# Patient Record
Sex: FEMALE | Race: BLACK OR AFRICAN AMERICAN | Employment: OTHER | URBAN - METROPOLITAN AREA
[De-identification: names, ages, dates, MRNs, and addresses within clinical notes are randomized per-mention and may not be internally consistent; named-entity substitution may affect disease eponyms.]

---

## 2017-01-25 RX ORDER — FLUTICASONE PROPIONATE 50 MCG
SPRAY, SUSPENSION (ML) NASAL
Qty: 1 BOTTLE | Refills: 3 | Status: CANCELLED | OUTPATIENT
Start: 2017-01-25

## 2017-01-25 RX ORDER — FLUTICASONE PROPIONATE 50 MCG
SPRAY, SUSPENSION (ML) NASAL
Qty: 1 BOTTLE | Refills: 3 | Status: SHIPPED | OUTPATIENT
Start: 2017-01-25 | End: 2017-06-20

## 2017-02-27 RX ORDER — SOLIFENACIN SUCCINATE 10 MG/1
TABLET, FILM COATED ORAL
Qty: 30 TABLET | Refills: 0 | Status: SHIPPED | OUTPATIENT
Start: 2017-02-27 | End: 2017-04-04 | Stop reason: SDUPTHER

## 2017-03-23 ENCOUNTER — OFFICE VISIT (OUTPATIENT)
Dept: PRIMARY CARE CLINIC | Age: 62
End: 2017-03-23

## 2017-03-23 VITALS
HEART RATE: 58 BPM | WEIGHT: 233.6 LBS | OXYGEN SATURATION: 98 % | BODY MASS INDEX: 36.59 KG/M2 | SYSTOLIC BLOOD PRESSURE: 122 MMHG | TEMPERATURE: 97.8 F | DIASTOLIC BLOOD PRESSURE: 76 MMHG

## 2017-03-23 DIAGNOSIS — E04.9 GOITER: Primary | ICD-10-CM

## 2017-03-23 DIAGNOSIS — L85.3 DRY SKIN: ICD-10-CM

## 2017-03-23 DIAGNOSIS — E04.9 GOITER: ICD-10-CM

## 2017-03-23 DIAGNOSIS — R10.13 EPIGASTRIC PAIN: ICD-10-CM

## 2017-03-23 LAB
ALBUMIN SERPL-MCNC: 4.1 G/DL (ref 3.4–5)
ALP BLD-CCNC: 86 U/L (ref 40–129)
ALT SERPL-CCNC: 8 U/L (ref 10–40)
AST SERPL-CCNC: 14 U/L (ref 15–37)
BASOPHILS ABSOLUTE: 0 K/UL (ref 0–0.2)
BASOPHILS RELATIVE PERCENT: 1 %
BILIRUB SERPL-MCNC: <0.2 MG/DL (ref 0–1)
BILIRUBIN DIRECT: <0.2 MG/DL (ref 0–0.3)
BILIRUBIN, INDIRECT: ABNORMAL MG/DL (ref 0–1)
EOSINOPHILS ABSOLUTE: 0.3 K/UL (ref 0–0.6)
EOSINOPHILS RELATIVE PERCENT: 6.6 %
HCT VFR BLD CALC: 40.1 % (ref 36–48)
HEMOGLOBIN: 12.7 G/DL (ref 12–16)
LIPASE: 11 U/L (ref 13–60)
LYMPHOCYTES ABSOLUTE: 1.5 K/UL (ref 1–5.1)
LYMPHOCYTES RELATIVE PERCENT: 36.3 %
MCH RBC QN AUTO: 28.2 PG (ref 26–34)
MCHC RBC AUTO-ENTMCNC: 31.7 G/DL (ref 31–36)
MCV RBC AUTO: 89 FL (ref 80–100)
MONOCYTES ABSOLUTE: 0.4 K/UL (ref 0–1.3)
MONOCYTES RELATIVE PERCENT: 10 %
NEUTROPHILS ABSOLUTE: 1.9 K/UL (ref 1.7–7.7)
NEUTROPHILS RELATIVE PERCENT: 46.1 %
PDW BLD-RTO: 14.5 % (ref 12.4–15.4)
PLATELET # BLD: 264 K/UL (ref 135–450)
PMV BLD AUTO: 9.2 FL (ref 5–10.5)
RBC # BLD: 4.51 M/UL (ref 4–5.2)
TOTAL PROTEIN: 7.7 G/DL (ref 6.4–8.2)
TROPONIN: <0.01 NG/ML
TSH REFLEX: 1.56 UIU/ML (ref 0.27–4.2)
WBC # BLD: 4.2 K/UL (ref 4–11)

## 2017-03-23 PROCEDURE — 1036F TOBACCO NON-USER: CPT | Performed by: INTERNAL MEDICINE

## 2017-03-23 PROCEDURE — 99214 OFFICE O/P EST MOD 30 MIN: CPT | Performed by: INTERNAL MEDICINE

## 2017-03-23 PROCEDURE — 3017F COLORECTAL CA SCREEN DOC REV: CPT | Performed by: INTERNAL MEDICINE

## 2017-03-23 PROCEDURE — G8417 CALC BMI ABV UP PARAM F/U: HCPCS | Performed by: INTERNAL MEDICINE

## 2017-03-23 PROCEDURE — G8427 DOCREV CUR MEDS BY ELIG CLIN: HCPCS | Performed by: INTERNAL MEDICINE

## 2017-03-23 PROCEDURE — G8484 FLU IMMUNIZE NO ADMIN: HCPCS | Performed by: INTERNAL MEDICINE

## 2017-03-23 PROCEDURE — 3014F SCREEN MAMMO DOC REV: CPT | Performed by: INTERNAL MEDICINE

## 2017-03-23 ASSESSMENT — ENCOUNTER SYMPTOMS
ABDOMINAL DISTENTION: 0
SORE THROAT: 0
NAUSEA: 1
BLOOD IN STOOL: 0
RECTAL PAIN: 0
CONSTIPATION: 0
ANAL BLEEDING: 0
ABDOMINAL PAIN: 1
DIARRHEA: 1
BACK PAIN: 0
VOMITING: 1
SHORTNESS OF BREATH: 0
COUGH: 0

## 2017-03-27 ENCOUNTER — TELEPHONE (OUTPATIENT)
Dept: PRIMARY CARE CLINIC | Age: 62
End: 2017-03-27

## 2017-03-27 ENCOUNTER — HOSPITAL ENCOUNTER (OUTPATIENT)
Dept: ULTRASOUND IMAGING | Age: 62
Discharge: HOME | End: 2017-03-27

## 2017-03-27 DIAGNOSIS — E04.2 MULTINODULAR GOITER (NONTOXIC): Primary | ICD-10-CM

## 2017-03-27 DIAGNOSIS — R10.13 EPIGASTRIC PAIN: ICD-10-CM

## 2017-03-27 DIAGNOSIS — E04.9 NONTOXIC GOITER: ICD-10-CM

## 2017-03-27 DIAGNOSIS — E04.9 GOITER: ICD-10-CM

## 2017-03-30 ENCOUNTER — OFFICE VISIT (OUTPATIENT)
Dept: ENT CLINIC | Age: 62
End: 2017-03-30

## 2017-03-30 VITALS
HEART RATE: 71 BPM | DIASTOLIC BLOOD PRESSURE: 81 MMHG | RESPIRATION RATE: 24 BRPM | HEIGHT: 67 IN | WEIGHT: 230 LBS | BODY MASS INDEX: 36.1 KG/M2 | SYSTOLIC BLOOD PRESSURE: 131 MMHG

## 2017-03-30 DIAGNOSIS — E04.1 THYROID NODULE: Primary | ICD-10-CM

## 2017-03-30 DIAGNOSIS — J30.9 ALLERGIC SINUSITIS: ICD-10-CM

## 2017-03-30 PROCEDURE — 3014F SCREEN MAMMO DOC REV: CPT | Performed by: OTOLARYNGOLOGY

## 2017-03-30 PROCEDURE — 99203 OFFICE O/P NEW LOW 30 MIN: CPT | Performed by: OTOLARYNGOLOGY

## 2017-03-30 PROCEDURE — G8417 CALC BMI ABV UP PARAM F/U: HCPCS | Performed by: OTOLARYNGOLOGY

## 2017-03-30 PROCEDURE — 1036F TOBACCO NON-USER: CPT | Performed by: OTOLARYNGOLOGY

## 2017-03-30 PROCEDURE — G8427 DOCREV CUR MEDS BY ELIG CLIN: HCPCS | Performed by: OTOLARYNGOLOGY

## 2017-03-30 PROCEDURE — G8484 FLU IMMUNIZE NO ADMIN: HCPCS | Performed by: OTOLARYNGOLOGY

## 2017-03-30 PROCEDURE — 3017F COLORECTAL CA SCREEN DOC REV: CPT | Performed by: OTOLARYNGOLOGY

## 2017-03-30 RX ORDER — LEVOTHYROXINE SODIUM 0.1 MG/1
100 TABLET ORAL DAILY
Qty: 30 TABLET | Refills: 3 | Status: SHIPPED | OUTPATIENT
Start: 2017-03-30 | End: 2017-06-20 | Stop reason: SDUPTHER

## 2017-03-30 RX ORDER — METHYLPREDNISOLONE 4 MG/1
4 TABLET ORAL SEE ADMIN INSTRUCTIONS
Qty: 1 KIT | Refills: 0 | Status: SHIPPED | OUTPATIENT
Start: 2017-03-30 | End: 2017-06-20

## 2017-03-30 ASSESSMENT — ENCOUNTER SYMPTOMS
FACIAL SWELLING: 0
ALLERGIC/IMMUNOLOGIC NEGATIVE: 1
RESPIRATORY NEGATIVE: 1
EYES NEGATIVE: 1
STRIDOR: 0
VOICE CHANGE: 0
SORE THROAT: 0
TROUBLE SWALLOWING: 1

## 2017-04-17 ENCOUNTER — TELEPHONE (OUTPATIENT)
Dept: PRIMARY CARE CLINIC | Age: 62
End: 2017-04-17

## 2017-04-17 RX ORDER — FLUCONAZOLE 150 MG/1
150 TABLET ORAL ONCE
Qty: 1 TABLET | Refills: 0 | Status: SHIPPED | OUTPATIENT
Start: 2017-04-17 | End: 2017-04-17

## 2017-04-18 ENCOUNTER — TELEPHONE (OUTPATIENT)
Dept: ENT CLINIC | Age: 62
End: 2017-04-18

## 2017-04-18 DIAGNOSIS — J02.9 PHARYNGITIS, UNSPECIFIED ETIOLOGY: Primary | ICD-10-CM

## 2017-04-18 RX ORDER — AZITHROMYCIN 250 MG/1
250 TABLET, FILM COATED ORAL DAILY
Qty: 1 PACKET | Refills: 0 | Status: SHIPPED | OUTPATIENT
Start: 2017-04-18 | End: 2017-06-20

## 2017-05-02 ENCOUNTER — OFFICE VISIT (OUTPATIENT)
Dept: ENT CLINIC | Age: 62
End: 2017-05-02

## 2017-05-02 VITALS
WEIGHT: 220 LBS | HEIGHT: 67 IN | DIASTOLIC BLOOD PRESSURE: 67 MMHG | HEART RATE: 63 BPM | SYSTOLIC BLOOD PRESSURE: 124 MMHG | BODY MASS INDEX: 34.53 KG/M2

## 2017-05-02 DIAGNOSIS — E04.1 THYROID NODULE: Primary | ICD-10-CM

## 2017-05-02 DIAGNOSIS — J34.2 NOSE SEPTUM DEVIATION: ICD-10-CM

## 2017-05-02 DIAGNOSIS — H61.23 BILATERAL IMPACTED CERUMEN: ICD-10-CM

## 2017-05-02 PROCEDURE — 3017F COLORECTAL CA SCREEN DOC REV: CPT | Performed by: OTOLARYNGOLOGY

## 2017-05-02 PROCEDURE — 1036F TOBACCO NON-USER: CPT | Performed by: OTOLARYNGOLOGY

## 2017-05-02 PROCEDURE — G8417 CALC BMI ABV UP PARAM F/U: HCPCS | Performed by: OTOLARYNGOLOGY

## 2017-05-02 PROCEDURE — 99213 OFFICE O/P EST LOW 20 MIN: CPT | Performed by: OTOLARYNGOLOGY

## 2017-05-02 PROCEDURE — 3014F SCREEN MAMMO DOC REV: CPT | Performed by: OTOLARYNGOLOGY

## 2017-05-02 PROCEDURE — 69210 REMOVE IMPACTED EAR WAX UNI: CPT | Performed by: OTOLARYNGOLOGY

## 2017-05-02 PROCEDURE — G8427 DOCREV CUR MEDS BY ELIG CLIN: HCPCS | Performed by: OTOLARYNGOLOGY

## 2017-05-08 ENCOUNTER — OFFICE VISIT (OUTPATIENT)
Dept: PRIMARY CARE CLINIC | Age: 62
End: 2017-05-08

## 2017-05-08 DIAGNOSIS — R07.89 MIDSTERNAL CHEST PAIN: Primary | ICD-10-CM

## 2017-05-08 DIAGNOSIS — Z11.4 SCREENING FOR HIV WITHOUT PRESENCE OF RISK FACTORS: ICD-10-CM

## 2017-05-08 DIAGNOSIS — Z23 NEED FOR PNEUMOCOCCAL VACCINATION: ICD-10-CM

## 2017-05-08 DIAGNOSIS — Z23 NEED FOR PROPHYLACTIC VACCINATION AGAINST DIPHTHERIA-TETANUS-PERTUSSIS (DTP): ICD-10-CM

## 2017-05-08 DIAGNOSIS — E78.01 FAMILIAL HYPERCHOLESTEROLEMIA: ICD-10-CM

## 2017-05-08 DIAGNOSIS — Z23 NEED FOR TDAP VACCINATION: ICD-10-CM

## 2017-05-08 DIAGNOSIS — Z11.59 NEED FOR HEPATITIS C SCREENING TEST: ICD-10-CM

## 2017-05-08 DIAGNOSIS — Z13.220 SCREENING FOR HYPERLIPIDEMIA: ICD-10-CM

## 2017-05-08 DIAGNOSIS — Z11.4 SCREENING FOR HIV (HUMAN IMMUNODEFICIENCY VIRUS): ICD-10-CM

## 2017-05-08 DIAGNOSIS — Z23 NEED FOR PROPHYLACTIC VACCINATION AGAINST STREPTOCOCCUS PNEUMONIAE (PNEUMOCOCCUS): ICD-10-CM

## 2017-05-08 PROBLEM — K44.9 BERGMANN'S SYNDROME: Status: ACTIVE | Noted: 2017-05-08

## 2017-05-08 PROBLEM — K85.90 PANCREATITIS: Status: ACTIVE | Noted: 2017-05-08

## 2017-05-08 LAB
A/G RATIO: 1.1 (ref 1.1–2.2)
ALBUMIN SERPL-MCNC: 4.1 G/DL (ref 3.4–5)
ALP BLD-CCNC: 87 U/L (ref 40–129)
ALT SERPL-CCNC: 8 U/L (ref 10–40)
ANION GAP SERPL CALCULATED.3IONS-SCNC: 14 MMOL/L (ref 3–16)
AST SERPL-CCNC: 13 U/L (ref 15–37)
BILIRUB SERPL-MCNC: 0.4 MG/DL (ref 0–1)
BUN BLDV-MCNC: 12 MG/DL (ref 7–20)
CALCIUM SERPL-MCNC: 9.7 MG/DL (ref 8.3–10.6)
CHLORIDE BLD-SCNC: 106 MMOL/L (ref 99–110)
CHOLESTEROL, TOTAL: 215 MG/DL (ref 0–199)
CO2: 22 MMOL/L (ref 21–32)
CREAT SERPL-MCNC: 0.6 MG/DL (ref 0.6–1.1)
GFR AFRICAN AMERICAN: >60
GFR NON-AFRICAN AMERICAN: >60
GLOBULIN: 3.6 G/DL
GLUCOSE BLD-MCNC: 73 MG/DL (ref 70–99)
HDLC SERPL-MCNC: 67 MG/DL (ref 40–60)
LDL CHOLESTEROL CALCULATED: 133 MG/DL
POTASSIUM SERPL-SCNC: 4.3 MMOL/L (ref 3.5–5.1)
SODIUM BLD-SCNC: 142 MMOL/L (ref 136–145)
TOTAL PROTEIN: 7.7 G/DL (ref 6.4–8.2)
TRIGL SERPL-MCNC: 74 MG/DL (ref 0–150)
VLDLC SERPL CALC-MCNC: 15 MG/DL

## 2017-05-08 PROCEDURE — 1036F TOBACCO NON-USER: CPT | Performed by: NURSE PRACTITIONER

## 2017-05-08 PROCEDURE — G8427 DOCREV CUR MEDS BY ELIG CLIN: HCPCS | Performed by: NURSE PRACTITIONER

## 2017-05-08 PROCEDURE — 93000 ELECTROCARDIOGRAM COMPLETE: CPT | Performed by: NURSE PRACTITIONER

## 2017-05-08 PROCEDURE — G0009 ADMIN PNEUMOCOCCAL VACCINE: HCPCS | Performed by: NURSE PRACTITIONER

## 2017-05-08 PROCEDURE — 99214 OFFICE O/P EST MOD 30 MIN: CPT | Performed by: NURSE PRACTITIONER

## 2017-05-08 PROCEDURE — 3017F COLORECTAL CA SCREEN DOC REV: CPT | Performed by: NURSE PRACTITIONER

## 2017-05-08 PROCEDURE — 90715 TDAP VACCINE 7 YRS/> IM: CPT | Performed by: NURSE PRACTITIONER

## 2017-05-08 PROCEDURE — G8417 CALC BMI ABV UP PARAM F/U: HCPCS | Performed by: NURSE PRACTITIONER

## 2017-05-08 PROCEDURE — 90670 PCV13 VACCINE IM: CPT | Performed by: NURSE PRACTITIONER

## 2017-05-08 PROCEDURE — 90471 IMMUNIZATION ADMIN: CPT | Performed by: NURSE PRACTITIONER

## 2017-05-08 PROCEDURE — 3014F SCREEN MAMMO DOC REV: CPT | Performed by: NURSE PRACTITIONER

## 2017-05-08 RX ORDER — AMOXICILLIN 250 MG
1 CAPSULE ORAL DAILY PRN
Qty: 30 TABLET | Refills: 0 | Status: SHIPPED | OUTPATIENT
Start: 2017-05-08 | End: 2018-02-01

## 2017-05-08 RX ORDER — SUCRALFATE 1 G/1
1 TABLET ORAL 4 TIMES DAILY
Qty: 120 TABLET | Refills: 3 | Status: SHIPPED | OUTPATIENT
Start: 2017-05-08 | End: 2017-07-18 | Stop reason: SDUPTHER

## 2017-05-08 ASSESSMENT — ENCOUNTER SYMPTOMS
CHEST TIGHTNESS: 0
ABDOMINAL PAIN: 1
ABDOMINAL DISTENTION: 0
BLOOD IN STOOL: 0
DIARRHEA: 0
CHOKING: 0
CONSTIPATION: 1
WHEEZING: 0
SHORTNESS OF BREATH: 0
PHOTOPHOBIA: 0
VOICE CHANGE: 0
NAUSEA: 0
FACIAL SWELLING: 0
COUGH: 0
VOMITING: 0

## 2017-05-08 ASSESSMENT — PATIENT HEALTH QUESTIONNAIRE - PHQ9
SUM OF ALL RESPONSES TO PHQ QUESTIONS 1-9: 0
2. FEELING DOWN, DEPRESSED OR HOPELESS: 0
1. LITTLE INTEREST OR PLEASURE IN DOING THINGS: 0
SUM OF ALL RESPONSES TO PHQ9 QUESTIONS 1 & 2: 0

## 2017-05-09 LAB — HEPATITIS C ANTIBODY INTERPRETATION: NORMAL

## 2017-05-10 ENCOUNTER — PRE-PROCEDURE TELEPHONE (OUTPATIENT)
Dept: INTERVENTIONAL RADIOLOGY/VASCULAR | Age: 62
End: 2017-05-10

## 2017-05-10 LAB — HIV-1 AND HIV-2 ANTIBODIES: NEGATIVE

## 2017-05-17 ENCOUNTER — HOSPITAL ENCOUNTER (OUTPATIENT)
Dept: INTERVENTIONAL RADIOLOGY/VASCULAR | Age: 62
Discharge: HOME | End: 2017-05-17
Attending: OTOLARYNGOLOGY | Admitting: OTOLARYNGOLOGY

## 2017-05-17 VITALS
RESPIRATION RATE: 16 BRPM | SYSTOLIC BLOOD PRESSURE: 137 MMHG | DIASTOLIC BLOOD PRESSURE: 76 MMHG | OXYGEN SATURATION: 99 % | TEMPERATURE: 97.4 F | HEART RATE: 62 BPM

## 2017-05-17 DIAGNOSIS — E04.1 NONTOXIC UNINODULAR GOITER: ICD-10-CM

## 2017-05-17 RX ORDER — LIDOCAINE HYDROCHLORIDE 10 MG/ML
5 INJECTION, SOLUTION EPIDURAL; INFILTRATION; INTRACAUDAL; PERINEURAL ONCE
Status: COMPLETED | OUTPATIENT
Start: 2017-05-17 | End: 2017-05-17

## 2017-05-17 RX ORDER — BACITRACIN ZINC AND POLYMYXIN B SULFATE 500; 1000 [USP'U]/G; [USP'U]/G
OINTMENT TOPICAL DAILY
Status: COMPLETED | OUTPATIENT
Start: 2017-05-17 | End: 2017-05-17

## 2017-05-17 RX ADMIN — BACITRACIN ZINC AND POLYMYXIN B SULFATE: 500; 1000 OINTMENT TOPICAL at 09:30

## 2017-05-17 RX ADMIN — LIDOCAINE HYDROCHLORIDE 5 ML: 10 INJECTION, SOLUTION EPIDURAL; INFILTRATION; INTRACAUDAL; PERINEURAL at 09:25

## 2017-05-17 ASSESSMENT — PAIN - FUNCTIONAL ASSESSMENT: PAIN_FUNCTIONAL_ASSESSMENT: 0-10

## 2017-05-18 ENCOUNTER — TELEPHONE (OUTPATIENT)
Dept: ENT CLINIC | Age: 62
End: 2017-05-18

## 2017-05-25 ENCOUNTER — TELEPHONE (OUTPATIENT)
Dept: PRIMARY CARE CLINIC | Age: 62
End: 2017-05-25

## 2017-06-01 DIAGNOSIS — R92.8 ABNORMAL MAMMOGRAM OF RIGHT BREAST: Primary | ICD-10-CM

## 2017-06-20 ENCOUNTER — OFFICE VISIT (OUTPATIENT)
Dept: PRIMARY CARE CLINIC | Age: 62
End: 2017-06-20

## 2017-06-20 VITALS
HEIGHT: 67 IN | WEIGHT: 224 LBS | BODY MASS INDEX: 35.16 KG/M2 | RESPIRATION RATE: 16 BRPM | HEART RATE: 61 BPM | SYSTOLIC BLOOD PRESSURE: 130 MMHG | DIASTOLIC BLOOD PRESSURE: 80 MMHG | OXYGEN SATURATION: 99 % | TEMPERATURE: 97.7 F

## 2017-06-20 DIAGNOSIS — R32 INCONTINENCE: ICD-10-CM

## 2017-06-20 DIAGNOSIS — I10 ESSENTIAL HYPERTENSION: ICD-10-CM

## 2017-06-20 DIAGNOSIS — F32.A ANXIETY AND DEPRESSION: Primary | ICD-10-CM

## 2017-06-20 DIAGNOSIS — E03.9 ACQUIRED HYPOTHYROIDISM: ICD-10-CM

## 2017-06-20 DIAGNOSIS — K21.00 GASTROESOPHAGEAL REFLUX DISEASE WITH ESOPHAGITIS: ICD-10-CM

## 2017-06-20 DIAGNOSIS — D64.9 ANEMIA, UNSPECIFIED TYPE: ICD-10-CM

## 2017-06-20 DIAGNOSIS — F41.9 ANXIETY AND DEPRESSION: Primary | ICD-10-CM

## 2017-06-20 DIAGNOSIS — N95.1 MENOPAUSAL SYNDROME (HOT FLASHES): ICD-10-CM

## 2017-06-20 PROCEDURE — 3014F SCREEN MAMMO DOC REV: CPT | Performed by: INTERNAL MEDICINE

## 2017-06-20 PROCEDURE — G8427 DOCREV CUR MEDS BY ELIG CLIN: HCPCS | Performed by: INTERNAL MEDICINE

## 2017-06-20 PROCEDURE — 1036F TOBACCO NON-USER: CPT | Performed by: INTERNAL MEDICINE

## 2017-06-20 PROCEDURE — G8417 CALC BMI ABV UP PARAM F/U: HCPCS | Performed by: INTERNAL MEDICINE

## 2017-06-20 PROCEDURE — 99214 OFFICE O/P EST MOD 30 MIN: CPT | Performed by: INTERNAL MEDICINE

## 2017-06-20 PROCEDURE — 3017F COLORECTAL CA SCREEN DOC REV: CPT | Performed by: INTERNAL MEDICINE

## 2017-06-20 RX ORDER — SOLIFENACIN SUCCINATE 10 MG/1
10 TABLET, FILM COATED ORAL DAILY
Qty: 30 TABLET | Refills: 5 | Status: SHIPPED | OUTPATIENT
Start: 2017-06-20 | End: 2017-07-17 | Stop reason: SDUPTHER

## 2017-06-20 RX ORDER — ATENOLOL 50 MG/1
TABLET ORAL
Qty: 90 TABLET | Refills: 1 | Status: SHIPPED | OUTPATIENT
Start: 2017-06-20 | End: 2017-11-28 | Stop reason: SDUPTHER

## 2017-06-20 RX ORDER — LEVOTHYROXINE SODIUM 0.1 MG/1
100 TABLET ORAL DAILY
Qty: 30 TABLET | Refills: 3 | Status: SHIPPED | OUTPATIENT
Start: 2017-06-20 | End: 2017-11-28

## 2017-06-20 RX ORDER — LANOLIN ALCOHOL/MO/W.PET/CERES
325 CREAM (GRAM) TOPICAL
Qty: 90 TABLET | Refills: 3 | Status: SHIPPED | OUTPATIENT
Start: 2017-06-20 | End: 2017-11-28

## 2017-06-20 RX ORDER — AMLODIPINE BESYLATE 10 MG/1
TABLET ORAL
Qty: 90 TABLET | Refills: 1 | Status: SHIPPED | OUTPATIENT
Start: 2017-06-20 | End: 2017-11-28 | Stop reason: SDUPTHER

## 2017-06-20 ASSESSMENT — ENCOUNTER SYMPTOMS
CONSTIPATION: 1
CHOKING: 0
ABDOMINAL DISTENTION: 0
NAUSEA: 0
COUGH: 0
SHORTNESS OF BREATH: 0
VOICE CHANGE: 0
CHEST TIGHTNESS: 0
BLOOD IN STOOL: 0
PHOTOPHOBIA: 0
VOMITING: 0
DIARRHEA: 0
WHEEZING: 0
FACIAL SWELLING: 0

## 2017-07-10 RX ORDER — FLUTICASONE PROPIONATE 50 MCG
SPRAY, SUSPENSION (ML) NASAL
Qty: 1 BOTTLE | Refills: 3 | Status: SHIPPED | OUTPATIENT
Start: 2017-07-10 | End: 2017-09-26

## 2017-07-11 ENCOUNTER — OFFICE VISIT (OUTPATIENT)
Dept: ENT CLINIC | Age: 62
End: 2017-07-11

## 2017-07-11 VITALS — BODY MASS INDEX: 34.53 KG/M2 | WEIGHT: 220 LBS | HEIGHT: 67 IN

## 2017-07-11 DIAGNOSIS — J32.9 RECURRENT SINUSITIS: Primary | ICD-10-CM

## 2017-07-11 DIAGNOSIS — J30.9 ALLERGIC SINUSITIS: ICD-10-CM

## 2017-07-11 PROCEDURE — 3014F SCREEN MAMMO DOC REV: CPT | Performed by: OTOLARYNGOLOGY

## 2017-07-11 PROCEDURE — 99213 OFFICE O/P EST LOW 20 MIN: CPT | Performed by: OTOLARYNGOLOGY

## 2017-07-11 PROCEDURE — G8428 CUR MEDS NOT DOCUMENT: HCPCS | Performed by: OTOLARYNGOLOGY

## 2017-07-11 PROCEDURE — 3017F COLORECTAL CA SCREEN DOC REV: CPT | Performed by: OTOLARYNGOLOGY

## 2017-07-11 PROCEDURE — G8417 CALC BMI ABV UP PARAM F/U: HCPCS | Performed by: OTOLARYNGOLOGY

## 2017-07-11 PROCEDURE — 1036F TOBACCO NON-USER: CPT | Performed by: OTOLARYNGOLOGY

## 2017-07-11 RX ORDER — BUTALBITAL, ACETAMINOPHEN AND CAFFEINE 50; 325; 40 MG/1; MG/1; MG/1
1 TABLET ORAL EVERY 4 HOURS PRN
Qty: 30 TABLET | Refills: 1 | Status: SHIPPED | OUTPATIENT
Start: 2017-07-11 | End: 2017-09-26

## 2017-07-11 RX ORDER — AZITHROMYCIN 250 MG/1
TABLET, FILM COATED ORAL
Qty: 1 PACKET | Refills: 0 | Status: SHIPPED | OUTPATIENT
Start: 2017-07-11 | End: 2017-09-26

## 2017-07-11 RX ORDER — PREDNISONE 10 MG/1
TABLET ORAL
Qty: 25 TABLET | Refills: 0 | Status: SHIPPED | OUTPATIENT
Start: 2017-07-11 | End: 2017-09-26

## 2017-07-17 DIAGNOSIS — R32 INCONTINENCE: ICD-10-CM

## 2017-07-17 RX ORDER — SOLIFENACIN SUCCINATE 10 MG/1
10 TABLET, FILM COATED ORAL DAILY
Qty: 30 TABLET | Refills: 2 | Status: SHIPPED | OUTPATIENT
Start: 2017-07-17 | End: 2017-11-28 | Stop reason: SDUPTHER

## 2017-07-18 ENCOUNTER — OFFICE VISIT (OUTPATIENT)
Dept: PRIMARY CARE CLINIC | Age: 62
End: 2017-07-18

## 2017-07-18 VITALS
HEART RATE: 63 BPM | BODY MASS INDEX: 35.16 KG/M2 | OXYGEN SATURATION: 98 % | SYSTOLIC BLOOD PRESSURE: 110 MMHG | DIASTOLIC BLOOD PRESSURE: 69 MMHG | TEMPERATURE: 97.9 F | HEIGHT: 67 IN | RESPIRATION RATE: 17 BRPM | WEIGHT: 224 LBS

## 2017-07-18 DIAGNOSIS — R10.13 EPIGASTRIC PAIN: ICD-10-CM

## 2017-07-18 DIAGNOSIS — R10.13 EPIGASTRIC PAIN: Primary | ICD-10-CM

## 2017-07-18 DIAGNOSIS — R07.89 MIDSTERNAL CHEST PAIN: ICD-10-CM

## 2017-07-18 DIAGNOSIS — K21.00 GASTROESOPHAGEAL REFLUX DISEASE WITH ESOPHAGITIS: ICD-10-CM

## 2017-07-18 DIAGNOSIS — F32.A ANXIETY AND DEPRESSION: ICD-10-CM

## 2017-07-18 DIAGNOSIS — F41.9 ANXIETY AND DEPRESSION: ICD-10-CM

## 2017-07-18 LAB
A/G RATIO: 1 (ref 1.1–2.2)
ALBUMIN SERPL-MCNC: 3.9 G/DL (ref 3.4–5)
ALP BLD-CCNC: 91 U/L (ref 40–129)
ALT SERPL-CCNC: 12 U/L (ref 10–40)
AMYLASE: 74 U/L (ref 25–115)
ANION GAP SERPL CALCULATED.3IONS-SCNC: 15 MMOL/L (ref 3–16)
AST SERPL-CCNC: 15 U/L (ref 15–37)
BILIRUB SERPL-MCNC: 0.3 MG/DL (ref 0–1)
BUN BLDV-MCNC: 11 MG/DL (ref 7–20)
CALCIUM SERPL-MCNC: 9.6 MG/DL (ref 8.3–10.6)
CHLORIDE BLD-SCNC: 102 MMOL/L (ref 99–110)
CO2: 22 MMOL/L (ref 21–32)
CREAT SERPL-MCNC: 0.6 MG/DL (ref 0.6–1.2)
GFR AFRICAN AMERICAN: >60
GFR NON-AFRICAN AMERICAN: >60
GLOBULIN: 4 G/DL
GLUCOSE BLD-MCNC: 89 MG/DL (ref 70–99)
LIPASE: 9 U/L (ref 13–60)
POTASSIUM SERPL-SCNC: 4.2 MMOL/L (ref 3.5–5.1)
REASON FOR REJECTION: NORMAL
REJECTED TEST: NORMAL
SODIUM BLD-SCNC: 139 MMOL/L (ref 136–145)
TOTAL PROTEIN: 7.9 G/DL (ref 6.4–8.2)

## 2017-07-18 PROCEDURE — 3017F COLORECTAL CA SCREEN DOC REV: CPT | Performed by: INTERNAL MEDICINE

## 2017-07-18 PROCEDURE — 3014F SCREEN MAMMO DOC REV: CPT | Performed by: INTERNAL MEDICINE

## 2017-07-18 PROCEDURE — G8417 CALC BMI ABV UP PARAM F/U: HCPCS | Performed by: INTERNAL MEDICINE

## 2017-07-18 PROCEDURE — 99214 OFFICE O/P EST MOD 30 MIN: CPT | Performed by: INTERNAL MEDICINE

## 2017-07-18 PROCEDURE — G8427 DOCREV CUR MEDS BY ELIG CLIN: HCPCS | Performed by: INTERNAL MEDICINE

## 2017-07-18 PROCEDURE — 1036F TOBACCO NON-USER: CPT | Performed by: INTERNAL MEDICINE

## 2017-07-18 RX ORDER — SUCRALFATE 1 G/1
1 TABLET ORAL 4 TIMES DAILY
Qty: 120 TABLET | Refills: 3 | Status: SHIPPED | OUTPATIENT
Start: 2017-07-18 | End: 2017-11-28

## 2017-07-18 ASSESSMENT — ENCOUNTER SYMPTOMS
ABDOMINAL PAIN: 1
COUGH: 0
PHOTOPHOBIA: 0
FACIAL SWELLING: 0
BLOOD IN STOOL: 0
VOICE CHANGE: 0
CHEST TIGHTNESS: 0
SHORTNESS OF BREATH: 0
DIARRHEA: 0
CONSTIPATION: 1
CHOKING: 0
NAUSEA: 0
ABDOMINAL DISTENTION: 0
VOMITING: 0
WHEEZING: 0

## 2017-07-21 ENCOUNTER — TELEPHONE (OUTPATIENT)
Dept: ENT CLINIC | Age: 62
End: 2017-07-21

## 2017-07-21 ENCOUNTER — HOSPITAL ENCOUNTER (OUTPATIENT)
Dept: CT IMAGING | Age: 62
Discharge: HOME | End: 2017-07-21
Attending: OTOLARYNGOLOGY | Admitting: OTOLARYNGOLOGY

## 2017-07-21 DIAGNOSIS — J32.9 CHRONIC SINUSITIS: ICD-10-CM

## 2017-07-21 DIAGNOSIS — J32.9 RECURRENT SINUSITIS: ICD-10-CM

## 2017-07-25 ENCOUNTER — TELEPHONE (OUTPATIENT)
Dept: ENT CLINIC | Age: 62
End: 2017-07-25

## 2017-08-07 DIAGNOSIS — J45.909 UNCOMPLICATED ASTHMA, UNSPECIFIED ASTHMA SEVERITY: Primary | ICD-10-CM

## 2017-08-17 ENCOUNTER — TELEPHONE (OUTPATIENT)
Dept: PRIMARY CARE CLINIC | Age: 62
End: 2017-08-17

## 2017-08-18 RX ORDER — FLUCONAZOLE 100 MG/1
200 TABLET ORAL ONCE
Qty: 2 TABLET | Refills: 0 | Status: SHIPPED | OUTPATIENT
Start: 2017-08-18 | End: 2017-08-18

## 2017-09-14 ENCOUNTER — TELEPHONE (OUTPATIENT)
Dept: ENT CLINIC | Age: 62
End: 2017-09-14

## 2017-09-14 DIAGNOSIS — J30.9 ALLERGIC SINUSITIS: Primary | ICD-10-CM

## 2017-09-14 DIAGNOSIS — G44.86 HEADACHE, CERVICOGENIC: ICD-10-CM

## 2017-09-14 RX ORDER — METHYLPREDNISOLONE 4 MG/1
4 TABLET ORAL SEE ADMIN INSTRUCTIONS
Qty: 1 KIT | Refills: 0 | Status: SHIPPED | OUTPATIENT
Start: 2017-09-14 | End: 2017-09-26

## 2017-09-14 RX ORDER — BUTALBITAL, ACETAMINOPHEN AND CAFFEINE 50; 325; 40 MG/1; MG/1; MG/1
1 TABLET ORAL EVERY 4 HOURS PRN
Qty: 30 TABLET | Refills: 0 | Status: SHIPPED | OUTPATIENT
Start: 2017-09-14 | End: 2017-11-28

## 2017-10-08 DIAGNOSIS — E04.1 THYROID NODULE: ICD-10-CM

## 2017-10-09 RX ORDER — LEVOTHYROXINE SODIUM 0.1 MG/1
100 TABLET ORAL DAILY
Qty: 30 TABLET | Refills: 0 | Status: SHIPPED | OUTPATIENT
Start: 2017-10-09 | End: 2017-11-28

## 2017-10-11 ENCOUNTER — HOSPITAL ENCOUNTER (OUTPATIENT)
Dept: PHYSICAL THERAPY | Age: 62
Discharge: HOME | End: 2017-10-31
Admitting: ORTHOPAEDIC SURGERY

## 2017-10-11 ASSESSMENT — PAIN SCALES - GENERAL: PAINLEVEL_OUTOF10: 8

## 2017-10-11 ASSESSMENT — PAIN DESCRIPTION - PROGRESSION: CLINICAL_PROGRESSION: NOT CHANGED

## 2017-10-11 ASSESSMENT — PAIN DESCRIPTION - DESCRIPTORS: DESCRIPTORS: SHARP;STABBING;NUMBNESS

## 2017-10-11 ASSESSMENT — PAIN DESCRIPTION - ORIENTATION: ORIENTATION: RIGHT

## 2017-10-11 ASSESSMENT — PAIN DESCRIPTION - FREQUENCY: FREQUENCY: INTERMITTENT

## 2017-10-11 ASSESSMENT — PAIN DESCRIPTION - LOCATION: LOCATION: KNEE

## 2017-10-11 ASSESSMENT — PAIN DESCRIPTION - PAIN TYPE: TYPE: ACUTE PAIN

## 2017-10-11 NOTE — PROGRESS NOTES
Intervention(s): Medication (see eMar); Cold applied  Vital Signs  Patient Currently in Pain: Yes    Vision/Hearing  Vision  Vision: Within Functional Limits  Hearing  Hearing: Within functional limits    Orientation  Orientation  Overall Orientation Status: Within Normal Limits    Social/Functional History  Social/Functional History  Lives With: Alone  Type of Home: Apartment  Home Layout:  (Apartment is on the first level, no stairs to enter however a small flight to get to the laundry. Pt does not use that laundry as pt with difficulty with step, pt's son takes it to the laundry mat)  Bathroom Shower/Tub: Tub/Shower unit (Pt reports that she often has difficulty getting into the shower )  Home Equipment: Cane  ADL Assistance: [address] [place] [address] Avenue: Independent  Homemaking Responsibilities: Yes (Pt with difficulty with heavy cleaning  )  Ambulation Assistance: Independent (Pt now uses a cane to ambulation however was independent without an AD prior to the injury)  Transfer Assistance: Independent  Active : No (Pt unable to drive d/t pain medication however was driving prior to the injury)  Occupation: Retired  Leisure & Hobbies: Pt enjoys going to [place], used to love [employer]. Traveling. Pt reports that it is difficult for her to enjoy her leisure time d/t pain. Objective     Observation/Palpation  Palpation: Pt TTP in medial and lateral joint line. Edema: Pt with a R lateral knee swelling  consistent with an enlarged bursa.       AROM RLE (degrees)  R Knee Flexion 0-145: 66 degrees in supine, 95 degrees in sitting  R Knee Extension 0: lacking 3 degrees  AROM LLE (degrees)  L Knee Flexion 0-145: 110 degrees  L Knee Extension 0: full extension    Strength RLE  R Hip Flexion: 4-/5  R Hip ABduction: 4-/5  R Hip ADduction: 4-/5  R Knee Flexion: 4/5  R Knee Extension: 4/5  Strength LLE  L Hip Flexion: 4-/5  L Hip ABduction: 4-/5  L Hip ADduction: 4-/5  L Knee Flexion: 4/5  L Knee Extension:

## 2017-10-11 NOTE — PLAN OF CARE
Outpatient Physical Therapy     Phone: [phone] Fax: [phone]     To: Referring Practitioner: Anne-Marie Crouch      Patient: Palmira Valdiiva   : 1955   MRN: [id number]  Evaluation Date: 10/11/2017      Diagnosis Information:  · Diagnosis: R knee sprain   · Treatment Diagnosis: decreased strength in the B LE, decreased AROM and PROM in the R knee, decreased balance, gait abnormalities, and decreased functional mobility     Physical Therapy Certification/Re-Certification Form  Dear Dr. Sosa April,  The following patient has been evaluated for physical therapy services. Please review the attached evaluation and/or summary of the patient's plan of care, and verify that you agree therapy should continue by signing the attached document and sending it back to our office. Plan of Care/Treatment to date:  [x] Therapeutic Exercise      [x] Modalities:  [x] Therapeutic Activity        [] Ultrasound    [x] Gait Training        [] Cervical Traction   [x] Neuromuscular Re-education      [x] Cold/hotpack    [x] Instruction in HEP        [] Lumbar Traction  [x] Manual Therapy        [x] Electrical Stimulation            [] Aquatic Therapy        [] Iontophoresis        ? [] Lymphedema management  [] Women's Health     Other:  [] Vestibular Rehab        []    []  Needed     Frequency/Duration:  # Days per week: [] 1 day # Weeks: [] 1 week [] 5 weeks     [x] 2 days? [] 2 weeks [x] 6 weeks     [] 3 days   [] 3 weeks [] 7 weeks     [] 4 days   [] 4 weeks [] 8 weeks    Rehab Potential: [] Excellent [x] Good [] Fair  [] Poor     Electronically signed by:  Daren Narvaez PT, DPT    If you have any questions or concerns, please don't hesitate to call.   Thank you for your referral.      Physician Signature:________________________________Date:__________________  By signing above, therapists plan is approved by physician

## 2017-10-11 NOTE — FLOWSHEET NOTE
Physical Therapy Daily Treatment Note  Date:  10/11/2017    Patient Name:  Lawrence Newman    :  1955  MRN: [id number]  Restrictions/Precautions:    Medical/Treatment Diagnosis Information:   · Diagnosis: R knee sprain  · Treatment Diagnosis: decreased strength in the B LE, decreased AROM and PROM in the R knee, decreased balance, gait abnormalities, and decreased functional mobility    Tracking Information:  Physician Information Referring Practitioner: Nitish Ernst     Plan of Care Sent Date: 10/11 Signed Received:    Visit Count / Total Visits      Insurance Approved Visits   Approved Dates:     Insurance Information PT Insurance Information: Medicare primary, Verneda Hacker secondary- Eval + 30 visits/year     Progress Note/G-codes   []  Yes  []  No Next Due:      Pain level: 8/10     Subjective:  SEE EVAL    Objective: SEE EVAL  Observation:   Test measurements:      Exercises:  Exercise/Equipment Resistance/Repetitions Other comments                                                                            Other Therapeutic Activities:  10/11: Patient educated on the focus of skilled physical therapy services and plan of care, including: diagnosis, prognosis, treatment goals & options. Pt also educated on BPPV and that should her vertigo symptoms becoming bothersome to her that she should discuss them with her MD as from her subjective report they are consistent with BPPV which is able to be treated.      Home Exercise Program:      Manual Treatments:      Modalities:      Timed Code Treatment Minutes:  38    Total Treatment Minutes:  60    Treatment/Activity Tolerance:  [x] Patient tolerated treatment well [] Patient limited by fatigue  [] Patient limited by pain  [] Patient limited by other medical complications  [] Other:     Prognosis: [x] Good [] Fair  [] Poor    Patient Requires Follow-up: [x] Yes  [] No    Plan:   [] Continue per plan of care [] Alter current plan (see comments)  [x]

## 2017-11-01 ENCOUNTER — HOSPITAL ENCOUNTER (OUTPATIENT)
Dept: OTHER | Age: 62
Discharge: HOME | End: 2017-11-30
Attending: ORTHOPAEDIC SURGERY | Admitting: ORTHOPAEDIC SURGERY

## 2017-11-06 ENCOUNTER — OFFICE VISIT (OUTPATIENT)
Dept: ENT CLINIC | Age: 62
End: 2017-11-06

## 2017-11-06 VITALS
HEART RATE: 67 BPM | SYSTOLIC BLOOD PRESSURE: 114 MMHG | HEIGHT: 67 IN | DIASTOLIC BLOOD PRESSURE: 63 MMHG | WEIGHT: 228 LBS | BODY MASS INDEX: 35.79 KG/M2

## 2017-11-06 DIAGNOSIS — J32.9 RECURRENT SINUSITIS: Primary | ICD-10-CM

## 2017-11-06 DIAGNOSIS — H61.21 IMPACTED CERUMEN OF RIGHT EAR: ICD-10-CM

## 2017-11-06 PROCEDURE — 99213 OFFICE O/P EST LOW 20 MIN: CPT | Performed by: OTOLARYNGOLOGY

## 2017-11-06 PROCEDURE — G8484 FLU IMMUNIZE NO ADMIN: HCPCS | Performed by: OTOLARYNGOLOGY

## 2017-11-06 PROCEDURE — G8417 CALC BMI ABV UP PARAM F/U: HCPCS | Performed by: OTOLARYNGOLOGY

## 2017-11-06 PROCEDURE — 69210 REMOVE IMPACTED EAR WAX UNI: CPT | Performed by: OTOLARYNGOLOGY

## 2017-11-06 PROCEDURE — G8427 DOCREV CUR MEDS BY ELIG CLIN: HCPCS | Performed by: OTOLARYNGOLOGY

## 2017-11-06 PROCEDURE — 3017F COLORECTAL CA SCREEN DOC REV: CPT | Performed by: OTOLARYNGOLOGY

## 2017-11-06 PROCEDURE — 3014F SCREEN MAMMO DOC REV: CPT | Performed by: OTOLARYNGOLOGY

## 2017-11-06 PROCEDURE — 1036F TOBACCO NON-USER: CPT | Performed by: OTOLARYNGOLOGY

## 2017-11-06 RX ORDER — PREDNISONE 10 MG/1
TABLET ORAL
Qty: 25 TABLET | Refills: 0 | Status: SHIPPED | OUTPATIENT
Start: 2017-11-06 | End: 2017-11-28

## 2017-11-08 ENCOUNTER — HOSPITAL ENCOUNTER (OUTPATIENT)
Dept: NUCLEAR MEDICINE | Age: 62
Discharge: HOME | End: 2017-11-08
Admitting: ORTHOPAEDIC SURGERY

## 2017-11-08 DIAGNOSIS — Z96.653 STATUS POST BILATERAL KNEE REPLACEMENTS: ICD-10-CM

## 2017-11-08 DIAGNOSIS — Z96.659 PRESENCE OF ARTIFICIAL KNEE JOINT: ICD-10-CM

## 2017-11-08 RX ORDER — TC 99M MEDRONATE 20 MG/10ML
23.37 INJECTION, POWDER, LYOPHILIZED, FOR SOLUTION INTRAVENOUS
Status: COMPLETED | OUTPATIENT
Start: 2017-11-08 | End: 2017-11-08

## 2017-11-08 RX ADMIN — TC 99M MEDRONATE 23.37 MILLICURIE: 20 INJECTION, POWDER, LYOPHILIZED, FOR SOLUTION INTRAVENOUS at 11:33

## 2017-11-10 ENCOUNTER — TELEPHONE (OUTPATIENT)
Dept: PRIMARY CARE CLINIC | Age: 62
End: 2017-11-10

## 2017-11-10 RX ORDER — FLUCONAZOLE 100 MG/1
200 TABLET ORAL ONCE
Qty: 2 TABLET | Refills: 0 | Status: SHIPPED | OUTPATIENT
Start: 2017-11-10 | End: 2017-11-10

## 2017-11-28 ENCOUNTER — OFFICE VISIT (OUTPATIENT)
Dept: PRIMARY CARE CLINIC | Age: 62
End: 2017-11-28

## 2017-11-28 VITALS
OXYGEN SATURATION: 98 % | DIASTOLIC BLOOD PRESSURE: 79 MMHG | BODY MASS INDEX: 35.85 KG/M2 | SYSTOLIC BLOOD PRESSURE: 123 MMHG | HEART RATE: 72 BPM | TEMPERATURE: 98.4 F | HEIGHT: 67 IN | WEIGHT: 228.4 LBS

## 2017-11-28 DIAGNOSIS — I10 ESSENTIAL HYPERTENSION: ICD-10-CM

## 2017-11-28 DIAGNOSIS — Z23 FLU VACCINE NEED: ICD-10-CM

## 2017-11-28 DIAGNOSIS — I47.9 TACHYCARDIA, PAROXYSMAL (HCC): ICD-10-CM

## 2017-11-28 DIAGNOSIS — Z23 NEED FOR PNEUMOCOCCAL VACCINATION: ICD-10-CM

## 2017-11-28 DIAGNOSIS — F32.A ANXIETY AND DEPRESSION: ICD-10-CM

## 2017-11-28 DIAGNOSIS — F41.9 ANXIETY AND DEPRESSION: ICD-10-CM

## 2017-11-28 DIAGNOSIS — R32 URINARY INCONTINENCE, UNSPECIFIED TYPE: ICD-10-CM

## 2017-11-28 DIAGNOSIS — Z12.4 CERVICAL CANCER SCREENING: ICD-10-CM

## 2017-11-28 DIAGNOSIS — I47.9 TACHYCARDIA, PAROXYSMAL (HCC): Primary | ICD-10-CM

## 2017-11-28 LAB
BASOPHILS ABSOLUTE: 0 K/UL (ref 0–0.2)
BASOPHILS RELATIVE PERCENT: 0.3 %
EOSINOPHILS ABSOLUTE: 0.1 K/UL (ref 0–0.6)
EOSINOPHILS RELATIVE PERCENT: 3.6 %
HCT VFR BLD CALC: 38.9 % (ref 36–48)
HEMOGLOBIN: 12.3 G/DL (ref 12–16)
LYMPHOCYTES ABSOLUTE: 1.5 K/UL (ref 1–5.1)
LYMPHOCYTES RELATIVE PERCENT: 41.8 %
MCH RBC QN AUTO: 28.1 PG (ref 26–34)
MCHC RBC AUTO-ENTMCNC: 31.5 G/DL (ref 31–36)
MCV RBC AUTO: 89 FL (ref 80–100)
MONOCYTES ABSOLUTE: 0.3 K/UL (ref 0–1.3)
MONOCYTES RELATIVE PERCENT: 8.1 %
NEUTROPHILS ABSOLUTE: 1.7 K/UL (ref 1.7–7.7)
NEUTROPHILS RELATIVE PERCENT: 46.2 %
PDW BLD-RTO: 14.1 % (ref 12.4–15.4)
PLATELET # BLD: 283 K/UL (ref 135–450)
PMV BLD AUTO: 8.9 FL (ref 5–10.5)
RBC # BLD: 4.37 M/UL (ref 4–5.2)
TSH SERPL DL<=0.05 MIU/L-ACNC: 0.03 UIU/ML (ref 0.27–4.2)
WBC # BLD: 3.7 K/UL (ref 4–11)

## 2017-11-28 PROCEDURE — G8484 FLU IMMUNIZE NO ADMIN: HCPCS | Performed by: INTERNAL MEDICINE

## 2017-11-28 PROCEDURE — 3017F COLORECTAL CA SCREEN DOC REV: CPT | Performed by: INTERNAL MEDICINE

## 2017-11-28 PROCEDURE — 1036F TOBACCO NON-USER: CPT | Performed by: INTERNAL MEDICINE

## 2017-11-28 PROCEDURE — G8417 CALC BMI ABV UP PARAM F/U: HCPCS | Performed by: INTERNAL MEDICINE

## 2017-11-28 PROCEDURE — G8427 DOCREV CUR MEDS BY ELIG CLIN: HCPCS | Performed by: INTERNAL MEDICINE

## 2017-11-28 PROCEDURE — 99214 OFFICE O/P EST MOD 30 MIN: CPT | Performed by: INTERNAL MEDICINE

## 2017-11-28 PROCEDURE — 3014F SCREEN MAMMO DOC REV: CPT | Performed by: INTERNAL MEDICINE

## 2017-11-28 RX ORDER — AMLODIPINE BESYLATE 10 MG/1
TABLET ORAL
Qty: 90 TABLET | Refills: 3 | Status: SHIPPED | OUTPATIENT
Start: 2017-11-28 | End: 2018-07-11 | Stop reason: SDUPTHER

## 2017-11-28 RX ORDER — SOLIFENACIN SUCCINATE 10 MG/1
10 TABLET, FILM COATED ORAL DAILY
Qty: 30 TABLET | Refills: 2 | Status: SHIPPED | OUTPATIENT
Start: 2017-11-28 | End: 2018-07-27 | Stop reason: SDUPTHER

## 2017-11-28 RX ORDER — ATENOLOL 50 MG/1
TABLET ORAL
Qty: 90 TABLET | Refills: 3 | Status: SHIPPED | OUTPATIENT
Start: 2017-11-28 | End: 2018-07-11 | Stop reason: SDUPTHER

## 2017-11-28 RX ORDER — ALBUTEROL SULFATE 90 UG/1
2 AEROSOL, METERED RESPIRATORY (INHALATION) EVERY 6 HOURS PRN
Qty: 8.5 G | Refills: 5 | Status: SHIPPED | OUTPATIENT
Start: 2017-11-28 | End: 2018-09-12 | Stop reason: SDUPTHER

## 2017-11-28 ASSESSMENT — ENCOUNTER SYMPTOMS
NAUSEA: 1
CHEST TIGHTNESS: 0
BLOOD IN STOOL: 0
CHOKING: 0
SHORTNESS OF BREATH: 0
VOICE CHANGE: 0
COUGH: 0
ABDOMINAL DISTENTION: 0
CONSTIPATION: 0
ABDOMINAL PAIN: 0
WHEEZING: 0
DIARRHEA: 0
VOMITING: 0
PHOTOPHOBIA: 0
FACIAL SWELLING: 0

## 2017-11-28 NOTE — PROGRESS NOTES
nervous/anxious. Anxiety: followed by counselor       Objective:   Physical Exam   Constitutional: She is oriented to person, place, and time. She appears well-nourished. No distress. HENT:   Mouth/Throat: Oropharynx is clear and moist.   Eyes: Conjunctivae are normal.   Neck: Neck supple. Thyromegaly present. No bruits     Cardiovascular: Normal rate, regular rhythm and normal heart sounds. Pulmonary/Chest: Breath sounds normal.   Abdominal: Soft. She exhibits distension. There is no tenderness. There is no rebound and no guarding. Musculoskeletal: Normal range of motion. She exhibits no edema, tenderness or deformity. Neurological: She is alert and oriented to person, place, and time. Skin: Skin is warm and dry. Psychiatric: She has a normal mood and affect. Her behavior is normal.   Nursing note and vitals reviewed. Assessment:       [place] was seen today for nausea and check-up. Diagnoses and all orders for this visit:    Tachycardia, paroxysmal (HCC)  -     CBC Auto Differential; Future  -     TSH without Reflex; Future    Essential hypertension  -     amLODIPine (NORVASC) 10 MG tablet; TAKE 1 TABLET BY MOUTH EVERY DAY  -     atenolol (TENORMIN) 50 MG tablet; TAKE ONE TABLET BY MOUTH DAILY    Anxiety and depression  -     sertraline (ZOLOFT) 50 MG tablet; Take 1 tablet by mouth every evening    Urinary incontinence, unspecified type  -     solifenacin (VESICARE) 10 MG tablet; Take 1 tablet by mouth daily    Cervical cancer screening  -     [facility] - Elpidio Bradford MD    Need for pneumococcal vaccination    Flu vaccine need  -     INFLUENZA, QUADV, 18-64 YRS, ID, PF, MICRO INJ, 0.1ML (FLUZONE QUADV, PF)    Other orders  -     albuterol sulfate HFA (PROAIR HFA) 108 (90 Base) MCG/ACT inhaler; Inhale 2 puffs into the lungs every 6 hours as needed for Wheezing              Strong FH of CAD   Wean off estrace ; keep close check on Lipids . Aspirin ?     Acquired hypothyroidism Controlled   -     levothyroxine (LEVOTHROID) 100 MCG tablet; Take 1 tablet by mouth daily      Menopausal syndrome (hot flashes) advised gradual weaning of Estrace     Anemia, unspecified type  Take daily Iron       Incontinence   Continues despite  vesicare             I    Plan:        Self Management Goals    Know which medication is for what condition:   Know side effects of medications, and discuss with doctor   Discuss side effects and instructions on new medications. Barriers to medication compliance addressed. All patient questions answered. Pt voiced understanding. Know correct dose/frequency of medications  Take medications at the same time each day  Stay current on medication refills  If taking OTC's check with MD/pharmacy first about interactions    LDL goal 377 or less  Systolic BP < or equal to 258  Diastolic BP < or equal to 85    Current Flu and Pneumonia Vax  Set targets for weight loss 4 lbs per month  Exercise 3-5 times per week  Keep check of weight  Weighting machine    On a scale of 1 to 5 how confident are you in these goals?   4/5  Education materials given

## 2017-11-28 NOTE — PROGRESS NOTES
Vaccine Information Sheet, \"Influenza - Inactivated\"  given to Hilda Osler, or parent/legal guardian of  Cayla Valdez and verbalized understanding. Patient responses:    Have you ever had a reaction to a flu vaccine? No  Are you able to eat eggs without adverse effects? Yes  Do you have any current illness? No  Have you ever had Guillian Loveland Syndrome? No    Flu vaccine given per order. Please see immunization tab.

## 2017-12-01 ENCOUNTER — HOSPITAL ENCOUNTER (OUTPATIENT)
Dept: OTHER | Age: 62
Discharge: HOME | End: 2017-12-31
Attending: ORTHOPAEDIC SURGERY | Admitting: ORTHOPAEDIC SURGERY

## 2017-12-04 PROCEDURE — 90630 INFLUENZA, QUADV, 18-64 YRS, ID, PF, MICRO INJ, 0.1ML (FLUZONE QUADV, PF): CPT | Performed by: INTERNAL MEDICINE

## 2017-12-04 PROCEDURE — G0008 ADMIN INFLUENZA VIRUS VAC: HCPCS | Performed by: INTERNAL MEDICINE

## 2017-12-11 ENCOUNTER — TELEPHONE (OUTPATIENT)
Dept: PRIMARY CARE CLINIC | Age: 62
End: 2017-12-11

## 2017-12-11 DIAGNOSIS — E05.90 HYPERTHYROIDISM: Primary | ICD-10-CM

## 2017-12-11 DIAGNOSIS — E04.1 THYROID NODULE: Primary | ICD-10-CM

## 2017-12-11 NOTE — TELEPHONE ENCOUNTER
Patient needs medication for a yeast infection. SYMPTOMS:vaginal irritation and discharge. PHARMACY:  [facility] on [address].

## 2017-12-11 NOTE — TELEPHONE ENCOUNTER
Patient was given her blood test results. She wants to know if Dr. Bobby Candelario wants her to have the T3 and T4 blood tests done said it was mentioned in Dr. Solange Rodriguez result notes. Also pt states she saw an endocrinolgogist in late November and he referred pt to a surgeon for her thyroid. Also pt has an appt with a surgeon for thyroid on 12/13 in [facility].       PATIENT:  (V)[phone]     Cell [phone]

## 2017-12-13 DIAGNOSIS — E05.90 HYPERTHYROIDISM: ICD-10-CM

## 2017-12-13 LAB
T3 TOTAL: 1.28 NG/ML (ref 0.8–2)
T4 FREE: 1 NG/DL (ref 0.9–1.8)
TSH SERPL DL<=0.05 MIU/L-ACNC: 0.04 UIU/ML (ref 0.27–4.2)

## 2017-12-14 DIAGNOSIS — E05.90 HYPERTHYROIDISM: Primary | ICD-10-CM

## 2017-12-26 RX ORDER — FLUTICASONE PROPIONATE 50 MCG
SPRAY, SUSPENSION (ML) NASAL
Qty: 16 G | Refills: 0 | Status: SHIPPED | OUTPATIENT
Start: 2017-12-26 | End: 2018-01-22

## 2017-12-26 NOTE — TELEPHONE ENCOUNTER
Dr Jarad Bradford patient     Requested Prescriptions     Pending Prescriptions Disp Refills    fluticasone (FLONASE) 50 MCG/ACT nasal spray [Pharmacy Med Name: FLUTICASONE 50MCG NASAL SP (120) RX]  0     Sig: SHAKE LIQUID AND USE 2 SPRAYS IN EACH NOSTRIL EVERY DAY.      Last fill: 1/25/17 R: 5  Last OV: 11/28/17

## 2018-01-09 ENCOUNTER — OFFICE VISIT (OUTPATIENT)
Dept: PRIMARY CARE CLINIC | Age: 63
End: 2018-01-09

## 2018-01-09 VITALS
HEART RATE: 64 BPM | TEMPERATURE: 97.8 F | OXYGEN SATURATION: 97 % | SYSTOLIC BLOOD PRESSURE: 144 MMHG | DIASTOLIC BLOOD PRESSURE: 80 MMHG | WEIGHT: 230 LBS | BODY MASS INDEX: 36.02 KG/M2

## 2018-01-09 DIAGNOSIS — R10.84 GENERALIZED ABDOMINAL PAIN: Primary | ICD-10-CM

## 2018-01-09 DIAGNOSIS — E05.90 SUBCLINICAL HYPERTHYROIDISM: ICD-10-CM

## 2018-01-09 PROCEDURE — 3014F SCREEN MAMMO DOC REV: CPT | Performed by: INTERNAL MEDICINE

## 2018-01-09 PROCEDURE — 3017F COLORECTAL CA SCREEN DOC REV: CPT | Performed by: INTERNAL MEDICINE

## 2018-01-09 PROCEDURE — 99214 OFFICE O/P EST MOD 30 MIN: CPT | Performed by: INTERNAL MEDICINE

## 2018-01-09 PROCEDURE — G8484 FLU IMMUNIZE NO ADMIN: HCPCS | Performed by: INTERNAL MEDICINE

## 2018-01-09 PROCEDURE — G8417 CALC BMI ABV UP PARAM F/U: HCPCS | Performed by: INTERNAL MEDICINE

## 2018-01-09 PROCEDURE — 1036F TOBACCO NON-USER: CPT | Performed by: INTERNAL MEDICINE

## 2018-01-09 PROCEDURE — G8427 DOCREV CUR MEDS BY ELIG CLIN: HCPCS | Performed by: INTERNAL MEDICINE

## 2018-01-09 RX ORDER — FAMOTIDINE 20 MG/1
20 TABLET, FILM COATED ORAL 2 TIMES DAILY
Qty: 60 TABLET | Refills: 3 | Status: SHIPPED | OUTPATIENT
Start: 2018-01-09 | End: 2018-02-01

## 2018-01-09 ASSESSMENT — ENCOUNTER SYMPTOMS
HEMATOCHEZIA: 0
ABDOMINAL PAIN: 1
VOMITING: 0
BELCHING: 0
RESPIRATORY NEGATIVE: 1
EYES NEGATIVE: 1
NAUSEA: 0
FLATUS: 0

## 2018-01-09 NOTE — PROGRESS NOTES
[Povidone Iodine] Other (See Comments)     welts    Chlorhexidine Gluconate Other (See Comments)     Welts from ChloraPrep    Morphine Itching    Other      chlorahexidine - hives    Toradol [Ketorolac Tromethamine]     Iodides Rash    Tape [Adhesive Tape] Rash     teradon tape also     Outpatient Prescriptions Marked as Taking for the 1/9/18 encounter (Office Visit) with Kamran Quevedo MD   Medication Sig Dispense Refill    famotidine (PEPCID) 20 MG tablet Take 1 tablet by mouth 2 times daily 60 tablet 3    fluticasone (FLONASE) 50 MCG/ACT nasal spray SHAKE LIQUID AND USE 2 SPRAYS IN EACH NOSTRIL EVERY DAY.  16 g 0    albuterol sulfate HFA (PROAIR HFA) 108 (90 Base) MCG/ACT inhaler Inhale 2 puffs into the lungs every 6 hours as needed for Wheezing 8.5 g 5    amLODIPine (NORVASC) 10 MG tablet TAKE 1 TABLET BY MOUTH EVERY DAY 90 tablet 3    atenolol (TENORMIN) 50 MG tablet TAKE ONE TABLET BY MOUTH DAILY 90 tablet 3    sertraline (ZOLOFT) 50 MG tablet Take 1 tablet by mouth every evening 90 tablet 3    solifenacin (VESICARE) 10 MG tablet Take 1 tablet by mouth daily 30 tablet 2    senna-docusate (PERICOLACE) 8.6-50 MG per tablet Take 1 tablet by mouth daily as needed for Constipation (use daily for 3 days and then as needed.) 30 tablet 0    fluticasone (FLONASE) 50 MCG/ACT nasal spray SHAKE WELL AND USE 2 SPRAYS IN EACH NOSTRIL EVERY DAY 1 Bottle 5    estradiol (ESTRACE) 1 MG tablet TAKE 1 TABLET BY MOUTH EVERY DAY 30 tablet 3       Immunization History   Administered Date(s) Administered    Influenza Virus Vaccine 09/24/2014    Influenza Whole 11/22/2012    Influenza, Intradermal, Preservative free 10/19/2016    Influenza, Intradermal, Quadrivalent, Preservative Free 12/04/2017    Pneumococcal 13-valent Conjugate ([id number]) 05/08/2017    Pneumococcal Conjugate 7-valent 11/22/2012    Tdap (Boostrix, Adacel) 05/08/2017       Past Medical History:   Diagnosis Date    Anxiety state     Asthma     Back ache     chronic    Environmental allergies     GERD (gastroesophageal reflux disease)     Hypertension     MVP (mitral valve prolapse)     Neck pain, chronic     Reflux      Past Surgical History:   Procedure Laterality Date    BACK SURGERY      BLADDER SUSPENSION      CHOLECYSTECTOMY      FOOT SURGERY  11-9-12    endoscopic plantar fasciotomy right foot    GASTRIC BYPASS SURGERY      HERNIA REPAIR      HIATAL HERNIA REPAIR      HYSTERECTOMY      JOINT REPLACEMENT Left     KNEE    KNEE ARTHROPLASTY  2/28/13    L    KNEE ARTHROSCOPY Right 3/7/2014    LAMINECTOMY  7/1/11    bilateral L5-S1 laminotomy, nerve root exploration, foraminotomy    SPLENECTOMY, PARTIAL      TONSILLECTOMY      UPPER GASTROINTESTINAL ENDOSCOPY      with dilatation     Family History   Problem Relation Age of Onset    High Blood Pressure Mother     Heart Disease Mother     Other Mother      glaucoma    High Blood Pressure Father     Heart Disease Father     Other Father      intestional problems    Cancer Sister      cancer    Kidney Disease Sister     Kidney Disease Brother     High Blood Pressure Brother     Other Sister      lupus,fibromyalgia  thyroid surgery    High Blood Pressure Sister     Kidney Disease Sister     Heart Disease Sister     Kidney Disease Brother     High Blood Pressure Brother     Anesth Problems Neg Hx     Malig Hyperten Neg Hx     Hypotension Neg Hx     Malig Hypertherm Neg Hx     Pseudochol. Deficiency Neg Hx        Review of Systems:  Review of Systems   Constitutional: Positive for fatigue. Negative for fever and weight loss. HENT: Negative. Eyes: Negative. Respiratory: Negative. Cardiovascular: Negative. Gastrointestinal: Positive for abdominal pain. Negative for anorexia, flatus, hematochezia, melena, nausea and vomiting. Genitourinary: Negative. Negative for frequency and hematuria. Musculoskeletal: Negative.   Negative for arthralgias and myalgias. Skin: Negative. Neurological: Negative. Negative for headaches. Psychiatric/Behavioral: Negative. Vitals:    01/09/18 1526   BP: (!) 144/80   Pulse: 64   Temp: 97.8 °F (36.6 °C)   TempSrc: Oral   SpO2: 97%   Weight: 230 lb (104.3 kg)     Body mass index is 36.02 kg/m². Wt Readings from Last 3 Encounters:   01/09/18 230 lb (104.3 kg)   11/28/17 228 lb 6.4 oz (103.6 kg)   11/06/17 228 lb (103.4 kg)     BP Readings from Last 3 Encounters:   01/09/18 (!) 144/80   11/28/17 123/79   11/06/17 114/63         Physical Exam   Constitutional: She is oriented to person, place, and time. She appears well-developed and well-nourished. HENT:   Head: Normocephalic and atraumatic. Eyes: Conjunctivae are normal. Pupils are equal, round, and reactive to light. Neck: Normal range of motion. Neck supple. Cardiovascular: Normal rate, regular rhythm and normal heart sounds. Pulmonary/Chest: Effort normal and breath sounds normal.   Abdominal: She exhibits no distension and no mass. There is no tenderness. There is no rebound and no guarding. Musculoskeletal: Normal range of motion. Neurological: She is alert and oriented to person, place, and time. She has normal reflexes. Skin: Skin is warm and dry.        Lab Review   Orders Only on 12/13/2017   Component Date Value    TSH 12/13/2017 0.04*    T4 Free 12/13/2017 1.0     T3, Total 12/13/2017 1.28    Orders Only on 11/28/2017   Component Date Value    WBC 11/28/2017 3.7*    RBC 11/28/2017 4.37     Hemoglobin 11/28/2017 12.3     Hematocrit 11/28/2017 38.9     MCV 11/28/2017 89.0     MCH 11/28/2017 28.1     MCHC 11/28/2017 31.5     RDW 11/28/2017 14.1     Platelets 77/75/4889 283     MPV 11/28/2017 8.9     Neutrophils % 11/28/2017 46.2     Lymphocytes % 11/28/2017 41.8     Monocytes % 11/28/2017 8.1     Eosinophils % 11/28/2017 3.6     Basophils % 11/28/2017 0.3     Neutrophils # 11/28/2017 1.7     Lymphocytes # 11/28/2017 1. 5     Monocytes # 11/28/2017 0.3     Eosinophils # 11/28/2017 0.1     Basophils # 11/28/2017 0.0     TSH 11/28/2017 0.03*   Orders Only on 07/18/2017   Component Date Value    Rejected Test 07/18/2017 CBCWD     Reason for Rejection 07/18/2017 see below    Orders Only on 07/18/2017   Component Date Value    Amylase 07/18/2017 74     Lipase 07/18/2017 9.0*    Sodium 07/18/2017 139     Potassium 07/18/2017 4.2     Chloride 07/18/2017 102     CO2 07/18/2017 22     Anion Gap 07/18/2017 15     Glucose 07/18/2017 89     BUN 07/18/2017 11     CREATININE 07/18/2017 0.6     GFR Non- 07/18/2017 >60     GFR  07/18/2017 >60     Calcium 07/18/2017 9.6     Total Protein 07/18/2017 7.9     Alb 07/18/2017 3.9     Albumin/Globulin Ratio 07/18/2017 1.0*    Total Bilirubin 07/18/2017 0.3     Alkaline Phosphatase 07/18/2017 91     ALT 07/18/2017 12     AST 07/18/2017 15     Globulin 07/18/2017 4.0          Health Maintenance   Topic Date Due    Zostavax vaccine  06/05/2017    Cervical cancer screen  10/20/2017    Pneumococcal med risk (1 of 1 - PPSV23) 12/19/2018 (Originally 6/5/1974)    Breast cancer screen  05/05/2018    TSH testing  12/13/2018    Lipid screen  05/08/2022    Colon cancer screen colonoscopy  01/06/2024    DTaP/Tdap/Td vaccine (2 - Td) 05/08/2027    Flu vaccine  Completed    Hepatitis C screen  Completed    HIV screen  Completed          Assessment/Plan:    Abdominal pain  Hx of abdominal pain and pancreatitis    Subclinical hyperthyroidism  Started feeling bad after being taken off of Levothyroxine recently. Due for thyroid surgery soon. 1. Generalized abdominal pain    - Comprehensive metabolic panel; Future  - Amylase; Future  - Lipase; Future  - Urinalysis; Future  - CBC WITH AUTO DIFFERENTIAL; Future  - famotidine (PEPCID) 20 MG tablet; Take 1 tablet by mouth 2 times daily  Dispense: 60 tablet;  Refill: 3  - CT Abd and Pelvis WO Contrast; Future    2. Subclinical hyperthyroidism    - TSH WITH REFLEX TO FT4; Future       Return in about 4 weeks (around 2/6/2018).

## 2018-01-22 ENCOUNTER — OFFICE VISIT (OUTPATIENT)
Dept: ENT CLINIC | Age: 63
End: 2018-01-22

## 2018-01-22 VITALS — DIASTOLIC BLOOD PRESSURE: 80 MMHG | SYSTOLIC BLOOD PRESSURE: 124 MMHG

## 2018-01-22 DIAGNOSIS — J34.89 NASAL CAVITY MASS: Primary | ICD-10-CM

## 2018-01-22 PROCEDURE — G8417 CALC BMI ABV UP PARAM F/U: HCPCS | Performed by: OTOLARYNGOLOGY

## 2018-01-22 PROCEDURE — 1036F TOBACCO NON-USER: CPT | Performed by: OTOLARYNGOLOGY

## 2018-01-22 PROCEDURE — G8484 FLU IMMUNIZE NO ADMIN: HCPCS | Performed by: OTOLARYNGOLOGY

## 2018-01-22 PROCEDURE — G8427 DOCREV CUR MEDS BY ELIG CLIN: HCPCS | Performed by: OTOLARYNGOLOGY

## 2018-01-22 PROCEDURE — 3017F COLORECTAL CA SCREEN DOC REV: CPT | Performed by: OTOLARYNGOLOGY

## 2018-01-22 PROCEDURE — 3014F SCREEN MAMMO DOC REV: CPT | Performed by: OTOLARYNGOLOGY

## 2018-01-22 PROCEDURE — 99213 OFFICE O/P EST LOW 20 MIN: CPT | Performed by: OTOLARYNGOLOGY

## 2018-01-23 ENCOUNTER — TELEPHONE (OUTPATIENT)
Dept: ENT CLINIC | Age: 63
End: 2018-01-23

## 2018-01-23 DIAGNOSIS — J34.89 NASAL CAVITY MASS: Primary | ICD-10-CM

## 2018-01-23 RX ORDER — PREDNISONE 10 MG/1
TABLET ORAL
Qty: 25 TABLET | Refills: 0 | Status: SHIPPED | OUTPATIENT
Start: 2018-01-23 | End: 2018-02-05 | Stop reason: HOSPADM

## 2018-01-30 ENCOUNTER — HOSPITAL ENCOUNTER (OUTPATIENT)
Dept: CT IMAGING | Age: 63
Discharge: HOME | End: 2018-01-30
Attending: SURGERY | Admitting: SURGERY

## 2018-01-30 ENCOUNTER — TELEPHONE (OUTPATIENT)
Dept: ENT CLINIC | Age: 63
End: 2018-01-30

## 2018-01-30 DIAGNOSIS — J34.89 NASAL CAVITY MASS: ICD-10-CM

## 2018-01-30 DIAGNOSIS — R22.0 LOCALIZED SWELLING, MASS, AND LUMP OF HEAD: ICD-10-CM

## 2018-01-30 LAB
A/G RATIO: 0.9 (ref 1.1–2.2)
ALBUMIN SERPL-MCNC: 3.9 G/DL (ref 3.4–5)
ALP BLD-CCNC: 79 U/L (ref 40–129)
ALT SERPL-CCNC: 8 U/L (ref 10–40)
ANION GAP SERPL CALCULATED.3IONS-SCNC: 13 MMOL/L (ref 3–16)
AST SERPL-CCNC: 13 U/L (ref 15–37)
BILIRUB SERPL-MCNC: 0.3 MG/DL (ref 0–1)
BUN BLDV-MCNC: 11 MG/DL (ref 7–20)
CALCIUM SERPL-MCNC: 9.3 MG/DL (ref 8.3–10.6)
CHLORIDE BLD-SCNC: 103 MMOL/L (ref 99–110)
CO2: 22 MMOL/L (ref 21–32)
CREAT SERPL-MCNC: 0.7 MG/DL (ref 0.6–1.2)
GFR AFRICAN AMERICAN: >60
GFR NON-AFRICAN AMERICAN: >60
GLOBULIN: 4.3 G/DL
GLUCOSE BLD-MCNC: 103 MG/DL (ref 70–99)
POTASSIUM SERPL-SCNC: 3.9 MMOL/L (ref 3.5–5.1)
SODIUM BLD-SCNC: 138 MMOL/L (ref 136–145)
TOTAL PROTEIN: 8.2 G/DL (ref 6.4–8.2)

## 2018-01-30 NOTE — TELEPHONE ENCOUNTER
I advised pt he said it was ok. .... Will you call her to confirm the surgery?   Please call on [phone]

## 2018-01-30 NOTE — TELEPHONE ENCOUNTER
CT scan confirms a mass in the anterior portion of the nasal passage which is separate from her turbinate. Comparison to previous one done in July shows not any particular change. I discussed the issue with the patient and do feel that it would be appropriate to remove it on an outpatient basis under general anesthesia. Patient is responsive to that and we will get that set up.

## 2018-02-05 ENCOUNTER — HOSPITAL ENCOUNTER (OUTPATIENT)
Dept: SURGERY | Age: 63
Discharge: HOME | End: 2018-02-05
Attending: OTOLARYNGOLOGY | Admitting: OTOLARYNGOLOGY

## 2018-02-05 VITALS
OXYGEN SATURATION: 100 % | TEMPERATURE: 97.9 F | HEART RATE: 60 BPM | RESPIRATION RATE: 15 BRPM | DIASTOLIC BLOOD PRESSURE: 72 MMHG | WEIGHT: 229.4 LBS | HEIGHT: 67 IN | BODY MASS INDEX: 36 KG/M2 | SYSTOLIC BLOOD PRESSURE: 130 MMHG

## 2018-02-05 DIAGNOSIS — J34.89 NASAL MASS: Primary | ICD-10-CM

## 2018-02-05 LAB
ANION GAP SERPL CALCULATED.3IONS-SCNC: 9 MMOL/L (ref 3–16)
BUN BLDV-MCNC: 12 MG/DL (ref 7–20)
CALCIUM SERPL-MCNC: 9 MG/DL (ref 8.3–10.6)
CHLORIDE BLD-SCNC: 103 MMOL/L (ref 99–110)
CO2: 27 MMOL/L (ref 21–32)
CREAT SERPL-MCNC: 0.7 MG/DL (ref 0.6–1.2)
GFR AFRICAN AMERICAN: >60
GFR NON-AFRICAN AMERICAN: >60
GLUCOSE BLD-MCNC: 92 MG/DL (ref 70–99)
HCT VFR BLD CALC: 37.6 % (ref 36–48)
HEMOGLOBIN: 12.6 G/DL (ref 12–16)
MCH RBC QN AUTO: 28.9 PG (ref 26–34)
MCHC RBC AUTO-ENTMCNC: 33.4 G/DL (ref 31–36)
MCV RBC AUTO: 86.4 FL (ref 80–100)
PDW BLD-RTO: 14.4 % (ref 12.4–15.4)
PLATELET # BLD: 316 K/UL (ref 135–450)
PMV BLD AUTO: 7.6 FL (ref 5–10.5)
POTASSIUM SERPL-SCNC: 4 MMOL/L (ref 3.5–5.1)
RBC # BLD: 4.35 M/UL (ref 4–5.2)
SODIUM BLD-SCNC: 139 MMOL/L (ref 136–145)
WBC # BLD: 6.4 K/UL (ref 4–11)

## 2018-02-05 PROCEDURE — 30117 REMOVAL OF INTRANASAL LESION: CPT | Performed by: OTOLARYNGOLOGY

## 2018-02-05 RX ORDER — SODIUM CHLORIDE, SODIUM LACTATE, POTASSIUM CHLORIDE, CALCIUM CHLORIDE 600; 310; 30; 20 MG/100ML; MG/100ML; MG/100ML; MG/100ML
INJECTION, SOLUTION INTRAVENOUS CONTINUOUS
Status: DISCONTINUED | OUTPATIENT
Start: 2018-02-05 | End: 2018-02-06 | Stop reason: HOSPADM

## 2018-02-05 RX ORDER — HYDROCODONE BITARTRATE AND ACETAMINOPHEN 5; 325 MG/1; MG/1
1 TABLET ORAL EVERY 6 HOURS PRN
Qty: 30 TABLET | Refills: 0 | Status: SHIPPED | OUTPATIENT
Start: 2018-02-05 | End: 2018-02-12

## 2018-02-05 RX ORDER — OXYCODONE HYDROCHLORIDE AND ACETAMINOPHEN 5; 325 MG/1; MG/1
1 TABLET ORAL
Status: COMPLETED | OUTPATIENT
Start: 2018-02-05 | End: 2018-02-05

## 2018-02-05 RX ORDER — SODIUM CHLORIDE 0.9 % (FLUSH) 0.9 %
10 SYRINGE (ML) INJECTION EVERY 12 HOURS SCHEDULED
Status: DISCONTINUED | OUTPATIENT
Start: 2018-02-05 | End: 2018-02-05 | Stop reason: SDUPTHER

## 2018-02-05 RX ORDER — HYDRALAZINE HYDROCHLORIDE 20 MG/ML
5 INJECTION INTRAMUSCULAR; INTRAVENOUS EVERY 10 MIN PRN
Status: DISCONTINUED | OUTPATIENT
Start: 2018-02-05 | End: 2018-02-06 | Stop reason: HOSPADM

## 2018-02-05 RX ORDER — SODIUM CHLORIDE 0.9 % (FLUSH) 0.9 %
10 SYRINGE (ML) INJECTION PRN
Status: DISCONTINUED | OUTPATIENT
Start: 2018-02-05 | End: 2018-02-05 | Stop reason: SDUPTHER

## 2018-02-05 RX ORDER — SODIUM CHLORIDE 0.9 % (FLUSH) 0.9 %
10 SYRINGE (ML) INJECTION EVERY 12 HOURS SCHEDULED
Status: DISCONTINUED | OUTPATIENT
Start: 2018-02-05 | End: 2018-02-06 | Stop reason: HOSPADM

## 2018-02-05 RX ORDER — LIDOCAINE HYDROCHLORIDE 10 MG/ML
1 INJECTION, SOLUTION EPIDURAL; INFILTRATION; INTRACAUDAL; PERINEURAL
Status: COMPLETED | OUTPATIENT
Start: 2018-02-05 | End: 2018-02-05

## 2018-02-05 RX ORDER — METHYLPREDNISOLONE 4 MG/1
TABLET ORAL
Qty: 1 KIT | Refills: 0 | Status: SHIPPED | OUTPATIENT
Start: 2018-02-05 | End: 2018-02-11

## 2018-02-05 RX ORDER — SODIUM CHLORIDE 0.9 % (FLUSH) 0.9 %
10 SYRINGE (ML) INJECTION PRN
Status: DISCONTINUED | OUTPATIENT
Start: 2018-02-05 | End: 2018-02-06 | Stop reason: HOSPADM

## 2018-02-05 RX ORDER — CEFAZOLIN SODIUM 2 G/100ML
2 INJECTION, SOLUTION INTRAVENOUS ONCE
Status: COMPLETED | OUTPATIENT
Start: 2018-02-05 | End: 2018-02-05

## 2018-02-05 RX ORDER — CEFDINIR 300 MG/1
300 CAPSULE ORAL 2 TIMES DAILY
Qty: 20 CAPSULE | Refills: 0 | Status: SHIPPED | OUTPATIENT
Start: 2018-02-05 | End: 2018-02-21

## 2018-02-05 RX ORDER — ONDANSETRON 2 MG/ML
4 INJECTION INTRAMUSCULAR; INTRAVENOUS EVERY 6 HOURS PRN
Status: DISCONTINUED | OUTPATIENT
Start: 2018-02-05 | End: 2018-02-06 | Stop reason: HOSPADM

## 2018-02-05 RX ORDER — MEPERIDINE HYDROCHLORIDE 25 MG/ML
12.5 INJECTION INTRAMUSCULAR; INTRAVENOUS; SUBCUTANEOUS EVERY 5 MIN PRN
Status: DISCONTINUED | OUTPATIENT
Start: 2018-02-05 | End: 2018-02-06 | Stop reason: HOSPADM

## 2018-02-05 RX ORDER — HYDROMORPHONE HCL 110MG/55ML
0.5 PATIENT CONTROLLED ANALGESIA SYRINGE INTRAVENOUS EVERY 5 MIN PRN
Status: DISCONTINUED | OUTPATIENT
Start: 2018-02-05 | End: 2018-02-06 | Stop reason: HOSPADM

## 2018-02-05 RX ORDER — LABETALOL HYDROCHLORIDE 5 MG/ML
5 INJECTION, SOLUTION INTRAVENOUS EVERY 10 MIN PRN
Status: DISCONTINUED | OUTPATIENT
Start: 2018-02-05 | End: 2018-02-06 | Stop reason: HOSPADM

## 2018-02-05 RX ORDER — ACETAMINOPHEN 325 MG/1
650 TABLET ORAL EVERY 4 HOURS PRN
Status: DISCONTINUED | OUTPATIENT
Start: 2018-02-05 | End: 2018-02-06 | Stop reason: HOSPADM

## 2018-02-05 RX ORDER — ONDANSETRON 2 MG/ML
4 INJECTION INTRAMUSCULAR; INTRAVENOUS
Status: ACTIVE | OUTPATIENT
Start: 2018-02-05 | End: 2018-02-05

## 2018-02-05 RX ADMIN — Medication 0.5 MG: at 09:38

## 2018-02-05 RX ADMIN — LIDOCAINE HYDROCHLORIDE 0.2 ML: 10 INJECTION, SOLUTION EPIDURAL; INFILTRATION; INTRACAUDAL; PERINEURAL at 07:42

## 2018-02-05 RX ADMIN — Medication 0.5 MG: at 10:08

## 2018-02-05 RX ADMIN — OXYCODONE HYDROCHLORIDE AND ACETAMINOPHEN 1 TABLET: 5; 325 TABLET ORAL at 10:38

## 2018-02-05 RX ADMIN — SODIUM CHLORIDE, SODIUM LACTATE, POTASSIUM CHLORIDE, CALCIUM CHLORIDE: 600; 310; 30; 20 INJECTION, SOLUTION INTRAVENOUS at 07:41

## 2018-02-05 RX ADMIN — Medication 0.5 MG: at 10:24

## 2018-02-05 RX ADMIN — CEFAZOLIN SODIUM 2 G: 2 INJECTION, SOLUTION INTRAVENOUS at 08:27

## 2018-02-05 ASSESSMENT — PAIN SCALES - GENERAL
PAINLEVEL_OUTOF10: 9
PAINLEVEL_OUTOF10: 6
PAINLEVEL_OUTOF10: 8
PAINLEVEL_OUTOF10: 6

## 2018-02-05 ASSESSMENT — PAIN DESCRIPTION - PAIN TYPE
TYPE: SURGICAL PAIN
TYPE: SURGICAL PAIN

## 2018-02-05 ASSESSMENT — PAIN DESCRIPTION - LOCATION
LOCATION: NOSE
LOCATION: NOSE

## 2018-02-05 ASSESSMENT — PAIN - FUNCTIONAL ASSESSMENT: PAIN_FUNCTIONAL_ASSESSMENT: 0-10

## 2018-02-05 NOTE — ANESTHESIA PRE-OP
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    Obstructive apnea G47.33    Pancreatitis K85.90    Recurrent sinusitis J32.9    Headache, cervicogenic R51    Nasal cavity mass R22.0       Past Medical History:        Diagnosis Date    Anxiety state     Asthma     Back ache     chronic    Environmental allergies     GERD (gastroesophageal reflux disease)     Hypertension     MVP (mitral valve prolapse)     Neck pain, chronic     Reflux        Past Surgical History:        Procedure Laterality Date    BACK SURGERY      BLADDER SUSPENSION      CHOLECYSTECTOMY      FOOT SURGERY  11-9-12    endoscopic plantar fasciotomy right foot    GASTRIC BYPASS SURGERY      HERNIA REPAIR      HIATAL HERNIA REPAIR      HYSTERECTOMY      JOINT REPLACEMENT Bilateral     KNEE    KNEE ARTHROPLASTY  2/28/13    L    KNEE ARTHROSCOPY Right 3/7/2014    LAMINECTOMY  7/1/11    bilateral L5-S1 laminotomy, nerve root exploration, foraminotomy    SPLENECTOMY, PARTIAL      TONSILLECTOMY      UPPER GASTROINTESTINAL ENDOSCOPY      with dilatation       Social History:    Social History   Substance Use Topics    Smoking status: Former Smoker     Packs/day: 0.25     Years: 2.00     Quit date: 1/1/2004    Smokeless tobacco: Never Used      Comment: [id number]    Alcohol use No                                Counseling given: Not Answered      Vital Signs (Current):   Vitals:    02/05/18 0709   Weight: 229 lb 6.4 oz discussed with CRNA.                   Tyler Boothe MD   2/5/2018

## 2018-02-05 NOTE — H&P
(7/1/11); hernia repair; Splenectomy, partial; hiatal hernia repair; Hysterectomy; Upper gastrointestinal endoscopy; Foot surgery (11-9-12); Knee Arthroplasty (2/28/13); back surgery; Gastric bypass surgery; bladder suspension; Cholecystectomy; Tonsillectomy; Knee arthroscopy (Right, 3/7/2014); and joint replacement (Bilateral). Medications:  Current Outpatient Prescriptions on File Prior to Encounter   Medication Sig Dispense Refill    esomeprazole Magnesium (NEXIUM) 40 MG PACK Take 40 mg by mouth 2 times daily      predniSONE (DELTASONE) 10 MG tablet Take 2 tablets bid for 3 days, 1 tablet bid for 2 days, 1 tablet daily for 2 days, then one half tablet daily 25 tablet 0    albuterol sulfate HFA (PROAIR HFA) 108 (90 Base) MCG/ACT inhaler Inhale 2 puffs into the lungs every 6 hours as needed for Wheezing 8.5 g 5    amLODIPine (NORVASC) 10 MG tablet TAKE 1 TABLET BY MOUTH EVERY DAY 90 tablet 3    atenolol (TENORMIN) 50 MG tablet TAKE ONE TABLET BY MOUTH DAILY 90 tablet 3    solifenacin (VESICARE) 10 MG tablet Take 1 tablet by mouth daily 30 tablet 2    fluticasone (FLONASE) 50 MCG/ACT nasal spray SHAKE WELL AND USE 2 SPRAYS IN EACH NOSTRIL EVERY DAY 1 Bottle 5    estradiol (ESTRACE) 1 MG tablet TAKE 1 TABLET BY MOUTH EVERY DAY 30 tablet 3     No current facility-administered medications on file prior to encounter. Allergies: Allergies   Allergen Reactions    Gabapentin Other (See Comments)     Memory Loss    Protonix [Pantoprazole Sodium] Shortness Of Breath and Rash    Tramadol Itching    Aspirin     Betadine [Povidone Iodine] Other (See Comments)     welts    Chlorhexidine Gluconate Other (See Comments)     Welts from ChloraPrep    Morphine Itching    Other      chlorahexidine - hives    Toradol [Ketorolac Tromethamine]     Iodides Rash    Tape [Adhesive Tape] Rash     teradon tape also  Paper tape ok         Social History:   reports that she quit smoking about 14 years ago.  She has Assessment & Recommendation:   1. HTN:  BP control is adequate  2. GERD:  On PPI therapy,  No current symptoms  3. WAQAR:   Uses CPAP prn  4. Asthma:  Uses only prn albuterol, last used > 1 month ago  5. Nasal mass: For surgery today       Patient is medically optimized for surgery. Thank you for the opportunity to participate in the care of your patient.   Tenny Nageotte, CNP    2/5/2018 7:12 AM

## 2018-02-05 NOTE — PROGRESS NOTES
Adm to Phase I PACU from OR awakening and responding. VS and respirations adequate.  No nasal drainage noted

## 2018-02-07 ENCOUNTER — OFFICE VISIT (OUTPATIENT)
Dept: ENT CLINIC | Age: 63
End: 2018-02-07

## 2018-02-07 VITALS — SYSTOLIC BLOOD PRESSURE: 118 MMHG | DIASTOLIC BLOOD PRESSURE: 80 MMHG

## 2018-02-07 DIAGNOSIS — D14.0 INVERTED PAPILLOMA OF NASAL CAVITY: Primary | ICD-10-CM

## 2018-02-07 PROCEDURE — 99024 POSTOP FOLLOW-UP VISIT: CPT | Performed by: OTOLARYNGOLOGY

## 2018-02-14 ENCOUNTER — OFFICE VISIT (OUTPATIENT)
Dept: ENT CLINIC | Age: 63
End: 2018-02-14

## 2018-02-14 VITALS — DIASTOLIC BLOOD PRESSURE: 80 MMHG | SYSTOLIC BLOOD PRESSURE: 104 MMHG

## 2018-02-14 DIAGNOSIS — D14.0 INVERTED PAPILLOMA OF NASAL CAVITY: Primary | ICD-10-CM

## 2018-02-14 PROCEDURE — 99024 POSTOP FOLLOW-UP VISIT: CPT | Performed by: OTOLARYNGOLOGY

## 2018-02-14 NOTE — PROGRESS NOTES
Patient is now one week post removal of inverting papilloma right nostril. She seems to be doing much better. Healing is noted. We will start her on some Bactroban ointment to be used 3 times daily. I will see her again in 2 weeks.

## 2018-02-21 ENCOUNTER — OFFICE VISIT (OUTPATIENT)
Dept: PRIMARY CARE CLINIC | Age: 63
End: 2018-02-21

## 2018-02-21 VITALS
HEIGHT: 67 IN | BODY MASS INDEX: 35.94 KG/M2 | WEIGHT: 229 LBS | OXYGEN SATURATION: 98 % | RESPIRATION RATE: 16 BRPM | HEART RATE: 68 BPM | SYSTOLIC BLOOD PRESSURE: 120 MMHG | TEMPERATURE: 97.4 F | DIASTOLIC BLOOD PRESSURE: 80 MMHG

## 2018-02-21 DIAGNOSIS — E05.90 HYPERTHYROIDISM: ICD-10-CM

## 2018-02-21 DIAGNOSIS — E55.9 VITAMIN D DEFICIENCY: ICD-10-CM

## 2018-02-21 DIAGNOSIS — I10 ESSENTIAL HYPERTENSION: ICD-10-CM

## 2018-02-21 DIAGNOSIS — Z01.818 PREOP EXAMINATION: Primary | ICD-10-CM

## 2018-02-21 DIAGNOSIS — Z01.818 PREOP EXAMINATION: ICD-10-CM

## 2018-02-21 LAB
A/G RATIO: 1.2 (ref 1.1–2.2)
ALBUMIN SERPL-MCNC: 4 G/DL (ref 3.4–5)
ALP BLD-CCNC: 86 U/L (ref 40–129)
ALT SERPL-CCNC: 6 U/L (ref 10–40)
ANION GAP SERPL CALCULATED.3IONS-SCNC: 14 MMOL/L (ref 3–16)
AST SERPL-CCNC: 10 U/L (ref 15–37)
BASOPHILS ABSOLUTE: 0 K/UL (ref 0–0.2)
BASOPHILS RELATIVE PERCENT: 0.6 %
BILIRUB SERPL-MCNC: 0.4 MG/DL (ref 0–1)
BUN BLDV-MCNC: 10 MG/DL (ref 7–20)
CALCIUM SERPL-MCNC: 9.7 MG/DL (ref 8.3–10.6)
CHLORIDE BLD-SCNC: 107 MMOL/L (ref 99–110)
CO2: 22 MMOL/L (ref 21–32)
CREAT SERPL-MCNC: 0.8 MG/DL (ref 0.6–1.2)
EOSINOPHILS ABSOLUTE: 0.1 K/UL (ref 0–0.6)
EOSINOPHILS RELATIVE PERCENT: 1.8 %
GFR AFRICAN AMERICAN: >60
GFR NON-AFRICAN AMERICAN: >60
GLOBULIN: 3.4 G/DL
GLUCOSE BLD-MCNC: 88 MG/DL (ref 70–99)
HCT VFR BLD CALC: 40.1 % (ref 36–48)
HEMOGLOBIN: 12.7 G/DL (ref 12–16)
LYMPHOCYTES ABSOLUTE: 1.3 K/UL (ref 1–5.1)
LYMPHOCYTES RELATIVE PERCENT: 21.1 %
MCH RBC QN AUTO: 28.5 PG (ref 26–34)
MCHC RBC AUTO-ENTMCNC: 31.7 G/DL (ref 31–36)
MCV RBC AUTO: 90.1 FL (ref 80–100)
MONOCYTES ABSOLUTE: 0.5 K/UL (ref 0–1.3)
MONOCYTES RELATIVE PERCENT: 8.3 %
NEUTROPHILS ABSOLUTE: 4 K/UL (ref 1.7–7.7)
NEUTROPHILS RELATIVE PERCENT: 68.2 %
PARATHYROID HORMONE INTACT: 105.7 PG/ML (ref 14–72)
PDW BLD-RTO: 14.9 % (ref 12.4–15.4)
PLATELET # BLD: 363 K/UL (ref 135–450)
PMV BLD AUTO: 8.9 FL (ref 5–10.5)
POTASSIUM SERPL-SCNC: 4.1 MMOL/L (ref 3.5–5.1)
RBC # BLD: 4.45 M/UL (ref 4–5.2)
SODIUM BLD-SCNC: 143 MMOL/L (ref 136–145)
T3 TOTAL: 1.41 NG/ML (ref 0.8–2)
T4 FREE: 0.9 NG/DL (ref 0.9–1.8)
TOTAL PROTEIN: 7.4 G/DL (ref 6.4–8.2)
TSH SERPL DL<=0.05 MIU/L-ACNC: 0.72 UIU/ML (ref 0.27–4.2)
VITAMIN D 25-HYDROXY: 9.8 NG/ML
WBC # BLD: 5.9 K/UL (ref 4–11)

## 2018-02-21 PROCEDURE — G8427 DOCREV CUR MEDS BY ELIG CLIN: HCPCS | Performed by: INTERNAL MEDICINE

## 2018-02-21 PROCEDURE — G8417 CALC BMI ABV UP PARAM F/U: HCPCS | Performed by: INTERNAL MEDICINE

## 2018-02-21 PROCEDURE — 3014F SCREEN MAMMO DOC REV: CPT | Performed by: INTERNAL MEDICINE

## 2018-02-21 PROCEDURE — G8484 FLU IMMUNIZE NO ADMIN: HCPCS | Performed by: INTERNAL MEDICINE

## 2018-02-21 PROCEDURE — 3017F COLORECTAL CA SCREEN DOC REV: CPT | Performed by: INTERNAL MEDICINE

## 2018-02-21 PROCEDURE — 1036F TOBACCO NON-USER: CPT | Performed by: INTERNAL MEDICINE

## 2018-02-21 PROCEDURE — 99214 OFFICE O/P EST MOD 30 MIN: CPT | Performed by: INTERNAL MEDICINE

## 2018-02-21 ASSESSMENT — ENCOUNTER SYMPTOMS
CHEST TIGHTNESS: 0
WHEEZING: 0
VOMITING: 0
FACIAL SWELLING: 0
SHORTNESS OF BREATH: 0
PHOTOPHOBIA: 0
BLOOD IN STOOL: 0
COUGH: 0
CHOKING: 0
VOICE CHANGE: 0
CONSTIPATION: 0
ABDOMINAL DISTENTION: 0
ABDOMINAL PAIN: 0
DIARRHEA: 0

## 2018-02-21 NOTE — PROGRESS NOTES
Anxiety: followed by counselor       Objective:   Physical Exam   Constitutional: She is oriented to person, place, and time. She appears well-nourished. No distress. HENT:   Mouth/Throat: Oropharynx is clear and moist.   Eyes: Conjunctivae and EOM are normal. Pupils are equal, round, and reactive to light. Neck: Neck supple. Thyromegaly present. No bruits     Cardiovascular: Normal rate, regular rhythm and normal heart sounds. Pulmonary/Chest: Breath sounds normal. She has no wheezes. Abdominal: Soft. She exhibits distension. There is no tenderness. Musculoskeletal: Normal range of motion. She exhibits no edema. Neurological: She is alert and oriented to person, place, and time. Skin: Skin is warm and dry. Psychiatric: She has a normal mood and affect. Her behavior is normal.   Nursing note and vitals reviewed. Assessment:       Cayla was seen today for pre-op exam.    Diagnoses and all orders for this visit:    Preop examination  Nl exam . Cleared for surgery  -     TSH without Reflex; Future  -     T4, Free; Future  -     T3; Future  -     PTH, Intact; Future  -     CALCIUM; Future  -     Vitamin D 25 Hydroxy; Future  -     CBC Auto Differential; Future  -     Comprehensive Metabolic Panel; Future    Thyroid Goitre   -     TSH without Reflex; Future  -     T4, Free; Future  -     T3; Future  -     PTH, Intact; Future  -     CALCIUM; Future  -     Vitamin D 25 Hydroxy; Future    Vitamin D deficiency  -     Vitamin D 25 Hydroxy; Future      Essential hypertension  -     amLODIPine (NORVASC) 10 MG tablet; TAKE 1 TABLET BY MOUTH EVERY DAY  -     atenolol (TENORMIN) 50 MG tablet; TAKE ONE TABLET BY MOUTH DAILY    Urinary incontinence, unspecified type  -     solifenacin (VESICARE) 10 MG tablet; Take 1 tablet by mouth daily    Cervical cancer screening  -     Laila Velarde MD                Strong FH of CAD   Wean off estrace ; keep close check on Lipids .  Aspirin ?    Menopausal syndrome (hot flashes) advised gradual weaning of Estrace     Anemia, unspecified type  Take daily Iron     Incontinence   Continues despite  vesicare             I    Plan:        Self Management Goals    Know which medication is for what condition:   Know side effects of medications, and discuss with doctor   Discuss side effects and instructions on new medications. Barriers to medication compliance addressed. All patient questions answered. Pt voiced understanding. Know correct dose/frequency of medications  Take medications at the same time each day  Stay current on medication refills  If taking OTC's check with MD/pharmacy first about interactions    LDL goal 647 or less  Systolic BP < or equal to 730  Diastolic BP < or equal to 85    Current Flu and Pneumonia Vax  Set targets for weight loss 4 lbs per month  Exercise 3-5 times per week  Keep check of weight  Weighting machine    On a scale of 1 to 5 how confident are you in these goals?   4/5  Education materials given

## 2018-02-22 NOTE — TELEPHONE ENCOUNTER
Patient call says was seen in the office yesterday says discussed the fact that she has a yeast infection from antibiotics and also told the doctor that also given her loose stools can something be called to the pharmacy please advise [phone]

## 2018-02-26 ENCOUNTER — OFFICE VISIT (OUTPATIENT)
Dept: ENT CLINIC | Age: 63
End: 2018-02-26

## 2018-02-26 VITALS — DIASTOLIC BLOOD PRESSURE: 80 MMHG | SYSTOLIC BLOOD PRESSURE: 124 MMHG

## 2018-02-26 DIAGNOSIS — D14.0 INVERTED PAPILLOMA OF NASAL CAVITY: Primary | ICD-10-CM

## 2018-02-26 PROCEDURE — 99024 POSTOP FOLLOW-UP VISIT: CPT | Performed by: OTOLARYNGOLOGY

## 2018-02-27 RX ORDER — FLUCONAZOLE 100 MG/1
200 TABLET ORAL ONCE
Qty: 2 TABLET | Refills: 0 | Status: SHIPPED | OUTPATIENT
Start: 2018-02-27 | End: 2018-02-27

## 2018-02-27 NOTE — TELEPHONE ENCOUNTER
Patient now calling to ask has her Nexium gotten approval patient says she gets pills not the powder please advise [phone]

## 2018-03-02 NOTE — TELEPHONE ENCOUNTER
Pt said she needs a PA for her Nexium. She has been out and is suffering. Pt said once it gets approved, she gets enough to cover. 6 months. She takes it 2 times a again, 20 mg each pill. Pt said she called in over a week ago about this. PATIENT:  [phone]    Please call patient to let her know this is done.   Thanks

## 2018-03-26 ENCOUNTER — OFFICE VISIT (OUTPATIENT)
Dept: ENT CLINIC | Age: 63
End: 2018-03-26

## 2018-03-26 VITALS — SYSTOLIC BLOOD PRESSURE: 124 MMHG | DIASTOLIC BLOOD PRESSURE: 80 MMHG

## 2018-03-26 DIAGNOSIS — G44.001 INTRACTABLE CLUSTER HEADACHE SYNDROME, UNSPECIFIED CHRONICITY PATTERN: ICD-10-CM

## 2018-03-26 DIAGNOSIS — D14.0 INVERTED PAPILLOMA OF NASAL CAVITY: Primary | ICD-10-CM

## 2018-03-26 PROCEDURE — 1036F TOBACCO NON-USER: CPT | Performed by: OTOLARYNGOLOGY

## 2018-03-26 PROCEDURE — 99213 OFFICE O/P EST LOW 20 MIN: CPT | Performed by: OTOLARYNGOLOGY

## 2018-03-26 PROCEDURE — 3014F SCREEN MAMMO DOC REV: CPT | Performed by: OTOLARYNGOLOGY

## 2018-03-26 PROCEDURE — G8482 FLU IMMUNIZE ORDER/ADMIN: HCPCS | Performed by: OTOLARYNGOLOGY

## 2018-03-26 PROCEDURE — 3017F COLORECTAL CA SCREEN DOC REV: CPT | Performed by: OTOLARYNGOLOGY

## 2018-03-26 PROCEDURE — G8417 CALC BMI ABV UP PARAM F/U: HCPCS | Performed by: OTOLARYNGOLOGY

## 2018-03-26 PROCEDURE — G8427 DOCREV CUR MEDS BY ELIG CLIN: HCPCS | Performed by: OTOLARYNGOLOGY

## 2018-03-26 RX ORDER — AMITRIPTYLINE HYDROCHLORIDE 10 MG/1
10 TABLET, FILM COATED ORAL NIGHTLY
Qty: 15 TABLET | Refills: 3 | Status: SHIPPED | OUTPATIENT
Start: 2018-03-26 | End: 2018-04-10 | Stop reason: SDUPTHER

## 2018-03-26 RX ORDER — BUTALBITAL, ACETAMINOPHEN AND CAFFEINE 50; 325; 40 MG/1; MG/1; MG/1
1 TABLET ORAL EVERY 4 HOURS PRN
Qty: 30 TABLET | Refills: 3 | Status: SHIPPED | OUTPATIENT
Start: 2018-03-26 | End: 2018-05-01

## 2018-03-26 RX ORDER — SODIUM CHLORIDE/ALOE VERA
GEL (GRAM) NASAL 3 TIMES DAILY
Qty: 1 TUBE | Refills: 3 | Status: SHIPPED | OUTPATIENT
Start: 2018-03-26 | End: 2018-09-12

## 2018-04-10 ENCOUNTER — OFFICE VISIT (OUTPATIENT)
Dept: PRIMARY CARE CLINIC | Age: 63
End: 2018-04-10

## 2018-04-10 ENCOUNTER — OFFICE VISIT (OUTPATIENT)
Dept: ENT CLINIC | Age: 63
End: 2018-04-10

## 2018-04-10 VITALS
BODY MASS INDEX: 35.93 KG/M2 | TEMPERATURE: 97.8 F | WEIGHT: 228.9 LBS | OXYGEN SATURATION: 100 % | HEART RATE: 62 BPM | DIASTOLIC BLOOD PRESSURE: 83 MMHG | HEIGHT: 67 IN | SYSTOLIC BLOOD PRESSURE: 121 MMHG

## 2018-04-10 VITALS — SYSTOLIC BLOOD PRESSURE: 126 MMHG | DIASTOLIC BLOOD PRESSURE: 80 MMHG

## 2018-04-10 DIAGNOSIS — D14.0 INVERTED PAPILLOMA OF NASAL CAVITY: Primary | ICD-10-CM

## 2018-04-10 DIAGNOSIS — M54.41 CHRONIC MIDLINE LOW BACK PAIN WITH BILATERAL SCIATICA: Primary | ICD-10-CM

## 2018-04-10 DIAGNOSIS — G89.29 CHRONIC MIDLINE LOW BACK PAIN WITH BILATERAL SCIATICA: Primary | ICD-10-CM

## 2018-04-10 DIAGNOSIS — M54.42 CHRONIC MIDLINE LOW BACK PAIN WITH BILATERAL SCIATICA: Primary | ICD-10-CM

## 2018-04-10 PROCEDURE — 3017F COLORECTAL CA SCREEN DOC REV: CPT | Performed by: INTERNAL MEDICINE

## 2018-04-10 PROCEDURE — 99024 POSTOP FOLLOW-UP VISIT: CPT | Performed by: OTOLARYNGOLOGY

## 2018-04-10 PROCEDURE — 99214 OFFICE O/P EST MOD 30 MIN: CPT | Performed by: INTERNAL MEDICINE

## 2018-04-10 PROCEDURE — 3014F SCREEN MAMMO DOC REV: CPT | Performed by: INTERNAL MEDICINE

## 2018-04-10 PROCEDURE — G8417 CALC BMI ABV UP PARAM F/U: HCPCS | Performed by: INTERNAL MEDICINE

## 2018-04-10 PROCEDURE — 1036F TOBACCO NON-USER: CPT | Performed by: INTERNAL MEDICINE

## 2018-04-10 PROCEDURE — G8427 DOCREV CUR MEDS BY ELIG CLIN: HCPCS | Performed by: INTERNAL MEDICINE

## 2018-04-10 RX ORDER — AMITRIPTYLINE HYDROCHLORIDE 50 MG/1
50 TABLET, FILM COATED ORAL NIGHTLY
Qty: 30 TABLET | Refills: 3 | Status: SHIPPED | OUTPATIENT
Start: 2018-04-10 | End: 2018-07-18

## 2018-04-10 ASSESSMENT — ENCOUNTER SYMPTOMS
CONSTIPATION: 0
VOMITING: 0
NAUSEA: 0
DIARRHEA: 0
SHORTNESS OF BREATH: 0
BACK PAIN: 1
COUGH: 0
ABDOMINAL PAIN: 0

## 2018-04-16 ENCOUNTER — TELEPHONE (OUTPATIENT)
Dept: PRIMARY CARE CLINIC | Age: 63
End: 2018-04-16

## 2018-04-19 ENCOUNTER — TELEPHONE (OUTPATIENT)
Dept: PRIMARY CARE CLINIC | Age: 63
End: 2018-04-19

## 2018-04-19 RX ORDER — FLUCONAZOLE 100 MG/1
200 TABLET ORAL ONCE
Qty: 2 TABLET | Refills: 0 | Status: SHIPPED | OUTPATIENT
Start: 2018-04-19 | End: 2018-04-19

## 2018-04-23 ENCOUNTER — CARE COORDINATION (OUTPATIENT)
Dept: CASE MANAGEMENT | Age: 63
End: 2018-04-23

## 2018-04-26 ENCOUNTER — OFFICE VISIT (OUTPATIENT)
Dept: PRIMARY CARE CLINIC | Age: 63
End: 2018-04-26

## 2018-04-26 VITALS
RESPIRATION RATE: 14 BRPM | DIASTOLIC BLOOD PRESSURE: 74 MMHG | HEART RATE: 68 BPM | TEMPERATURE: 99.1 F | OXYGEN SATURATION: 98 % | BODY MASS INDEX: 37.03 KG/M2 | SYSTOLIC BLOOD PRESSURE: 133 MMHG | WEIGHT: 236.4 LBS

## 2018-04-26 DIAGNOSIS — R10.13 EPIGASTRIC PAIN: Primary | ICD-10-CM

## 2018-04-26 PROCEDURE — 1036F TOBACCO NON-USER: CPT | Performed by: FAMILY MEDICINE

## 2018-04-26 PROCEDURE — 3017F COLORECTAL CA SCREEN DOC REV: CPT | Performed by: FAMILY MEDICINE

## 2018-04-26 PROCEDURE — G8417 CALC BMI ABV UP PARAM F/U: HCPCS | Performed by: FAMILY MEDICINE

## 2018-04-26 PROCEDURE — G8427 DOCREV CUR MEDS BY ELIG CLIN: HCPCS | Performed by: FAMILY MEDICINE

## 2018-04-26 PROCEDURE — 99213 OFFICE O/P EST LOW 20 MIN: CPT | Performed by: FAMILY MEDICINE

## 2018-05-01 ENCOUNTER — OFFICE VISIT (OUTPATIENT)
Dept: ENT CLINIC | Age: 63
End: 2018-05-01

## 2018-05-01 VITALS — SYSTOLIC BLOOD PRESSURE: 126 MMHG | DIASTOLIC BLOOD PRESSURE: 80 MMHG

## 2018-05-01 DIAGNOSIS — D14.0 INVERTED PAPILLOMA OF NASAL CAVITY: Primary | ICD-10-CM

## 2018-05-01 PROCEDURE — 99024 POSTOP FOLLOW-UP VISIT: CPT | Performed by: OTOLARYNGOLOGY

## 2018-05-01 RX ORDER — FLUTICASONE PROPIONATE 50 MCG
1 SPRAY, SUSPENSION (ML) NASAL DAILY
Qty: 1 BOTTLE | Refills: 3 | Status: SHIPPED | OUTPATIENT
Start: 2018-05-01 | End: 2019-07-11

## 2018-05-18 ENCOUNTER — TELEPHONE (OUTPATIENT)
Dept: PRIMARY CARE CLINIC | Age: 63
End: 2018-05-18

## 2018-05-18 RX ORDER — CYCLOBENZAPRINE HCL 10 MG
10 TABLET ORAL 3 TIMES DAILY PRN
Qty: 30 TABLET | Refills: 0 | Status: SHIPPED | OUTPATIENT
Start: 2018-05-18 | End: 2018-05-31 | Stop reason: SDUPTHER

## 2018-05-29 ENCOUNTER — TELEPHONE (OUTPATIENT)
Dept: PRIMARY CARE CLINIC | Age: 63
End: 2018-05-29

## 2018-05-31 DIAGNOSIS — M62.830 MUSCLE SPASM OF BACK: Primary | ICD-10-CM

## 2018-05-31 RX ORDER — CYCLOBENZAPRINE HCL 10 MG
10 TABLET ORAL 3 TIMES DAILY PRN
Qty: 30 TABLET | Refills: 0 | Status: SHIPPED | OUTPATIENT
Start: 2018-05-31 | End: 2018-06-10

## 2018-06-24 DIAGNOSIS — R10.13 EPIGASTRIC PAIN: ICD-10-CM

## 2018-07-11 ENCOUNTER — OFFICE VISIT (OUTPATIENT)
Dept: PRIMARY CARE CLINIC | Age: 63
End: 2018-07-11

## 2018-07-11 VITALS
HEART RATE: 60 BPM | OXYGEN SATURATION: 98 % | HEIGHT: 67 IN | WEIGHT: 234 LBS | TEMPERATURE: 98.2 F | DIASTOLIC BLOOD PRESSURE: 80 MMHG | BODY MASS INDEX: 36.73 KG/M2 | SYSTOLIC BLOOD PRESSURE: 130 MMHG

## 2018-07-11 DIAGNOSIS — K21.00 GERD WITH ESOPHAGITIS: ICD-10-CM

## 2018-07-11 DIAGNOSIS — I10 ESSENTIAL HYPERTENSION: ICD-10-CM

## 2018-07-11 DIAGNOSIS — E89.0 POSTOPERATIVE HYPOTHYROIDISM: Primary | ICD-10-CM

## 2018-07-11 PROCEDURE — G8417 CALC BMI ABV UP PARAM F/U: HCPCS | Performed by: INTERNAL MEDICINE

## 2018-07-11 PROCEDURE — G8427 DOCREV CUR MEDS BY ELIG CLIN: HCPCS | Performed by: INTERNAL MEDICINE

## 2018-07-11 PROCEDURE — 1036F TOBACCO NON-USER: CPT | Performed by: INTERNAL MEDICINE

## 2018-07-11 PROCEDURE — 3017F COLORECTAL CA SCREEN DOC REV: CPT | Performed by: INTERNAL MEDICINE

## 2018-07-11 PROCEDURE — 99214 OFFICE O/P EST MOD 30 MIN: CPT | Performed by: INTERNAL MEDICINE

## 2018-07-11 RX ORDER — AMLODIPINE BESYLATE 10 MG/1
TABLET ORAL
Qty: 90 TABLET | Refills: 3 | Status: SHIPPED | OUTPATIENT
Start: 2018-07-11 | End: 2018-09-12 | Stop reason: SDUPTHER

## 2018-07-11 RX ORDER — ATENOLOL 50 MG/1
TABLET ORAL
Qty: 90 TABLET | Refills: 3 | Status: SHIPPED | OUTPATIENT
Start: 2018-07-11 | End: 2018-09-12 | Stop reason: SDUPTHER

## 2018-07-11 RX ORDER — RANITIDINE 150 MG/1
150 TABLET ORAL 2 TIMES DAILY
Qty: 60 TABLET | Refills: 3 | Status: SHIPPED | OUTPATIENT
Start: 2018-07-11 | End: 2018-08-01

## 2018-07-11 ASSESSMENT — ENCOUNTER SYMPTOMS
ABDOMINAL PAIN: 0
SORE THROAT: 0
SHORTNESS OF BREATH: 0
CHOKING: 0
VOICE CHANGE: 0
CONSTIPATION: 0
PHOTOPHOBIA: 0
BLOOD IN STOOL: 0
FACIAL SWELLING: 0
ABDOMINAL DISTENTION: 0
WHEEZING: 0
CHEST TIGHTNESS: 0
COUGH: 1
DIARRHEA: 0
VOMITING: 0

## 2018-07-11 ASSESSMENT — PATIENT HEALTH QUESTIONNAIRE - PHQ9
1. LITTLE INTEREST OR PLEASURE IN DOING THINGS: 0
2. FEELING DOWN, DEPRESSED OR HOPELESS: 0
SUM OF ALL RESPONSES TO PHQ9 QUESTIONS 1 & 2: 0
SUM OF ALL RESPONSES TO PHQ QUESTIONS 1-9: 0

## 2018-07-11 NOTE — PROGRESS NOTES
Subjective:      Patient ID: Taylor Presley is a 66 y.o. female. 7/11/18 Patient presents with:  Cough: c/o dry cough for several weeks. heart burn ; worse at night  ;  taking nexium bid   Hypertension: taking meds reg   Hypothyroidism: S/O thyroidectomy . C/O Endocrine / [facility] . On Synthroid          Last seen  2/21/18 Patient presents with:  Pre-op Exam: Pre-op exam surgery scheduled 2/27/18 total thyroidectomy  Has had surgery before   No personal or FH of any anaesthesia problems or bleeding disorders         Last seen  11/28/17           Past Medical History:   Diagnosis Date    Anxiety state     Asthma     Back ache     chronic    Environmental allergies     GERD (gastroesophageal reflux disease)     Hypertension     MVP (mitral valve prolapse)     Neck pain, chronic     Reflux        Review of Systems   Constitutional: Negative for activity change, appetite change, chills, diaphoresis, fatigue, fever and unexpected weight change. All vaccines up to date    Flu vac 12/17    Tdap 5/17     Pneumonia vac 2012 ;  5/17    HENT: Negative for congestion, ear pain, facial swelling, postnasal drip, sore throat (sore tongue) and voice change. Dentures fit well ; new   Eyes: Negative for photophobia and visual disturbance. Eye exam 2/17   Respiratory: Positive for cough (Heartburn ). Negative for choking, chest tightness, shortness of breath and wheezing. Does not smoke ; No Etoh   H/o Asthma    Cardiovascular: Negative for chest pain and leg swelling. Palpitations: off and on         HTN > 5 yrs on meds . No known CAD . Father dies of MI in his 46s   Sister with CAD ? MVP  H/o palpitations   Gastrointestinal: Negative for abdominal distention, abdominal pain, blood in stool, constipation, diarrhea and vomiting. Post gastric bypass 2005  followed in [place] for this    GERD    Colonscopy 2015 ; Polyps   Endocrine: Negative for cold intolerance and heat intolerance.

## 2018-07-18 ENCOUNTER — PAT TELEPHONE (OUTPATIENT)
Dept: PREADMISSION TESTING | Age: 63
End: 2018-07-18

## 2018-07-18 VITALS — HEIGHT: 67 IN | BODY MASS INDEX: 35.31 KG/M2 | WEIGHT: 225 LBS

## 2018-07-18 RX ORDER — METHOCARBAMOL 500 MG/1
500 TABLET, FILM COATED ORAL 4 TIMES DAILY
COMMUNITY
End: 2018-07-18

## 2018-07-18 RX ORDER — METHOCARBAMOL 750 MG/1
750 TABLET, FILM COATED ORAL
COMMUNITY
End: 2018-09-12

## 2018-07-25 ENCOUNTER — HOSPITAL ENCOUNTER (OUTPATIENT)
Dept: ENDOSCOPY | Age: 63
Discharge: HOME | End: 2018-07-25
Attending: INTERNAL MEDICINE | Admitting: INTERNAL MEDICINE

## 2018-07-25 VITALS
HEART RATE: 78 BPM | RESPIRATION RATE: 18 BRPM | BODY MASS INDEX: 35 KG/M2 | SYSTOLIC BLOOD PRESSURE: 135 MMHG | WEIGHT: 223 LBS | DIASTOLIC BLOOD PRESSURE: 64 MMHG | HEIGHT: 67 IN | OXYGEN SATURATION: 97 % | TEMPERATURE: 97.1 F

## 2018-07-25 DIAGNOSIS — K21.00 GASTRO-ESOPHAGEAL REFLUX DISEASE WITH ESOPHAGITIS: ICD-10-CM

## 2018-07-25 RX ORDER — SODIUM CHLORIDE 0.9 % (FLUSH) 0.9 %
10 SYRINGE (ML) INJECTION PRN
Status: DISCONTINUED | OUTPATIENT
Start: 2018-07-25 | End: 2018-07-26 | Stop reason: HOSPADM

## 2018-07-25 RX ORDER — SODIUM CHLORIDE 0.9 % (FLUSH) 0.9 %
10 SYRINGE (ML) INJECTION EVERY 12 HOURS SCHEDULED
Status: DISCONTINUED | OUTPATIENT
Start: 2018-07-25 | End: 2018-07-26 | Stop reason: HOSPADM

## 2018-07-25 RX ORDER — LEVOTHYROXINE SODIUM 0.07 MG/1
75 TABLET ORAL DAILY
COMMUNITY
End: 2020-11-10 | Stop reason: SDUPTHER

## 2018-07-25 RX ORDER — SODIUM CHLORIDE 9 MG/ML
INJECTION, SOLUTION INTRAVENOUS CONTINUOUS
Status: DISCONTINUED | OUTPATIENT
Start: 2018-07-25 | End: 2018-07-26 | Stop reason: HOSPADM

## 2018-07-25 RX ORDER — LIDOCAINE HYDROCHLORIDE 10 MG/ML
1 INJECTION, SOLUTION EPIDURAL; INFILTRATION; INTRACAUDAL; PERINEURAL
Status: ACTIVE | OUTPATIENT
Start: 2018-07-25 | End: 2018-07-25

## 2018-07-25 RX ADMIN — SODIUM CHLORIDE: 9 INJECTION, SOLUTION INTRAVENOUS at 08:49

## 2018-07-25 ASSESSMENT — PAIN SCALES - GENERAL
PAINLEVEL_OUTOF10: 0

## 2018-07-25 ASSESSMENT — PAIN - FUNCTIONAL ASSESSMENT: PAIN_FUNCTIONAL_ASSESSMENT: 0-10

## 2018-07-25 NOTE — ANESTHESIA PRE-OP
ANESTHESIA PRE-OP NOTE    NAME: Cayla Farfan  : 1955  AGE: 66 y.o.  MED. REC. #: [id number]    DOS: 18      PROCEDURE: EGD      SURGEON: Yarelis    HPI: 65 yo F with GERD and abdominal pain     ALLERGIES: Gabapentin; Protonix [pantoprazole sodium]; Tramadol; Aspirin; Betadine [povidone iodine]; Chlorhexidine gluconate; Morphine; Other; Toradol [ketorolac tromethamine];  Iodides; and Tape [adhesive tape] Height: 5' 7\" (170.2 cm) Weight: 223 lb (101.2 kg)  Vitals:    18 0835   BP: 127/70   Pulse: 58   Resp: 18   Temp: 97 °F (36.1 °C)   SpO2: 100%      MEDICATIONS:  Patient's Medications   New Prescriptions    No medications on file   Previous Medications    ALBUTEROL SULFATE HFA (PROAIR HFA) 108 (90 BASE) MCG/ACT INHALER    Inhale 2 puffs into the lungs every 6 hours as needed for Wheezing    AMLODIPINE (NORVASC) 10 MG TABLET    TAKE 1 TABLET BY MOUTH EVERY DAY    ATENOLOL (TENORMIN) 50 MG TABLET    TAKE ONE TABLET BY MOUTH DAILY    ESTRADIOL (ESTRACE) 1 MG TABLET    TAKE 1 TABLET BY MOUTH EVERY DAY    FLUTICASONE (FLONASE) 50 MCG/ACT NASAL SPRAY    1 spray by Nasal route daily    METHOCARBAMOL (ROBAXIN) 750 MG TABLET    Take 750 mg by mouth    NEXIUM 40 MG DELAYED RELEASE CAPSULE    Take 1 capsule by mouth 2 times daily    RANITIDINE (ZANTAC) 150 MG TABLET    Take 1 tablet by mouth 2 times daily    SALINE NASAL GEL (AYR) GEL    by Nasal route three times daily    SOLIFENACIN (VESICARE) 10 MG TABLET    Take 1 tablet by mouth daily   Modified Medications    No medications on file   Discontinued Medications    No medications on file      ASA STATUS: 3  NPO since: > 8 hrs   Patient identified/chart reviewed: yes  CV:   + HTN  no HLD    no CAD   PULMONARY: +  Asthma    no COPD   no WAQAR   ENDOCRINE: no DM     no Thyroid Disease   GI: + GERD   : no known renal disease   NEURO/PSYCH: no CVA   no Seizure Disorder   MUSCULOSKELETAL: + chronic LBP   HEME/ONC: no DVT  no PE   no Anemia      OTHER:   EKG: Echocardiogram:   COMMENTS:        PSH:  has a past surgical history that includes laminectomy (7/1/11); hernia repair; Splenectomy, partial; hiatal hernia repair; Hysterectomy; Upper gastrointestinal endoscopy; Foot surgery (11-9-12); Knee Arthroplasty (2/28/13); back surgery; Gastric bypass surgery; bladder suspension; Cholecystectomy; Tonsillectomy; Knee arthroscopy (Right, 3/7/2014); joint replacement (Bilateral); and Nasal sinus surgery (Right, 02/05/2018). Patient Active Problem List   Diagnosis    Bariatric surgery status    Epidural lipomatosis    Adhesive capsulitis    Rotator cuff tendinitis    Plantar fasciitis, right    Mitral valve disease    GERD (gastroesophageal reflux disease)    HTN (hypertension)    Anxiety    Asthma    Osteoarthritis of right knee    Right knee DJD    Chest pain    Hesitancy of micturition    Abdominal pain, other specified site    HTN (hypertension), malignant    Abdominal pain    Abdominal pain, epigastric    Subclinical hyperthyroidism    Thyroid nodule    Allergic sinusitis    Pharyngitis    Bilateral impacted cerumen    Nose septum deviation    Anemia    Mau's syndrome    Obstructive apnea    Pancreatitis    Recurrent sinusitis    Headache, cervicogenic    Nasal mass    Inverted papilloma of nasal cavity    Intractable cluster headache syndrome     PMH:  Past Medical History:   Diagnosis Date    Anxiety state     Asthma     Back ache     chronic    Environmental allergies     GERD (gastroesophageal reflux disease)     Hypertension     MVP (mitral valve prolapse)     Neck pain, chronic     Reflux       PERSONAL/FAMILY ANESTHESIA PROBLEMS: no     AIRWAY ASSESSMENT:  MALLAMPATI: III DENTITION: few remaining teeth, none loose per patient ROM: full     ANESTHETIC PLAN: TIVA    If radames-operative block planned, describe:    CONSENT: Risks/benefits/options/questions discussed.  Patient agrees:  yes

## 2018-07-25 NOTE — BRIEF OP NOTE
Brief Postoperative Note    Cayla Moralez  YOB: 1955  [id number]    Pre-operative Diagnosis: Epigastric pain; GERD    Post-operative Diagnosis: Same    Procedure: EGD    Anesthesia: MAC    Surgeons/Assistants: Yarelis    Estimated Blood Loss: None    Complications: None    Specimens: Was Not Obtained    Findings: See dictated report    Electronically signed by Ofelia Ruvalcaba MD on 7/25/2018 at 9:12 AM

## 2018-07-25 NOTE — PROCEDURES
[address]                                  PROCEDURE NOTE    PATIENT NAME: Lor Snyder                     :        1955  MED REC NO:   [id number]                          ROOM:  ACCOUNT NO:   [de-identified]                          ADMIT DATE: 2018  PROVIDER:     Josiah Linder MD    EGD    DATE OF PROCEDURE:  2018    REFERRING PROVIDER:  Galina Jones MD    INSTRUMENT USED:  Olympus [id number]. ANESTHESIA:  The patient was premedicated with Diprivan intravenously as  administered by the anesthesiology service. INDICATIONS:  The patient has not been seen in many years. She has had  multiple previous abdominal surgeries. She presents now with what she  claims is refractory gastroesophageal reflux despite high-dose acid  suppression. She also is complaining of epigastric abdominal pain. PROCEDURE:  The endoscope was inserted into the esophagus without  difficulty. There was mild luminal narrowing in the distal esophagus due  to benign scarring. The Z-line was located at 40 cm. There was no  evidence of inflammatory or metaplastic change. The patient has a very  small gastric remnant which was normal.  There was no marginal ulceration. The blind pouch was examined and was normal.  The efferent limb of the  gastrojejunostomy was examined to the length of the endoscope and was  completely normal.    IMPRESSION:  1. Mild nonobstructive stricturing in the distal esophagus. 2.  Otherwise normal upper gastrointestinal endoscopy, status post  gastrojejunostomy. PLAN:  The etiology for the patient's symptoms is unknown. I see no  inflammatory change in the esophagus and her exam is otherwise normal.  She  also had a recent normal CT scan of the abdomen and pelvis. Her symptoms  may simply be postoperative.   I will encourage her to follow up with her  surgical team.  Her most

## 2018-07-25 NOTE — H&P
every 6 hours as needed for Wheezing 8.5 g 5    solifenacin (VESICARE) 10 MG tablet Take 1 tablet by mouth daily 30 tablet 2    estradiol (ESTRACE) 1 MG tablet TAKE 1 TABLET BY MOUTH EVERY DAY 30 tablet 3     No current facility-administered medications on file prior to encounter. Allergies:  Gabapentin; Protonix [pantoprazole sodium]; Tramadol; Aspirin; Betadine [povidone iodine]; Chlorhexidine gluconate; Morphine; Other; Toradol [ketorolac tromethamine]; Iodides; and Tape [place] tape]    Social History:      Social History     Social History    Marital status: Legally      Spouse name: N/A    Number of children: N/A    Years of education: N/A     Occupational History    Not on file. Social History Main Topics    Smoking status: Former Smoker     Packs/day: 0.25     Years: 2.00     Quit date: 1/1/2004    Smokeless tobacco: Never Used      Comment: [id number]    Alcohol use No    Drug use: No    Sexual activity: Yes     Partners: Male     Other Topics Concern    Not on file     Social History Narrative    No narrative on file           Family History:   Family History   Problem Relation Age of Onset    High Blood Pressure Mother     Heart Disease Mother     Other Mother         glaucoma    High Blood Pressure Father     Heart Disease Father     Other Father         intestional problems    Cancer Sister         cancer    Kidney Disease Sister     Kidney Disease Brother     High Blood Pressure Brother     Other Sister         lupus,fibromyalgia  thyroid surgery    High Blood Pressure Sister     Kidney Disease Sister     Heart Disease Sister     Kidney Disease Brother     High Blood Pressure Brother     Anesth Problems Neg Hx     Malig Hyperten Neg Hx     Hypotension Neg Hx     Malig Hypertherm Neg Hx     Pseudochol.  Deficiency Neg Hx          PHYSICAL EXAM:      /70   Pulse 58   Temp 97 °F (36.1 °C) (Temporal)   Resp 18   Ht 5' 7\" (1.702 m)   Wt 223

## 2018-07-27 DIAGNOSIS — K21.00 GASTROESOPHAGEAL REFLUX DISEASE WITH ESOPHAGITIS: ICD-10-CM

## 2018-07-27 DIAGNOSIS — R32 URINARY INCONTINENCE, UNSPECIFIED TYPE: ICD-10-CM

## 2018-07-27 DIAGNOSIS — R10.13 EPIGASTRIC PAIN: ICD-10-CM

## 2018-07-27 RX ORDER — SOLIFENACIN SUCCINATE 10 MG/1
10 TABLET, FILM COATED ORAL DAILY
Qty: 30 TABLET | Refills: 0 | OUTPATIENT
Start: 2018-07-27

## 2018-07-27 RX ORDER — SOLIFENACIN SUCCINATE 10 MG/1
10 TABLET, FILM COATED ORAL DAILY
Qty: 30 TABLET | Refills: 2 | Status: SHIPPED | OUTPATIENT
Start: 2018-07-27 | End: 2018-11-08 | Stop reason: SDUPTHER

## 2018-08-01 ENCOUNTER — OFFICE VISIT (OUTPATIENT)
Dept: ENT CLINIC | Age: 63
End: 2018-08-01

## 2018-08-01 VITALS — DIASTOLIC BLOOD PRESSURE: 80 MMHG | SYSTOLIC BLOOD PRESSURE: 124 MMHG

## 2018-08-01 DIAGNOSIS — J30.9 ALLERGIC SINUSITIS: Primary | ICD-10-CM

## 2018-08-01 DIAGNOSIS — G44.86 HEADACHE, CERVICOGENIC: ICD-10-CM

## 2018-08-01 DIAGNOSIS — J32.9 RECURRENT SINUSITIS: ICD-10-CM

## 2018-08-01 PROCEDURE — G8417 CALC BMI ABV UP PARAM F/U: HCPCS | Performed by: OTOLARYNGOLOGY

## 2018-08-01 PROCEDURE — 99213 OFFICE O/P EST LOW 20 MIN: CPT | Performed by: OTOLARYNGOLOGY

## 2018-08-01 PROCEDURE — G8427 DOCREV CUR MEDS BY ELIG CLIN: HCPCS | Performed by: OTOLARYNGOLOGY

## 2018-08-01 PROCEDURE — 96372 THER/PROPH/DIAG INJ SC/IM: CPT | Performed by: OTOLARYNGOLOGY

## 2018-08-01 PROCEDURE — 1036F TOBACCO NON-USER: CPT | Performed by: OTOLARYNGOLOGY

## 2018-08-01 PROCEDURE — 3017F COLORECTAL CA SCREEN DOC REV: CPT | Performed by: OTOLARYNGOLOGY

## 2018-08-01 RX ORDER — AZITHROMYCIN 250 MG/1
TABLET, FILM COATED ORAL
Qty: 1 PACKET | Refills: 0 | Status: SHIPPED | OUTPATIENT
Start: 2018-08-01 | End: 2018-09-12

## 2018-08-01 RX ORDER — FLUCONAZOLE 100 MG/1
100 TABLET ORAL DAILY
Qty: 5 TABLET | Refills: 0 | Status: SHIPPED | OUTPATIENT
Start: 2018-08-01 | End: 2018-09-12

## 2018-08-01 RX ORDER — BUTALBITAL, ACETAMINOPHEN AND CAFFEINE 50; 325; 40 MG/1; MG/1; MG/1
1 TABLET ORAL EVERY 4 HOURS PRN
Qty: 30 TABLET | Refills: 0 | Status: SHIPPED | OUTPATIENT
Start: 2018-08-01 | End: 2018-08-29 | Stop reason: SDUPTHER

## 2018-08-01 RX ORDER — FLUTICASONE PROPIONATE 50 MCG
2 SPRAY, SUSPENSION (ML) NASAL DAILY
Qty: 1 BOTTLE | Refills: 2 | Status: SHIPPED | OUTPATIENT
Start: 2018-08-01 | End: 2018-09-04

## 2018-08-01 RX ORDER — METHYLPREDNISOLONE ACETATE 40 MG/ML
40 INJECTION, SUSPENSION INTRA-ARTICULAR; INTRALESIONAL; INTRAMUSCULAR; SOFT TISSUE ONCE
Status: COMPLETED | OUTPATIENT
Start: 2018-08-01 | End: 2018-08-01

## 2018-08-01 RX ADMIN — METHYLPREDNISOLONE ACETATE 40 MG: 40 INJECTION, SUSPENSION INTRA-ARTICULAR; INTRALESIONAL; INTRAMUSCULAR; SOFT TISSUE at 13:15

## 2018-08-01 NOTE — PROGRESS NOTES
Patient has been doing reasonably well with no problem breathing through her nose but she has been having over the past week or so some increase in in the frontal area as well as the ethmoid region thought to be secondary to her sinuses feeling congested. She has had a little off balance associated with it but no fever. She has had no bleeding from her nose or posteriorly. Currently, she appears in no obvious acute distress. Ear examination reveals normal findings with normal-appearing tympanic membranes and ear canals. The oral examination is totally unremarkable. The neck is free of any adenopathy, mass, thyroid enlargement but she does have a healing anterior scar from previous surgery. Nasal mucosa treated with cotton pledgets  impregnated with Afrin and lidocaine placed in middle meatuses against turbinates. Pledgets left in place for five minutes and removed enabling enhanced visualization. The exam revealed positive edema of mucosa. it was negative for erythema indicative of infection. There was not evidence of deviation of the septum which was not significant. There were not polyps present. .There were not other masses present. The turbinates were not enlarged beyond normal.  There does appear to be some tenderness overlying the frontal sinus areas as well as the maxillary areas all of which suggests that there is some element of sinus infection as well as allergy involvement. I see no evidence of recurrence of her inverting papilloma on the right side. The airway is quite patent. No nystagmus is noted. We will start her on a course of Zithromax as well as Diflucan to prevent yeast infection which she has had in the past.  She will also be on some Fioricet for the headaches and continue her Flonase nasal spray. In addition, Depo-Medrol injection is given.   I have asked that she will let me know if there is no improvement in the course of the next week and I will see her again in 3 months for routine evaluation.

## 2018-08-13 ENCOUNTER — TELEPHONE (OUTPATIENT)
Dept: PRIMARY CARE CLINIC | Age: 63
End: 2018-08-13

## 2018-08-16 ENCOUNTER — TELEPHONE (OUTPATIENT)
Dept: PRIMARY CARE CLINIC | Age: 63
End: 2018-08-16

## 2018-08-16 NOTE — TELEPHONE ENCOUNTER
Form sent from [facility] for back brace. First one had code # [id number]. Send another one with correct code : . New form faxed 8/15/18. Please complete 2nd form and rerturn.

## 2018-08-17 NOTE — TELEPHONE ENCOUNTER
Juan Nunez from [facility] calling back to make sure code  gets puts on form that was faxed back  please call [phone]

## 2018-08-24 DIAGNOSIS — E03.9 ACQUIRED HYPOTHYROIDISM: ICD-10-CM

## 2018-08-24 RX ORDER — LEVOTHYROXINE SODIUM 0.1 MG/1
100 TABLET ORAL DAILY
Qty: 30 TABLET | Refills: 0 | Status: SHIPPED | OUTPATIENT
Start: 2018-08-24 | End: 2018-09-04

## 2018-08-28 ENCOUNTER — TELEPHONE (OUTPATIENT)
Dept: ENT CLINIC | Age: 63
End: 2018-08-28

## 2018-08-28 DIAGNOSIS — D14.0 INVERTED PAPILLOMA OF NASAL CAVITY: Primary | ICD-10-CM

## 2018-08-28 DIAGNOSIS — J30.9 ALLERGIC SINUSITIS: ICD-10-CM

## 2018-08-28 RX ORDER — METHYLPREDNISOLONE 4 MG/1
4 TABLET ORAL SEE ADMIN INSTRUCTIONS
Qty: 1 KIT | Refills: 0 | Status: SHIPPED | OUTPATIENT
Start: 2018-08-28 | End: 2018-09-12

## 2018-08-29 DIAGNOSIS — G44.86 HEADACHE, CERVICOGENIC: ICD-10-CM

## 2018-08-29 RX ORDER — BUTALBITAL, ACETAMINOPHEN AND CAFFEINE 50; 325; 40 MG/1; MG/1; MG/1
1 TABLET ORAL EVERY 4 HOURS PRN
Qty: 30 TABLET | Refills: 0 | Status: SHIPPED | OUTPATIENT
Start: 2018-08-29 | End: 2018-09-12

## 2018-08-29 NOTE — TELEPHONE ENCOUNTER
Patient called , she says that someone tried to reach her yesterday, Fbaian Ferraro ,    She says that she is having headaches, and has been sleeping  A lot, please advise    Please call patient on her cell [phone]

## 2018-08-29 NOTE — TELEPHONE ENCOUNTER
Number listed in message is for a business, if patient calls back please just relay message from     Ysabel De Leon [address] [employer] 22 hours ago (3:21 PM)      I would like her to try the medication which is at the pharmacy and have her scheduled for a sinus CT scan (Routing comment)

## 2018-09-04 ENCOUNTER — PAT TELEPHONE (OUTPATIENT)
Dept: PREADMISSION TESTING | Age: 63
End: 2018-09-04

## 2018-09-04 VITALS — HEIGHT: 67 IN | WEIGHT: 220 LBS | BODY MASS INDEX: 34.53 KG/M2

## 2018-09-07 RX ORDER — SODIUM CHLORIDE 9 MG/ML
INJECTION, SOLUTION INTRAVENOUS CONTINUOUS
Status: CANCELLED | OUTPATIENT
Start: 2018-09-07

## 2018-09-07 RX ORDER — SODIUM CHLORIDE 0.9 % (FLUSH) 0.9 %
10 SYRINGE (ML) INJECTION EVERY 12 HOURS SCHEDULED
Status: CANCELLED | OUTPATIENT
Start: 2018-09-07

## 2018-09-07 RX ORDER — SODIUM CHLORIDE 0.9 % (FLUSH) 0.9 %
10 SYRINGE (ML) INJECTION PRN
Status: CANCELLED | OUTPATIENT
Start: 2018-09-07

## 2018-09-08 ENCOUNTER — HOSPITAL ENCOUNTER (OUTPATIENT)
Dept: CT IMAGING | Age: 63
Discharge: HOME | End: 2018-09-08
Attending: OTOLARYNGOLOGY | Admitting: OTOLARYNGOLOGY

## 2018-09-08 DIAGNOSIS — J30.9 ALLERGIC SINUSITIS: ICD-10-CM

## 2018-09-08 DIAGNOSIS — J30.9 ALLERGIC RHINITIS: ICD-10-CM

## 2018-09-08 DIAGNOSIS — D14.0 INVERTED PAPILLOMA OF NASAL CAVITY: ICD-10-CM

## 2018-09-11 ENCOUNTER — HOSPITAL ENCOUNTER (OUTPATIENT)
Dept: ENDOSCOPY | Age: 63
Discharge: HOME | End: 2018-09-12
Attending: INTERNAL MEDICINE | Admitting: INTERNAL MEDICINE

## 2018-09-11 DIAGNOSIS — K62.5 HEMORRHAGE OF ANUS AND RECTUM: ICD-10-CM

## 2018-09-12 ENCOUNTER — OFFICE VISIT (OUTPATIENT)
Dept: PRIMARY CARE CLINIC | Age: 63
End: 2018-09-12

## 2018-09-12 ENCOUNTER — TELEPHONE (OUTPATIENT)
Dept: PRIMARY CARE CLINIC | Age: 63
End: 2018-09-12

## 2018-09-12 VITALS
BODY MASS INDEX: 36.73 KG/M2 | HEIGHT: 67 IN | TEMPERATURE: 97.5 F | OXYGEN SATURATION: 98 % | DIASTOLIC BLOOD PRESSURE: 80 MMHG | HEART RATE: 59 BPM | SYSTOLIC BLOOD PRESSURE: 120 MMHG | WEIGHT: 234 LBS

## 2018-09-12 DIAGNOSIS — N76.0 ACUTE VAGINITIS: ICD-10-CM

## 2018-09-12 DIAGNOSIS — J20.9 COPD WITH ACUTE BRONCHITIS (HCC): ICD-10-CM

## 2018-09-12 DIAGNOSIS — J44.0 COPD WITH ACUTE BRONCHITIS (HCC): ICD-10-CM

## 2018-09-12 DIAGNOSIS — E89.0 POST-OPERATIVE HYPOTHYROIDISM: ICD-10-CM

## 2018-09-12 DIAGNOSIS — R63.5 WEIGHT GAIN: ICD-10-CM

## 2018-09-12 DIAGNOSIS — I10 ESSENTIAL HYPERTENSION: ICD-10-CM

## 2018-09-12 PROCEDURE — G8427 DOCREV CUR MEDS BY ELIG CLIN: HCPCS | Performed by: INTERNAL MEDICINE

## 2018-09-12 PROCEDURE — G8926 SPIRO NO PERF OR DOC: HCPCS | Performed by: INTERNAL MEDICINE

## 2018-09-12 PROCEDURE — 3023F SPIROM DOC REV: CPT | Performed by: INTERNAL MEDICINE

## 2018-09-12 PROCEDURE — 99214 OFFICE O/P EST MOD 30 MIN: CPT | Performed by: INTERNAL MEDICINE

## 2018-09-12 PROCEDURE — 1036F TOBACCO NON-USER: CPT | Performed by: INTERNAL MEDICINE

## 2018-09-12 PROCEDURE — G8417 CALC BMI ABV UP PARAM F/U: HCPCS | Performed by: INTERNAL MEDICINE

## 2018-09-12 PROCEDURE — 3017F COLORECTAL CA SCREEN DOC REV: CPT | Performed by: INTERNAL MEDICINE

## 2018-09-12 RX ORDER — PREDNISONE 20 MG/1
40 TABLET ORAL DAILY
Qty: 12 TABLET | Refills: 0 | Status: SHIPPED | OUTPATIENT
Start: 2018-09-12 | End: 2018-09-18

## 2018-09-12 RX ORDER — ALBUTEROL SULFATE 90 UG/1
2 AEROSOL, METERED RESPIRATORY (INHALATION) EVERY 6 HOURS PRN
Qty: 8.5 G | Refills: 5 | Status: SHIPPED | OUTPATIENT
Start: 2018-09-12 | End: 2018-09-24 | Stop reason: SDUPTHER

## 2018-09-12 RX ORDER — AMLODIPINE BESYLATE 10 MG/1
TABLET ORAL
Qty: 90 TABLET | Refills: 3 | Status: SHIPPED | OUTPATIENT
Start: 2018-09-12 | End: 2019-03-29

## 2018-09-12 RX ORDER — FLUCONAZOLE 100 MG/1
200 TABLET ORAL ONCE
Qty: 2 TABLET | Refills: 0 | Status: SHIPPED | OUTPATIENT
Start: 2018-09-12 | End: 2018-09-12

## 2018-09-12 RX ORDER — AMOXICILLIN AND CLAVULANATE POTASSIUM 875; 125 MG/1; MG/1
1 TABLET, FILM COATED ORAL 2 TIMES DAILY
Qty: 20 TABLET | Refills: 0 | Status: SHIPPED | OUTPATIENT
Start: 2018-09-12 | End: 2018-09-22

## 2018-09-12 RX ORDER — FLUTICASONE PROPIONATE 220 UG/1
2 AEROSOL, METERED RESPIRATORY (INHALATION) 2 TIMES DAILY
Qty: 1 INHALER | Refills: 3 | Status: SHIPPED | OUTPATIENT
Start: 2018-09-12 | End: 2018-09-24 | Stop reason: SDUPTHER

## 2018-09-12 RX ORDER — ATENOLOL 50 MG/1
TABLET ORAL
Qty: 90 TABLET | Refills: 3 | Status: SHIPPED | OUTPATIENT
Start: 2018-09-12 | End: 2019-03-29

## 2018-09-12 ASSESSMENT — ENCOUNTER SYMPTOMS
VOICE CHANGE: 0
BLOOD IN STOOL: 0
FACIAL SWELLING: 0
SORE THROAT: 1
SHORTNESS OF BREATH: 0
PHOTOPHOBIA: 0
ABDOMINAL DISTENTION: 0
VOMITING: 0
CONSTIPATION: 0
COUGH: 1
ABDOMINAL PAIN: 0
DIARRHEA: 0
CHOKING: 0
WHEEZING: 1
CHEST TIGHTNESS: 0

## 2018-09-13 ENCOUNTER — HOSPITAL ENCOUNTER (OUTPATIENT)
Dept: OTHER | Age: 63
Discharge: HOME | End: 2018-09-13
Attending: INTERNAL MEDICINE | Admitting: INTERNAL MEDICINE

## 2018-09-13 DIAGNOSIS — J44.0 COPD WITH ACUTE BRONCHITIS (HCC): ICD-10-CM

## 2018-09-13 DIAGNOSIS — J20.9 COPD WITH ACUTE BRONCHITIS (HCC): ICD-10-CM

## 2018-09-13 LAB
BASOPHILS ABSOLUTE: 0.1 K/UL (ref 0–0.2)
BASOPHILS RELATIVE PERCENT: 1 %
EOSINOPHILS ABSOLUTE: 0 K/UL (ref 0–0.6)
EOSINOPHILS RELATIVE PERCENT: 0.1 %
HCT VFR BLD CALC: 40.8 % (ref 36–48)
HEMOGLOBIN: 13 G/DL (ref 12–16)
LYMPHOCYTES ABSOLUTE: 0.9 K/UL (ref 1–5.1)
LYMPHOCYTES RELATIVE PERCENT: 17.1 %
MCH RBC QN AUTO: 28.4 PG (ref 26–34)
MCHC RBC AUTO-ENTMCNC: 31.8 G/DL (ref 31–36)
MCV RBC AUTO: 89.2 FL (ref 80–100)
MONOCYTES ABSOLUTE: 0.2 K/UL (ref 0–1.3)
MONOCYTES RELATIVE PERCENT: 3.4 %
NEUTROPHILS ABSOLUTE: 4.4 K/UL (ref 1.7–7.7)
NEUTROPHILS RELATIVE PERCENT: 78.4 %
PDW BLD-RTO: 15 % (ref 12.4–15.4)
PLATELET # BLD: 280 K/UL (ref 135–450)
PMV BLD AUTO: 9.7 FL (ref 5–10.5)
RBC # BLD: 4.57 M/UL (ref 4–5.2)
TSH SERPL DL<=0.05 MIU/L-ACNC: 0.32 UIU/ML (ref 0.27–4.2)
WBC # BLD: 5.5 K/UL (ref 4–11)

## 2018-09-19 ENCOUNTER — TELEPHONE (OUTPATIENT)
Dept: PRIMARY CARE CLINIC | Age: 63
End: 2018-09-19

## 2018-09-19 RX ORDER — FLUCONAZOLE 100 MG/1
100 TABLET ORAL DAILY
Qty: 3 TABLET | Refills: 0 | Status: SHIPPED | OUTPATIENT
Start: 2018-09-19 | End: 2018-09-22

## 2018-09-20 ENCOUNTER — TELEPHONE (OUTPATIENT)
Dept: PRIMARY CARE CLINIC | Age: 63
End: 2018-09-20

## 2018-09-20 NOTE — TELEPHONE ENCOUNTER
Possible yeast infection .  Diflucan was ordered   If generalized swelling occurs ,that is a allergic reaction , then stop the antibiotic

## 2018-09-24 ENCOUNTER — OFFICE VISIT (OUTPATIENT)
Dept: PRIMARY CARE CLINIC | Age: 63
End: 2018-09-24
Payer: MEDICARE

## 2018-09-24 VITALS
HEART RATE: 62 BPM | HEIGHT: 67 IN | DIASTOLIC BLOOD PRESSURE: 74 MMHG | BODY MASS INDEX: 36.88 KG/M2 | TEMPERATURE: 98.2 F | OXYGEN SATURATION: 100 % | WEIGHT: 235 LBS | SYSTOLIC BLOOD PRESSURE: 130 MMHG

## 2018-09-24 DIAGNOSIS — Z23 FLU VACCINE NEED: ICD-10-CM

## 2018-09-24 DIAGNOSIS — I10 ESSENTIAL HYPERTENSION: ICD-10-CM

## 2018-09-24 DIAGNOSIS — J45.40 MODERATE PERSISTENT ASTHMA WITHOUT COMPLICATION: Primary | ICD-10-CM

## 2018-09-24 PROCEDURE — G8427 DOCREV CUR MEDS BY ELIG CLIN: HCPCS | Performed by: INTERNAL MEDICINE

## 2018-09-24 PROCEDURE — 1036F TOBACCO NON-USER: CPT | Performed by: INTERNAL MEDICINE

## 2018-09-24 PROCEDURE — 90686 IIV4 VACC NO PRSV 0.5 ML IM: CPT | Performed by: INTERNAL MEDICINE

## 2018-09-24 PROCEDURE — G0008 ADMIN INFLUENZA VIRUS VAC: HCPCS | Performed by: INTERNAL MEDICINE

## 2018-09-24 PROCEDURE — G8417 CALC BMI ABV UP PARAM F/U: HCPCS | Performed by: INTERNAL MEDICINE

## 2018-09-24 PROCEDURE — 3017F COLORECTAL CA SCREEN DOC REV: CPT | Performed by: INTERNAL MEDICINE

## 2018-09-24 PROCEDURE — 99213 OFFICE O/P EST LOW 20 MIN: CPT | Performed by: INTERNAL MEDICINE

## 2018-09-24 RX ORDER — FEXOFENADINE HCL 180 MG/1
180 TABLET ORAL DAILY
Qty: 30 TABLET | Refills: 3 | Status: SHIPPED | OUTPATIENT
Start: 2018-09-24 | End: 2019-03-29

## 2018-09-24 RX ORDER — FLUTICASONE PROPIONATE 220 UG/1
2 AEROSOL, METERED RESPIRATORY (INHALATION) 2 TIMES DAILY
Qty: 1 INHALER | Refills: 3 | Status: SHIPPED | OUTPATIENT
Start: 2018-09-24 | End: 2019-11-01

## 2018-09-24 RX ORDER — ALBUTEROL SULFATE 90 UG/1
2 AEROSOL, METERED RESPIRATORY (INHALATION) EVERY 6 HOURS PRN
Qty: 8.5 G | Refills: 5 | Status: SHIPPED | OUTPATIENT
Start: 2018-09-24 | End: 2019-11-19 | Stop reason: SDUPTHER

## 2018-09-24 RX ORDER — MONTELUKAST SODIUM 10 MG/1
10 TABLET ORAL DAILY
Qty: 30 TABLET | Refills: 3 | Status: SHIPPED | OUTPATIENT
Start: 2018-09-24 | End: 2019-08-01

## 2018-09-24 ASSESSMENT — ENCOUNTER SYMPTOMS
PHOTOPHOBIA: 0
ABDOMINAL PAIN: 0
VOICE CHANGE: 0
FACIAL SWELLING: 0
DIARRHEA: 0
WHEEZING: 0
SHORTNESS OF BREATH: 0
ABDOMINAL DISTENTION: 0
CHEST TIGHTNESS: 0
COUGH: 0
CHOKING: 0
SORE THROAT: 0
VOMITING: 0
CONSTIPATION: 0
BLOOD IN STOOL: 0

## 2018-09-27 ENCOUNTER — ANESTHESIA EVENT (OUTPATIENT)
Dept: ENDOSCOPY | Age: 63
End: 2018-09-27
Payer: MEDICARE

## 2018-09-28 ENCOUNTER — ANESTHESIA (OUTPATIENT)
Dept: ENDOSCOPY | Age: 63
End: 2018-09-28
Payer: MEDICARE

## 2018-09-28 ENCOUNTER — HOSPITAL ENCOUNTER (OUTPATIENT)
Age: 63
Setting detail: OUTPATIENT SURGERY
Discharge: HOME | End: 2018-09-28
Attending: INTERNAL MEDICINE | Admitting: INTERNAL MEDICINE
Payer: MEDICARE

## 2018-09-28 VITALS
OXYGEN SATURATION: 100 % | RESPIRATION RATE: 16 BRPM | DIASTOLIC BLOOD PRESSURE: 72 MMHG | SYSTOLIC BLOOD PRESSURE: 122 MMHG | TEMPERATURE: 98.3 F | HEART RATE: 56 BPM

## 2018-09-28 VITALS — SYSTOLIC BLOOD PRESSURE: 109 MMHG | OXYGEN SATURATION: 99 % | DIASTOLIC BLOOD PRESSURE: 71 MMHG

## 2018-09-28 PROCEDURE — 2580000003 HC RX 258: Performed by: ANESTHESIOLOGY

## 2018-09-28 PROCEDURE — 6360000002 HC RX W HCPCS: Performed by: NURSE ANESTHETIST, CERTIFIED REGISTERED

## 2018-09-28 PROCEDURE — 7100000011 HC PHASE II RECOVERY - ADDTL 15 MIN: Performed by: INTERNAL MEDICINE

## 2018-09-28 PROCEDURE — 6370000000 HC RX 637 (ALT 250 FOR IP)

## 2018-09-28 PROCEDURE — 3609009500 HC COLONOSCOPY DIAGNOSTIC OR SCREENING: Performed by: INTERNAL MEDICINE

## 2018-09-28 PROCEDURE — 3700000001 HC ADD 15 MINUTES (ANESTHESIA): Performed by: INTERNAL MEDICINE

## 2018-09-28 PROCEDURE — 7100000010 HC PHASE II RECOVERY - FIRST 15 MIN: Performed by: INTERNAL MEDICINE

## 2018-09-28 PROCEDURE — 3700000000 HC ANESTHESIA ATTENDED CARE: Performed by: INTERNAL MEDICINE

## 2018-09-28 PROCEDURE — 2500000003 HC RX 250 WO HCPCS: Performed by: NURSE ANESTHETIST, CERTIFIED REGISTERED

## 2018-09-28 RX ORDER — LIDOCAINE HYDROCHLORIDE 20 MG/ML
INJECTION, SOLUTION INFILTRATION; PERINEURAL PRN
Status: DISCONTINUED | OUTPATIENT
Start: 2018-09-28 | End: 2018-09-28

## 2018-09-28 RX ORDER — LIDOCAINE HYDROCHLORIDE 20 MG/ML
INJECTION, SOLUTION INFILTRATION; PERINEURAL PRN
Status: DISCONTINUED | OUTPATIENT
Start: 2018-09-28 | End: 2018-09-28 | Stop reason: SDUPTHER

## 2018-09-28 RX ORDER — SODIUM CHLORIDE 0.9 % (FLUSH) 0.9 %
10 SYRINGE (ML) INJECTION EVERY 12 HOURS SCHEDULED
Status: DISCONTINUED | OUTPATIENT
Start: 2018-09-28 | End: 2018-09-28 | Stop reason: HOSPADM

## 2018-09-28 RX ORDER — SODIUM CHLORIDE 0.9 % (FLUSH) 0.9 %
10 SYRINGE (ML) INJECTION PRN
Status: DISCONTINUED | OUTPATIENT
Start: 2018-09-28 | End: 2018-09-28 | Stop reason: HOSPADM

## 2018-09-28 RX ORDER — ONDANSETRON 2 MG/ML
4 INJECTION INTRAMUSCULAR; INTRAVENOUS PRN
Status: DISCONTINUED | OUTPATIENT
Start: 2018-09-28 | End: 2018-09-28 | Stop reason: HOSPADM

## 2018-09-28 RX ORDER — LABETALOL HYDROCHLORIDE 5 MG/ML
5 INJECTION, SOLUTION INTRAVENOUS EVERY 10 MIN PRN
Status: DISCONTINUED | OUTPATIENT
Start: 2018-09-28 | End: 2018-09-28 | Stop reason: HOSPADM

## 2018-09-28 RX ORDER — SODIUM CHLORIDE 9 MG/ML
INJECTION, SOLUTION INTRAVENOUS CONTINUOUS
Status: DISCONTINUED | OUTPATIENT
Start: 2018-09-28 | End: 2018-09-28 | Stop reason: HOSPADM

## 2018-09-28 RX ORDER — OXYCODONE HYDROCHLORIDE AND ACETAMINOPHEN 5; 325 MG/1; MG/1
1 TABLET ORAL PRN
Status: DISCONTINUED | OUTPATIENT
Start: 2018-09-28 | End: 2018-09-28 | Stop reason: HOSPADM

## 2018-09-28 RX ORDER — PROPOFOL 10 MG/ML
INJECTION, EMULSION INTRAVENOUS PRN
Status: DISCONTINUED | OUTPATIENT
Start: 2018-09-28 | End: 2018-09-28 | Stop reason: SDUPTHER

## 2018-09-28 RX ORDER — HYDRALAZINE HYDROCHLORIDE 20 MG/ML
5 INJECTION INTRAMUSCULAR; INTRAVENOUS EVERY 10 MIN PRN
Status: DISCONTINUED | OUTPATIENT
Start: 2018-09-28 | End: 2018-09-28 | Stop reason: HOSPADM

## 2018-09-28 RX ORDER — LIDOCAINE HYDROCHLORIDE 10 MG/ML
1 INJECTION, SOLUTION EPIDURAL; INFILTRATION; INTRACAUDAL; PERINEURAL
Status: DISCONTINUED | OUTPATIENT
Start: 2018-09-28 | End: 2018-09-28 | Stop reason: HOSPADM

## 2018-09-28 RX ORDER — OXYCODONE HYDROCHLORIDE AND ACETAMINOPHEN 5; 325 MG/1; MG/1
2 TABLET ORAL PRN
Status: DISCONTINUED | OUTPATIENT
Start: 2018-09-28 | End: 2018-09-28 | Stop reason: HOSPADM

## 2018-09-28 RX ORDER — SODIUM PHOSPHATE, DIBASIC AND SODIUM PHOSPHATE, MONOBASIC 7; 19 G/133ML; G/133ML
ENEMA RECTAL
Status: COMPLETED
Start: 2018-09-28 | End: 2018-09-28

## 2018-09-28 RX ORDER — MEPERIDINE HYDROCHLORIDE 25 MG/ML
12.5 INJECTION INTRAMUSCULAR; INTRAVENOUS; SUBCUTANEOUS EVERY 5 MIN PRN
Status: DISCONTINUED | OUTPATIENT
Start: 2018-09-28 | End: 2018-09-28 | Stop reason: HOSPADM

## 2018-09-28 RX ORDER — SODIUM PHOSPHATE, DIBASIC AND SODIUM PHOSPHATE, MONOBASIC 7; 19 G/133ML; G/133ML
2 ENEMA RECTAL
Status: COMPLETED | OUTPATIENT
Start: 2018-09-28 | End: 2018-09-28

## 2018-09-28 RX ORDER — PROMETHAZINE HYDROCHLORIDE 25 MG/ML
6.25 INJECTION, SOLUTION INTRAMUSCULAR; INTRAVENOUS
Status: DISCONTINUED | OUTPATIENT
Start: 2018-09-28 | End: 2018-09-28 | Stop reason: HOSPADM

## 2018-09-28 RX ORDER — DIPHENHYDRAMINE HYDROCHLORIDE 50 MG/ML
12.5 INJECTION INTRAMUSCULAR; INTRAVENOUS
Status: DISCONTINUED | OUTPATIENT
Start: 2018-09-28 | End: 2018-09-28 | Stop reason: HOSPADM

## 2018-09-28 RX ADMIN — LIDOCAINE HYDROCHLORIDE 25 MG: 20 INJECTION, SOLUTION INFILTRATION; PERINEURAL at 13:47

## 2018-09-28 RX ADMIN — PROPOFOL 50 MG: 10 INJECTION, EMULSION INTRAVENOUS at 13:47

## 2018-09-28 RX ADMIN — SODIUM PHOSPHATE 2 ENEMA: 7; 19 ENEMA RECTAL at 12:25

## 2018-09-28 RX ADMIN — LIDOCAINE HYDROCHLORIDE 25 MG: 20 INJECTION, SOLUTION INFILTRATION; PERINEURAL at 13:52

## 2018-09-28 RX ADMIN — SODIUM PHOSPHATE, DIBASIC AND SODIUM PHOSPHATE, MONOBASIC 2 ENEMA: 7; 19 ENEMA RECTAL at 12:25

## 2018-09-28 RX ADMIN — LIDOCAINE HYDROCHLORIDE 50 MG: 20 INJECTION, SOLUTION INFILTRATION; PERINEURAL at 13:44

## 2018-09-28 RX ADMIN — SODIUM CHLORIDE: 0.9 INJECTION, SOLUTION INTRAVENOUS at 12:52

## 2018-09-28 RX ADMIN — PROPOFOL 50 MG: 10 INJECTION, EMULSION INTRAVENOUS at 13:52

## 2018-09-28 RX ADMIN — PROPOFOL 100 MG: 10 INJECTION, EMULSION INTRAVENOUS at 13:44

## 2018-09-28 ASSESSMENT — PAIN SCALES - GENERAL
PAINLEVEL_OUTOF10: 0

## 2018-09-28 ASSESSMENT — PAIN - FUNCTIONAL ASSESSMENT: PAIN_FUNCTIONAL_ASSESSMENT: 0-10

## 2018-09-28 NOTE — ANESTHESIA PRE PROCEDURE
RBC 4.57 09/13/2018    HGB 13.0 09/13/2018    HCT 40.8 09/13/2018    MCV 89.2 09/13/2018    RDW 15.0 09/13/2018     09/13/2018       CMP:   Lab Results   Component Value Date     07/12/2018    K 4.1 07/12/2018     07/12/2018    CO2 23 07/12/2018    BUN 18 07/12/2018    CREATININE 0.9 07/12/2018    GFRAA >60 07/12/2018    GFRAA >60 06/15/2013    AGRATIO 1.0 07/12/2018    LABGLOM >60 07/12/2018    GLUCOSE 93 07/12/2018    PROT 7.5 07/12/2018    PROT 7.8 02/22/2013    CALCIUM 9.2 07/12/2018    BILITOT 0.3 07/12/2018    ALKPHOS 74 07/12/2018    AST 12 07/12/2018    ALT 9 07/12/2018       POC Tests: No results for input(s): POCGLU, POCNA, POCK, POCCL, POCBUN, POCHEMO, POCHCT in the last 72 hours. Coags:   Lab Results   Component Value Date    PROTIME 21.3 03/04/2013    INR 1.93 03/04/2013    APTT 29.2 02/22/2013       HCG (If Applicable):   Lab Results   Component Value Date    PREGTESTUR negative 10/09/2014        ABGs: No results found for: PHART, PO2ART, QIY3BTQ, ISU7DRV, BEART, Z5GATSYY     Type & Screen (If Applicable):  Lab Results   Component Value Date    LABABO O 02/22/2013    [address] Positive 02/22/2013       Anesthesia Evaluation    Airway: Mallampati: II  TM distance: <3 FB   Neck ROM: full  Mouth opening: > = 3 FB Dental:      Comment: Poor dentition    Pulmonary:normal exam  breath sounds clear to auscultation  (+) sleep apnea: on CPAP,  asthma:                            Cardiovascular:    (+) hypertension: mild, murmur,         Rhythm: regular  Rate: normal                    Neuro/Psych:   (+) neuromuscular disease:, headaches:,             GI/Hepatic/Renal:   (+) hiatal hernia, GERD:,           Endo/Other:    (+) hyperthyroidism::., .                 Abdominal:   (+) obese,         Vascular:                                      Anesthesia Plan      MAC     ASA 3       Induction: intravenous. Anesthetic plan and risks discussed with patient.       Plan discussed with

## 2018-09-28 NOTE — ANESTHESIA POSTPROCEDURE EVALUATION
Department of Anesthesiology  Postprocedure Note    Patient: Reagan Rodas  MRN: [id number]  YOB: 1955  Date of evaluation: 9/28/2018  Time:  2:08 PM     Procedure Summary     Date:  09/28/18 Room / Location:  [facility] ENDO 02 / [facility] ENDOSCOPY    Anesthesia Start:  4840 Anesthesia Stop:  1400    Procedure:  COLONOSCOPY DIAGNOSTIC OR SCREENING (N/A ) Diagnosis:       Rectal bleeding      (RECTAL BLEEDING)    Surgeon:  Justus Phillips MD Responsible Provider:  Tika Valdivia MD    Anesthesia Type:  MAC ASA Status:  3          Anesthesia Type: MAC    Wilfrid Phase I: Wilfrid Score: 10    Wilfrid Phase II: Wilfrid Score: 9    Last vitals: Reviewed and per EMR flowsheets.        Anesthesia Post Evaluation    Patient location during evaluation: PACU  Patient participation: complete - patient participated  Level of consciousness: awake and alert  Pain score: 0  Airway patency: patent  Nausea & Vomiting: no nausea and no vomiting  Complications: no  Cardiovascular status: blood pressure returned to baseline  Respiratory status: acceptable  Hydration status: stable

## 2018-11-01 ENCOUNTER — OFFICE VISIT (OUTPATIENT)
Dept: ENT CLINIC | Age: 63
End: 2018-11-01

## 2018-11-01 VITALS — SYSTOLIC BLOOD PRESSURE: 122 MMHG | HEART RATE: 64 BPM | DIASTOLIC BLOOD PRESSURE: 74 MMHG | OXYGEN SATURATION: 97 %

## 2018-11-01 DIAGNOSIS — J32.9 RECURRENT SINUSITIS: ICD-10-CM

## 2018-11-01 DIAGNOSIS — J34.2 NOSE SEPTUM DEVIATION: Primary | ICD-10-CM

## 2018-11-01 PROCEDURE — 99024 POSTOP FOLLOW-UP VISIT: CPT | Performed by: OTOLARYNGOLOGY

## 2018-11-06 ENCOUNTER — HOSPITAL ENCOUNTER (OUTPATIENT)
Age: 63
Setting detail: OUTPATIENT SURGERY
Discharge: HOME | End: 2018-11-06
Attending: ANESTHESIOLOGY | Admitting: ANESTHESIOLOGY
Payer: MEDICARE

## 2018-11-06 ENCOUNTER — APPOINTMENT (OUTPATIENT)
Dept: GENERAL RADIOLOGY | Age: 63
End: 2018-11-06
Attending: ANESTHESIOLOGY
Payer: MEDICARE

## 2018-11-06 VITALS
OXYGEN SATURATION: 100 % | SYSTOLIC BLOOD PRESSURE: 118 MMHG | WEIGHT: 230 LBS | BODY MASS INDEX: 36.1 KG/M2 | HEIGHT: 67 IN | DIASTOLIC BLOOD PRESSURE: 74 MMHG | RESPIRATION RATE: 16 BRPM | TEMPERATURE: 97 F | HEART RATE: 60 BPM

## 2018-11-06 PROCEDURE — 3610000056 HC PAIN LEVEL 4 BASE (NON-OR): Performed by: ANESTHESIOLOGY

## 2018-11-06 PROCEDURE — 6360000002 HC RX W HCPCS: Performed by: ANESTHESIOLOGY

## 2018-11-06 PROCEDURE — 99152 MOD SED SAME PHYS/QHP 5/>YRS: CPT | Performed by: ANESTHESIOLOGY

## 2018-11-06 PROCEDURE — 2500000003 HC RX 250 WO HCPCS: Performed by: ANESTHESIOLOGY

## 2018-11-06 PROCEDURE — 2709999900 HC NON-CHARGEABLE SUPPLY: Performed by: ANESTHESIOLOGY

## 2018-11-06 PROCEDURE — 77003 FLUOROGUIDE FOR SPINE INJECT: CPT

## 2018-11-06 RX ORDER — BUPIVACAINE HYDROCHLORIDE 2.5 MG/ML
INJECTION, SOLUTION EPIDURAL; INFILTRATION; INTRACAUDAL
Status: COMPLETED | OUTPATIENT
Start: 2018-11-06 | End: 2018-11-06

## 2018-11-06 RX ORDER — FENTANYL CITRATE 50 UG/ML
INJECTION, SOLUTION INTRAMUSCULAR; INTRAVENOUS
Status: COMPLETED | OUTPATIENT
Start: 2018-11-06 | End: 2018-11-06

## 2018-11-06 RX ORDER — LIDOCAINE HYDROCHLORIDE 10 MG/ML
INJECTION, SOLUTION INFILTRATION; PERINEURAL
Status: COMPLETED | OUTPATIENT
Start: 2018-11-06 | End: 2018-11-06

## 2018-11-06 RX ORDER — MIDAZOLAM HYDROCHLORIDE 1 MG/ML
INJECTION INTRAMUSCULAR; INTRAVENOUS
Status: COMPLETED | OUTPATIENT
Start: 2018-11-06 | End: 2018-11-06

## 2018-11-06 RX ORDER — METHYLPREDNISOLONE ACETATE 40 MG/ML
INJECTION, SUSPENSION INTRA-ARTICULAR; INTRALESIONAL; INTRAMUSCULAR; SOFT TISSUE
Status: COMPLETED | OUTPATIENT
Start: 2018-11-06 | End: 2018-11-06

## 2018-11-06 ASSESSMENT — PAIN DESCRIPTION - LOCATION
LOCATION: BACK
LOCATION: BACK

## 2018-11-06 ASSESSMENT — PAIN SCALES - GENERAL
PAINLEVEL_OUTOF10: 4
PAINLEVEL_OUTOF10: 4

## 2018-11-06 ASSESSMENT — PAIN DESCRIPTION - ORIENTATION
ORIENTATION: LOWER
ORIENTATION: LOWER

## 2018-11-06 ASSESSMENT — PAIN DESCRIPTION - PAIN TYPE
TYPE: CHRONIC PAIN
TYPE: CHRONIC PAIN

## 2018-11-06 ASSESSMENT — PAIN DESCRIPTION - DESCRIPTORS: DESCRIPTORS: ACHING;PRESSURE

## 2018-11-06 ASSESSMENT — PAIN - FUNCTIONAL ASSESSMENT: PAIN_FUNCTIONAL_ASSESSMENT: 0-10

## 2018-11-08 DIAGNOSIS — R32 URINARY INCONTINENCE, UNSPECIFIED TYPE: ICD-10-CM

## 2018-11-08 NOTE — OP NOTE
Lidocaine for local anesthesia. A 22-gauge, 3.5-inch needle was inserted into the lumbar medial branch under radiographic guidance. Both AP and lateral views were obtained. Following placement of the needle and negative aspiration, 1 cc of Bupivacaine 0.25% with Depomedrol 20 mg. was injected. The identical procedure was performed on facet joint levels Right L4-L5 and RT L5-Sacral Ala . During the procedure there was no evidence of CSF, paresthesias or heme. After the procedure the needles were flushed with preservative free local anesthetic and removed. Skin was cleaned and a sterile dressing was applied. Following the procedure the patient's vital signs were stable. The patient was discharged home in good condition after being given discharge instructions.

## 2018-11-09 RX ORDER — SOLIFENACIN SUCCINATE 10 MG/1
10 TABLET, FILM COATED ORAL DAILY
Qty: 30 TABLET | Refills: 0 | Status: SHIPPED | OUTPATIENT
Start: 2018-11-09 | End: 2018-12-17 | Stop reason: SDUPTHER

## 2018-12-17 DIAGNOSIS — R32 URINARY INCONTINENCE, UNSPECIFIED TYPE: ICD-10-CM

## 2018-12-18 RX ORDER — SOLIFENACIN SUCCINATE 10 MG/1
10 TABLET, FILM COATED ORAL DAILY
Qty: 30 TABLET | Refills: 0 | Status: SHIPPED | OUTPATIENT
Start: 2018-12-18 | End: 2019-01-23 | Stop reason: SDUPTHER

## 2019-01-10 ENCOUNTER — OFFICE VISIT (OUTPATIENT)
Dept: ENT CLINIC | Age: 64
End: 2019-01-10
Payer: MEDICARE

## 2019-01-10 VITALS — SYSTOLIC BLOOD PRESSURE: 126 MMHG | DIASTOLIC BLOOD PRESSURE: 74 MMHG | HEART RATE: 59 BPM | OXYGEN SATURATION: 98 %

## 2019-01-10 DIAGNOSIS — J32.9 RECURRENT SINUSITIS: ICD-10-CM

## 2019-01-10 DIAGNOSIS — J30.9 ALLERGIC SINUSITIS: Primary | ICD-10-CM

## 2019-01-10 PROCEDURE — 3017F COLORECTAL CA SCREEN DOC REV: CPT | Performed by: OTOLARYNGOLOGY

## 2019-01-10 PROCEDURE — 1036F TOBACCO NON-USER: CPT | Performed by: OTOLARYNGOLOGY

## 2019-01-10 PROCEDURE — G8427 DOCREV CUR MEDS BY ELIG CLIN: HCPCS | Performed by: OTOLARYNGOLOGY

## 2019-01-10 PROCEDURE — 99213 OFFICE O/P EST LOW 20 MIN: CPT | Performed by: OTOLARYNGOLOGY

## 2019-01-10 PROCEDURE — G8482 FLU IMMUNIZE ORDER/ADMIN: HCPCS | Performed by: OTOLARYNGOLOGY

## 2019-01-10 PROCEDURE — G8417 CALC BMI ABV UP PARAM F/U: HCPCS | Performed by: OTOLARYNGOLOGY

## 2019-01-10 RX ORDER — METHYLPREDNISOLONE 4 MG/1
4 TABLET ORAL SEE ADMIN INSTRUCTIONS
Qty: 1 KIT | Refills: 0 | Status: SHIPPED | OUTPATIENT
Start: 2019-01-10 | End: 2019-03-29

## 2019-01-10 RX ORDER — BUTALBITAL, ACETAMINOPHEN AND CAFFEINE 50; 325; 40 MG/1; MG/1; MG/1
1 TABLET ORAL EVERY 4 HOURS PRN
Qty: 30 TABLET | Refills: 0 | Status: SHIPPED | OUTPATIENT
Start: 2019-01-10 | End: 2019-03-29

## 2019-01-10 RX ORDER — AZITHROMYCIN 250 MG/1
TABLET, FILM COATED ORAL
Qty: 1 PACKET | Refills: 0 | Status: SHIPPED | OUTPATIENT
Start: 2019-01-10 | End: 2019-03-29

## 2019-01-11 ENCOUNTER — TELEPHONE (OUTPATIENT)
Dept: ENT CLINIC | Age: 64
End: 2019-01-11

## 2019-01-22 ENCOUNTER — OFFICE VISIT (OUTPATIENT)
Dept: ENT CLINIC | Age: 64
End: 2019-01-22
Payer: MEDICARE

## 2019-01-22 VITALS — SYSTOLIC BLOOD PRESSURE: 128 MMHG | DIASTOLIC BLOOD PRESSURE: 72 MMHG | HEART RATE: 51 BPM | OXYGEN SATURATION: 99 %

## 2019-01-22 DIAGNOSIS — G43.101 MIGRAINE WITH AURA AND WITH STATUS MIGRAINOSUS, NOT INTRACTABLE: Primary | ICD-10-CM

## 2019-01-22 PROCEDURE — 99213 OFFICE O/P EST LOW 20 MIN: CPT | Performed by: OTOLARYNGOLOGY

## 2019-01-22 PROCEDURE — G8417 CALC BMI ABV UP PARAM F/U: HCPCS | Performed by: OTOLARYNGOLOGY

## 2019-01-22 PROCEDURE — G8482 FLU IMMUNIZE ORDER/ADMIN: HCPCS | Performed by: OTOLARYNGOLOGY

## 2019-01-22 PROCEDURE — G8427 DOCREV CUR MEDS BY ELIG CLIN: HCPCS | Performed by: OTOLARYNGOLOGY

## 2019-01-22 PROCEDURE — 3017F COLORECTAL CA SCREEN DOC REV: CPT | Performed by: OTOLARYNGOLOGY

## 2019-01-22 PROCEDURE — 1036F TOBACCO NON-USER: CPT | Performed by: OTOLARYNGOLOGY

## 2019-01-22 RX ORDER — AMITRIPTYLINE HYDROCHLORIDE 10 MG/1
10 TABLET, FILM COATED ORAL NIGHTLY
Qty: 15 TABLET | Refills: 1 | Status: SHIPPED | OUTPATIENT
Start: 2019-01-22 | End: 2019-03-29

## 2019-01-22 RX ORDER — BUTALBITAL, ACETAMINOPHEN AND CAFFEINE 50; 325; 40 MG/1; MG/1; MG/1
1 TABLET ORAL EVERY 4 HOURS PRN
Qty: 30 TABLET | Refills: 1 | Status: SHIPPED | OUTPATIENT
Start: 2019-01-22 | End: 2019-02-19 | Stop reason: SDUPTHER

## 2019-01-23 DIAGNOSIS — R32 URINARY INCONTINENCE, UNSPECIFIED TYPE: ICD-10-CM

## 2019-01-23 RX ORDER — SOLIFENACIN SUCCINATE 10 MG/1
10 TABLET, FILM COATED ORAL DAILY
Qty: 30 TABLET | Refills: 0 | Status: SHIPPED | OUTPATIENT
Start: 2019-01-23 | End: 2019-02-18 | Stop reason: SDUPTHER

## 2019-02-03 DIAGNOSIS — K21.00 GERD WITH ESOPHAGITIS: ICD-10-CM

## 2019-02-15 ENCOUNTER — TELEPHONE (OUTPATIENT)
Dept: ENT CLINIC | Age: 64
End: 2019-02-15

## 2019-02-15 DIAGNOSIS — G43.101 MIGRAINE WITH AURA AND WITH STATUS MIGRAINOSUS, NOT INTRACTABLE: Primary | ICD-10-CM

## 2019-02-15 RX ORDER — METHYLPREDNISOLONE 4 MG/1
4 TABLET ORAL SEE ADMIN INSTRUCTIONS
Qty: 1 KIT | Refills: 0 | Status: SHIPPED | OUTPATIENT
Start: 2019-02-15 | End: 2019-03-29

## 2019-02-15 RX ORDER — SUMATRIPTAN 50 MG/1
50 TABLET, FILM COATED ORAL
Qty: 9 TABLET | Refills: 3 | Status: SHIPPED | OUTPATIENT
Start: 2019-02-15 | End: 2019-03-29

## 2019-02-18 DIAGNOSIS — R32 URINARY INCONTINENCE, UNSPECIFIED TYPE: ICD-10-CM

## 2019-02-19 ENCOUNTER — OFFICE VISIT (OUTPATIENT)
Dept: ENT CLINIC | Age: 64
End: 2019-02-19
Payer: MEDICARE

## 2019-02-19 ENCOUNTER — TELEPHONE (OUTPATIENT)
Dept: ENT CLINIC | Age: 64
End: 2019-02-19

## 2019-02-19 VITALS — DIASTOLIC BLOOD PRESSURE: 72 MMHG | SYSTOLIC BLOOD PRESSURE: 136 MMHG | HEART RATE: 64 BPM | OXYGEN SATURATION: 98 %

## 2019-02-19 DIAGNOSIS — G43.101 MIGRAINE WITH AURA AND WITH STATUS MIGRAINOSUS, NOT INTRACTABLE: Primary | ICD-10-CM

## 2019-02-19 DIAGNOSIS — G43.101 MIGRAINE WITH AURA AND WITH STATUS MIGRAINOSUS, NOT INTRACTABLE: ICD-10-CM

## 2019-02-19 DIAGNOSIS — D14.0 INVERTED PAPILLOMA OF LATERAL NASAL WALL: ICD-10-CM

## 2019-02-19 DIAGNOSIS — N28.9 KIDNEY DISEASE: ICD-10-CM

## 2019-02-19 PROCEDURE — 1036F TOBACCO NON-USER: CPT | Performed by: OTOLARYNGOLOGY

## 2019-02-19 PROCEDURE — G8417 CALC BMI ABV UP PARAM F/U: HCPCS | Performed by: OTOLARYNGOLOGY

## 2019-02-19 PROCEDURE — 99213 OFFICE O/P EST LOW 20 MIN: CPT | Performed by: OTOLARYNGOLOGY

## 2019-02-19 PROCEDURE — G8482 FLU IMMUNIZE ORDER/ADMIN: HCPCS | Performed by: OTOLARYNGOLOGY

## 2019-02-19 PROCEDURE — 3017F COLORECTAL CA SCREEN DOC REV: CPT | Performed by: OTOLARYNGOLOGY

## 2019-02-19 PROCEDURE — G8427 DOCREV CUR MEDS BY ELIG CLIN: HCPCS | Performed by: OTOLARYNGOLOGY

## 2019-02-19 RX ORDER — PREDNISONE 10 MG/1
TABLET ORAL
Qty: 25 TABLET | Refills: 0 | Status: SHIPPED | OUTPATIENT
Start: 2019-02-19 | End: 2019-03-29

## 2019-02-19 RX ORDER — BUTALBITAL, ACETAMINOPHEN AND CAFFEINE 50; 325; 40 MG/1; MG/1; MG/1
1 TABLET ORAL EVERY 4 HOURS PRN
Qty: 30 TABLET | Refills: 1 | Status: SHIPPED | OUTPATIENT
Start: 2019-02-19 | End: 2019-03-29

## 2019-02-19 RX ORDER — SOLIFENACIN SUCCINATE 10 MG/1
10 TABLET, FILM COATED ORAL DAILY
Qty: 30 TABLET | Refills: 0 | Status: SHIPPED | OUTPATIENT
Start: 2019-02-19 | End: 2019-03-28 | Stop reason: SDUPTHER

## 2019-02-20 ENCOUNTER — TELEPHONE (OUTPATIENT)
Dept: ENT CLINIC | Age: 64
End: 2019-02-20

## 2019-02-20 DIAGNOSIS — F41.9 ANXIETY: Primary | ICD-10-CM

## 2019-02-20 RX ORDER — ALPRAZOLAM 0.25 MG/1
TABLET ORAL
Qty: 3 TABLET | Refills: 0 | OUTPATIENT
Start: 2019-02-20 | End: 2019-02-21

## 2019-02-22 ENCOUNTER — HOSPITAL ENCOUNTER (OUTPATIENT)
Dept: CT IMAGING | Age: 64
Discharge: HOME | End: 2019-02-22
Payer: MEDICARE

## 2019-02-22 ENCOUNTER — HOSPITAL ENCOUNTER (OUTPATIENT)
Age: 64
Discharge: HOME | End: 2019-02-22
Payer: MEDICARE

## 2019-02-22 DIAGNOSIS — G43.101 MIGRAINE WITH AURA AND WITH STATUS MIGRAINOSUS, NOT INTRACTABLE: ICD-10-CM

## 2019-02-22 DIAGNOSIS — D14.0 INVERTED PAPILLOMA OF LATERAL NASAL WALL: ICD-10-CM

## 2019-02-22 LAB
ALBUMIN SERPL-MCNC: 4.3 G/DL (ref 3.4–5)
ANION GAP SERPL CALCULATED.3IONS-SCNC: 10 MMOL/L (ref 3–16)
BUN BLDV-MCNC: 12 MG/DL (ref 7–20)
CALCIUM SERPL-MCNC: 9.6 MG/DL (ref 8.3–10.6)
CHLORIDE BLD-SCNC: 106 MMOL/L (ref 99–110)
CO2: 23 MMOL/L (ref 21–32)
CREAT SERPL-MCNC: 0.8 MG/DL (ref 0.6–1.2)
GFR AFRICAN AMERICAN: >60
GFR NON-AFRICAN AMERICAN: >60
GLUCOSE BLD-MCNC: 85 MG/DL (ref 70–99)
PHOSPHORUS: 2.8 MG/DL (ref 2.5–4.9)
POTASSIUM SERPL-SCNC: 3.9 MMOL/L (ref 3.5–5.1)
SODIUM BLD-SCNC: 139 MMOL/L (ref 136–145)

## 2019-02-22 PROCEDURE — 36415 COLL VENOUS BLD VENIPUNCTURE: CPT

## 2019-02-22 PROCEDURE — 80069 RENAL FUNCTION PANEL: CPT

## 2019-02-22 PROCEDURE — 70450 CT HEAD/BRAIN W/O DYE: CPT

## 2019-02-26 ENCOUNTER — TELEPHONE (OUTPATIENT)
Dept: ENT CLINIC | Age: 64
End: 2019-02-26

## 2019-03-06 ENCOUNTER — TELEPHONE (OUTPATIENT)
Dept: PRIMARY CARE CLINIC | Age: 64
End: 2019-03-06

## 2019-03-07 ENCOUNTER — TELEPHONE (OUTPATIENT)
Dept: PRIMARY CARE CLINIC | Age: 64
End: 2019-03-07

## 2019-03-07 DIAGNOSIS — K21.9 GASTROESOPHAGEAL REFLUX DISEASE WITHOUT ESOPHAGITIS: Primary | ICD-10-CM

## 2019-03-07 RX ORDER — ESOMEPRAZOLE MAGNESIUM 40 MG/1
40 CAPSULE, DELAYED RELEASE ORAL DAILY
Qty: 30 CAPSULE | Refills: 3 | Status: SHIPPED | OUTPATIENT
Start: 2019-03-07 | End: 2019-06-26 | Stop reason: SDUPTHER

## 2019-03-28 DIAGNOSIS — R32 URINARY INCONTINENCE, UNSPECIFIED TYPE: ICD-10-CM

## 2019-03-29 ENCOUNTER — OFFICE VISIT (OUTPATIENT)
Dept: PRIMARY CARE CLINIC | Age: 64
End: 2019-03-29
Payer: MEDICARE

## 2019-03-29 VITALS
SYSTOLIC BLOOD PRESSURE: 160 MMHG | WEIGHT: 243 LBS | DIASTOLIC BLOOD PRESSURE: 90 MMHG | HEIGHT: 67 IN | HEART RATE: 76 BPM | BODY MASS INDEX: 38.14 KG/M2

## 2019-03-29 DIAGNOSIS — G44.89 OTHER HEADACHE SYNDROME: Primary | ICD-10-CM

## 2019-03-29 DIAGNOSIS — I10 ESSENTIAL HYPERTENSION: ICD-10-CM

## 2019-03-29 PROCEDURE — 1036F TOBACCO NON-USER: CPT | Performed by: INTERNAL MEDICINE

## 2019-03-29 PROCEDURE — G8417 CALC BMI ABV UP PARAM F/U: HCPCS | Performed by: INTERNAL MEDICINE

## 2019-03-29 PROCEDURE — G8427 DOCREV CUR MEDS BY ELIG CLIN: HCPCS | Performed by: INTERNAL MEDICINE

## 2019-03-29 PROCEDURE — 99213 OFFICE O/P EST LOW 20 MIN: CPT | Performed by: INTERNAL MEDICINE

## 2019-03-29 PROCEDURE — 3017F COLORECTAL CA SCREEN DOC REV: CPT | Performed by: INTERNAL MEDICINE

## 2019-03-29 PROCEDURE — G8482 FLU IMMUNIZE ORDER/ADMIN: HCPCS | Performed by: INTERNAL MEDICINE

## 2019-03-29 RX ORDER — LABETALOL 200 MG/1
200 TABLET, FILM COATED ORAL 2 TIMES DAILY
Qty: 60 TABLET | Refills: 1 | Status: SHIPPED | OUTPATIENT
Start: 2019-03-29 | End: 2019-06-26 | Stop reason: SDUPTHER

## 2019-03-29 RX ORDER — ESTRADIOL 1 MG/1
1 TABLET ORAL DAILY
COMMUNITY
End: 2021-03-11

## 2019-03-29 RX ORDER — METHOCARBAMOL 500 MG/1
TABLET, FILM COATED ORAL
Refills: 0 | COMMUNITY
Start: 2019-02-12 | End: 2020-03-05

## 2019-03-29 RX ORDER — SOLIFENACIN SUCCINATE 10 MG/1
10 TABLET, FILM COATED ORAL DAILY
Qty: 30 TABLET | Refills: 0 | Status: SHIPPED | OUTPATIENT
Start: 2019-03-29 | End: 2019-05-21 | Stop reason: SDUPTHER

## 2019-03-29 RX ORDER — ESTRADIOL 0.5 MG/1
0.5 TABLET ORAL DAILY
COMMUNITY
End: 2021-03-11

## 2019-03-29 ASSESSMENT — ENCOUNTER SYMPTOMS
WHEEZING: 0
BLOOD IN STOOL: 0
VOMITING: 0
FACIAL SWELLING: 0
CONSTIPATION: 0
SHORTNESS OF BREATH: 0
ABDOMINAL PAIN: 0
CHEST TIGHTNESS: 0
ABDOMINAL DISTENTION: 0
DIARRHEA: 0
VOICE CHANGE: 0
PHOTOPHOBIA: 0
CHOKING: 0
SORE THROAT: 0
COUGH: 0

## 2019-04-18 ENCOUNTER — APPOINTMENT (OUTPATIENT)
Dept: CT IMAGING | Age: 64
End: 2019-04-18
Payer: MEDICARE

## 2019-04-18 ENCOUNTER — HOSPITAL ENCOUNTER (EMERGENCY)
Age: 64
Discharge: HOME | End: 2019-04-18
Payer: MEDICARE

## 2019-04-18 VITALS
HEART RATE: 64 BPM | TEMPERATURE: 97.8 F | BODY MASS INDEX: 37.74 KG/M2 | DIASTOLIC BLOOD PRESSURE: 75 MMHG | WEIGHT: 240.96 LBS | RESPIRATION RATE: 16 BRPM | OXYGEN SATURATION: 98 % | SYSTOLIC BLOOD PRESSURE: 150 MMHG

## 2019-04-18 DIAGNOSIS — R10.10 PAIN OF UPPER ABDOMEN: Primary | ICD-10-CM

## 2019-04-18 LAB
A/G RATIO: 1 (ref 1.1–2.2)
ALBUMIN SERPL-MCNC: 4 G/DL (ref 3.4–5)
ALP BLD-CCNC: 87 U/L (ref 40–129)
ALT SERPL-CCNC: 9 U/L (ref 10–40)
ANION GAP SERPL CALCULATED.3IONS-SCNC: 11 MMOL/L (ref 3–16)
AST SERPL-CCNC: 16 U/L (ref 15–37)
BASOPHILS ABSOLUTE: 0 K/UL (ref 0–0.2)
BASOPHILS RELATIVE PERCENT: 0.8 %
BILIRUB SERPL-MCNC: 0.4 MG/DL (ref 0–1)
BILIRUBIN URINE: NEGATIVE
BLOOD, URINE: NEGATIVE
BUN BLDV-MCNC: 8 MG/DL (ref 7–20)
CALCIUM SERPL-MCNC: 9.3 MG/DL (ref 8.3–10.6)
CHLORIDE BLD-SCNC: 108 MMOL/L (ref 99–110)
CLARITY: CLEAR
CO2: 23 MMOL/L (ref 21–32)
COLOR: YELLOW
CREAT SERPL-MCNC: 0.8 MG/DL (ref 0.6–1.2)
EOSINOPHILS ABSOLUTE: 0.2 K/UL (ref 0–0.6)
EOSINOPHILS RELATIVE PERCENT: 5.1 %
GFR AFRICAN AMERICAN: >60
GFR NON-AFRICAN AMERICAN: >60
GLOBULIN: 4 G/DL
GLUCOSE BLD-MCNC: 84 MG/DL (ref 70–99)
GLUCOSE URINE: NEGATIVE MG/DL
HCT VFR BLD CALC: 37.7 % (ref 36–48)
HEMOGLOBIN: 12.2 G/DL (ref 12–16)
KETONES, URINE: NEGATIVE MG/DL
LEUKOCYTE ESTERASE, URINE: NEGATIVE
LIPASE: 6 U/L (ref 13–60)
LYMPHOCYTES ABSOLUTE: 1.3 K/UL (ref 1–5.1)
LYMPHOCYTES RELATIVE PERCENT: 33.7 %
MCH RBC QN AUTO: 28.7 PG (ref 26–34)
MCHC RBC AUTO-ENTMCNC: 32.4 G/DL (ref 31–36)
MCV RBC AUTO: 88.8 FL (ref 80–100)
MICROSCOPIC EXAMINATION: NORMAL
MONOCYTES ABSOLUTE: 0.3 K/UL (ref 0–1.3)
MONOCYTES RELATIVE PERCENT: 8.7 %
NEUTROPHILS ABSOLUTE: 2.1 K/UL (ref 1.7–7.7)
NEUTROPHILS RELATIVE PERCENT: 51.7 %
NITRITE, URINE: NEGATIVE
PDW BLD-RTO: 14.6 % (ref 12.4–15.4)
PH UA: 6 (ref 5–8)
PLATELET # BLD: 280 K/UL (ref 135–450)
PMV BLD AUTO: 8.3 FL (ref 5–10.5)
POTASSIUM SERPL-SCNC: 4.1 MMOL/L (ref 3.5–5.1)
PROTEIN UA: NEGATIVE MG/DL
RBC # BLD: 4.25 M/UL (ref 4–5.2)
SODIUM BLD-SCNC: 142 MMOL/L (ref 136–145)
SPECIFIC GRAVITY UA: 1.01 (ref 1–1.03)
TOTAL PROTEIN: 8 G/DL (ref 6.4–8.2)
URINE REFLEX TO CULTURE: NORMAL
URINE TYPE: NORMAL
UROBILINOGEN, URINE: 1 E.U./DL
WBC # BLD: 4 K/UL (ref 4–11)

## 2019-04-18 PROCEDURE — 74176 CT ABD & PELVIS W/O CONTRAST: CPT

## 2019-04-18 PROCEDURE — 99284 EMERGENCY DEPT VISIT MOD MDM: CPT

## 2019-04-18 PROCEDURE — 6360000002 HC RX W HCPCS: Performed by: PHYSICIAN ASSISTANT

## 2019-04-18 PROCEDURE — 93005 ELECTROCARDIOGRAM TRACING: CPT | Performed by: EMERGENCY MEDICINE

## 2019-04-18 PROCEDURE — 2580000003 HC RX 258: Performed by: PHYSICIAN ASSISTANT

## 2019-04-18 PROCEDURE — 96374 THER/PROPH/DIAG INJ IV PUSH: CPT

## 2019-04-18 PROCEDURE — 81003 URINALYSIS AUTO W/O SCOPE: CPT

## 2019-04-18 PROCEDURE — 36415 COLL VENOUS BLD VENIPUNCTURE: CPT

## 2019-04-18 PROCEDURE — 6370000000 HC RX 637 (ALT 250 FOR IP): Performed by: PHYSICIAN ASSISTANT

## 2019-04-18 PROCEDURE — 83690 ASSAY OF LIPASE: CPT

## 2019-04-18 PROCEDURE — 80053 COMPREHEN METABOLIC PANEL: CPT

## 2019-04-18 PROCEDURE — 85025 COMPLETE CBC W/AUTO DIFF WBC: CPT

## 2019-04-18 PROCEDURE — 96361 HYDRATE IV INFUSION ADD-ON: CPT

## 2019-04-18 RX ORDER — 0.9 % SODIUM CHLORIDE 0.9 %
1000 INTRAVENOUS SOLUTION INTRAVENOUS ONCE
Status: COMPLETED | OUTPATIENT
Start: 2019-04-18 | End: 2019-04-18

## 2019-04-18 RX ORDER — HYDROCODONE BITARTRATE AND ACETAMINOPHEN 5; 325 MG/1; MG/1
1 TABLET ORAL ONCE
Status: COMPLETED | OUTPATIENT
Start: 2019-04-18 | End: 2019-04-18

## 2019-04-18 RX ORDER — ONDANSETRON 2 MG/ML
4 INJECTION INTRAMUSCULAR; INTRAVENOUS ONCE
Status: COMPLETED | OUTPATIENT
Start: 2019-04-18 | End: 2019-04-18

## 2019-04-18 RX ORDER — HYDROCODONE BITARTRATE AND ACETAMINOPHEN 5; 325 MG/1; MG/1
1 TABLET ORAL EVERY 6 HOURS PRN
Qty: 6 TABLET | Refills: 0 | Status: SHIPPED | OUTPATIENT
Start: 2019-04-18 | End: 2019-04-20

## 2019-04-18 RX ADMIN — ONDANSETRON 4 MG: 2 INJECTION INTRAMUSCULAR; INTRAVENOUS at 15:13

## 2019-04-18 RX ADMIN — SODIUM CHLORIDE 1000 ML: 9 INJECTION, SOLUTION INTRAVENOUS at 15:13

## 2019-04-18 RX ADMIN — HYDROCODONE BITARTRATE AND ACETAMINOPHEN 1 TABLET: 5; 325 TABLET ORAL at 19:25

## 2019-04-18 ASSESSMENT — PAIN DESCRIPTION - PAIN TYPE
TYPE: ACUTE PAIN
TYPE: ACUTE PAIN

## 2019-04-18 ASSESSMENT — ENCOUNTER SYMPTOMS
APNEA: 0
SHORTNESS OF BREATH: 0
NAUSEA: 1
EYE DISCHARGE: 0
SORE THROAT: 0
CHOKING: 0
EYE REDNESS: 0
VOMITING: 0
FACIAL SWELLING: 0
BACK PAIN: 0
ABDOMINAL PAIN: 1

## 2019-04-18 ASSESSMENT — PAIN SCALES - GENERAL
PAINLEVEL_OUTOF10: 7
PAINLEVEL_OUTOF10: 6

## 2019-04-18 ASSESSMENT — PAIN DESCRIPTION - DESCRIPTORS
DESCRIPTORS: SHARP;SHOOTING
DESCRIPTORS: SHARP

## 2019-04-18 ASSESSMENT — PAIN DESCRIPTION - ORIENTATION: ORIENTATION: MID;RIGHT

## 2019-04-18 ASSESSMENT — PAIN DESCRIPTION - FREQUENCY: FREQUENCY: INTERMITTENT

## 2019-04-18 ASSESSMENT — PAIN DESCRIPTION - LOCATION
LOCATION: ABDOMEN
LOCATION: ABDOMEN

## 2019-04-18 NOTE — ED PROVIDER NOTES
**EVALUATED BY ADVANCED PRACTICE PROVIDER**        [facility]  eMERGENCY dEPARTMENT eNCOUnter      Pt Name: Yovany Chan  FUD:[id number]  [place] 1955  Date of evaluation: 4/18/2019  Provider: Juan José Keen PA-C      Chief Complaint:    Chief Complaint   Patient presents with    Abdominal Pain     upper abd pain, radiates to RUQ and into back. Worse after eating. + nausea. no vomiting. hx pacreatitis. Nursing Notes, Past Medical Hx, Past Surgical Hx, Social Hx, Allergies, and Family Hx were all reviewed and agreed with or any disagreements were addressed in the HPI.    HPI:  (Location, Duration, Timing, Severity,Quality, Assoc Sx, Context, Modifying factors)  This is a  66 y.o. female complain of upper abdominal pain after eating she gets nauseated. She denies vomiting. History of pancreatitis. She denies fever, no diarrhea or constipation. She tells me she is taken Nexium 40 mg for reflux disease. Pain does not radiate to her back. No chest pain. No other complaints.       PastMedical/Surgical History:      Diagnosis Date    Anxiety state     Asthma     Back ache     chronic    Environmental allergies     GERD (gastroesophageal reflux disease)     Hypertension     MVP (mitral valve prolapse)     Neck pain, chronic     Reflux          Procedure Laterality Date    BACK SURGERY      BLADDER SUSPENSION      CHOLECYSTECTOMY      FOOT SURGERY  11-9-12    endoscopic plantar fasciotomy right foot    GASTRIC BYPASS SURGERY      HERNIA REPAIR      HIATAL HERNIA REPAIR      HYSTERECTOMY      JOINT REPLACEMENT Bilateral     KNEE    KNEE ARTHROPLASTY  2/28/13    L    KNEE ARTHROSCOPY Right 3/7/2014    LAMINECTOMY  7/1/11    bilateral L5-S1 laminotomy, nerve root exploration, foraminotomy    NASAL SINUS SURGERY Right 02/05/2018    Excision of rt nasal mass    [place] COLONOSCOPY FLX DX W/COLLJ SPEC WHEN PFRMD N/A 9/28/2018    COLONOSCOPY DIAGNOSTIC OR Pertinent negatives as per HPI. Except as noted above in the ROS, problem specific ROS was completed and is negative. Physical Exam:  Physical Exam   Constitutional: She is oriented to person, place, and time. She appears well-developed and well-nourished. HENT:   Head: Normocephalic and atraumatic. Nose: Nose normal.   Eyes: Right eye exhibits no discharge. Left eye exhibits no discharge. Neck: Normal range of motion. Neck supple. Cardiovascular: Normal rate, regular rhythm and normal heart sounds. Exam reveals no gallop and no friction rub. No murmur heard. Pulmonary/Chest: Effort normal and breath sounds normal. No respiratory distress. She has no wheezes. She has no rales. She exhibits no tenderness. Abdominal: Soft. Bowel sounds are normal. She exhibits no distension and no mass. There is tenderness. There is no guarding. Musculoskeletal: Normal range of motion. Neurological: She is alert and oriented to person, place, and time. Skin: Skin is warm and dry. She is not diaphoretic. Psychiatric: She has a normal mood and affect. Her behavior is normal.   Nursing note and vitals reviewed.       MEDICAL DECISION MAKING    Vitals:    Vitals:    04/18/19 1345   BP: (!) 168/99   Pulse: 61   Resp: 16   Temp: 97.8 °F (36.6 °C)   TempSrc: Oral   SpO2: 97%   Weight: 240 lb 15.4 oz (109.3 kg)       LABS:  Labs Reviewed   COMPREHENSIVE METABOLIC PANEL - Abnormal; Notable for the following components:       Result Value    Albumin/Globulin Ratio 1.0 (*)     ALT 9 (*)     All other components within normal limits    Narrative:     Performed at:  [facility]  [address] [address]   Phone [phone]   LIPASE - Abnormal; Notable for the following components:    Lipase 6.0 (*)     All other components within normal limits    Narrative:     Performed at:  [facility]  [address] [address] Phone [phone]   CBC WITH AUTO DIFFERENTIAL    Narrative:     Performed at:  [facility]  [address] [place], [place] [address]   Phone [phone]   URINE RT REFLEX TO CULTURE    Narrative:     Performed at:  [facility]  [address] [place], [place] [address]   Phone [phone] of labs reviewed and werenegative at this time or not returned at the time of this note. RADIOLOGY:   Non-plain film images such as CT, Ultrasound and MRI are read by the radiologist. Hossein Thomas PA-C have directly visualized the radiologic plain film image(s) with the below findings:        Interpretation per the Radiologist below, if available at the time of thisnote:    [address] [facility] [address]   Final Result   No evidence of acute intra-abdominal process. Ct Abdomen Pelvis Wo Contrast    Result Date: 4/18/2019  EXAMINATION: CT OF THE ABDOMEN AND PELVIS WITHOUT CONTRAST 4/18/2019 2:51 pm TECHNIQUE: CT of the abdomen and pelvis was performed without the administration of intravenous contrast. Multiplanar reformatted images are provided for review. Dose modulation, iterative reconstruction, and/or weight based adjustment of the mA/kV was utilized to reduce the radiation dose to as low as reasonably achievable. COMPARISON: 07/13/2018 HISTORY: ORDERING SYSTEM PROVIDED HISTORY: Epigastric abdominal pain TECHNOLOGIST PROVIDED HISTORY: Ordering Physician Provided Reason for Exam: epigastric abd pain, hx partial splenectomy, paco, hernia, hysterectomy, gastric bypass, bladder suspension Acuity: Acute Type of Exam: Initial FINDINGS: Lower Chest: Basilar atelectasis. Organs: Lack of IV and oral contrast limits sensitivity for visceral organ pathology. Within the kidneys bilaterally, no renal calculi. Fluid density lesions within each kidney, typically on the basis of cystic change. No ureteral calculus.  Status post cholecystectomy. Noncontrast views of the spleen are grossly unremarkable. No pancreatic stranding. 1.4 cm low-density nodule of the left adrenal gland, Hounsfield units 8, most compatible with adenoma. Low-density thickening of the right adrenal gland medially is also not markedly changed. Postoperative change is seen within the midline upper abdomen along the stomach. Calcification of the abdominal aorta and iliac arteries. GI/Bowel: Diverticulosis, without rajinder diverticulitis. Appendix is within normal limits in caliber. Loops of small and large bowel are nondilated. Postoperative change of bowel within the mid abdomen to the left of midline. Pelvis: Pelvic phleboliths. Bladder is decompressed. No inguinal adenopathy. Peritoneum/Retroperitoneum: No free air. Bones/Soft Tissues: Sclerotic focus within the left jena sacrum is similar to prior. No evidence of acute intra-abdominal process. MEDICAL DECISION MAKING / ED COURSE:      PROCEDURES:   Procedures    None    Patient was given:     Medications   ondansetron (ZOFRAN) injection 4 mg (4 mg Intravenous Given 4/18/19 1513)   0.9 % sodium chloride bolus (0 mLs Intravenous Stopped 4/18/19 1621)       Emergency room course: Patient on exam cardiovascular regular rate and rhythm, lungs clear no wheezes rales or rhonchi. No chest wall tenderness with palpation. Abdomen shows mild upper abdominal tenderness with no rebound or guarding noted. No palpable mass. Normal bowel sounds all 4 quadrants. No CVA or flank tenderness. Patient neurologically is no motor sensory deficit noted. Full range of motion all extremity. Does not appear to be in acute distress. Alert and oriented ×4. Lab results from today urinalysis is negative for leukocyte negative for nitrites negative for blood and ketones. CBC within normal limits with a white count of 4.0. CMP within normal limits.  Lipase normal at 6.0.      CT abdomen and pelvis without contrast shows no

## 2019-04-19 LAB
EKG ATRIAL RATE: 51 BPM
EKG DIAGNOSIS: NORMAL
EKG P-R INTERVAL: 164 MS
EKG Q-T INTERVAL: 450 MS
EKG QRS DURATION: 80 MS
EKG QTC CALCULATION (BAZETT): 414 MS
EKG R AXIS: -26 DEGREES
EKG T AXIS: -28 DEGREES
EKG VENTRICULAR RATE: 51 BPM

## 2019-04-19 PROCEDURE — 93010 ELECTROCARDIOGRAM REPORT: CPT | Performed by: INTERNAL MEDICINE

## 2019-04-19 NOTE — ED PROVIDER NOTES
Interpreted by emergency department physician    Rhythm: sinus bradycardia  Rate: 51  Axis: -26  Ectopy: none  Conduction: normal  ST Segments: normal  T Waves: diffuse flattening but otherwise normal  Q Waves: none    Clinical Impression: sinus bradycardia    Fidel Brandon DO  04/19/19 3895

## 2019-05-02 ENCOUNTER — TELEPHONE (OUTPATIENT)
Dept: PRIMARY CARE CLINIC | Age: 64
End: 2019-05-02

## 2019-05-02 DIAGNOSIS — J30.9 ALLERGIC SINUSITIS: ICD-10-CM

## 2019-05-02 RX ORDER — FLUTICASONE PROPIONATE 50 MCG
SPRAY, SUSPENSION (ML) NASAL
Qty: 1 BOTTLE | Refills: 3 | Status: SHIPPED | OUTPATIENT
Start: 2019-05-02 | End: 2019-10-03 | Stop reason: SDUPTHER

## 2019-05-21 DIAGNOSIS — R32 URINARY INCONTINENCE, UNSPECIFIED TYPE: ICD-10-CM

## 2019-05-21 RX ORDER — SOLIFENACIN SUCCINATE 10 MG/1
10 TABLET, FILM COATED ORAL DAILY
Qty: 30 TABLET | Refills: 0 | Status: SHIPPED | OUTPATIENT
Start: 2019-05-21 | End: 2019-06-26 | Stop reason: SDUPTHER

## 2019-05-30 ENCOUNTER — TELEPHONE (OUTPATIENT)
Dept: PRIMARY CARE CLINIC | Age: 64
End: 2019-05-30

## 2019-06-26 ENCOUNTER — TELEPHONE (OUTPATIENT)
Dept: PRIMARY CARE CLINIC | Age: 64
End: 2019-06-26

## 2019-06-26 ENCOUNTER — OFFICE VISIT (OUTPATIENT)
Dept: ENT CLINIC | Age: 64
End: 2019-06-26
Payer: MEDICARE

## 2019-06-26 ENCOUNTER — OFFICE VISIT (OUTPATIENT)
Dept: PRIMARY CARE CLINIC | Age: 64
End: 2019-06-26
Payer: MEDICARE

## 2019-06-26 VITALS
DIASTOLIC BLOOD PRESSURE: 80 MMHG | HEIGHT: 67 IN | HEART RATE: 55 BPM | SYSTOLIC BLOOD PRESSURE: 140 MMHG | WEIGHT: 238 LBS | BODY MASS INDEX: 37.35 KG/M2

## 2019-06-26 VITALS — DIASTOLIC BLOOD PRESSURE: 64 MMHG | SYSTOLIC BLOOD PRESSURE: 126 MMHG | HEART RATE: 65 BPM | OXYGEN SATURATION: 97 %

## 2019-06-26 DIAGNOSIS — R32 URINARY INCONTINENCE, UNSPECIFIED TYPE: ICD-10-CM

## 2019-06-26 DIAGNOSIS — Z23 NEED FOR PNEUMOCOCCAL VACCINATION: ICD-10-CM

## 2019-06-26 DIAGNOSIS — R63.8 WEIGHT DISORDER: ICD-10-CM

## 2019-06-26 DIAGNOSIS — I10 ESSENTIAL HYPERTENSION: ICD-10-CM

## 2019-06-26 DIAGNOSIS — K21.9 GASTROESOPHAGEAL REFLUX DISEASE WITHOUT ESOPHAGITIS: ICD-10-CM

## 2019-06-26 DIAGNOSIS — Z23 NEED FOR SHINGLES VACCINE: ICD-10-CM

## 2019-06-26 DIAGNOSIS — H81.09 LABYRINTHINE VERTIGO, UNSPECIFIED LATERALITY: Primary | ICD-10-CM

## 2019-06-26 PROCEDURE — G8417 CALC BMI ABV UP PARAM F/U: HCPCS | Performed by: OTOLARYNGOLOGY

## 2019-06-26 PROCEDURE — G8427 DOCREV CUR MEDS BY ELIG CLIN: HCPCS | Performed by: INTERNAL MEDICINE

## 2019-06-26 PROCEDURE — 1036F TOBACCO NON-USER: CPT | Performed by: INTERNAL MEDICINE

## 2019-06-26 PROCEDURE — 3017F COLORECTAL CA SCREEN DOC REV: CPT | Performed by: INTERNAL MEDICINE

## 2019-06-26 PROCEDURE — 90732 PPSV23 VACC 2 YRS+ SUBQ/IM: CPT | Performed by: INTERNAL MEDICINE

## 2019-06-26 PROCEDURE — G8417 CALC BMI ABV UP PARAM F/U: HCPCS | Performed by: INTERNAL MEDICINE

## 2019-06-26 PROCEDURE — 99214 OFFICE O/P EST MOD 30 MIN: CPT | Performed by: INTERNAL MEDICINE

## 2019-06-26 PROCEDURE — 3017F COLORECTAL CA SCREEN DOC REV: CPT | Performed by: OTOLARYNGOLOGY

## 2019-06-26 PROCEDURE — 3014F SCREEN MAMMO DOC REV: CPT | Performed by: OTOLARYNGOLOGY

## 2019-06-26 PROCEDURE — 1036F TOBACCO NON-USER: CPT | Performed by: OTOLARYNGOLOGY

## 2019-06-26 PROCEDURE — 3014F SCREEN MAMMO DOC REV: CPT | Performed by: INTERNAL MEDICINE

## 2019-06-26 PROCEDURE — G0009 ADMIN PNEUMOCOCCAL VACCINE: HCPCS | Performed by: INTERNAL MEDICINE

## 2019-06-26 PROCEDURE — G8427 DOCREV CUR MEDS BY ELIG CLIN: HCPCS | Performed by: OTOLARYNGOLOGY

## 2019-06-26 PROCEDURE — 99214 OFFICE O/P EST MOD 30 MIN: CPT | Performed by: OTOLARYNGOLOGY

## 2019-06-26 RX ORDER — FESOTERODINE FUMARATE 4 MG/1
4 TABLET, EXTENDED RELEASE ORAL DAILY
Qty: 30 TABLET | Refills: 3 | Status: SHIPPED | OUTPATIENT
Start: 2019-06-26 | End: 2019-07-11

## 2019-06-26 RX ORDER — SOLIFENACIN SUCCINATE 10 MG/1
10 TABLET, FILM COATED ORAL DAILY
Qty: 30 TABLET | Refills: 0 | Status: SHIPPED | OUTPATIENT
Start: 2019-06-26 | End: 2019-06-26

## 2019-06-26 RX ORDER — LABETALOL 200 MG/1
200 TABLET, FILM COATED ORAL 2 TIMES DAILY
Qty: 60 TABLET | Refills: 5 | Status: SHIPPED | OUTPATIENT
Start: 2019-06-26 | End: 2019-07-11 | Stop reason: SDUPTHER

## 2019-06-26 RX ORDER — ESOMEPRAZOLE MAGNESIUM 40 MG/1
40 CAPSULE, DELAYED RELEASE ORAL DAILY
Qty: 30 CAPSULE | Refills: 3 | Status: SHIPPED | OUTPATIENT
Start: 2019-06-26 | End: 2019-11-05

## 2019-06-26 ASSESSMENT — ENCOUNTER SYMPTOMS
SORE THROAT: 0
BLOOD IN STOOL: 0
WHEEZING: 0
ABDOMINAL DISTENTION: 0
CHEST TIGHTNESS: 0
FACIAL SWELLING: 0
VOMITING: 0
CONSTIPATION: 0
CHOKING: 0
VOICE CHANGE: 0
DIARRHEA: 0
PHOTOPHOBIA: 0
ABDOMINAL PAIN: 0
COUGH: 0
SHORTNESS OF BREATH: 0

## 2019-06-26 ASSESSMENT — PATIENT HEALTH QUESTIONNAIRE - PHQ9
SUM OF ALL RESPONSES TO PHQ QUESTIONS 1-9: 0
1. LITTLE INTEREST OR PLEASURE IN DOING THINGS: 0
SUM OF ALL RESPONSES TO PHQ9 QUESTIONS 1 & 2: 0
SUM OF ALL RESPONSES TO PHQ QUESTIONS 1-9: 0
2. FEELING DOWN, DEPRESSED OR HOPELESS: 0

## 2019-06-26 NOTE — TELEPHONE ENCOUNTER
Received fax from [employer] stating that the pt [employer] is not covered but Silas Parker is   If change is ok please sign pended order

## 2019-06-26 NOTE — PROGRESS NOTES
swelling, postnasal drip, sore throat and voice change. Dentures fit well ; new   Eyes: Negative for photophobia and visual disturbance. Eye exam 2/17   Respiratory: Negative for cough, choking, chest tightness, shortness of breath and wheezing. Does not smoke ; No Etoh   H/o Asthma    Cardiovascular: Negative for chest pain and leg swelling. Palpitations: off and on         HTN > 5 yrs on meds . No known CAD . Father dies of MI in his 46s   Sister with CAD ? MVP  H/o palpitations   Gastrointestinal: Negative for abdominal distention, abdominal pain, blood in stool, constipation, diarrhea and vomiting. Post gastric bypass 2005    GERD    Colonscopy 2015 ; Polyps   Endocrine: Negative for cold intolerance and heat intolerance. Followed by Dr. Jessica Crook for thyroid   Genitourinary:        [address] [facility]  2018    Scheduled Pap 0n 9/127/18    Musculoskeletal:        DJD   Skin: Negative. Neurological: Positive for headaches. Psychiatric/Behavioral: Negative for self-injury, sleep disturbance and suicidal ideas. Anxiety: followed by counselor       Objective:   Physical Exam   Constitutional: She is oriented to person, place, and time. No distress. Eyes: Conjunctivae are normal.   Neck: Neck supple. Surgical Scar / neck     Cardiovascular: Regular rhythm and normal heart sounds. Bradycardia present. Pulmonary/Chest: Breath sounds normal.   Abdominal: Soft. She exhibits distension. Musculoskeletal: Normal range of motion. She exhibits no edema. Neurological: She is alert and oriented to person, place, and time. Skin: Skin is warm and dry. Psychiatric: She has a normal mood and affect. Her behavior is normal.   Nursing note and vitals reviewed. Assessment:       Dante was seen today for 1 month follow-up, hypertension and gastroesophageal reflux. Diagnoses and all orders for this visit:    Weight disorder  BMI > 37 .  Advised low carb diet = exercise reg     Essential hypertension BP better with Labetalol . Off Norvasc and Atenolol   -     labetalol (NORMODYNE) 200 MG tablet; Take 1 tablet by mouth 2 times daily    Gastroesophageal reflux disease without esophagitis  Wt reduction will help . Avoid caffiene / chees / Rosea Hick / oj  -     esomeprazole (NEXIUM) 40 MG delayed release capsule; Take 1 capsule by mouth daily    Urinary incontinence, unspecified type  -     solifenacin (VESICARE) 10 MG tablet; Take 1 tablet by mouth daily    Need for pneumococcal vaccination    Need for shingles vaccine  -     zoster recombinant adjuvanted vaccine [facility]) 50 MCG/0.5ML SUSR injection; Inject 0.5 mLs into the muscle once for 1 dose        Other headache syndrome   Resolved with change in meds         Moderate persistent asthma without complication  -    Post-operative hypothyroidism On Synthroid 75 mcg / d  C/O Endocrine            I    Plan:        Self Management Goals    Know which medication is for what condition:   Know side effects of medications, and discuss with doctor   Discuss side effects and instructions on new medications. Barriers to medication compliance addressed. All patient questions answered. Pt voiced understanding. Know correct dose/frequency of medications  Take medications at the same time each day  Stay current on medication refills  If taking OTC's check with MD/pharmacy first about interactions    LDL goal 410 or less  Systolic BP < or equal to 778  Diastolic BP < or equal to 85    Current Flu and Pneumonia Vax  Set targets for weight loss 4 lbs per month  Exercise 3-5 times per week  Keep check of weight  Weighting machine    On a scale of 1 to 5 how confident are you in these goals?   4/5  Education materials given

## 2019-06-27 RX ORDER — FESOTERODINE FUMARATE 8 MG/1
1 TABLET, EXTENDED RELEASE ORAL DAILY
Qty: 30 TABLET | Refills: 0 | Status: SHIPPED | OUTPATIENT
Start: 2019-06-27 | End: 2020-11-10

## 2019-07-10 ENCOUNTER — TELEPHONE (OUTPATIENT)
Dept: PRIMARY CARE CLINIC | Age: 64
End: 2019-07-10

## 2019-07-11 ENCOUNTER — OFFICE VISIT (OUTPATIENT)
Dept: PRIMARY CARE CLINIC | Age: 64
End: 2019-07-11
Payer: MEDICARE

## 2019-07-11 VITALS
DIASTOLIC BLOOD PRESSURE: 90 MMHG | WEIGHT: 236 LBS | BODY MASS INDEX: 37.04 KG/M2 | SYSTOLIC BLOOD PRESSURE: 155 MMHG | HEART RATE: 71 BPM | HEIGHT: 67 IN

## 2019-07-11 DIAGNOSIS — N76.0 ACUTE VAGINITIS: ICD-10-CM

## 2019-07-11 DIAGNOSIS — M54.5 ACUTE MIDLINE LOW BACK PAIN, WITH SCIATICA PRESENCE UNSPECIFIED: Primary | ICD-10-CM

## 2019-07-11 DIAGNOSIS — I10 ESSENTIAL HYPERTENSION: ICD-10-CM

## 2019-07-11 PROCEDURE — 1036F TOBACCO NON-USER: CPT | Performed by: INTERNAL MEDICINE

## 2019-07-11 PROCEDURE — 3017F COLORECTAL CA SCREEN DOC REV: CPT | Performed by: INTERNAL MEDICINE

## 2019-07-11 PROCEDURE — 3014F SCREEN MAMMO DOC REV: CPT | Performed by: INTERNAL MEDICINE

## 2019-07-11 PROCEDURE — G8417 CALC BMI ABV UP PARAM F/U: HCPCS | Performed by: INTERNAL MEDICINE

## 2019-07-11 PROCEDURE — 99214 OFFICE O/P EST MOD 30 MIN: CPT | Performed by: INTERNAL MEDICINE

## 2019-07-11 PROCEDURE — G8427 DOCREV CUR MEDS BY ELIG CLIN: HCPCS | Performed by: INTERNAL MEDICINE

## 2019-07-11 RX ORDER — FESOTERODINE FUMARATE 4 MG/1
4 TABLET, EXTENDED RELEASE ORAL DAILY
Qty: 30 TABLET | Refills: 5 | Status: SHIPPED | OUTPATIENT
Start: 2019-07-11 | End: 2019-08-01

## 2019-07-11 RX ORDER — LABETALOL 200 MG/1
200 TABLET, FILM COATED ORAL 2 TIMES DAILY
Qty: 60 TABLET | Refills: 5 | Status: SHIPPED | OUTPATIENT
Start: 2019-07-11 | End: 2019-10-23

## 2019-07-11 RX ORDER — FLUCONAZOLE 100 MG/1
100 TABLET ORAL DAILY
Qty: 2 TABLET | Refills: 0 | Status: SHIPPED | OUTPATIENT
Start: 2019-07-11 | End: 2019-07-13

## 2019-07-11 RX ORDER — PREDNISONE 20 MG/1
40 TABLET ORAL DAILY
Qty: 12 TABLET | Refills: 0 | Status: SHIPPED | OUTPATIENT
Start: 2019-07-11 | End: 2019-07-17

## 2019-07-11 RX ORDER — LOSARTAN POTASSIUM 100 MG/1
100 TABLET ORAL DAILY
Qty: 30 TABLET | Refills: 5 | Status: SHIPPED | OUTPATIENT
Start: 2019-07-11 | End: 2019-10-23 | Stop reason: SDUPTHER

## 2019-07-11 ASSESSMENT — ENCOUNTER SYMPTOMS
BLOOD IN STOOL: 0
VOMITING: 0
DIARRHEA: 0
ABDOMINAL PAIN: 0
CHOKING: 0
SORE THROAT: 0
PHOTOPHOBIA: 0
COUGH: 0
SHORTNESS OF BREATH: 0
VOICE CHANGE: 0
BACK PAIN: 1
ABDOMINAL DISTENTION: 0
WHEEZING: 0
CHEST TIGHTNESS: 0
FACIAL SWELLING: 0
CONSTIPATION: 0

## 2019-08-01 RX ORDER — ACETAMINOPHEN 160 MG
TABLET,DISINTEGRATING ORAL
COMMUNITY
End: 2020-11-10

## 2019-08-01 RX ORDER — ACETAMINOPHEN 325 MG/1
650 TABLET ORAL EVERY 6 HOURS PRN
COMMUNITY
End: 2020-11-10

## 2019-08-04 DIAGNOSIS — K21.9 GASTROESOPHAGEAL REFLUX DISEASE WITHOUT ESOPHAGITIS: ICD-10-CM

## 2019-08-12 ENCOUNTER — ANESTHESIA EVENT (OUTPATIENT)
Dept: ENDOSCOPY | Age: 64
End: 2019-08-12
Payer: MEDICARE

## 2019-08-14 ENCOUNTER — HOSPITAL ENCOUNTER (OUTPATIENT)
Age: 64
Setting detail: OUTPATIENT SURGERY
Discharge: HOME | End: 2019-08-14
Attending: INTERNAL MEDICINE | Admitting: INTERNAL MEDICINE
Payer: MEDICARE

## 2019-08-14 ENCOUNTER — ANESTHESIA (OUTPATIENT)
Dept: ENDOSCOPY | Age: 64
End: 2019-08-14
Payer: MEDICARE

## 2019-08-14 VITALS
OXYGEN SATURATION: 98 % | SYSTOLIC BLOOD PRESSURE: 162 MMHG | HEART RATE: 59 BPM | RESPIRATION RATE: 16 BRPM | TEMPERATURE: 97.8 F | HEIGHT: 67 IN | BODY MASS INDEX: 36.26 KG/M2 | WEIGHT: 231 LBS | DIASTOLIC BLOOD PRESSURE: 68 MMHG

## 2019-08-14 VITALS — OXYGEN SATURATION: 95 % | DIASTOLIC BLOOD PRESSURE: 72 MMHG | SYSTOLIC BLOOD PRESSURE: 161 MMHG

## 2019-08-14 PROCEDURE — 2580000003 HC RX 258: Performed by: ANESTHESIOLOGY

## 2019-08-14 PROCEDURE — 3700000001 HC ADD 15 MINUTES (ANESTHESIA): Performed by: INTERNAL MEDICINE

## 2019-08-14 PROCEDURE — 6360000002 HC RX W HCPCS: Performed by: NURSE ANESTHETIST, CERTIFIED REGISTERED

## 2019-08-14 PROCEDURE — 3609017100 HC EGD: Performed by: INTERNAL MEDICINE

## 2019-08-14 PROCEDURE — 3700000000 HC ANESTHESIA ATTENDED CARE: Performed by: INTERNAL MEDICINE

## 2019-08-14 PROCEDURE — 7100000010 HC PHASE II RECOVERY - FIRST 15 MIN: Performed by: INTERNAL MEDICINE

## 2019-08-14 PROCEDURE — 7100000011 HC PHASE II RECOVERY - ADDTL 15 MIN: Performed by: INTERNAL MEDICINE

## 2019-08-14 PROCEDURE — 2500000003 HC RX 250 WO HCPCS: Performed by: NURSE ANESTHETIST, CERTIFIED REGISTERED

## 2019-08-14 RX ORDER — SODIUM CHLORIDE 9 MG/ML
INJECTION, SOLUTION INTRAVENOUS CONTINUOUS
Status: DISCONTINUED | OUTPATIENT
Start: 2019-08-14 | End: 2019-08-14 | Stop reason: HOSPADM

## 2019-08-14 RX ORDER — SODIUM CHLORIDE 0.9 % (FLUSH) 0.9 %
10 SYRINGE (ML) INJECTION EVERY 12 HOURS SCHEDULED
Status: DISCONTINUED | OUTPATIENT
Start: 2019-08-14 | End: 2019-08-14 | Stop reason: HOSPADM

## 2019-08-14 RX ORDER — LIDOCAINE HYDROCHLORIDE 20 MG/ML
INJECTION, SOLUTION EPIDURAL; INFILTRATION; INTRACAUDAL; PERINEURAL PRN
Status: DISCONTINUED | OUTPATIENT
Start: 2019-08-14 | End: 2019-08-14 | Stop reason: SDUPTHER

## 2019-08-14 RX ORDER — SODIUM CHLORIDE 0.9 % (FLUSH) 0.9 %
10 SYRINGE (ML) INJECTION PRN
Status: DISCONTINUED | OUTPATIENT
Start: 2019-08-14 | End: 2019-08-14 | Stop reason: HOSPADM

## 2019-08-14 RX ORDER — PROPOFOL 10 MG/ML
INJECTION, EMULSION INTRAVENOUS PRN
Status: DISCONTINUED | OUTPATIENT
Start: 2019-08-14 | End: 2019-08-14 | Stop reason: SDUPTHER

## 2019-08-14 RX ORDER — ONDANSETRON 2 MG/ML
4 INJECTION INTRAMUSCULAR; INTRAVENOUS
Status: DISCONTINUED | OUTPATIENT
Start: 2019-08-14 | End: 2019-08-14 | Stop reason: HOSPADM

## 2019-08-14 RX ADMIN — PROPOFOL 25 MG: 10 INJECTION, EMULSION INTRAVENOUS at 10:01

## 2019-08-14 RX ADMIN — PROPOFOL 25 MG: 10 INJECTION, EMULSION INTRAVENOUS at 10:02

## 2019-08-14 RX ADMIN — SODIUM CHLORIDE: 9 INJECTION, SOLUTION INTRAVENOUS at 09:47

## 2019-08-14 RX ADMIN — LIDOCAINE HYDROCHLORIDE 100 MG: 20 INJECTION, SOLUTION EPIDURAL; INFILTRATION; INTRACAUDAL; PERINEURAL at 09:56

## 2019-08-14 RX ADMIN — PROPOFOL 125 MG: 10 INJECTION, EMULSION INTRAVENOUS at 09:56

## 2019-08-14 RX ADMIN — PROPOFOL 25 MG: 10 INJECTION, EMULSION INTRAVENOUS at 09:59

## 2019-08-14 ASSESSMENT — PAIN SCALES - GENERAL
PAINLEVEL_OUTOF10: 0

## 2019-08-14 ASSESSMENT — PAIN - FUNCTIONAL ASSESSMENT: PAIN_FUNCTIONAL_ASSESSMENT: 0-10

## 2019-08-14 NOTE — H&P
Historical Provider, MD   Calcium Carbonate-Vitamin D (CALTRATE 600+D PO) Take by mouth   Yes Historical Provider, MD   labetalol (NORMODYNE) 200 MG tablet Take 1 tablet by mouth 2 times daily 7/11/19  Yes Yoko Cordero MD   losartan (COZAAR) 100 MG tablet Take 1 tablet by mouth daily 7/11/19  Yes Yoko Cordero MD   Fesoterodine Fumarate ER 8 MG TB24 Take 1 tablet by mouth daily 6/27/19  Yes Yoko Cordero MD   esomeprazole (NEXIUM) 40 MG delayed release capsule Take 1 capsule by mouth daily 6/26/19  Yes Yoko Cordero MD   fluticasone (FLONASE) 50 MCG/ACT nasal spray SHAKE LIQUID AND USE 2 SPRAYS NASALLY DAILY 5/2/19  Yes Adis Mcadams MD   methocarbamol (ROBAXIN) 500 MG tablet TK 1 T PO Q 8 H PRF MUSCLE SPASMS 2/12/19  Yes Historical Provider, MD   estradiol (ESTRACE) 1 MG tablet Take 1 mg by mouth daily   Yes Historical Provider, MD   estradiol (ESTRACE) 0.5 MG tablet Take 0.5 mg by mouth daily   Yes Historical Provider, MD   levothyroxine (SYNTHROID) 75 MCG tablet Take 75 mcg by mouth Daily Indications: TAKES 1/2 TABLET    Yes Historical Provider, MD   fluticasone (FLOVENT HFA) 220 MCG/ACT inhaler Inhale 2 puffs into the lungs 2 times daily 9/24/18 9/24/19  Yoko Cordero MD   albuterol sulfate HFA (PROAIR HFA) 108 (90 Base) MCG/ACT inhaler Inhale 2 puffs into the lungs every 6 hours as needed for Wheezing 9/24/18   Yoko Cordero MD     Allergies:    Gabapentin; Protonix [pantoprazole sodium]; Tramadol; Amoxicillin; Aspirin; Augmentin [amoxicillin-pot clavulanate]; Betadine [povidone iodine]; Chlorhexidine gluconate; Morphine; Other; Toradol [ketorolac tromethamine];  Iodides; Ranitidine; and Tape Elray Ana tape]  Social History:   Social History     Socioeconomic History    Marital status: Legally      Spouse name: Not on file    Number of children: Not on file    Years of education: Not on file    Highest education level: Not on file   Occupational History    Not on file Social Needs    Financial resource strain: Not on file    Food insecurity:     Worry: Not on file     Inability: Not on file    Transportation needs:     Medical: Not on file     Non-medical: Not on file   Tobacco Use    Smoking status: Former Smoker     Packs/day: 0.25     Years: 2.00     Pack years: 0.50     Last attempt to quit: 1/1/2004     Years since quitting: 15.6    Smokeless tobacco: Never Used    Tobacco comment: [id number]   Substance and Sexual Activity    Alcohol use: No    Drug use: No    Sexual activity: Yes     Partners: Male   Lifestyle    Physical activity:     Days per week: Not on file     Minutes per session: Not on file    Stress: Not on file   Relationships    Social connections:     Talks on phone: Not on file     Gets together: Not on file     Attends Rastafarian service: Not on file     Active member of club or organization: Not on file     Attends meetings of clubs or organizations: Not on file     Relationship status: Not on file    Intimate partner violence:     Fear of current or ex partner: Not on file     Emotionally abused: Not on file     Physically abused: Not on file     Forced sexual activity: Not on file   Other Topics Concern    Not on file   Social History Narrative    Not on file           Family History:   Family History   Problem Relation Age of Onset    High Blood Pressure Mother     Heart Disease Mother     Other Mother         glaucoma    High Blood Pressure Father     Heart Disease Father     Other Father         intestional problems    Cancer Sister         cancer    Kidney Disease Sister     Kidney Disease Brother     High Blood Pressure Brother     Other Sister         lupus,fibromyalgia  thyroid surgery    High Blood Pressure Sister     Kidney Disease Sister     Heart Disease Sister     Kidney Disease Brother     High Blood Pressure Brother     Anesth Problems Neg Hx     Malig Hyperten Neg Hx     Hypotension Neg Hx     Malig

## 2019-08-14 NOTE — ANESTHESIA PRE PROCEDURE
Years since quitting: 15.6    Smokeless tobacco: Never Used    Tobacco comment: [id number]   Substance Use Topics    Alcohol use: No                                Counseling given: Not Answered  Comment: [id number]      Vital Signs (Current):   Vitals:    08/01/19 1645 08/14/19 0842   BP:  (!) 159/72   Pulse:  62   Resp:  16   Temp:  97 °F (36.1 °C)   TempSrc:  Temporal   SpO2:  98%   Weight: 236 lb (107 kg) 231 lb (104.8 kg)   Height: 5' 7\" (1.702 m) 5' 7\" (1.702 m)                                              BP Readings from Last 3 Encounters:   08/14/19 (!) 159/72   07/11/19 (!) 155/90   06/26/19 (!) 140/80       NPO Status: Time of last liquid consumption: 2300                        Time of last solid consumption: 2100                        Date of last liquid consumption: 08/13/19                        Date of last solid food consumption: 08/13/19    BMI:   Wt Readings from Last 3 Encounters:   08/14/19 231 lb (104.8 kg)   07/11/19 236 lb (107 kg)   06/26/19 238 lb (108 kg)     Body mass index is 36.18 kg/m². CBC:   Lab Results   Component Value Date    WBC 4.0 04/18/2019    RBC 4.25 04/18/2019    HGB 12.2 04/18/2019    HCT 37.7 04/18/2019    MCV 88.8 04/18/2019    RDW 14.6 04/18/2019     04/18/2019       CMP:   Lab Results   Component Value Date     04/18/2019    K 4.1 04/18/2019     04/18/2019    CO2 23 04/18/2019    BUN 8 04/18/2019    CREATININE 0.8 04/18/2019    GFRAA >60 04/18/2019    GFRAA >60 06/15/2013    AGRATIO 1.0 04/18/2019    LABGLOM >60 04/18/2019    GLUCOSE 84 04/18/2019    PROT 8.0 04/18/2019    PROT 7.8 02/22/2013    CALCIUM 9.3 04/18/2019    BILITOT 0.4 04/18/2019    ALKPHOS 87 04/18/2019    AST 16 04/18/2019    ALT 9 04/18/2019       POC Tests: No results for input(s): POCGLU, POCNA, POCK, POCCL, POCBUN, POCHEMO, POCHCT in the last 72 hours.     Coags:   Lab Results   Component Value Date    PROTIME 21.3 03/04/2013    INR 1.93 03/04/2013    APTT 29.2 02/22/2013       HCG (If Applicable):   Lab Results   Component Value Date    PREGTESTUR negative 10/09/2014        ABGs: No results found for: PHART, PO2ART, UMM8JYG, LNY6PGM, BEART, [id number]     Type & Screen (If Applicable):  Lab Results   Component Value Date    LABABO O 02/22/2013    [facility] JOSE Positive 02/22/2013       Anesthesia Evaluation  Patient summary reviewed no history of anesthetic complications:   Airway: Mallampati: II  TM distance: >3 FB   Neck ROM: full  Mouth opening: > = 3 FB Dental:      Comment: Missing teeth    Pulmonary: breath sounds clear to auscultation  (+) sleep apnea:  asthma:     (-) COPD                           Cardiovascular:    (+) hypertension:, valvular problems/murmurs: MVP, dysrhythmias (palpitations per patient):,     (-) past MI, CAD, CABG/stent,  angina,  CHF and murmur      Rhythm: regular  Rate: normal                    Neuro/Psych:   (+) neuromuscular disease:, headaches: migraine headaches, depression/anxiety             GI/Hepatic/Renal:   (+) hiatal hernia, GERD:,      (-) PUD, hepatitis, liver disease and no renal disease       Endo/Other:    (+) hypothyroidism: arthritis:., .    (-) diabetes mellitus, blood dyscrasia               Abdominal:           Vascular:                                        Anesthesia Plan      MAC     ASA 3       Induction: intravenous. Anesthetic plan and risks discussed with patient. Plan discussed with CRNA. This pre-anesthesia assessment may be used as a history and physical.    DOS STAFF ADDENDUM:    Pt seen and examined, chart reviewed (including anesthesia, drug and allergy history). No interval changes to history and physical examination. Anesthetic plan, risks, benefits, alternatives, and personnel involved discussed with patient. Patient verbalized an understanding and agrees to proceed.       Dotty Elaine MD  August 14, 2019  8:56 AM        Dotty Elaine MD   8/14/2019

## 2019-08-20 ENCOUNTER — HOSPITAL ENCOUNTER (OUTPATIENT)
Dept: GENERAL RADIOLOGY | Age: 64
Discharge: HOME | End: 2019-08-20
Payer: MEDICARE

## 2019-08-20 DIAGNOSIS — R10.9 ABDOMINAL PAIN, UNSPECIFIED ABDOMINAL LOCATION: ICD-10-CM

## 2019-08-20 PROCEDURE — 74250 X-RAY XM SM INT 1CNTRST STD: CPT

## 2019-09-04 DIAGNOSIS — K21.9 GASTROESOPHAGEAL REFLUX DISEASE WITHOUT ESOPHAGITIS: ICD-10-CM

## 2019-09-13 ENCOUNTER — PROCEDURE VISIT (OUTPATIENT)
Dept: AUDIOLOGY | Age: 64
End: 2019-09-13
Payer: MEDICARE

## 2019-09-13 DIAGNOSIS — H90.3 SENSORINEURAL HEARING LOSS (SNHL) OF BOTH EARS: Primary | ICD-10-CM

## 2019-09-13 DIAGNOSIS — H81.4 CENTRAL POSITIONAL VERTIGO: Primary | ICD-10-CM

## 2019-09-13 PROCEDURE — 92540 BASIC VESTIBULAR EVALUATION: CPT | Performed by: AUDIOLOGIST

## 2019-09-13 PROCEDURE — 92567 TYMPANOMETRY: CPT | Performed by: AUDIOLOGIST

## 2019-09-13 PROCEDURE — 92557 COMPREHENSIVE HEARING TEST: CPT | Performed by: AUDIOLOGIST

## 2019-09-13 PROCEDURE — 92537 CALORIC VSTBLR TEST W/REC: CPT | Performed by: AUDIOLOGIST

## 2019-09-13 NOTE — PROGRESS NOTES
Subjective:      Patient ID: Bryan More is a 60 y.o. female referred to audiology for vestibular evaluation. Patient reports with concerns of vertigo, accompanied by headaches. Patient reports headaches are frequently. Patient reports a change in blood pressure medication which has stabilized blood pressure. Patient ranks blood pressure and now, normal and controlled. Assessment:       Neither central nor peripheral-vestibular involvement can be ruled out at this time. I am convinced symptoms are central in origin, rather peripheral. Some evidence of positional vertigo observed. Plan:      Recommendations: MRI is warranted. Patient should consult with neurology for ongoing headaches. Physical therapy warranted for ear-brain retraining.          Noah Vazquez, AuD

## 2019-09-18 ENCOUNTER — TELEPHONE (OUTPATIENT)
Dept: OTOLARYNGOLOGY | Age: 64
End: 2019-09-18

## 2019-09-18 DIAGNOSIS — H81.4 VERTIGO OF CENTRAL ORIGIN, UNSPECIFIED LATERALITY: Primary | ICD-10-CM

## 2019-10-01 ENCOUNTER — OFFICE VISIT (OUTPATIENT)
Dept: ENT CLINIC | Age: 64
End: 2019-10-01
Payer: MEDICARE

## 2019-10-01 VITALS — OXYGEN SATURATION: 97 % | DIASTOLIC BLOOD PRESSURE: 80 MMHG | SYSTOLIC BLOOD PRESSURE: 142 MMHG | HEART RATE: 69 BPM

## 2019-10-01 DIAGNOSIS — J30.9 ALLERGIC SINUSITIS: Primary | ICD-10-CM

## 2019-10-01 DIAGNOSIS — G44.86 HEADACHE, CERVICOGENIC: ICD-10-CM

## 2019-10-01 PROCEDURE — 3014F SCREEN MAMMO DOC REV: CPT | Performed by: OTOLARYNGOLOGY

## 2019-10-01 PROCEDURE — 1036F TOBACCO NON-USER: CPT | Performed by: OTOLARYNGOLOGY

## 2019-10-01 PROCEDURE — G8484 FLU IMMUNIZE NO ADMIN: HCPCS | Performed by: OTOLARYNGOLOGY

## 2019-10-01 PROCEDURE — 3017F COLORECTAL CA SCREEN DOC REV: CPT | Performed by: OTOLARYNGOLOGY

## 2019-10-01 PROCEDURE — 99213 OFFICE O/P EST LOW 20 MIN: CPT | Performed by: OTOLARYNGOLOGY

## 2019-10-01 PROCEDURE — G8417 CALC BMI ABV UP PARAM F/U: HCPCS | Performed by: OTOLARYNGOLOGY

## 2019-10-01 PROCEDURE — G8427 DOCREV CUR MEDS BY ELIG CLIN: HCPCS | Performed by: OTOLARYNGOLOGY

## 2019-10-01 RX ORDER — BUTALBITAL, ACETAMINOPHEN AND CAFFEINE 50; 325; 40 MG/1; MG/1; MG/1
1 TABLET ORAL EVERY 4 HOURS PRN
Qty: 20 TABLET | Refills: 1 | Status: SHIPPED | OUTPATIENT
Start: 2019-10-01 | End: 2019-10-23 | Stop reason: ALTCHOICE

## 2019-10-01 RX ORDER — METHYLPREDNISOLONE 4 MG/1
4 TABLET ORAL SEE ADMIN INSTRUCTIONS
Qty: 1 KIT | Refills: 0 | Status: SHIPPED | OUTPATIENT
Start: 2019-10-01 | End: 2019-10-23

## 2019-10-02 DIAGNOSIS — K21.9 GASTROESOPHAGEAL REFLUX DISEASE WITHOUT ESOPHAGITIS: ICD-10-CM

## 2019-10-03 DIAGNOSIS — J30.9 ALLERGIC SINUSITIS: ICD-10-CM

## 2019-10-03 RX ORDER — FLUTICASONE PROPIONATE 50 MCG
SPRAY, SUSPENSION (ML) NASAL
Qty: 1 BOTTLE | Refills: 3 | Status: SHIPPED | OUTPATIENT
Start: 2019-10-03 | End: 2020-04-24

## 2019-10-15 ENCOUNTER — TELEPHONE (OUTPATIENT)
Dept: ENT CLINIC | Age: 64
End: 2019-10-15

## 2019-10-15 DIAGNOSIS — B37.9 MONILIASIS: ICD-10-CM

## 2019-10-15 DIAGNOSIS — G44.86 HEADACHE, CERVICOGENIC: Primary | ICD-10-CM

## 2019-10-15 RX ORDER — FLUCONAZOLE 100 MG/1
100 TABLET ORAL DAILY
Qty: 7 TABLET | Refills: 0 | Status: SHIPPED | OUTPATIENT
Start: 2019-10-15 | End: 2019-11-05 | Stop reason: ALTCHOICE

## 2019-10-15 RX ORDER — AMITRIPTYLINE HYDROCHLORIDE 10 MG/1
10 TABLET, FILM COATED ORAL NIGHTLY
Qty: 15 TABLET | Refills: 1 | Status: SHIPPED | OUTPATIENT
Start: 2019-10-15 | End: 2019-10-23 | Stop reason: ALTCHOICE

## 2019-10-23 ENCOUNTER — OFFICE VISIT (OUTPATIENT)
Dept: PRIMARY CARE CLINIC | Age: 64
End: 2019-10-23
Payer: MEDICARE

## 2019-10-23 VITALS
HEIGHT: 67 IN | DIASTOLIC BLOOD PRESSURE: 100 MMHG | SYSTOLIC BLOOD PRESSURE: 160 MMHG | HEART RATE: 67 BPM | WEIGHT: 233 LBS | BODY MASS INDEX: 36.57 KG/M2

## 2019-10-23 DIAGNOSIS — G44.89 OTHER HEADACHE SYNDROME: ICD-10-CM

## 2019-10-23 DIAGNOSIS — Z23 FLU VACCINE NEED: ICD-10-CM

## 2019-10-23 DIAGNOSIS — I10 ESSENTIAL HYPERTENSION: ICD-10-CM

## 2019-10-23 PROCEDURE — G8484 FLU IMMUNIZE NO ADMIN: HCPCS | Performed by: INTERNAL MEDICINE

## 2019-10-23 PROCEDURE — G9899 SCRN MAM PERF RSLTS DOC: HCPCS | Performed by: INTERNAL MEDICINE

## 2019-10-23 PROCEDURE — 90686 IIV4 VACC NO PRSV 0.5 ML IM: CPT | Performed by: INTERNAL MEDICINE

## 2019-10-23 PROCEDURE — G8417 CALC BMI ABV UP PARAM F/U: HCPCS | Performed by: INTERNAL MEDICINE

## 2019-10-23 PROCEDURE — 1036F TOBACCO NON-USER: CPT | Performed by: INTERNAL MEDICINE

## 2019-10-23 PROCEDURE — G0008 ADMIN INFLUENZA VIRUS VAC: HCPCS | Performed by: INTERNAL MEDICINE

## 2019-10-23 PROCEDURE — 3017F COLORECTAL CA SCREEN DOC REV: CPT | Performed by: INTERNAL MEDICINE

## 2019-10-23 PROCEDURE — 99214 OFFICE O/P EST MOD 30 MIN: CPT | Performed by: INTERNAL MEDICINE

## 2019-10-23 PROCEDURE — G8427 DOCREV CUR MEDS BY ELIG CLIN: HCPCS | Performed by: INTERNAL MEDICINE

## 2019-10-23 RX ORDER — LOSARTAN POTASSIUM 100 MG/1
100 TABLET ORAL DAILY
Qty: 30 TABLET | Refills: 5 | Status: SHIPPED | OUTPATIENT
Start: 2019-10-23 | End: 2019-11-05 | Stop reason: SDUPTHER

## 2019-10-23 RX ORDER — LABETALOL 300 MG/1
300 TABLET, FILM COATED ORAL 2 TIMES DAILY
Qty: 60 TABLET | Refills: 3 | Status: SHIPPED | OUTPATIENT
Start: 2019-10-23 | End: 2019-11-05 | Stop reason: SDUPTHER

## 2019-10-23 ASSESSMENT — ENCOUNTER SYMPTOMS
FACIAL SWELLING: 0
CHEST TIGHTNESS: 0
ABDOMINAL DISTENTION: 0
ABDOMINAL PAIN: 0
DIARRHEA: 0
SORE THROAT: 0
VOMITING: 0
PHOTOPHOBIA: 0
COUGH: 0
CHOKING: 0
CONSTIPATION: 0
VOICE CHANGE: 0
WHEEZING: 0
BLOOD IN STOOL: 0
SHORTNESS OF BREATH: 0

## 2019-11-01 ENCOUNTER — OFFICE VISIT (OUTPATIENT)
Dept: ENT CLINIC | Age: 64
End: 2019-11-01
Payer: MEDICARE

## 2019-11-01 VITALS — HEART RATE: 70 BPM | DIASTOLIC BLOOD PRESSURE: 88 MMHG | OXYGEN SATURATION: 92 % | SYSTOLIC BLOOD PRESSURE: 148 MMHG

## 2019-11-01 DIAGNOSIS — K21.9 GASTROESOPHAGEAL REFLUX DISEASE WITHOUT ESOPHAGITIS: ICD-10-CM

## 2019-11-01 DIAGNOSIS — J30.9 ALLERGIC SINUSITIS: Primary | ICD-10-CM

## 2019-11-01 DIAGNOSIS — J32.9 RECURRENT SINUSITIS: ICD-10-CM

## 2019-11-01 PROCEDURE — G9899 SCRN MAM PERF RSLTS DOC: HCPCS | Performed by: OTOLARYNGOLOGY

## 2019-11-01 PROCEDURE — G8417 CALC BMI ABV UP PARAM F/U: HCPCS | Performed by: OTOLARYNGOLOGY

## 2019-11-01 PROCEDURE — 1036F TOBACCO NON-USER: CPT | Performed by: OTOLARYNGOLOGY

## 2019-11-01 PROCEDURE — 99213 OFFICE O/P EST LOW 20 MIN: CPT | Performed by: OTOLARYNGOLOGY

## 2019-11-01 PROCEDURE — G8427 DOCREV CUR MEDS BY ELIG CLIN: HCPCS | Performed by: OTOLARYNGOLOGY

## 2019-11-01 PROCEDURE — 3017F COLORECTAL CA SCREEN DOC REV: CPT | Performed by: OTOLARYNGOLOGY

## 2019-11-01 PROCEDURE — G8482 FLU IMMUNIZE ORDER/ADMIN: HCPCS | Performed by: OTOLARYNGOLOGY

## 2019-11-01 RX ORDER — BUTALBITAL, ACETAMINOPHEN AND CAFFEINE 50; 325; 40 MG/1; MG/1; MG/1
1 TABLET ORAL EVERY 4 HOURS PRN
Qty: 25 TABLET | Refills: 0 | Status: SHIPPED | OUTPATIENT
Start: 2019-11-01 | End: 2020-03-05

## 2019-11-01 RX ORDER — PREDNISONE 10 MG/1
TABLET ORAL
Qty: 25 TABLET | Refills: 0 | Status: SHIPPED | OUTPATIENT
Start: 2019-11-01 | End: 2019-12-10

## 2019-11-05 ENCOUNTER — OFFICE VISIT (OUTPATIENT)
Dept: PRIMARY CARE CLINIC | Age: 64
End: 2019-11-05
Payer: MEDICARE

## 2019-11-05 ENCOUNTER — TELEPHONE (OUTPATIENT)
Dept: PRIMARY CARE CLINIC | Age: 64
End: 2019-11-05

## 2019-11-05 ENCOUNTER — HOSPITAL ENCOUNTER (OUTPATIENT)
Dept: CT IMAGING | Age: 64
Discharge: HOME | End: 2019-11-05
Payer: MEDICARE

## 2019-11-05 VITALS
HEIGHT: 67 IN | WEIGHT: 236 LBS | BODY MASS INDEX: 37.04 KG/M2 | DIASTOLIC BLOOD PRESSURE: 80 MMHG | HEART RATE: 56 BPM | SYSTOLIC BLOOD PRESSURE: 180 MMHG

## 2019-11-05 DIAGNOSIS — K21.9 GASTROESOPHAGEAL REFLUX DISEASE WITHOUT ESOPHAGITIS: ICD-10-CM

## 2019-11-05 DIAGNOSIS — I10 ESSENTIAL HYPERTENSION: ICD-10-CM

## 2019-11-05 DIAGNOSIS — J32.9 RECURRENT SINUSITIS: ICD-10-CM

## 2019-11-05 PROCEDURE — G9899 SCRN MAM PERF RSLTS DOC: HCPCS | Performed by: INTERNAL MEDICINE

## 2019-11-05 PROCEDURE — G8482 FLU IMMUNIZE ORDER/ADMIN: HCPCS | Performed by: INTERNAL MEDICINE

## 2019-11-05 PROCEDURE — 3017F COLORECTAL CA SCREEN DOC REV: CPT | Performed by: INTERNAL MEDICINE

## 2019-11-05 PROCEDURE — G8427 DOCREV CUR MEDS BY ELIG CLIN: HCPCS | Performed by: INTERNAL MEDICINE

## 2019-11-05 PROCEDURE — 1036F TOBACCO NON-USER: CPT | Performed by: INTERNAL MEDICINE

## 2019-11-05 PROCEDURE — 70486 CT MAXILLOFACIAL W/O DYE: CPT

## 2019-11-05 PROCEDURE — G8417 CALC BMI ABV UP PARAM F/U: HCPCS | Performed by: INTERNAL MEDICINE

## 2019-11-05 PROCEDURE — 99213 OFFICE O/P EST LOW 20 MIN: CPT | Performed by: INTERNAL MEDICINE

## 2019-11-05 RX ORDER — LABETALOL 300 MG/1
300 TABLET, FILM COATED ORAL 2 TIMES DAILY
Qty: 60 TABLET | Refills: 3 | Status: SHIPPED | OUTPATIENT
Start: 2019-11-05 | End: 2019-12-10 | Stop reason: SDUPTHER

## 2019-11-05 RX ORDER — ESOMEPRAZOLE MAGNESIUM 40 MG/1
40 CAPSULE, DELAYED RELEASE ORAL DAILY
Qty: 90 CAPSULE | Refills: 3 | Status: SHIPPED | OUTPATIENT
Start: 2019-11-05 | End: 2019-12-10 | Stop reason: SDUPTHER

## 2019-11-05 RX ORDER — LOSARTAN POTASSIUM 100 MG/1
100 TABLET ORAL DAILY
Qty: 30 TABLET | Refills: 5 | Status: SHIPPED | OUTPATIENT
Start: 2019-11-05 | End: 2019-12-10 | Stop reason: SDUPTHER

## 2019-11-05 RX ORDER — HYDRALAZINE HYDROCHLORIDE 50 MG/1
50 TABLET, FILM COATED ORAL 2 TIMES DAILY
Qty: 60 TABLET | Refills: 5 | Status: SHIPPED | OUTPATIENT
Start: 2019-11-05 | End: 2019-12-10 | Stop reason: SDUPTHER

## 2019-11-05 ASSESSMENT — ENCOUNTER SYMPTOMS
BLOOD IN STOOL: 0
CHOKING: 0
WHEEZING: 0
BACK PAIN: 1
VOMITING: 0
FACIAL SWELLING: 0
ABDOMINAL DISTENTION: 0
ABDOMINAL PAIN: 0
SORE THROAT: 0
SHORTNESS OF BREATH: 0
PHOTOPHOBIA: 0
CONSTIPATION: 0
CHEST TIGHTNESS: 0
COUGH: 0
VOICE CHANGE: 0
DIARRHEA: 0

## 2019-11-13 ENCOUNTER — TELEPHONE (OUTPATIENT)
Dept: ENT CLINIC | Age: 64
End: 2019-11-13

## 2019-11-13 DIAGNOSIS — G44.86 HEADACHE, CERVICOGENIC: Primary | ICD-10-CM

## 2019-11-13 RX ORDER — BUTALBITAL, ACETAMINOPHEN AND CAFFEINE 50; 325; 40 MG/1; MG/1; MG/1
1 TABLET ORAL EVERY 4 HOURS PRN
Qty: 50 TABLET | Refills: 2 | Status: SHIPPED | OUTPATIENT
Start: 2019-11-13 | End: 2020-01-28

## 2019-11-15 ENCOUNTER — TELEPHONE (OUTPATIENT)
Dept: PRIMARY CARE CLINIC | Age: 64
End: 2019-11-15

## 2019-11-15 DIAGNOSIS — G44.89 OTHER HEADACHE SYNDROME: ICD-10-CM

## 2019-11-15 LAB
A/G RATIO: 1.3 (ref 1.1–2.2)
ALBUMIN SERPL-MCNC: 4.1 G/DL (ref 3.4–5)
ALP BLD-CCNC: 90 U/L (ref 40–129)
ALT SERPL-CCNC: 12 U/L (ref 10–40)
AMYLASE: 80 U/L (ref 25–115)
ANION GAP SERPL CALCULATED.3IONS-SCNC: 14 MMOL/L (ref 3–16)
AST SERPL-CCNC: 15 U/L (ref 15–37)
BASOPHILS ABSOLUTE: 0 K/UL (ref 0–0.2)
BASOPHILS RELATIVE PERCENT: 1.3 %
BILIRUB SERPL-MCNC: <0.2 MG/DL (ref 0–1)
BUN BLDV-MCNC: 12 MG/DL (ref 7–20)
CALCIUM SERPL-MCNC: 9.5 MG/DL (ref 8.3–10.6)
CHLORIDE BLD-SCNC: 109 MMOL/L (ref 99–110)
CO2: 18 MMOL/L (ref 21–32)
CREAT SERPL-MCNC: 0.9 MG/DL (ref 0.6–1.2)
EOSINOPHILS ABSOLUTE: 0.3 K/UL (ref 0–0.6)
EOSINOPHILS RELATIVE PERCENT: 8.2 %
GFR AFRICAN AMERICAN: >60
GFR NON-AFRICAN AMERICAN: >60
GLOBULIN: 3.1 G/DL
GLUCOSE BLD-MCNC: 91 MG/DL (ref 70–99)
HCT VFR BLD CALC: 35.1 % (ref 36–48)
HEMOGLOBIN: 11.3 G/DL (ref 12–16)
LIPASE: 8 U/L (ref 13–60)
LYMPHOCYTES ABSOLUTE: 1 K/UL (ref 1–5.1)
LYMPHOCYTES RELATIVE PERCENT: 28.6 %
MCH RBC QN AUTO: 28.6 PG (ref 26–34)
MCHC RBC AUTO-ENTMCNC: 32.2 G/DL (ref 31–36)
MCV RBC AUTO: 88.9 FL (ref 80–100)
MONOCYTES ABSOLUTE: 0.3 K/UL (ref 0–1.3)
MONOCYTES RELATIVE PERCENT: 10 %
NEUTROPHILS ABSOLUTE: 1.8 K/UL (ref 1.7–7.7)
NEUTROPHILS RELATIVE PERCENT: 51.9 %
PDW BLD-RTO: 14 % (ref 12.4–15.4)
PLATELET # BLD: 251 K/UL (ref 135–450)
PMV BLD AUTO: 9.1 FL (ref 5–10.5)
POTASSIUM SERPL-SCNC: 4.3 MMOL/L (ref 3.5–5.1)
RBC # BLD: 3.95 M/UL (ref 4–5.2)
SEDIMENTATION RATE, ERYTHROCYTE: 42 MM/HR (ref 0–30)
SODIUM BLD-SCNC: 141 MMOL/L (ref 136–145)
TOTAL PROTEIN: 7.2 G/DL (ref 6.4–8.2)
WBC # BLD: 3.5 K/UL (ref 4–11)

## 2019-11-18 RX ORDER — FERROUS SULFATE 325(65) MG
325 TABLET ORAL 2 TIMES DAILY
Qty: 180 TABLET | Refills: 5 | Status: SHIPPED | OUTPATIENT
Start: 2019-11-18 | End: 2020-11-10

## 2019-11-19 DIAGNOSIS — J45.40 MODERATE PERSISTENT ASTHMA WITHOUT COMPLICATION: ICD-10-CM

## 2019-11-20 RX ORDER — ALBUTEROL SULFATE 90 UG/1
2 AEROSOL, METERED RESPIRATORY (INHALATION) EVERY 6 HOURS PRN
Qty: 18 G | Refills: 0 | Status: SHIPPED | OUTPATIENT
Start: 2019-11-20 | End: 2019-12-18

## 2019-11-20 RX ORDER — FLUTICASONE PROPIONATE 220 UG/1
AEROSOL, METERED RESPIRATORY (INHALATION)
Qty: 12 G | Refills: 0 | Status: SHIPPED | OUTPATIENT
Start: 2019-11-20 | End: 2020-03-23

## 2019-12-03 DIAGNOSIS — K21.9 GASTROESOPHAGEAL REFLUX DISEASE WITHOUT ESOPHAGITIS: ICD-10-CM

## 2019-12-10 ENCOUNTER — OFFICE VISIT (OUTPATIENT)
Dept: PRIMARY CARE CLINIC | Age: 64
End: 2019-12-10
Payer: MEDICARE

## 2019-12-10 ENCOUNTER — OFFICE VISIT (OUTPATIENT)
Dept: ENT CLINIC | Age: 64
End: 2019-12-10
Payer: MEDICARE

## 2019-12-10 VITALS
WEIGHT: 233 LBS | HEIGHT: 67 IN | DIASTOLIC BLOOD PRESSURE: 80 MMHG | HEART RATE: 69 BPM | BODY MASS INDEX: 36.57 KG/M2 | OXYGEN SATURATION: 99 % | SYSTOLIC BLOOD PRESSURE: 114 MMHG

## 2019-12-10 VITALS — DIASTOLIC BLOOD PRESSURE: 78 MMHG | SYSTOLIC BLOOD PRESSURE: 138 MMHG | OXYGEN SATURATION: 93 % | HEART RATE: 73 BPM

## 2019-12-10 DIAGNOSIS — K21.9 GASTROESOPHAGEAL REFLUX DISEASE WITHOUT ESOPHAGITIS: ICD-10-CM

## 2019-12-10 DIAGNOSIS — G43.101 MIGRAINE WITH AURA AND WITH STATUS MIGRAINOSUS, NOT INTRACTABLE: Primary | ICD-10-CM

## 2019-12-10 DIAGNOSIS — I10 ESSENTIAL HYPERTENSION: ICD-10-CM

## 2019-12-10 DIAGNOSIS — D50.9 IRON DEFICIENCY ANEMIA, UNSPECIFIED IRON DEFICIENCY ANEMIA TYPE: ICD-10-CM

## 2019-12-10 DIAGNOSIS — H81.4 VERTIGO OF CENTRAL ORIGIN, UNSPECIFIED LATERALITY: ICD-10-CM

## 2019-12-10 PROCEDURE — G9899 SCRN MAM PERF RSLTS DOC: HCPCS | Performed by: INTERNAL MEDICINE

## 2019-12-10 PROCEDURE — G8417 CALC BMI ABV UP PARAM F/U: HCPCS | Performed by: INTERNAL MEDICINE

## 2019-12-10 PROCEDURE — G8482 FLU IMMUNIZE ORDER/ADMIN: HCPCS | Performed by: OTOLARYNGOLOGY

## 2019-12-10 PROCEDURE — 1036F TOBACCO NON-USER: CPT | Performed by: OTOLARYNGOLOGY

## 2019-12-10 PROCEDURE — G8427 DOCREV CUR MEDS BY ELIG CLIN: HCPCS | Performed by: OTOLARYNGOLOGY

## 2019-12-10 PROCEDURE — 3017F COLORECTAL CA SCREEN DOC REV: CPT | Performed by: INTERNAL MEDICINE

## 2019-12-10 PROCEDURE — G8482 FLU IMMUNIZE ORDER/ADMIN: HCPCS | Performed by: INTERNAL MEDICINE

## 2019-12-10 PROCEDURE — 99213 OFFICE O/P EST LOW 20 MIN: CPT | Performed by: OTOLARYNGOLOGY

## 2019-12-10 PROCEDURE — G8417 CALC BMI ABV UP PARAM F/U: HCPCS | Performed by: OTOLARYNGOLOGY

## 2019-12-10 PROCEDURE — G9899 SCRN MAM PERF RSLTS DOC: HCPCS | Performed by: OTOLARYNGOLOGY

## 2019-12-10 PROCEDURE — G8427 DOCREV CUR MEDS BY ELIG CLIN: HCPCS | Performed by: INTERNAL MEDICINE

## 2019-12-10 PROCEDURE — 3017F COLORECTAL CA SCREEN DOC REV: CPT | Performed by: OTOLARYNGOLOGY

## 2019-12-10 PROCEDURE — 1036F TOBACCO NON-USER: CPT | Performed by: INTERNAL MEDICINE

## 2019-12-10 PROCEDURE — 99213 OFFICE O/P EST LOW 20 MIN: CPT | Performed by: INTERNAL MEDICINE

## 2019-12-10 RX ORDER — LOSARTAN POTASSIUM 100 MG/1
100 TABLET ORAL DAILY
Qty: 30 TABLET | Refills: 6 | Status: SHIPPED | OUTPATIENT
Start: 2019-12-10 | End: 2020-05-29 | Stop reason: SDUPTHER

## 2019-12-10 RX ORDER — HYDRALAZINE HYDROCHLORIDE 50 MG/1
50 TABLET, FILM COATED ORAL 2 TIMES DAILY
Qty: 60 TABLET | Refills: 6 | Status: SHIPPED | OUTPATIENT
Start: 2019-12-10 | End: 2020-05-29 | Stop reason: SDUPTHER

## 2019-12-10 RX ORDER — ESOMEPRAZOLE MAGNESIUM 40 MG/1
40 CAPSULE, DELAYED RELEASE ORAL DAILY
Qty: 90 CAPSULE | Refills: 3 | Status: SHIPPED | OUTPATIENT
Start: 2019-12-10 | End: 2020-11-10 | Stop reason: SDUPTHER

## 2019-12-10 RX ORDER — AMITRIPTYLINE HYDROCHLORIDE 10 MG/1
10 TABLET, FILM COATED ORAL NIGHTLY
Qty: 30 TABLET | Refills: 0 | Status: SHIPPED | OUTPATIENT
Start: 2019-12-10 | End: 2020-03-05

## 2019-12-10 RX ORDER — LABETALOL 300 MG/1
300 TABLET, FILM COATED ORAL 2 TIMES DAILY
Qty: 60 TABLET | Refills: 6 | Status: SHIPPED | OUTPATIENT
Start: 2019-12-10 | End: 2020-05-29 | Stop reason: SDUPTHER

## 2019-12-10 ASSESSMENT — ENCOUNTER SYMPTOMS
SHORTNESS OF BREATH: 0
ABDOMINAL PAIN: 0
DIARRHEA: 0
CONSTIPATION: 0
VOMITING: 0
ABDOMINAL DISTENTION: 0
CHOKING: 0
WHEEZING: 0
COUGH: 0
PHOTOPHOBIA: 0
BLOOD IN STOOL: 0
FACIAL SWELLING: 0
VOICE CHANGE: 0
SORE THROAT: 0
BACK PAIN: 1
CHEST TIGHTNESS: 0

## 2019-12-18 DIAGNOSIS — J45.40 MODERATE PERSISTENT ASTHMA WITHOUT COMPLICATION: ICD-10-CM

## 2019-12-18 RX ORDER — ALBUTEROL SULFATE 90 UG/1
2 AEROSOL, METERED RESPIRATORY (INHALATION) EVERY 6 HOURS PRN
Qty: 18 G | Refills: 0 | Status: SHIPPED | OUTPATIENT
Start: 2019-12-18 | End: 2020-03-23

## 2020-01-08 ENCOUNTER — TELEPHONE (OUTPATIENT)
Dept: ENT CLINIC | Age: 65
End: 2020-01-08

## 2020-01-08 RX ORDER — TOPIRAMATE 25 MG/1
25 TABLET ORAL NIGHTLY
Qty: 15 TABLET | Refills: 0 | Status: SHIPPED | OUTPATIENT
Start: 2020-01-08 | End: 2020-03-05

## 2020-01-28 ENCOUNTER — OFFICE VISIT (OUTPATIENT)
Dept: ENT CLINIC | Age: 65
End: 2020-01-28
Payer: MEDICARE

## 2020-01-28 VITALS — OXYGEN SATURATION: 98 % | DIASTOLIC BLOOD PRESSURE: 88 MMHG | SYSTOLIC BLOOD PRESSURE: 145 MMHG | HEART RATE: 79 BPM

## 2020-01-28 PROCEDURE — G8482 FLU IMMUNIZE ORDER/ADMIN: HCPCS | Performed by: OTOLARYNGOLOGY

## 2020-01-28 PROCEDURE — 3017F COLORECTAL CA SCREEN DOC REV: CPT | Performed by: OTOLARYNGOLOGY

## 2020-01-28 PROCEDURE — 99213 OFFICE O/P EST LOW 20 MIN: CPT | Performed by: OTOLARYNGOLOGY

## 2020-01-28 PROCEDURE — G8427 DOCREV CUR MEDS BY ELIG CLIN: HCPCS | Performed by: OTOLARYNGOLOGY

## 2020-01-28 PROCEDURE — G8417 CALC BMI ABV UP PARAM F/U: HCPCS | Performed by: OTOLARYNGOLOGY

## 2020-01-28 PROCEDURE — G9899 SCRN MAM PERF RSLTS DOC: HCPCS | Performed by: OTOLARYNGOLOGY

## 2020-01-28 PROCEDURE — 1036F TOBACCO NON-USER: CPT | Performed by: OTOLARYNGOLOGY

## 2020-01-28 RX ORDER — ALPRAZOLAM 0.25 MG/1
0.25 TABLET ORAL 2 TIMES DAILY
Qty: 30 TABLET | Refills: 0 | Status: SHIPPED | OUTPATIENT
Start: 2020-01-28 | End: 2020-02-12

## 2020-01-28 RX ORDER — METHYLPREDNISOLONE 4 MG/1
4 TABLET ORAL SEE ADMIN INSTRUCTIONS
Qty: 1 KIT | Refills: 0 | Status: SHIPPED | OUTPATIENT
Start: 2020-01-28 | End: 2020-03-05 | Stop reason: ALTCHOICE

## 2020-01-28 NOTE — PROGRESS NOTES
Patient primarily is complaining of headaches which are generalized in location and are unassociated with nausea or vomiting but are occurring in a more frequent fashion. She has not noticed any particular sinus drainage of any significance and does not seem to have more of a problem on the right side than the left. No fever is been noted. Currently, she appears in no obvious acute distress. Ear examination reveals normal-appearing tympanic membranes and ear canals. Pupils are equal round regular and reactive to light and accommodation and extraocular movements are totally intact. No neurologic signs are noted. Oral examination is unremarkable. The neck is free of adenopathy, mass, thyroid enlargement. Nasal mucosa treated with cotton pledgets  impregnated with Afrin and lidocaine placed in middle meatuses against turbinates. Pledgets left in place for five minutes and removed enabling enhanced visualization. The exam revealed positive edema of mucosa. it was negative for erythema indicative of infection. There was not evidence of deviation of the septum which was not significant. There were not polyps present. .There were not other masses present. The turbinates were not enlarged beyond normal.  Findings seem to be mostly those of what appears to be a migraine variant. The nasal tissues themselves do not indicate any evidence of disease of any significance. I have reviewed all of her previous studies including CT scans which have been not demonstrated any significant pathology. As a result, we will try further attempts at medical therapy with a Medrol Dosepak as well as Xanax. She will contact the office in 2 weeks to determine her response.

## 2020-02-18 ENCOUNTER — TELEPHONE (OUTPATIENT)
Dept: ENT CLINIC | Age: 65
End: 2020-02-18

## 2020-02-18 RX ORDER — ALPRAZOLAM 0.25 MG/1
0.25 TABLET ORAL 2 TIMES DAILY
Qty: 60 TABLET | Refills: 0 | OUTPATIENT
Start: 2020-02-18 | End: 2020-05-06 | Stop reason: SDUPTHER

## 2020-03-04 ENCOUNTER — TELEPHONE (OUTPATIENT)
Dept: PRIMARY CARE CLINIC | Age: 65
End: 2020-03-04

## 2020-03-05 ENCOUNTER — OFFICE VISIT (OUTPATIENT)
Dept: PRIMARY CARE CLINIC | Age: 65
End: 2020-03-05
Payer: MEDICARE

## 2020-03-05 VITALS
HEIGHT: 67 IN | SYSTOLIC BLOOD PRESSURE: 121 MMHG | WEIGHT: 225 LBS | BODY MASS INDEX: 35.31 KG/M2 | DIASTOLIC BLOOD PRESSURE: 81 MMHG | HEART RATE: 64 BPM

## 2020-03-05 PROBLEM — E66.01 MORBIDLY OBESE (HCC): Status: ACTIVE | Noted: 2020-03-05

## 2020-03-05 PROCEDURE — 3017F COLORECTAL CA SCREEN DOC REV: CPT | Performed by: INTERNAL MEDICINE

## 2020-03-05 PROCEDURE — 99214 OFFICE O/P EST MOD 30 MIN: CPT | Performed by: INTERNAL MEDICINE

## 2020-03-05 PROCEDURE — G8417 CALC BMI ABV UP PARAM F/U: HCPCS | Performed by: INTERNAL MEDICINE

## 2020-03-05 PROCEDURE — 1036F TOBACCO NON-USER: CPT | Performed by: INTERNAL MEDICINE

## 2020-03-05 PROCEDURE — G8427 DOCREV CUR MEDS BY ELIG CLIN: HCPCS | Performed by: INTERNAL MEDICINE

## 2020-03-05 PROCEDURE — G8482 FLU IMMUNIZE ORDER/ADMIN: HCPCS | Performed by: INTERNAL MEDICINE

## 2020-03-05 PROCEDURE — G9899 SCRN MAM PERF RSLTS DOC: HCPCS | Performed by: INTERNAL MEDICINE

## 2020-03-05 RX ORDER — PROMETHAZINE HYDROCHLORIDE 25 MG/1
25 TABLET ORAL EVERY 6 HOURS PRN
Qty: 21 TABLET | Refills: 0 | Status: SHIPPED | OUTPATIENT
Start: 2020-03-05 | End: 2020-03-12

## 2020-03-05 RX ORDER — ACETAMINOPHEN 500 MG
500 TABLET ORAL EVERY 6 HOURS PRN
Qty: 90 TABLET | Refills: 1 | Status: SHIPPED | OUTPATIENT
Start: 2020-03-05 | End: 2020-11-10 | Stop reason: SDUPTHER

## 2020-03-05 RX ORDER — SULFAMETHOXAZOLE AND TRIMETHOPRIM 800; 160 MG/1; MG/1
1 TABLET ORAL 2 TIMES DAILY
Qty: 20 TABLET | Refills: 0 | Status: SHIPPED | OUTPATIENT
Start: 2020-03-05 | End: 2020-03-15

## 2020-03-05 RX ORDER — DOXYCYCLINE HYCLATE 100 MG
100 TABLET ORAL 2 TIMES DAILY
Qty: 20 TABLET | Refills: 0 | Status: SHIPPED | OUTPATIENT
Start: 2020-03-05 | End: 2020-03-15

## 2020-03-05 ASSESSMENT — ENCOUNTER SYMPTOMS
DIARRHEA: 0
WHEEZING: 0
CHEST TIGHTNESS: 0
SORE THROAT: 0
PHOTOPHOBIA: 0
BLOOD IN STOOL: 0
COUGH: 0
FACIAL SWELLING: 0
ABDOMINAL DISTENTION: 0
ABDOMINAL PAIN: 1
CONSTIPATION: 0
CHOKING: 0
VOICE CHANGE: 0
BACK PAIN: 1
VOMITING: 0
SHORTNESS OF BREATH: 0

## 2020-03-05 ASSESSMENT — PATIENT HEALTH QUESTIONNAIRE - PHQ9
SUM OF ALL RESPONSES TO PHQ QUESTIONS 1-9: 0
SUM OF ALL RESPONSES TO PHQ QUESTIONS 1-9: 0
2. FEELING DOWN, DEPRESSED OR HOPELESS: 0
SUM OF ALL RESPONSES TO PHQ9 QUESTIONS 1 & 2: 0
1. LITTLE INTEREST OR PLEASURE IN DOING THINGS: 0

## 2020-03-05 NOTE — PROGRESS NOTES
Subjective:      Patient ID: Connie Fu is a 60 y.o. female. 3/5/2020 Patient presents with:  Abscess: groin area            Last seen  12/10/2019 Patient presents with:  Hypertension: follow up  Anemia  Headaches much improved             Last seen  11/5/2019 Patient presents with:  Hypertension: 8 day follow up               2/27/18 total thyroidectomy      Abscess   Associated symptoms include abdominal pain (lower ; lump) and arthralgias. Pertinent negatives include no chest pain, chills, congestion, coughing, diaphoresis, fatigue, fever, sore throat or vomiting. Past Medical History:   Diagnosis Date    Anxiety state     Asthma     Back ache     chronic    Environmental allergies     GERD (gastroesophageal reflux disease)     Hypertension     MVP (mitral valve prolapse)     Neck pain, chronic     Reflux     Sleep apnea        Review of Systems   Constitutional: Negative for activity change, appetite change, chills, diaphoresis, fatigue, fever and unexpected weight change. All vaccines up to date    Flu vac 10/19    Tdap 5/17     Pneumonia vac 2012 ;  5/17    HENT: Negative for congestion, ear pain, facial swelling, postnasal drip, sore throat and voice change. Dentures fit well ; new   Eyes: Negative for photophobia and visual disturbance. Eye exam 2/17   Respiratory: Negative for cough, choking, chest tightness, shortness of breath and wheezing. Does not smoke ; No Etoh   H/o Asthma    Cardiovascular: Negative for chest pain and leg swelling. Palpitations: off and on         HTN > 5 yrs on meds . No known CAD . Father dies of MI in his 46s   Sister with CAD ? MVP  H/o palpitations   Gastrointestinal: Positive for abdominal pain (lower ; lump). Negative for abdominal distention, blood in stool, constipation, diarrhea and vomiting. H/O Ulers ? Cannot take Nsaids! Post gastric bypass 2005      GERD    Colonscopy 2015 ;  Polyps   Endocrine:

## 2020-03-23 RX ORDER — FLUTICASONE PROPIONATE 220 UG/1
AEROSOL, METERED RESPIRATORY (INHALATION)
Qty: 12 G | Refills: 5 | Status: SHIPPED | OUTPATIENT
Start: 2020-03-23 | End: 2020-05-29 | Stop reason: SDUPTHER

## 2020-03-23 RX ORDER — ALBUTEROL SULFATE 90 UG/1
2 AEROSOL, METERED RESPIRATORY (INHALATION) EVERY 6 HOURS PRN
Qty: 18 G | Refills: 5 | Status: SHIPPED | OUTPATIENT
Start: 2020-03-23 | End: 2020-05-29 | Stop reason: SDUPTHER

## 2020-04-24 RX ORDER — FLUTICASONE PROPIONATE 50 MCG
SPRAY, SUSPENSION (ML) NASAL
Qty: 16 G | Refills: 3 | Status: SHIPPED | OUTPATIENT
Start: 2020-04-24 | End: 2020-11-10

## 2020-05-06 RX ORDER — ALPRAZOLAM 0.25 MG/1
0.25 TABLET ORAL 2 TIMES DAILY
Qty: 60 TABLET | Refills: 0 | Status: CANCELLED | OUTPATIENT
Start: 2020-05-06 | End: 2020-06-05

## 2020-05-06 RX ORDER — ALPRAZOLAM 0.25 MG/1
0.25 TABLET ORAL 2 TIMES DAILY
Qty: 60 TABLET | Refills: 0 | OUTPATIENT
Start: 2020-05-06 | End: 2020-06-05

## 2020-05-07 ENCOUNTER — TELEPHONE (OUTPATIENT)
Dept: ENT CLINIC | Age: 65
End: 2020-05-07

## 2020-05-07 RX ORDER — ALPRAZOLAM 0.25 MG/1
0.25 TABLET ORAL 2 TIMES DAILY
Qty: 60 TABLET | Refills: 0 | OUTPATIENT
Start: 2020-05-07 | End: 2020-06-06

## 2020-05-29 ENCOUNTER — VIRTUAL VISIT (OUTPATIENT)
Dept: PRIMARY CARE CLINIC | Age: 65
End: 2020-05-29
Payer: MEDICARE

## 2020-05-29 VITALS — DIASTOLIC BLOOD PRESSURE: 81 MMHG | SYSTOLIC BLOOD PRESSURE: 121 MMHG

## 2020-05-29 PROCEDURE — G8417 CALC BMI ABV UP PARAM F/U: HCPCS | Performed by: INTERNAL MEDICINE

## 2020-05-29 PROCEDURE — G8427 DOCREV CUR MEDS BY ELIG CLIN: HCPCS | Performed by: INTERNAL MEDICINE

## 2020-05-29 PROCEDURE — 3017F COLORECTAL CA SCREEN DOC REV: CPT | Performed by: INTERNAL MEDICINE

## 2020-05-29 PROCEDURE — 1036F TOBACCO NON-USER: CPT | Performed by: INTERNAL MEDICINE

## 2020-05-29 PROCEDURE — 99213 OFFICE O/P EST LOW 20 MIN: CPT | Performed by: INTERNAL MEDICINE

## 2020-05-29 PROCEDURE — G9899 SCRN MAM PERF RSLTS DOC: HCPCS | Performed by: INTERNAL MEDICINE

## 2020-05-29 RX ORDER — AZITHROMYCIN 250 MG/1
250 TABLET, FILM COATED ORAL SEE ADMIN INSTRUCTIONS
Qty: 6 TABLET | Refills: 0 | Status: SHIPPED | OUTPATIENT
Start: 2020-05-29 | End: 2020-06-03

## 2020-05-29 RX ORDER — GUAIFENESIN 600 MG/1
600 TABLET, EXTENDED RELEASE ORAL 2 TIMES DAILY
Qty: 14 TABLET | Refills: 0 | Status: SHIPPED | OUTPATIENT
Start: 2020-05-29 | End: 2020-06-05

## 2020-05-29 RX ORDER — LOSARTAN POTASSIUM 100 MG/1
100 TABLET ORAL DAILY
Qty: 30 TABLET | Refills: 6 | Status: SHIPPED | OUTPATIENT
Start: 2020-05-29 | End: 2020-11-10 | Stop reason: SDUPTHER

## 2020-05-29 RX ORDER — ALBUTEROL SULFATE 90 UG/1
2 AEROSOL, METERED RESPIRATORY (INHALATION) EVERY 6 HOURS PRN
Qty: 18 G | Refills: 5 | Status: SHIPPED | OUTPATIENT
Start: 2020-05-29 | End: 2021-04-19

## 2020-05-29 RX ORDER — HYDRALAZINE HYDROCHLORIDE 50 MG/1
50 TABLET, FILM COATED ORAL 2 TIMES DAILY
Qty: 60 TABLET | Refills: 6 | Status: SHIPPED | OUTPATIENT
Start: 2020-05-29 | End: 2020-11-10 | Stop reason: SDUPTHER

## 2020-05-29 RX ORDER — FLUTICASONE PROPIONATE 220 UG/1
2 AEROSOL, METERED RESPIRATORY (INHALATION) 2 TIMES DAILY
Qty: 12 G | Refills: 5 | Status: SHIPPED | OUTPATIENT
Start: 2020-05-29 | End: 2021-04-19

## 2020-05-29 RX ORDER — LABETALOL 300 MG/1
300 TABLET, FILM COATED ORAL 2 TIMES DAILY
Qty: 60 TABLET | Refills: 6 | Status: SHIPPED | OUTPATIENT
Start: 2020-05-29 | End: 2020-11-10 | Stop reason: SDUPTHER

## 2020-05-29 ASSESSMENT — ENCOUNTER SYMPTOMS
ABDOMINAL DISTENTION: 0
CHOKING: 0
SHORTNESS OF BREATH: 0
CONSTIPATION: 0
DIARRHEA: 0
WHEEZING: 1
VOICE CHANGE: 0
CHEST TIGHTNESS: 0
BLOOD IN STOOL: 0
SORE THROAT: 0
FACIAL SWELLING: 0
COUGH: 1
RHINORRHEA: 1
PHOTOPHOBIA: 0

## 2020-05-29 NOTE — PROGRESS NOTES
Genitourinary:        Mammogram  [facility]  2018    Scheduled Pap 0n 9/127/18    Musculoskeletal:        DJD . Surgery back 2012 ? C/O Dr Parmjit Ramesh . On robaxin fom him    Skin: Negative. Psychiatric/Behavioral: Negative for self-injury, sleep disturbance and suicidal ideas. Anxiety: followed by counselor       Objective:   Physical Exam   Constitutional: She is oriented to person, place, and time. No distress. Vitals as last noted 3/20   Neck:        Neurological: She is alert and oriented to person, place, and time. Psychiatric: Her behavior is normal.       Assessment:       Cayla Funez is a 60 y.o. female evaluated via telephone on 5/29/2020. Consent:  She and/or health care decision maker is aware that that she may receive a bill for this telephone service, depending on her insurance coverage, and has provided verbal consent to proceed: Yes      Documentation:  I communicated with the patient about this . Details of this discussion including any medical advice provided: as noted       I affirm this is a Patient Initiated Episode with a Patient who has not had a related appointment within my department in the past 7 days or scheduled within the next 24 hours. Patient identification was verified at the start of the visit: Yes    Total Time: minutes: 11-20 minutes    Note: not billable if this call serves to triage the patient into an appointment for the relevant concern      Sudha Yao was seen today for cough and congestion. Diagnoses and all orders for this visit:    Asthma with bronchitis  Advised to keep check of Temp . Call is symptoms worsen . Stay home for 2 wks   -     azithromycin (ZITHROMAX) 250 MG tablet; Take 1 tablet by mouth See Admin Instructions for 5 days 500mg on day 1 followed by 250mg on days 2 - 5  -     guaiFENesin (MUCINEX) 600 MG extended release tablet;  Take 1 tablet by mouth 2 times daily for 7 days  -     fluticasone (FLOVENT HFA) 220 MCG/ACT

## 2020-06-04 ENCOUNTER — TELEPHONE (OUTPATIENT)
Dept: ADMINISTRATIVE | Age: 65
End: 2020-06-04

## 2020-06-04 RX ORDER — TIZANIDINE 2 MG/1
2 TABLET ORAL 3 TIMES DAILY PRN
Qty: 30 TABLET | Refills: 0 | Status: SHIPPED | OUTPATIENT
Start: 2020-06-04 | End: 2020-07-09 | Stop reason: SDUPTHER

## 2020-06-04 RX ORDER — ACETAMINOPHEN 500 MG
500 TABLET ORAL EVERY 6 HOURS PRN
Qty: 90 TABLET | Refills: 1 | Status: SHIPPED | OUTPATIENT
Start: 2020-06-04 | End: 2021-04-05

## 2020-06-05 ENCOUNTER — TELEPHONE (OUTPATIENT)
Dept: PRIMARY CARE CLINIC | Age: 65
End: 2020-06-05

## 2020-06-09 ENCOUNTER — OFFICE VISIT (OUTPATIENT)
Dept: ENT CLINIC | Age: 65
End: 2020-06-09
Payer: MEDICARE

## 2020-06-09 VITALS
TEMPERATURE: 97.8 F | HEIGHT: 67 IN | DIASTOLIC BLOOD PRESSURE: 80 MMHG | SYSTOLIC BLOOD PRESSURE: 142 MMHG | HEART RATE: 66 BPM | BODY MASS INDEX: 35.24 KG/M2

## 2020-06-09 PROCEDURE — 99213 OFFICE O/P EST LOW 20 MIN: CPT | Performed by: OTOLARYNGOLOGY

## 2020-06-09 PROCEDURE — 1036F TOBACCO NON-USER: CPT | Performed by: OTOLARYNGOLOGY

## 2020-06-09 PROCEDURE — G8417 CALC BMI ABV UP PARAM F/U: HCPCS | Performed by: OTOLARYNGOLOGY

## 2020-06-09 PROCEDURE — G8427 DOCREV CUR MEDS BY ELIG CLIN: HCPCS | Performed by: OTOLARYNGOLOGY

## 2020-06-09 PROCEDURE — 3017F COLORECTAL CA SCREEN DOC REV: CPT | Performed by: OTOLARYNGOLOGY

## 2020-06-09 PROCEDURE — G9899 SCRN MAM PERF RSLTS DOC: HCPCS | Performed by: OTOLARYNGOLOGY

## 2020-06-09 RX ORDER — CLINDAMYCIN HYDROCHLORIDE 300 MG/1
300 CAPSULE ORAL 3 TIMES DAILY
Qty: 30 CAPSULE | Refills: 0 | Status: SHIPPED | OUTPATIENT
Start: 2020-06-09 | End: 2020-11-10

## 2020-06-09 RX ORDER — QUETIAPINE FUMARATE 25 MG/1
25 TABLET, FILM COATED ORAL 2 TIMES DAILY
Qty: 60 TABLET | Refills: 3 | Status: SHIPPED | OUTPATIENT
Start: 2020-06-09 | End: 2020-11-10

## 2020-06-09 RX ORDER — TOBRAMYCIN 3 MG/ML
1 SOLUTION/ DROPS OPHTHALMIC 4 TIMES DAILY
Qty: 5 ML | Refills: 1 | Status: SHIPPED | OUTPATIENT
Start: 2020-06-09 | End: 2020-06-19

## 2020-07-09 RX ORDER — TIZANIDINE 2 MG/1
2 TABLET ORAL 3 TIMES DAILY PRN
Qty: 30 TABLET | Refills: 0 | Status: SHIPPED | OUTPATIENT
Start: 2020-07-09 | End: 2020-11-10

## 2020-07-09 NOTE — TELEPHONE ENCOUNTER
[facility] received a call from:    Name of Caller: [place]    Relationship to patient:self    Organization name: n/a    Best contact number: [phone]    Reason for call: Pt is requesting a prescription for her eczema. Pt is also requesting referral for dermatology    Patient requesting a medication refill. Medication: tiZANidine (ZANAFLEX) 2 MG tablet   Pharmacy: [address]  Last office visit: 6/9/2020  Next office visit: Visit date not found  .

## 2020-07-14 ENCOUNTER — OFFICE VISIT (OUTPATIENT)
Dept: ENT CLINIC | Age: 65
End: 2020-07-14
Payer: MEDICARE

## 2020-07-14 VITALS
DIASTOLIC BLOOD PRESSURE: 86 MMHG | WEIGHT: 225 LBS | TEMPERATURE: 97.8 F | SYSTOLIC BLOOD PRESSURE: 130 MMHG | HEIGHT: 67 IN | HEART RATE: 77 BPM | BODY MASS INDEX: 35.31 KG/M2

## 2020-07-14 PROCEDURE — 30901 CONTROL OF NOSEBLEED: CPT | Performed by: OTOLARYNGOLOGY

## 2020-07-14 PROCEDURE — 99212 OFFICE O/P EST SF 10 MIN: CPT | Performed by: OTOLARYNGOLOGY

## 2020-07-14 PROCEDURE — G8427 DOCREV CUR MEDS BY ELIG CLIN: HCPCS | Performed by: OTOLARYNGOLOGY

## 2020-07-14 PROCEDURE — 3017F COLORECTAL CA SCREEN DOC REV: CPT | Performed by: OTOLARYNGOLOGY

## 2020-07-14 PROCEDURE — G8417 CALC BMI ABV UP PARAM F/U: HCPCS | Performed by: OTOLARYNGOLOGY

## 2020-07-14 PROCEDURE — G9899 SCRN MAM PERF RSLTS DOC: HCPCS | Performed by: OTOLARYNGOLOGY

## 2020-07-14 PROCEDURE — 1036F TOBACCO NON-USER: CPT | Performed by: OTOLARYNGOLOGY

## 2020-07-14 RX ORDER — PREDNISOLONE ACETATE 10 MG/ML
1 SUSPENSION/ DROPS OPHTHALMIC 2 TIMES DAILY
Qty: 5 ML | Refills: 1 | Status: SHIPPED | OUTPATIENT
Start: 2020-07-14 | End: 2021-11-16

## 2020-07-14 NOTE — PROGRESS NOTES
Over the course the past couple weeks, the patient has been noticing intermittent bleeding from the left side of her nose. This is not required any packing or visits to an emergency room or urgent care facility. She has been using her nasal spray 2 puffs on each side once a day. She has not been on any medication including aspirin or ibuprofen nor is she on any blood thinning agents. She has had no trauma. There is been no fever. No significant pain has been noted. Currently, she appears in no obvious acute distress. Ear examination reveals normal-appearing tympanic membranes and ear canals. Oral examination is unremarkable. The neck is free of any adenopathy, mass, thyroid enlargement. Nasal mucosa treated with cotton pledgets  impregnated with Afrin and lidocaine placed in middle meatuses against turbinates. Pledgets left in place for five minutes and removed enabling enhanced visualization. The exam revealed positive edema of mucosa. it was negative for erythema indicative of infection. There was not evidence of deviation of the septum which was not significant. There were not polyps present. .There were not other masses present. The turbinates were not enlarged beyond normal.  Site of bleeding appears to be the midportion of the cartilaginous septum on the left side. The areas treated with silver nitrate cautery. We will have her use saline spray 3 times daily and also some Bactroban ointment on a nightly basis for the next 2 weeks. I have asked that she contact me if no significant improvement occurs. She is also instructed to decrease the use of her Flonase to 1 spray on each side once a day. She is also complaining of eye drainage which appears to be related to conjunctivitis on an allergic basis. Therefore, we will add Pred forte drops as well.

## 2020-07-15 ENCOUNTER — TELEPHONE (OUTPATIENT)
Dept: PRIMARY CARE CLINIC | Age: 65
End: 2020-07-15

## 2020-07-15 NOTE — TELEPHONE ENCOUNTER
Pt was exposed to someone who was admitted to the hospital with Covid. .   Pt has underlying heart condition. She will be having spinal surgery in the future, but she does not know the exact date. She would like to be tested now for COVID and wants Dr. Jenny Nieto to order the test.  She said her other family members have already been tested today at [facility] on [facility]. She said she needs an appt to go there. Pt does not currently have any symptoms. Please advise.       PHONE:  [phone]

## 2020-08-09 RX ORDER — FESOTERODINE FUMARATE 4 MG/1
TABLET, FILM COATED, EXTENDED RELEASE ORAL
Qty: 30 TABLET | Refills: 5 | Status: CANCELLED | OUTPATIENT
Start: 2020-08-09

## 2020-08-10 RX ORDER — FESOTERODINE FUMARATE 4 MG/1
TABLET, FILM COATED, EXTENDED RELEASE ORAL
Qty: 30 TABLET | Refills: 5 | Status: SHIPPED | OUTPATIENT
Start: 2020-08-10 | End: 2020-11-10

## 2020-08-27 ENCOUNTER — TELEPHONE (OUTPATIENT)
Dept: PRIMARY CARE CLINIC | Age: 65
End: 2020-08-27

## 2020-08-27 NOTE — TELEPHONE ENCOUNTER
Called patient she wanted to let Dr. Tari Bautista know that she is in the hospital  .  She is very concerned and wanted to know if Tari Bautista will look into what's going on

## 2020-08-27 NOTE — TELEPHONE ENCOUNTER
Pt is in the hospital and is requesting to speak to Eddy. pls return her call. thank you  Jose Rafael: [phone]

## 2020-08-31 ENCOUNTER — TELEPHONE (OUTPATIENT)
Dept: PRIMARY CARE CLINIC | Age: 65
End: 2020-08-31

## 2020-09-01 RX ORDER — FLUCONAZOLE 100 MG/1
200 TABLET ORAL ONCE
Qty: 2 TABLET | Refills: 0 | Status: SHIPPED | OUTPATIENT
Start: 2020-09-01 | End: 2020-10-08

## 2020-09-24 ENCOUNTER — NURSE TRIAGE (OUTPATIENT)
Dept: OTHER | Facility: CLINIC | Age: 65
End: 2020-09-24

## 2020-09-24 NOTE — TELEPHONE ENCOUNTER
Reason for Disposition   Patient wants to be seen    Answer Assessment - Initial Assessment Questions  1. DESCRIPTION: \"Describe your dizziness. \"      Lighted out when walking   2. LIGHTHEADED: \"Do you feel lightheaded? \" (e.g., somewhat faint, woozy, weak upon standing)      *No Answer*  3. VERTIGO: \"Do you feel like either you or the room is spinning or tilting? \" (i.e. vertigo)      *No Answer*  4. SEVERITY: \"How bad is it? \"  \"Do you feel like you are going to faint? \" \"Can you stand and walk? \"    - MILD - walking normally    - MODERATE - interferes with normal activities (e.g., work, school)     - SEVERE - unable to stand, requires support to walk, feels like passing out now. *No Answer*  5. ONSET:  \"When did the dizziness begin? \"      Couple weeks  6. AGGRAVATING FACTORS: \"Does anything make it worse? \" (e.g., standing, change in head position)      *No Answer*  7. HEART RATE: \"Can you tell me your heart rate? \" \"How many beats in 15 seconds? \"  (Note: not all patients can do this)        *No Answer*  8. CAUSE: \"What do you think is causing the dizziness? \"      *No Answer*  9. RECURRENT SYMPTOM: \"Have you had dizziness before? \" If so, ask: \"When was the last time? \" \"What happened that time? \"      *No Answer*  10. OTHER SYMPTOMS: \"Do you have any other symptoms? \" (e.g., fever, chest pain, vomiting, diarrhea, bleeding)        *No Answer*  11. PREGNANCY: \"Is there any chance you are pregnant? \" \"When was your last menstrual period? \"        *No Answer*    Protocols used: DIZZINESS-ADULT-OH    Pt states she has been having dizzy spells for 3 weeks thinks its related to her B/p but has not checked her b/p at home pt states she had on episode where she passed out a while back    Call transferred to Donna Vick

## 2020-10-01 ENCOUNTER — VIRTUAL VISIT (OUTPATIENT)
Dept: PRIMARY CARE CLINIC | Age: 65
End: 2020-10-01
Payer: MEDICARE

## 2020-10-01 PROCEDURE — G8428 CUR MEDS NOT DOCUMENT: HCPCS | Performed by: INTERNAL MEDICINE

## 2020-10-01 PROCEDURE — G8417 CALC BMI ABV UP PARAM F/U: HCPCS | Performed by: INTERNAL MEDICINE

## 2020-10-01 PROCEDURE — 3017F COLORECTAL CA SCREEN DOC REV: CPT | Performed by: INTERNAL MEDICINE

## 2020-10-01 PROCEDURE — G8484 FLU IMMUNIZE NO ADMIN: HCPCS | Performed by: INTERNAL MEDICINE

## 2020-10-01 PROCEDURE — 1036F TOBACCO NON-USER: CPT | Performed by: INTERNAL MEDICINE

## 2020-10-01 PROCEDURE — G9899 SCRN MAM PERF RSLTS DOC: HCPCS | Performed by: INTERNAL MEDICINE

## 2020-10-01 PROCEDURE — 99214 OFFICE O/P EST MOD 30 MIN: CPT | Performed by: INTERNAL MEDICINE

## 2020-10-01 RX ORDER — SULFAMETHOXAZOLE AND TRIMETHOPRIM 400; 80 MG/1; MG/1
1 TABLET ORAL DAILY
Qty: 10 TABLET | Refills: 0 | Status: SHIPPED | OUTPATIENT
Start: 2020-10-01 | End: 2020-10-06

## 2020-10-01 ASSESSMENT — ENCOUNTER SYMPTOMS
SHORTNESS OF BREATH: 0
DIARRHEA: 0
ABDOMINAL DISTENTION: 0
PHOTOPHOBIA: 0
BACK PAIN: 1
SORE THROAT: 0
BLOOD IN STOOL: 0
CHEST TIGHTNESS: 0
CONSTIPATION: 0
CHOKING: 0
FACIAL SWELLING: 0
VOICE CHANGE: 0

## 2020-10-01 NOTE — PROGRESS NOTES
Subjective:      Patient ID: Ned Crowder is a 63 y.o. female. VV 10/1/2020 Patient presents with:  Back Pain 2 wks . S/P Surgery dissectomy 8/26/20 at [facility]   Urinary Frequency 3 days     Was told she has Stage 3 Renal failiure ? Labs last checked here 11/19 show Nl renal function               Last seen  VV 5/29/2020  Patient presents with:  Cough  Congestion: one sister has pneumonia but not been tested for covid 19 yet! Has not checked temperature ? Last seen  3/5/2020 Patient presents with:  Abscess: groin area                   2/27/18 total thyroidectomy           Past Medical History:   Diagnosis Date    Anxiety state     Asthma     Back ache     chronic    Environmental allergies     GERD (gastroesophageal reflux disease)     Hypertension     MVP (mitral valve prolapse)     Neck pain, chronic     Reflux     Sleep apnea        Review of Systems   Constitutional: Negative for activity change, appetite change, chills, diaphoresis, fatigue, fever and unexpected weight change. All vaccines up to date    Flu vac 10/19    Tdap 5/17     Pneumonia vac 2012 ;  5/17    HENT: Negative for ear pain, facial swelling, postnasal drip, sore throat and voice change. Dentures fit well ; new   Eyes: Negative for photophobia and visual disturbance. Eye exam 2/17   Respiratory: Negative for choking, chest tightness and shortness of breath. Does not smoke ; No Etoh   H/o Asthma    Cardiovascular: Negative for chest pain and leg swelling. Palpitations: off and on         HTN > 5 yrs on meds . No known CAD . Father dies of MI in his 46s   Sister with CAD ? MVP  H/o palpitations   Gastrointestinal: Negative for abdominal distention, blood in stool, constipation and diarrhea. H/O Ulers ? Cannot take Nsaids! Post gastric bypass 2005      GERD    Colonscopy 2015 ; Polyps   Endocrine: Negative for cold intolerance and heat intolerance.         Followed by Dr. Gloria Block for thyroid   Genitourinary: Positive for frequency and urgency. Mammogram  St   2018    Scheduled Pap 0n 9/127/18    Musculoskeletal: Positive for back pain. S/P Surgery  DDD 8/26/20      DJD . Surgery back 2012 ? C/O Dr Alvarez Clay . On robaxin fom him    Skin: Negative. Neurological: Positive for dizziness and light-headedness. Psychiatric/Behavioral: Negative for self-injury, sleep disturbance and suicidal ideas. Anxiety: followed by counselor       Objective:   Physical Exam   Constitutional: She is oriented to person, place, and time. Neck:        Neurological: She is alert and oriented to person, place, and time. Assessment:       Cayla Rivera is a 63 y.o. female evaluated via telephone on 10/1/2020. Consent:  She and/or health care decision maker is aware that that she may receive a bill for this telephone service, depending on her insurance coverage, and has provided verbal consent to proceed: Yes      Documentation:  I communicated with the patient about this . Details of this discussion including any medical advice provided: as noted       I affirm this is a Patient Initiated Episode with a Patient who has not had a related appointment within my department in the past 7 days or scheduled within the next 24 hours. Patient identification was verified at the start of the visit: Yes    Total Time: 20 mins     Note: not billable if this call serves to triage the patient into an appointment for the relevant concern      Sloan Vann was seen today for back pain, urinary frequency and dizziness. Diagnoses and all orders for this visit:    Frequency of urination treat for possible UTI   -     sulfamethoxazole-trimethoprim (BACTRIM) 400-80 MG per tablet; Take 1 tablet by mouth daily for 5 days  -     Urinalysis; Future    Dizziness  Check Labs and F/U in office .  Bring all meds with you  -     CBC Auto Differential; Future  -     Comprehensive Metabolic Panel; Future    Hyperglycemia  -     Hemoglobin A1C; Future    Post operative  Hypothyroidism  On synthroid 75   C/o endocrine  -     Lipid Panel; Future  -     TSH without Reflex; Future          Essential hypertension  -     losartan (COZAAR) 100 MG tablet; Take 1 tablet by mouth daily  -     hydrALAZINE (APRESOLINE) 50 MG tablet; Take 1 tablet by mouth 2 times daily  -     labetalol (NORMODYNE) 300 MG tablet;  Take 1 tablet by mouth 2 times daily          Iron deficiency anemia, unspecified iron deficiency anemia s/p gastric bypass       -      I    Plan:

## 2020-10-08 RX ORDER — FLUCONAZOLE 100 MG/1
TABLET ORAL
Qty: 2 TABLET | Refills: 0 | Status: SHIPPED | OUTPATIENT
Start: 2020-10-08 | End: 2020-11-10

## 2020-10-08 NOTE — TELEPHONE ENCOUNTER
Medication:   Requested Prescriptions     Pending Prescriptions Disp Refills    fluconazole (DIFLUCAN) 100 MG tablet [Pharmacy Med Name: FLUCONAZOLE 100MG TABLETS] 2 tablet 0     Sig: TAKE 2 TABLETS BY MOUTH 1 TIME FOR 1 DOSE        Last Filled:      Patient Phone Number: [phone] (home)     Last appt: 10/1/2020   Next appt: 11/3/2020    Last OARRS:   RX Monitoring 1/9/2018   Attestation The Prescription Monitoring Report for this patient was reviewed today.

## 2020-11-10 ENCOUNTER — VIRTUAL VISIT (OUTPATIENT)
Dept: PRIMARY CARE CLINIC | Age: 65
End: 2020-11-10
Payer: MEDICARE

## 2020-11-10 PROCEDURE — G8428 CUR MEDS NOT DOCUMENT: HCPCS | Performed by: INTERNAL MEDICINE

## 2020-11-10 PROCEDURE — 99214 OFFICE O/P EST MOD 30 MIN: CPT | Performed by: INTERNAL MEDICINE

## 2020-11-10 PROCEDURE — 1036F TOBACCO NON-USER: CPT | Performed by: INTERNAL MEDICINE

## 2020-11-10 PROCEDURE — G8417 CALC BMI ABV UP PARAM F/U: HCPCS | Performed by: INTERNAL MEDICINE

## 2020-11-10 PROCEDURE — G9899 SCRN MAM PERF RSLTS DOC: HCPCS | Performed by: INTERNAL MEDICINE

## 2020-11-10 PROCEDURE — G8484 FLU IMMUNIZE NO ADMIN: HCPCS | Performed by: INTERNAL MEDICINE

## 2020-11-10 PROCEDURE — 3017F COLORECTAL CA SCREEN DOC REV: CPT | Performed by: INTERNAL MEDICINE

## 2020-11-10 RX ORDER — HYDRALAZINE HYDROCHLORIDE 50 MG/1
50 TABLET, FILM COATED ORAL 2 TIMES DAILY
Qty: 60 TABLET | Refills: 6 | Status: SHIPPED | OUTPATIENT
Start: 2020-11-10 | End: 2020-12-21

## 2020-11-10 RX ORDER — LEVOTHYROXINE SODIUM 0.07 MG/1
75 TABLET ORAL DAILY
Qty: 30 TABLET | Refills: 1 | Status: SHIPPED | OUTPATIENT
Start: 2020-11-10 | End: 2021-03-11 | Stop reason: SDUPTHER

## 2020-11-10 RX ORDER — LOSARTAN POTASSIUM 100 MG/1
100 TABLET ORAL DAILY
Qty: 30 TABLET | Refills: 6 | Status: SHIPPED | OUTPATIENT
Start: 2020-11-10 | End: 2020-12-28

## 2020-11-10 RX ORDER — ACETAMINOPHEN 500 MG
500 TABLET ORAL EVERY 6 HOURS PRN
Qty: 90 TABLET | Refills: 1 | Status: SHIPPED | OUTPATIENT
Start: 2020-11-10 | End: 2021-01-14

## 2020-11-10 RX ORDER — ESOMEPRAZOLE MAGNESIUM 40 MG/1
40 CAPSULE, DELAYED RELEASE ORAL DAILY
Qty: 90 CAPSULE | Refills: 3 | Status: ON HOLD | OUTPATIENT
Start: 2020-11-10 | End: 2021-05-27 | Stop reason: HOSPADM

## 2020-11-10 RX ORDER — LABETALOL 300 MG/1
300 TABLET, FILM COATED ORAL 2 TIMES DAILY
Qty: 60 TABLET | Refills: 6 | Status: SHIPPED | OUTPATIENT
Start: 2020-11-10 | End: 2021-01-08 | Stop reason: SDUPTHER

## 2020-11-10 ASSESSMENT — ENCOUNTER SYMPTOMS
SHORTNESS OF BREATH: 0
CHOKING: 0
DIARRHEA: 0
ABDOMINAL PAIN: 1
BACK PAIN: 1
CHEST TIGHTNESS: 0
FACIAL SWELLING: 0
VOICE CHANGE: 0
SORE THROAT: 0
PHOTOPHOBIA: 0
CONSTIPATION: 0
BLOOD IN STOOL: 0
ABDOMINAL DISTENTION: 0

## 2020-11-10 NOTE — PROGRESS NOTES
Subjective:      Patient ID: Angie Holguin is a 63 y.o. female. 11/10/2020 Patient presents with:  Abdominal Pain . Never got labs as ordered last visit ? ??    States she did See Dr Ben Whitney who has told her there was nothing he could do ? ??              Last seen   VV 10/1/2020 Patient presents with:  Back Pain 2 wks . S/P Surgery dissectomy 8/26/20 at [facility]   Urinary Frequency 3 days     Was told she has Stage 3 Renal failiure ? Labs last checked here 11/19 show Nl renal function               Last seen  VV 5/29/2020  Patient presents with:  Cough  Congestion: one sister has pneumonia but not been tested for covid 19 yet! Has not checked temperature ? Last seen  3/5/2020 Patient presents with:  Abscess: groin area                   2/27/18 total thyroidectomy           Past Medical History:   Diagnosis Date    Anxiety state     Asthma     Back ache     chronic    Environmental allergies     GERD (gastroesophageal reflux disease)     Hypertension     MVP (mitral valve prolapse)     Neck pain, chronic     Reflux     Sleep apnea        Review of Systems   Constitutional: Negative for activity change, appetite change, chills, diaphoresis, fatigue, fever and unexpected weight change. All vaccines up to date    Flu vac 10/19    Tdap 5/17     Pneumonia vac 2012 ;  5/17    HENT: Negative for ear pain, facial swelling, postnasal drip, sore throat and voice change. Dentures fit well ; new   Eyes: Negative for photophobia and visual disturbance. Eye exam 2/17   Respiratory: Negative for choking, chest tightness and shortness of breath. Does not smoke ; No Etoh   H/o Asthma    Cardiovascular: Negative for chest pain and leg swelling. Palpitations: off and on         HTN > 5 yrs on meds . No known CAD . Father dies of MI in his 46s   Sister with CAD ? MVP  H/o palpitations   Gastrointestinal: Positive for abdominal pain.  Negative for abdominal distention, blood in stool, constipation and diarrhea. H/O Ulers ? Cannot take Nsaids! Post gastric bypass 2005      GERD    Colonscopy 2015 ; Polyps   Endocrine: Negative for cold intolerance and heat intolerance. Followed by Dr. Ceasar Rizvi for thyroid   Genitourinary: Positive for frequency. Mammogram  St   2018    Scheduled Pap 0n 9/127/18    Musculoskeletal: Positive for back pain. S/P Surgery  DDD 8/26/20      DJD . Surgery back 2012 ? C/O Dr Nellie Busby . On robaxin fom him    Skin: Negative. Psychiatric/Behavioral: Negative for self-injury, sleep disturbance and suicidal ideas. Anxiety: followed by counselor       Objective:   Physical Exam   Constitutional: She is oriented to person, place, and time. No distress. Neck:        Pulmonary/Chest: Effort normal.   Abdominal: She exhibits distension. Neurological: She is alert and oriented to person, place, and time. Psychiatric: She has a normal mood and affect. Her behavior is normal.       Assessment:       Cayla Patel is a 63 y.o. female evaluated via  [facility]  on 11/10/2020. Consent:  She and/or health care decision maker is aware that that she may receive a bill for this telephone service, depending on her insurance coverage, and has provided verbal consent to proceed: Yes      Documentation:  I communicated with the patient about this . Details of this discussion including any medical advice provided: as noted       I affirm this is a Patient Initiated Episode with a Patient who has not had a related appointment within my department in the past 7 days or scheduled within the next 24 hours. Patient identification was verified at the start of the visit: Yes    Total Time: 20 mins     Note: not billable if this call serves to triage the patient into an appointment for the relevant concern      Meliza Cortez was seen today for abdominal pain.     Diagnoses and all orders for this visit:    Generalized abdominal pain   Get labs first . CT abd 4/19    -     Comprehensive Metabolic Panel; Future  -     CBC Auto Differential; Future  -     Lipid Panel; Future  -     TSH without Reflex; Future  -     Urinalysis; Future  -     Vitamin D 25 Hydroxy; Future  -     LIPASE; Future  -     AMYLASE; Future  -     esomeprazole (NEXIUM) 40 MG delayed release capsule; Take 1 capsule by mouth daily  -     acetaminophen (TYLENOL) 500 MG tablet; Take 1 tablet by mouth every 6 hours as needed for Pain    Gastroesophageal reflux disease without esophagitis  -     esomeprazole (NEXIUM) 40 MG delayed release capsule; Take 1 capsule by mouth daily    Essential hypertension  -     losartan (COZAAR) 100 MG tablet; Take 1 tablet by mouth daily  -     hydrALAZINE (APRESOLINE) 50 MG tablet; Take 1 tablet by mouth 2 times daily  -     labetalol (NORMODYNE) 300 MG tablet; Take 1 tablet by mouth 2 times daily    Acquired hypothyroidism  -     levothyroxine (SYNTHROID) 75 MCG tablet;  Take 1 tablet by mouth Daily Indications: TAKES 1/2 TABLET              Iron deficiency anemia, unspecified iron deficiency anemia s/p gastric bypass       -      I    Plan:

## 2020-11-13 ENCOUNTER — HOSPITAL ENCOUNTER (OUTPATIENT)
Age: 65
Discharge: HOME | End: 2020-11-13
Payer: MEDICARE

## 2020-11-13 LAB
A/G RATIO: 0.9 (ref 1.1–2.2)
ALBUMIN SERPL-MCNC: 3.5 G/DL (ref 3.4–5)
ALP BLD-CCNC: 75 U/L (ref 40–129)
ALT SERPL-CCNC: 7 U/L (ref 10–40)
AMYLASE: 80 U/L (ref 25–115)
ANION GAP SERPL CALCULATED.3IONS-SCNC: 7 MMOL/L (ref 3–16)
AST SERPL-CCNC: 13 U/L (ref 15–37)
BASOPHILS ABSOLUTE: 0 K/UL (ref 0–0.2)
BASOPHILS RELATIVE PERCENT: 1.3 %
BILIRUB SERPL-MCNC: 0.3 MG/DL (ref 0–1)
BILIRUBIN URINE: NEGATIVE
BLOOD, URINE: NEGATIVE
BUN BLDV-MCNC: 12 MG/DL (ref 7–20)
CALCIUM SERPL-MCNC: 9.5 MG/DL (ref 8.3–10.6)
CHLORIDE BLD-SCNC: 108 MMOL/L (ref 99–110)
CHOLESTEROL, TOTAL: 212 MG/DL (ref 0–199)
CLARITY: ABNORMAL
CO2: 23 MMOL/L (ref 21–32)
COLOR: YELLOW
CREAT SERPL-MCNC: 0.9 MG/DL (ref 0.6–1.2)
EOSINOPHILS ABSOLUTE: 0.3 K/UL (ref 0–0.6)
EOSINOPHILS RELATIVE PERCENT: 7.6 %
EPITHELIAL CELLS, UA: 10 /HPF (ref 0–5)
GFR AFRICAN AMERICAN: >60
GFR NON-AFRICAN AMERICAN: >60
GLOBULIN: 3.8 G/DL
GLUCOSE BLD-MCNC: 83 MG/DL (ref 70–99)
GLUCOSE URINE: NEGATIVE MG/DL
HCT VFR BLD CALC: 33.2 % (ref 36–48)
HDLC SERPL-MCNC: 64 MG/DL (ref 40–60)
HEMOGLOBIN: 10.6 G/DL (ref 12–16)
HYALINE CASTS: 5 /LPF (ref 0–8)
KETONES, URINE: NEGATIVE MG/DL
LDL CHOLESTEROL CALCULATED: 132 MG/DL
LEUKOCYTE ESTERASE, URINE: NEGATIVE
LIPASE: 15 U/L (ref 13–60)
LYMPHOCYTES ABSOLUTE: 1.1 K/UL (ref 1–5.1)
LYMPHOCYTES RELATIVE PERCENT: 32.7 %
MCH RBC QN AUTO: 28.4 PG (ref 26–34)
MCHC RBC AUTO-ENTMCNC: 31.8 G/DL (ref 31–36)
MCV RBC AUTO: 89.3 FL (ref 80–100)
MICROSCOPIC EXAMINATION: YES
MONOCYTES ABSOLUTE: 0.4 K/UL (ref 0–1.3)
MONOCYTES RELATIVE PERCENT: 10.8 %
NEUTROPHILS ABSOLUTE: 1.6 K/UL (ref 1.7–7.7)
NEUTROPHILS RELATIVE PERCENT: 47.6 %
NITRITE, URINE: NEGATIVE
PDW BLD-RTO: 14 % (ref 12.4–15.4)
PH UA: 6.5 (ref 5–8)
PLATELET # BLD: 260 K/UL (ref 135–450)
PMV BLD AUTO: 8.9 FL (ref 5–10.5)
POTASSIUM SERPL-SCNC: 4.2 MMOL/L (ref 3.5–5.1)
PROTEIN UA: NEGATIVE MG/DL
RBC # BLD: 3.72 M/UL (ref 4–5.2)
RBC UA: 1 /HPF (ref 0–4)
SODIUM BLD-SCNC: 138 MMOL/L (ref 136–145)
SPECIFIC GRAVITY UA: 1.01 (ref 1–1.03)
TOTAL PROTEIN: 7.3 G/DL (ref 6.4–8.2)
TRIGL SERPL-MCNC: 79 MG/DL (ref 0–150)
TSH SERPL DL<=0.05 MIU/L-ACNC: 1.32 UIU/ML (ref 0.27–4.2)
URINE TYPE: ABNORMAL
UROBILINOGEN, URINE: 0.2 E.U./DL
VITAMIN D 25-HYDROXY: 36 NG/ML
VLDLC SERPL CALC-MCNC: 16 MG/DL
WBC # BLD: 3.4 K/UL (ref 4–11)
WBC UA: 0 /HPF (ref 0–5)

## 2020-11-13 PROCEDURE — 83690 ASSAY OF LIPASE: CPT

## 2020-11-13 PROCEDURE — 84443 ASSAY THYROID STIM HORMONE: CPT

## 2020-11-13 PROCEDURE — 80061 LIPID PANEL: CPT

## 2020-11-13 PROCEDURE — 85025 COMPLETE CBC W/AUTO DIFF WBC: CPT

## 2020-11-13 PROCEDURE — 81001 URINALYSIS AUTO W/SCOPE: CPT

## 2020-11-13 PROCEDURE — 82306 VITAMIN D 25 HYDROXY: CPT

## 2020-11-13 PROCEDURE — 36415 COLL VENOUS BLD VENIPUNCTURE: CPT

## 2020-11-13 PROCEDURE — 80053 COMPREHEN METABOLIC PANEL: CPT

## 2020-11-13 PROCEDURE — 83036 HEMOGLOBIN GLYCOSYLATED A1C: CPT

## 2020-11-13 PROCEDURE — 82150 ASSAY OF AMYLASE: CPT

## 2020-11-14 LAB
ESTIMATED AVERAGE GLUCOSE: 93.9 MG/DL
HBA1C MFR BLD: 4.9 %

## 2020-11-16 ENCOUNTER — TELEPHONE (OUTPATIENT)
Dept: PRIMARY CARE CLINIC | Age: 65
End: 2020-11-16

## 2020-11-16 RX ORDER — LORAZEPAM 0.5 MG/1
0.5 TABLET ORAL EVERY 8 HOURS PRN
Qty: 10 TABLET | Refills: 0 | Status: SHIPPED | OUTPATIENT
Start: 2020-11-16 | End: 2020-12-16

## 2020-11-16 NOTE — TELEPHONE ENCOUNTER
Patient wants Dr. Apryl Kenney to call in a sedative for her ASAP. She has an MRI scheduled for for tomorrow morning at 7:00am at [facility].

## 2020-11-17 ENCOUNTER — TELEPHONE (OUTPATIENT)
Dept: PRIMARY CARE CLINIC | Age: 65
End: 2020-11-17

## 2020-11-17 NOTE — TELEPHONE ENCOUNTER
In regards to the the ativan which was ordered. Can you pls call in the medication. The escribe did not go through. Pt requested a call back with her most recent b/w results. pls return her call once sent ,thank you!   [phone]

## 2020-12-07 ENCOUNTER — TELEPHONE (OUTPATIENT)
Dept: PRIMARY CARE CLINIC | Age: 65
End: 2020-12-07

## 2020-12-07 NOTE — TELEPHONE ENCOUNTER
Pt calling in with complaints of right sided abdominal pain and diarrhea since Saturday. She is requesting something for the pain and the diarrhea. Pt states that she just started  taking Dicyclomine.     Pt also states that she cam in contact with someone COVID positive on Saturday, she denies any symptoms but wants to get tested

## 2020-12-08 ENCOUNTER — OFFICE VISIT (OUTPATIENT)
Dept: PRIMARY CARE CLINIC | Age: 65
End: 2020-12-08
Payer: MEDICARE

## 2020-12-08 PROCEDURE — G8428 CUR MEDS NOT DOCUMENT: HCPCS | Performed by: NURSE PRACTITIONER

## 2020-12-08 PROCEDURE — G8417 CALC BMI ABV UP PARAM F/U: HCPCS | Performed by: NURSE PRACTITIONER

## 2020-12-08 PROCEDURE — 99211 OFF/OP EST MAY X REQ PHY/QHP: CPT | Performed by: NURSE PRACTITIONER

## 2020-12-08 NOTE — PATIENT INSTRUCTIONS

## 2020-12-08 NOTE — PROGRESS NOTES
Cayla Farfan received a viral test for COVID-19. They were educated on isolation and quarantine as appropriate. For any symptoms, they were directed to seek care from their PCP, given contact information to establish with a doctor, directed to an urgent care or the emergency room.

## 2020-12-10 ENCOUNTER — TELEPHONE (OUTPATIENT)
Dept: PRIMARY CARE CLINIC | Age: 65
End: 2020-12-10

## 2020-12-10 LAB — SARS-COV-2, NAA: NOT DETECTED

## 2020-12-10 RX ORDER — LOPERAMIDE HYDROCHLORIDE 2 MG/1
2 CAPSULE ORAL 2 TIMES DAILY PRN
Qty: 10 CAPSULE | Refills: 0 | Status: SHIPPED | OUTPATIENT
Start: 2020-12-10 | End: 2020-12-15

## 2020-12-15 RX ORDER — LOPERAMIDE HYDROCHLORIDE 2 MG/1
CAPSULE ORAL
Qty: 10 CAPSULE | Refills: 0 | Status: SHIPPED | OUTPATIENT
Start: 2020-12-15 | End: 2021-03-11

## 2020-12-15 NOTE — TELEPHONE ENCOUNTER
Medication:   Requested Prescriptions     Pending Prescriptions Disp Refills    loperamide (IMODIUM) 2 MG capsule [Pharmacy Med Name: LOPERAMIDE 2MG CAPSULES] 10 capsule 0     Sig: TAKE 1 CAPSULE BY MOUTH TWICE DAILY AS NEEDED FOR DIARRHEA        Last Filled:      Patient Phone Number: [phone] (home)     Last appt: 11/10/2020   Next appt: Visit date not found    Last OARRS:   RX Monitoring 1/9/2018   Attestation The Prescription Monitoring Report for this patient was reviewed today.

## 2020-12-21 RX ORDER — FLUTICASONE PROPIONATE 50 MCG
SPRAY, SUSPENSION (ML) NASAL
Qty: 48 G | Refills: 3 | Status: SHIPPED | OUTPATIENT
Start: 2020-12-21 | End: 2021-03-04 | Stop reason: SDUPTHER

## 2021-01-07 ENCOUNTER — NURSE TRIAGE (OUTPATIENT)
Dept: OTHER | Facility: CLINIC | Age: 66
End: 2021-01-07

## 2021-01-07 ENCOUNTER — TELEPHONE (OUTPATIENT)
Dept: PRIMARY CARE CLINIC | Age: 66
End: 2021-01-07

## 2021-01-07 NOTE — TELEPHONE ENCOUNTER
185/85 blood pressure with nose bleeds. Taken today at [facility].   Had colonoscopy on Tuesday 185/81 then. Reason for Disposition   Systolic BP >= 538 OR Diastolic >= 710    Answer Assessment - Initial Assessment Questions  1. BLOOD PRESSURE: \"What is the blood pressure? \" \"Did you take at least two measurements 5 minutes apart? \"        See above    2. ONSET: \"When did you take your blood pressure? \"        See above    3. HOW: \"How did you obtain the blood pressure? \" (e.g., visiting nurse, automatic home BP monitor)        MD    4. HISTORY: \"Do you have a history of high blood pressure? \"        Yes    5. MEDICATIONS: Noelle Kellyton you taking any medications for blood pressure? \" \"Have you missed any doses recently? \"        Yes no missed does    6. OTHER SYMPTOMS: \"Do you have any symptoms? \" (e.g., headache, chest pain, blurred vision, difficulty breathing, weakness)        Dizziness, headaches. 7. PREGNANCY: \"Is there any chance you are pregnant? \" \"When was your last menstrual period? \"        No    Protocols used: HIGH BLOOD PRESSURE-ADULT-[place]    Caller provided care advice and instructed to call back with worsening symptoms. Attention Provider: Thank you for allowing me to participate in the care of your patient. The patient was connected to triage in response to information provided to the [facility]. Please do not respond through this encounter as the response is not directed to a shared pool. Warm transfer to Cherylene Ellison at [facility] ([facility]).

## 2021-01-07 NOTE — TELEPHONE ENCOUNTER
Nurse triage has requested she be seen tomorrow for 185/85 blood pressure with nose bleeds. Taken today at [facility].   Had colonoscopy on Tuesday 185/81 then. Dr Prince Gotti has 1 appointment but it physician fill.  Please call [place] back at [phone]

## 2021-01-07 NOTE — TELEPHONE ENCOUNTER
LMOM to see if pt' can come in tomorrow at 15, pt was advise to call back and let office know either way

## 2021-01-08 ENCOUNTER — OFFICE VISIT (OUTPATIENT)
Dept: PRIMARY CARE CLINIC | Age: 66
End: 2021-01-08
Payer: MEDICARE

## 2021-01-08 VITALS
SYSTOLIC BLOOD PRESSURE: 140 MMHG | TEMPERATURE: 97.1 F | HEIGHT: 67 IN | DIASTOLIC BLOOD PRESSURE: 80 MMHG | HEART RATE: 82 BPM | WEIGHT: 225.2 LBS | BODY MASS INDEX: 35.35 KG/M2 | OXYGEN SATURATION: 98 %

## 2021-01-08 DIAGNOSIS — I10 ESSENTIAL HYPERTENSION: ICD-10-CM

## 2021-01-08 DIAGNOSIS — I10 ESSENTIAL HYPERTENSION: Primary | ICD-10-CM

## 2021-01-08 DIAGNOSIS — Z23 NEED FOR INFLUENZA VACCINATION: ICD-10-CM

## 2021-01-08 PROCEDURE — G0008 ADMIN INFLUENZA VIRUS VAC: HCPCS | Performed by: INTERNAL MEDICINE

## 2021-01-08 PROCEDURE — 90686 IIV4 VACC NO PRSV 0.5 ML IM: CPT | Performed by: INTERNAL MEDICINE

## 2021-01-08 PROCEDURE — 99213 OFFICE O/P EST LOW 20 MIN: CPT | Performed by: INTERNAL MEDICINE

## 2021-01-08 RX ORDER — LOSARTAN POTASSIUM 100 MG/1
100 TABLET ORAL DAILY
Qty: 90 TABLET | Refills: 6 | Status: SHIPPED | OUTPATIENT
Start: 2021-01-08 | End: 2021-03-11 | Stop reason: SDUPTHER

## 2021-01-08 RX ORDER — HYDROCODONE BITARTRATE AND ACETAMINOPHEN 5; 325 MG/1; MG/1
TABLET ORAL
COMMUNITY
Start: 2020-12-31 | End: 2021-04-30

## 2021-01-08 RX ORDER — LABETALOL 300 MG/1
300 TABLET, FILM COATED ORAL 2 TIMES DAILY
Qty: 60 TABLET | Refills: 6 | Status: SHIPPED | OUTPATIENT
Start: 2021-01-08 | End: 2021-03-11 | Stop reason: SDUPTHER

## 2021-01-08 RX ORDER — HYDRALAZINE HYDROCHLORIDE 50 MG/1
TABLET, FILM COATED ORAL
Qty: 270 TABLET | Refills: 3 | Status: SHIPPED | OUTPATIENT
Start: 2021-01-08 | End: 2021-03-11 | Stop reason: SDUPTHER

## 2021-01-08 RX ORDER — SPIRONOLACTONE 50 MG/1
50 TABLET, FILM COATED ORAL DAILY
Qty: 90 TABLET | Refills: 1 | Status: SHIPPED | OUTPATIENT
Start: 2021-01-08 | End: 2021-03-11 | Stop reason: SDUPTHER

## 2021-01-08 RX ORDER — HYDRALAZINE HYDROCHLORIDE 50 MG/1
50 TABLET, FILM COATED ORAL 3 TIMES DAILY
Qty: 90 TABLET | Refills: 3 | Status: SHIPPED | OUTPATIENT
Start: 2021-01-08 | End: 2021-01-08

## 2021-01-08 ASSESSMENT — PATIENT HEALTH QUESTIONNAIRE - PHQ9
SUM OF ALL RESPONSES TO PHQ9 QUESTIONS 1 & 2: 0
1. LITTLE INTEREST OR PLEASURE IN DOING THINGS: 0
SUM OF ALL RESPONSES TO PHQ QUESTIONS 1-9: 0
SUM OF ALL RESPONSES TO PHQ QUESTIONS 1-9: 0

## 2021-01-08 ASSESSMENT — ENCOUNTER SYMPTOMS
SHORTNESS OF BREATH: 0
DIARRHEA: 0
CHEST TIGHTNESS: 0
SORE THROAT: 0
VOICE CHANGE: 0
BACK PAIN: 1
CHOKING: 0
CONSTIPATION: 0
ABDOMINAL DISTENTION: 0
PHOTOPHOBIA: 0
BLOOD IN STOOL: 0
FACIAL SWELLING: 0

## 2021-01-08 NOTE — PROGRESS NOTES
?    MVP  H/o palpitations   Gastrointestinal: Negative for abdominal distention, blood in stool, constipation and diarrhea. Upper Endoscopy  1/21  H/O Ulers ? Cannot take Nsaids! Post gastric bypass 2005      GERD    Repeat Colonscopy 1/21  ; Polyps; O [place]    Endocrine: Negative for cold intolerance and heat intolerance. Followed by Dr. Portia Piña  for thyroid   Genitourinary:        Mammogram  5/20    Scheduled Pap 0n 9/127/18    Musculoskeletal: Positive for back pain. Sees Ortho neck / [facility]   1/21       S/P Surgery  DDD 8/26/20      DJD . Surgery back 2012 ? C/O Dr Moreira Goes . On robaxin fom him    Skin: Negative. Psychiatric/Behavioral: Negative for self-injury, sleep disturbance and suicidal ideas. Anxiety: followed by counselor       Objective:   Physical Exam   Constitutional: She is oriented to person, place, and time. No distress. Eyes: Conjunctivae and EOM are normal.   Neck:        Cardiovascular: Regular rhythm and normal heart sounds. Pulmonary/Chest: Effort normal and breath sounds normal.   Abdominal: She exhibits distension (obese). Musculoskeletal: Normal range of motion. Neurological: She is alert and oriented to person, place, and time. Psychiatric: She has a normal mood and affect. Her behavior is normal.       Assessment: Nay Mace was seen today for hypertension and epistaxis. Diagnoses and all orders for this visit:    Essential hypertension controlled . Cont same ; low salt diet   -     losartan (COZAAR) 100 MG tablet; Take 1 tablet by mouth daily  -     labetalol (NORMODYNE) 300 MG tablet; Take 1 tablet by mouth 2 times daily  -      hydrALAZINE (APRESOLINE) 50 MG tablet; Take 1 tablet by mouth 3 times daily  -     spironolactone (ALDACTONE) 50 MG tablet; Take 1 tablet by mouth daily    Epistaxis   No active bleed ;   Advised not to blow nose hard     Need for influenza vaccination  -     INFLUENZA, QUADV, 3 YRS AND OLDER, IM PF, PREFILL SYR OR SDV, 0.5ML (AFLURIA QUADV, PF)        Acquired hypothyroidism c/o Endocrine   On Synthroid 75 ??  -         Iron deficiency anemia, unspecified iron deficiency anemia s/p gastric bypass       -      I    Plan:

## 2021-01-14 DIAGNOSIS — R10.84 GENERALIZED ABDOMINAL PAIN: ICD-10-CM

## 2021-01-14 RX ORDER — PSEUDOEPHED/ACETAMINOPH/DIPHEN 30MG-500MG
TABLET ORAL
Qty: 90 TABLET | Refills: 1 | Status: SHIPPED | OUTPATIENT
Start: 2021-01-14 | End: 2021-04-05

## 2021-01-14 NOTE — TELEPHONE ENCOUNTER
Medication:   Requested Prescriptions     Pending Prescriptions Disp Refills    ACETAMINOPHEN EXTRA STRENGTH 500 MG tablet [Pharmacy Med Name: ACETAMINOPHEN 500MG E/S CAPLETS] 90 tablet 1     Sig: TAKE 1 TABLET BY MOUTH EVERY 6 HOURS AS NEEDED FOR PAIN        Last Filled:      Patient Phone Number: [phone] (home)     Last appt: 1/8/2021   Next appt: Visit date not found    Last OARRS:   RX Monitoring 1/9/2018   Attestation The Prescription Monitoring Report for this patient was reviewed today.

## 2021-02-10 ENCOUNTER — TELEPHONE (OUTPATIENT)
Dept: PRIMARY CARE CLINIC | Age: 66
End: 2021-02-10

## 2021-02-10 NOTE — TELEPHONE ENCOUNTER
Pt states she had back surgery Monday by Dr. Gerald Navarro at [facility], pt is being discharged today    Per Dr Kellogg Ready letter ok to do for 6 months  Letter done mailed out

## 2021-02-10 NOTE — LETTER
[facility]  [id number]  Phone: [phone]  Fax: [phone]    Jenny Manzanares MD        February 10, 2021     Patient: Davidson Becker   YOB: 1955   Date of Visit: 2/10/2021       To Whom It May Concern: It is my medical opinion that Maxi Abraham requires a disability parking placard for the following reasons:  She cannot walk without assistance from another person or the use of an assistance device (cane, crutch, prosthetic device, wheelchair, etc.). Duration of need: 6 months    If you have any questions or concerns, please don't hesitate to call.     Sincerely,          Jenny Manzanares MD

## 2021-02-15 ENCOUNTER — TELEPHONE (OUTPATIENT)
Dept: ADMINISTRATIVE | Age: 66
End: 2021-02-15

## 2021-03-04 DIAGNOSIS — J30.9 ALLERGIC SINUSITIS: ICD-10-CM

## 2021-03-04 RX ORDER — FLUTICASONE PROPIONATE 50 MCG
SPRAY, SUSPENSION (ML) NASAL
Qty: 48 G | Refills: 3 | Status: SHIPPED | OUTPATIENT
Start: 2021-03-04 | End: 2022-06-10 | Stop reason: SDUPTHER

## 2021-03-11 ENCOUNTER — VIRTUAL VISIT (OUTPATIENT)
Dept: PRIMARY CARE CLINIC | Age: 66
End: 2021-03-11
Payer: MEDICARE

## 2021-03-11 DIAGNOSIS — Z23 HIGH PRIORITY FOR COVID-19 VIRUS VACCINATION: ICD-10-CM

## 2021-03-11 DIAGNOSIS — I10 ESSENTIAL HYPERTENSION: ICD-10-CM

## 2021-03-11 DIAGNOSIS — E03.9 ACQUIRED HYPOTHYROIDISM: ICD-10-CM

## 2021-03-11 DIAGNOSIS — Z09 HOSPITAL DISCHARGE FOLLOW-UP: ICD-10-CM

## 2021-03-11 PROCEDURE — 3017F COLORECTAL CA SCREEN DOC REV: CPT | Performed by: INTERNAL MEDICINE

## 2021-03-11 PROCEDURE — G8417 CALC BMI ABV UP PARAM F/U: HCPCS | Performed by: INTERNAL MEDICINE

## 2021-03-11 PROCEDURE — 1036F TOBACCO NON-USER: CPT | Performed by: INTERNAL MEDICINE

## 2021-03-11 PROCEDURE — 99213 OFFICE O/P EST LOW 20 MIN: CPT | Performed by: INTERNAL MEDICINE

## 2021-03-11 PROCEDURE — G9899 SCRN MAM PERF RSLTS DOC: HCPCS | Performed by: INTERNAL MEDICINE

## 2021-03-11 PROCEDURE — 1111F DSCHRG MED/CURRENT MED MERGE: CPT | Performed by: INTERNAL MEDICINE

## 2021-03-11 PROCEDURE — G8427 DOCREV CUR MEDS BY ELIG CLIN: HCPCS | Performed by: INTERNAL MEDICINE

## 2021-03-11 PROCEDURE — G8482 FLU IMMUNIZE ORDER/ADMIN: HCPCS | Performed by: INTERNAL MEDICINE

## 2021-03-11 RX ORDER — LABETALOL 300 MG/1
300 TABLET, FILM COATED ORAL 2 TIMES DAILY
Qty: 60 TABLET | Refills: 6 | Status: SHIPPED | OUTPATIENT
Start: 2021-03-11 | End: 2021-04-19

## 2021-03-11 RX ORDER — HYDRALAZINE HYDROCHLORIDE 50 MG/1
50 TABLET, FILM COATED ORAL 3 TIMES DAILY
Qty: 270 TABLET | Refills: 3 | Status: SHIPPED | OUTPATIENT
Start: 2021-03-11 | End: 2021-04-30

## 2021-03-11 RX ORDER — LOSARTAN POTASSIUM 100 MG/1
100 TABLET ORAL DAILY
Qty: 90 TABLET | Refills: 6 | Status: SHIPPED | OUTPATIENT
Start: 2021-03-11 | End: 2021-04-05

## 2021-03-11 RX ORDER — SPIRONOLACTONE 50 MG/1
50 TABLET, FILM COATED ORAL DAILY
Qty: 90 TABLET | Refills: 1 | Status: SHIPPED | OUTPATIENT
Start: 2021-03-11 | End: 2021-04-05

## 2021-03-11 RX ORDER — LEVOTHYROXINE SODIUM 0.07 MG/1
75 TABLET ORAL DAILY
Qty: 90 TABLET | Refills: 2 | Status: SHIPPED | OUTPATIENT
Start: 2021-03-11 | End: 2021-08-12

## 2021-03-11 ASSESSMENT — ENCOUNTER SYMPTOMS
PHOTOPHOBIA: 0
SHORTNESS OF BREATH: 0
VOICE CHANGE: 0
BLOOD IN STOOL: 0
CHEST TIGHTNESS: 0
BACK PAIN: 1
CHOKING: 0
DIARRHEA: 0
CONSTIPATION: 0
FACIAL SWELLING: 0
SORE THROAT: 0
ABDOMINAL DISTENTION: 0

## 2021-03-11 NOTE — PROGRESS NOTES
Subjective:      Patient ID: Ron Cadena is a 63 y.o. female. 3/11/2021 Patient presents with: Follow-Up from Hospital: [address] 2/8-2/10/2021- cervical spinal stenosis    Pain a lot better     Visiting Home Nurse 2 x wk . Wt down to 213 . BP controlled . Taking all meds reg               11/10/2020 Patient presents with:  Abdominal Pain . Never got labs as ordered last visit ? ??    States she did See Dr Carmina Quiles who has told her there was nothing he could do ? ??              Last seen   VV 10/1/2020 Patient presents with:  Back Pain 2 wks . S/P Surgery dissectomy 8/26/20 at [facility]   Urinary Frequency 3 days     Was told she has Stage 3 Renal failiure ? Labs last checked here 11/19 show Nl renal function               Last seen  VV 5/29/2020  Patient presents with:  Cough  Congestion: one sister has pneumonia but not been tested for covid 19 yet! Has not checked temperature ? Last seen  3/5/2020 Patient presents with:  Abscess: groin area                   2/27/18 total thyroidectomy      Hypertension  Pertinent negatives include no chest pain or shortness of breath. Palpitations: off and on    Epistaxis            Past Medical History:   Diagnosis Date    Anxiety state     Asthma     Back ache     chronic    Environmental allergies     GERD (gastroesophageal reflux disease)     Hypertension     MVP (mitral valve prolapse)     Neck pain, chronic     Reflux     Sleep apnea        Review of Systems   Constitutional: Negative for activity change, appetite change, chills, diaphoresis, fatigue, fever and unexpected weight change. All vaccines up to date    Flu vac 1/21    Tdap 5/17     Pneumonia vac 2012 ;  5/17    HENT: Positive for nosebleeds. Negative for ear pain, facial swelling, postnasal drip, sore throat and voice change. Dentures fit well ; new   Eyes: Negative for photophobia and visual disturbance.         Eye exam 11/20   Respiratory: Negative for choking, chest tightness and shortness of breath. Does not smoke ; No Etoh   H/o Asthma    Cardiovascular: Negative for chest pain and leg swelling. Palpitations: off and on         HTN > 5 yrs on meds . No known CAD . Father dies of MI in his 46s   Sister with CAD ? MVP  H/o palpitations   Gastrointestinal: Negative for abdominal distention, blood in stool, constipation and diarrhea. Upper Endoscopy  1/21  H/O [place]lers ? Cannot take Nsaids! Post gastric bypass 2005      GERD    Repeat Colonscopy 1/21  ; Polyps; O [place]    Endocrine: Negative for cold intolerance and heat intolerance. Followed by Dr. Loretta Marsh  for thyroid   Genitourinary:        Mammogram  5/20    Scheduled Pap 0n 9/127/18    Musculoskeletal: Positive for back pain. Sees Ortho neck / [facility]   1/21       S/P Surgery  DDD 8/26/20      DJD . Surgery back 2012 ? C/O Dr Reinaldo Briggs . On robaxin fom him    Skin: Negative. Psychiatric/Behavioral: Negative for self-injury, sleep disturbance and suicidal ideas. Anxiety: followed by counselor       Objective:   Physical Exam   Constitutional: She is oriented to person, place, and time. No distress. 213 lb now   Neck:        Pulmonary/Chest: Effort normal.   Abdominal: She exhibits distension. Musculoskeletal: Normal range of motion. Neurological: She is alert and oriented to person, place, and time. Psychiatric: She has a normal mood and affect. Her behavior is normal.       Assessment:       Cayla Riddle is a 63 y.o. female evaluated via  [address] [place] [address] on 3/11/2021. Consent:  She and/or health care decision maker is aware that that she may receive a bill for this telephone service, depending on her insurance coverage, and has provided verbal consent to proceed: Yes      Documentation:  I communicated with the patient about this .    Details of this discussion including any medical advice provided:as noted       I affirm this is a Patient Initiated Episode with a Patient who has

## 2021-03-15 ENCOUNTER — TELEPHONE (OUTPATIENT)
Dept: PRIMARY CARE CLINIC | Age: 66
End: 2021-03-15

## 2021-03-16 ENCOUNTER — IMMUNIZATION (OUTPATIENT)
Dept: PRIMARY CARE CLINIC | Age: 66
End: 2021-03-16
Payer: MEDICARE

## 2021-03-16 PROCEDURE — 0001A COVID-19, PFIZER VACCINE 30MCG/0.3ML DOSE: CPT | Performed by: FAMILY MEDICINE

## 2021-03-16 PROCEDURE — 91300 COVID-19, PFIZER VACCINE 30MCG/0.3ML DOSE: CPT | Performed by: FAMILY MEDICINE

## 2021-03-25 ENCOUNTER — TELEPHONE (OUTPATIENT)
Dept: ORTHOPEDIC SURGERY | Age: 66
End: 2021-03-25

## 2021-03-30 ENCOUNTER — TELEPHONE (OUTPATIENT)
Dept: PRIMARY CARE CLINIC | Age: 66
End: 2021-03-30

## 2021-03-30 NOTE — TELEPHONE ENCOUNTER
Patient would like a refill on Nexum 40 mg in (brand name)  which she says requires prior authorizataon

## 2021-04-01 ENCOUNTER — TELEPHONE (OUTPATIENT)
Dept: PRIMARY CARE CLINIC | Age: 66
End: 2021-04-01

## 2021-04-01 ENCOUNTER — TELEPHONE (OUTPATIENT)
Dept: ORTHOPEDIC SURGERY | Age: 66
End: 2021-04-01

## 2021-04-01 NOTE — TELEPHONE ENCOUNTER
She needs her OLGA Nexium. She is very frustrated that she can not her Nexium. She really needs it due to her esophagitis. Please send PA unable to take generic does not work for her.

## 2021-04-05 ENCOUNTER — HOSPITAL ENCOUNTER (OUTPATIENT)
Dept: GENERAL RADIOLOGY | Age: 66
Discharge: HOME | End: 2021-04-05
Payer: MEDICARE

## 2021-04-05 ENCOUNTER — OFFICE VISIT (OUTPATIENT)
Dept: PRIMARY CARE CLINIC | Age: 66
End: 2021-04-05
Payer: MEDICARE

## 2021-04-05 ENCOUNTER — HOSPITAL ENCOUNTER (OUTPATIENT)
Age: 66
Discharge: HOME | End: 2021-04-05
Payer: MEDICARE

## 2021-04-05 ENCOUNTER — HOSPITAL ENCOUNTER (OUTPATIENT)
Dept: GENERAL RADIOLOGY | Age: 66
End: 2021-04-05
Payer: MEDICARE

## 2021-04-05 VITALS
SYSTOLIC BLOOD PRESSURE: 152 MMHG | HEART RATE: 55 BPM | TEMPERATURE: 96.8 F | DIASTOLIC BLOOD PRESSURE: 90 MMHG | BODY MASS INDEX: 34.75 KG/M2 | OXYGEN SATURATION: 100 % | HEIGHT: 67 IN | WEIGHT: 221.4 LBS

## 2021-04-05 DIAGNOSIS — I10 ESSENTIAL HYPERTENSION: ICD-10-CM

## 2021-04-05 DIAGNOSIS — R11.2 NON-INTRACTABLE VOMITING WITH NAUSEA, UNSPECIFIED VOMITING TYPE: ICD-10-CM

## 2021-04-05 DIAGNOSIS — I10 ESSENTIAL HYPERTENSION: Primary | ICD-10-CM

## 2021-04-05 LAB
ALBUMIN SERPL-MCNC: 4.2 G/DL (ref 3.4–5)
ANION GAP SERPL CALCULATED.3IONS-SCNC: 11 MMOL/L (ref 3–16)
BASOPHILS ABSOLUTE: 0 K/UL (ref 0–0.2)
BASOPHILS RELATIVE PERCENT: 0.4 %
BUN BLDV-MCNC: 13 MG/DL (ref 7–20)
CALCIUM SERPL-MCNC: 10.4 MG/DL (ref 8.3–10.6)
CHLORIDE BLD-SCNC: 106 MMOL/L (ref 99–110)
CO2: 23 MMOL/L (ref 21–32)
CREAT SERPL-MCNC: 1.1 MG/DL (ref 0.6–1.2)
EOSINOPHILS ABSOLUTE: 0.2 K/UL (ref 0–0.6)
EOSINOPHILS RELATIVE PERCENT: 6.5 %
GFR AFRICAN AMERICAN: >60
GFR NON-AFRICAN AMERICAN: 50
GLUCOSE BLD-MCNC: 82 MG/DL (ref 70–99)
HCT VFR BLD CALC: 34.9 % (ref 36–48)
HEMOGLOBIN: 11.3 G/DL (ref 12–16)
LYMPHOCYTES ABSOLUTE: 1.3 K/UL (ref 1–5.1)
LYMPHOCYTES RELATIVE PERCENT: 36 %
MCH RBC QN AUTO: 28.8 PG (ref 26–34)
MCHC RBC AUTO-ENTMCNC: 32.4 G/DL (ref 31–36)
MCV RBC AUTO: 89.1 FL (ref 80–100)
MONOCYTES ABSOLUTE: 0.3 K/UL (ref 0–1.3)
MONOCYTES RELATIVE PERCENT: 8.6 %
NEUTROPHILS ABSOLUTE: 1.7 K/UL (ref 1.7–7.7)
NEUTROPHILS RELATIVE PERCENT: 48.5 %
PDW BLD-RTO: 13.4 % (ref 12.4–15.4)
PHOSPHORUS: 3.1 MG/DL (ref 2.5–4.9)
PLATELET # BLD: 256 K/UL (ref 135–450)
PMV BLD AUTO: 9.3 FL (ref 5–10.5)
POTASSIUM SERPL-SCNC: 4.4 MMOL/L (ref 3.5–5.1)
RBC # BLD: 3.92 M/UL (ref 4–5.2)
SODIUM BLD-SCNC: 140 MMOL/L (ref 136–145)
WBC # BLD: 3.6 K/UL (ref 4–11)

## 2021-04-05 PROCEDURE — 36415 COLL VENOUS BLD VENIPUNCTURE: CPT

## 2021-04-05 PROCEDURE — 3017F COLORECTAL CA SCREEN DOC REV: CPT | Performed by: NURSE PRACTITIONER

## 2021-04-05 PROCEDURE — 1036F TOBACCO NON-USER: CPT | Performed by: NURSE PRACTITIONER

## 2021-04-05 PROCEDURE — 74018 RADEX ABDOMEN 1 VIEW: CPT

## 2021-04-05 PROCEDURE — 85025 COMPLETE CBC W/AUTO DIFF WBC: CPT

## 2021-04-05 PROCEDURE — 99213 OFFICE O/P EST LOW 20 MIN: CPT | Performed by: NURSE PRACTITIONER

## 2021-04-05 PROCEDURE — G9899 SCRN MAM PERF RSLTS DOC: HCPCS | Performed by: NURSE PRACTITIONER

## 2021-04-05 PROCEDURE — G8417 CALC BMI ABV UP PARAM F/U: HCPCS | Performed by: NURSE PRACTITIONER

## 2021-04-05 PROCEDURE — G8427 DOCREV CUR MEDS BY ELIG CLIN: HCPCS | Performed by: NURSE PRACTITIONER

## 2021-04-05 PROCEDURE — 80069 RENAL FUNCTION PANEL: CPT

## 2021-04-05 RX ORDER — LOSARTAN POTASSIUM 50 MG/1
50 TABLET ORAL DAILY
Qty: 30 TABLET | Refills: 1 | Status: SHIPPED | OUTPATIENT
Start: 2021-04-05 | End: 2021-04-05

## 2021-04-05 ASSESSMENT — ENCOUNTER SYMPTOMS
SHORTNESS OF BREATH: 0
ABDOMINAL PAIN: 1
BLOOD IN STOOL: 0
CONSTIPATION: 0
NAUSEA: 1
VOMITING: 1
ABDOMINAL DISTENTION: 0
DIARRHEA: 1
COUGH: 0

## 2021-04-05 NOTE — PROGRESS NOTES
Date: 4/5/2021                                               Subjective/Objective:     Chief Complaint   Patient presents with    Hypertension    Medication Check     pt states BP medication makes her sick and dizzy       HPI     Cayla Baeza is a 64 yo female, patient of Dr Brandon Schmidt, here for HTN follow up. Reports for the last 3 days she has nausea and vomiting. She has noticed pain around her umbilicus. The pain is intermittent. Reports she has a knot that she sees and goes away when she massages it. She reports she has been afraid to eat because she will get sick. She can drink liquids and does not have any vomiting. She has some abdominal pain when she tries to eat. She has had loose stools the last two days. Denies any blood in her stool. Has not had this happen before. Reports she had hernia repairs many years ago. Denies sick contacts. Hypertension: Current medications - labetalol 300 mg BID, losartan 100 mg daily, spironolactone 50 mg daily, hydralazine 50 mg TID. Reports she did not take her blood pressure medication today because for the last several weeks she has been having dizziness after she takes her medications. She has not had any dizziness today. She reports her PT at home has been checking her blood pressure and it has been low. Home blood pressure monitoring: No.  She is not adherent to a low sodium diet. Patient denies chest pain, shortness of breath and headache. Antihypertensive medication side effects: dizziness.                                        Sodium (mmol/L)   Date Value   11/13/2020 138    BUN (mg/dL)   Date Value   11/13/2020 12    Glucose (mg/dL)   Date Value   11/13/2020 83      Potassium (mmol/L)   Date Value   11/13/2020 4.2    CREATININE (mg/dL)   Date Value   11/13/2020 0.9                    Patient Active Problem List    Diagnosis Date Noted    Morbidly obese ([facility] [place] [address] 03/05/2020    Migraine with aura and with status migrainosus, not intractable 02/19/2019    Intractable cluster headache syndrome 03/26/2018    Inverted papilloma of nasal cavity 02/07/2018    Nasal mass 01/22/2018    Headache, cervicogenic 09/14/2017    Recurrent sinusitis 07/11/2017    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    Pancreatitis 05/08/2017    Bilateral impacted cerumen 05/02/2017    Nose septum deviation 05/02/2017    Pharyngitis 04/18/2017    Thyroid nodule 03/30/2017    Allergic sinusitis 03/30/2017    Anemia 11/27/2016    Obstructive apnea 10/02/2015    Subclinical hyperthyroidism 08/17/2015    Abdominal pain 09/23/2014    Abdominal pain, epigastric 09/05/2014    Hesitancy of micturition 08/13/2014    Abdominal pain, other specified site 08/13/2014    HTN (hypertension), malignant 08/13/2014    Chest pain 04/30/2014    Osteoarthritis of right knee 03/07/2014    Right knee DJD 03/07/2014    Asthma 03/01/2013    Plantar fasciitis, right 11/09/2012    Mitral valve disease 11/09/2012    GERD (gastroesophageal reflux disease) 11/09/2012    HTN (hypertension) 11/09/2012    Anxiety 11/09/2012    Adhesive capsulitis 09/07/2011    Rotator cuff tendinitis 09/07/2011    Bariatric surgery status 08/16/2011    Epidural lipomatosis 08/16/2011       Past Medical History:   Diagnosis Date    Anxiety state     Asthma     Back ache     chronic    Environmental allergies     GERD (gastroesophageal reflux disease)     Hypertension     MVP (mitral valve prolapse)     Neck pain, chronic     Reflux     Sleep apnea        Past Surgical History:   Procedure Laterality Date    BACK SURGERY      BLADDER SUSPENSION      CHOLECYSTECTOMY      FOOT SURGERY  11-9-12    endoscopic plantar fasciotomy right foot    GASTRIC BYPASS SURGERY      HERNIA REPAIR      HIATAL HERNIA REPAIR      HYSTERECTOMY      JOINT REPLACEMENT Bilateral     KNEE    KNEE ARTHROPLASTY  2/28/13    L    KNEE ARTHROSCOPY Right 3/7/2014    LAMINECTOMY  7/1/11    bilateral L5-S1 laminotomy, nerve root exploration, foraminotomy    NASAL SINUS SURGERY Right 02/05/2018    Excision of rt nasal mass    [place] COLONOSCOPY FLX DX W/COLLJ SPEC WHEN PFRMD N/A 9/28/2018    COLONOSCOPY DIAGNOSTIC OR SCREENING performed by Bertram Ahn MD at [address] [place] [address] DX/THER AGNT PARAVERT FACET JOINT, LUMBAR/SAC, 1ST LEVEL Right 11/6/2018    RIGHT L4,L5 S1 MEDIAL BRANCH BLOCK WITH FLUOROSCOPY performed by Navneet Rose MD at [address] [place] [address],[address] [address], PARTIAL      TONSILLECTOMY      UPPER GASTROINTESTINAL ENDOSCOPY      with dilatation    UPPER GASTROINTESTINAL ENDOSCOPY N/A 8/14/2019    EGD ESOPHAGOGASTRODUODENOSCOPY performed by Bertram Ahn MD at [place] [address] Visit on 12/08/2020   Component Date Value Ref Range Status    SARS-CoV-2, REMY 12/08/2020 NOT DETECTED  NOT DETECTED Final    Comment: The SARS-CoV-2 assay is a real-time RT-PCR test intended for the  qualitative detection of nucleic acid from the SARS-CoV-2 in  respiratory specimens from individuals. Testing is limited to the  guidelines of FDA Emergency Use Authorization FDA for performing  SARS-CoV-2 testing. A Non-Detected result does not preclude the possibility of 2019-nCoV  infection since the adequacy of sample collection and/or low viral  burden may result in the presence of viral nucleic acids below the  analytical sensitivity of this test method. Test results should be used  with caution and in conjunction with other clinical and laboratory data  in making a diagnosis.   Performed at: [facility], [address]  : Shyanne Cross MD         Family History   Problem Relation Age of Onset    High Blood Pressure Mother     Heart Disease Mother     Other Mother         glaucoma    High Blood Pressure Father     Heart Disease Father     Other Father         intestional problems    Cancer Sister         cancer    Kidney Disease Sister     Kidney Disease Brother     High Blood Pressure Brother     Other Sister         lupus,fibromyalgia  thyroid surgery    High Blood Pressure Sister     Kidney Disease Sister     Heart Disease Sister     Kidney Disease Brother     High Blood Pressure Brother     Anesth Problems Neg Hx     Malig Hyperten Neg Hx     Hypotension Neg Hx     Malig Hypertherm Neg Hx     Pseudochol. Deficiency Neg Hx        Current Outpatient Medications   Medication Sig Dispense Refill    losartan (COZAAR) 50 MG tablet Take 1 tablet by mouth daily 30 tablet 1    labetalol (NORMODYNE) 300 MG tablet Take 1 tablet by mouth 2 times daily 60 tablet 6    hydrALAZINE (APRESOLINE) 50 MG tablet Take 1 tablet by mouth 3 times daily 270 tablet 3    levothyroxine (SYNTHROID) 75 MCG tablet Take 1 tablet by mouth Daily Indications: TAKES 1/2 TABLET 90 tablet 2    fluticasone (FLONASE) 50 MCG/ACT nasal spray SHAKE LIQUID AND USE 2 SPRAYS IN EACH NOSTRIL DAILY 48 g 3    esomeprazole (NEXIUM) 40 MG delayed release capsule Take 1 capsule by mouth daily 90 capsule 3    fluticasone (FLOVENT HFA) 220 MCG/ACT inhaler Inhale 2 puffs into the lungs 2 times daily 12 g 5    albuterol sulfate  (90 Base) MCG/ACT inhaler Inhale 2 puffs into the lungs every 6 hours as needed for Wheezing 18 g 5    ACETAMINOPHEN EXTRA STRENGTH 500 MG tablet TAKE 1 TABLET BY MOUTH EVERY 6 HOURS AS NEEDED FOR PAIN 90 tablet 1    HYDROcodone-acetaminophen (NORCO) 5-325 MG per tablet TAKE 1 TABLET BY MOUTH EVERY 8 HOURS FOR UP TO 7 DAYS AS NEEDED FOR PAIN      prednisoLONE acetate (PRED FORTE) 1 % ophthalmic suspension Place 1 drop into both eyes 2 times daily 5 mL 1    acetaminophen (TYLENOL) 500 MG tablet Take 1 tablet by mouth every 6 hours as needed for Pain 90 tablet 1     No current facility-administered medications for this visit.         Allergies   Allergen Reactions    Gabapentin Other (See Comments)     Memory Loss    Protonix [Pantoprazole Sodium] Shortness Of Breath and Rash    Tramadol Itching    Amoxicillin     Aspirin     Augmentin [Amoxicillin-Pot Clavulanate]     Betadine [Povidone Iodine] Other (See Comments)     welts    Chlorhexidine Gluconate Other (See Comments)     Welts from ChloraPrep    Morphine Itching    Other      chlorahexidine - hives    Toradol [Ketorolac Tromethamine]     Iodides Rash    Ranitidine Nausea And Vomiting    Tape Jacklynn Alex Tape] Rash     teradon tape also  Paper tape ok        Review of Systems   Constitutional: Negative for chills and fever. Respiratory: Negative for cough and shortness of breath. Cardiovascular: Negative for chest pain. Gastrointestinal: Positive for abdominal pain, diarrhea, nausea and vomiting. Negative for abdominal distention, blood in stool and constipation. Genitourinary: Negative for dysuria and frequency. Neurological: Positive for dizziness. Negative for headaches. Vitals:  BP (!) 152/90   Pulse 55   Temp 96.8 °F (36 °C) (Temporal) Comment: checking in  Ht 5' 7\" (1.702 m)   Wt 221 lb 6.4 oz (100.4 kg)   LMP  (LMP Unknown)   SpO2 100%   BMI 34.68 kg/m²     Physical Exam  Vitals signs reviewed. Constitutional:       General: She is not in acute distress. Appearance: Normal appearance. HENT:      Head: Normocephalic and atraumatic. Cardiovascular:      Rate and Rhythm: Normal rate and regular rhythm. Heart sounds: Normal heart sounds. No murmur. Pulmonary:      Effort: Pulmonary effort is normal. No respiratory distress. Breath sounds: Normal breath sounds. No wheezing. Abdominal:      General: Bowel sounds are normal. There is no distension. Palpations: Abdomen is soft. There is no mass. Tenderness: There is no abdominal tenderness. There is no guarding or rebound. Comments: Mid epigastric tenderness   Skin:     General: Skin is warm and dry. Neurological:      General: No focal deficit present.       Mental Status: She is alert and oriented to person, of prescribed medications. Barriers to medication compliance addressed. Discussed all ordered testing and labs. All patient questions answered. Patient agreeable with plan above. Please note that this chart was generated using dragon dictation software. Although every effort was made to ensure the accuracy of this automated transcription, some errors in transcription may have occurred.

## 2021-04-05 NOTE — PATIENT INSTRUCTIONS
Start taking lower dose of losartan. Hold all other blood pressure medications and follow up in 2 weeks. Check your blood pressure at home every day and bring these numbers in with you.

## 2021-04-06 ENCOUNTER — TELEPHONE (OUTPATIENT)
Dept: PRIMARY CARE CLINIC | Age: 66
End: 2021-04-06

## 2021-04-06 ENCOUNTER — IMMUNIZATION (OUTPATIENT)
Dept: PRIMARY CARE CLINIC | Age: 66
End: 2021-04-06
Payer: MEDICARE

## 2021-04-06 PROCEDURE — 91300 COVID-19, PFIZER VACCINE 30MCG/0.3ML DOSE: CPT | Performed by: FAMILY MEDICINE

## 2021-04-06 PROCEDURE — 0002A COVID-19, PFIZER VACCINE 30MCG/0.3ML DOSE: CPT | Performed by: FAMILY MEDICINE

## 2021-04-18 NOTE — PROGRESS NOTES
Date: 4/19/2021                                               Subjective/Objective:     Chief Complaint   Patient presents with    Hypertension       HPI     Babita Laws is a 64 yo female, patient of Dr Kristal Mcwilliams, here for follow up on HTN. Last visit 4/5. Hypertension: Current medications - at last OV had reported non compliance with all medications due to dizziness, prior medications were - labetalol 300 mg BID, losartan 100 mg daily, spironolactone 50 mg daily, hydralazine 50 mg TID. She was asked to resume losartan at 50 mg daily - endorses compliance with this. She reported her PT at home had been checking her blood pressure and it had been low. Home blood pressure monitoring: No.  She is not adherent to a low sodium diet. Patient denies any further dizziness. She denies chest pain, SOB.           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    Pancreatitis 05/08/2017    Bilateral impacted cerumen 05/02/2017    Nose septum deviation 05/02/2017    Pharyngitis 04/18/2017    Thyroid nodule 03/30/2017    Allergic sinusitis 03/30/2017    Anemia 11/27/2016    Obstructive apnea 10/02/2015    Subclinical hyperthyroidism 08/17/2015    Abdominal pain 09/23/2014    Abdominal pain, epigastric 09/05/2014    Hesitancy of micturition 08/13/2014    Abdominal pain, other specified site 08/13/2014    HTN (hypertension), malignant 08/13/2014    Chest pain 04/30/2014    Osteoarthritis of right knee 03/07/2014    Right knee DJD 03/07/2014    Asthma 03/01/2013    Plantar fasciitis, right 11/09/2012    Mitral valve disease 11/09/2012    GERD (gastroesophageal reflux disease) 11/09/2012    HTN (hypertension) 11/09/2012    Anxiety 11/09/2012    Adhesive capsulitis 09/07/2011    Rotator cuff tendinitis 09/07/2011    Bariatric surgery status 08/16/2011    Epidural lipomatosis 08/16/2011       Past Medical History:   Diagnosis Date    Anxiety state     Asthma     Back ache     chronic    Environmental allergies     GERD (gastroesophageal reflux disease)     Hypertension     MVP (mitral valve prolapse)     Neck pain, chronic     Reflux     Sleep apnea        Past Surgical History:   Procedure Laterality Date    BACK SURGERY      BLADDER SUSPENSION      CHOLECYSTECTOMY      FOOT SURGERY  11-9-12    endoscopic plantar fasciotomy right foot    GASTRIC BYPASS SURGERY      HERNIA REPAIR      HIATAL HERNIA REPAIR      HYSTERECTOMY      JOINT REPLACEMENT Bilateral     KNEE    KNEE ARTHROPLASTY  2/28/13    L    KNEE ARTHROSCOPY Right 3/7/2014    LAMINECTOMY  7/1/11    bilateral L5-S1 laminotomy, nerve root exploration, foraminotomy    NASAL SINUS SURGERY Right 02/05/2018    Excision of rt nasal mass    [place] COLONOSCOPY FLX DX W/COLLJ SPEC WHEN PFRMD N/A 9/28/2018    COLONOSCOPY DIAGNOSTIC OR SCREENING performed by Era Mcqueen MD at [address] [place] [address] DX/THER AGNT PARAVERT FACET JOINT, LUMBAR/SAC, 1ST LEVEL Right 11/6/2018    RIGHT L4,L5 S1 MEDIAL BRANCH BLOCK WITH FLUOROSCOPY performed by Tiago Luther MD at [address] [place] [address],[address] [address], PARTIAL      TONSILLECTOMY      UPPER GASTROINTESTINAL ENDOSCOPY      with dilatation    UPPER GASTROINTESTINAL ENDOSCOPY N/A 8/14/2019    EGD ESOPHAGOGASTRODUODENOSCOPY performed by Era Mcqueen MD at [facility] on 04/05/2021   Component Date Value Ref Range Status    WBC 04/05/2021 3.6* 4.0 - 11.0 K/uL Final    RBC 04/05/2021 3.92* 4.00 - 5.20 M/uL Final    Hemoglobin 04/05/2021 11.3* 12.0 - 16.0 g/dL Final    Hematocrit 04/05/2021 34.9* 36.0 - 48.0 % Final    MCV 04/05/2021 89.1  80.0 - 100.0 fL Final    MCH 04/05/2021 28.8  26.0 - 34.0 pg Final    MCHC 04/05/2021 32.4  31.0 - 36.0 g/dL Final    RDW 04/05/2021 13.4  12.4 - 15.4 % Final    Platelets 27/46/4696 256  135 - 450 K/uL Final    MPV 04/05/2021 9.3  5.0 - 10.5 fL Final    Neutrophils % 04/05/2021 48.5  % Final    Lymphocytes % 04/05/2021 36.0  % Final    Monocytes % 04/05/2021 8.6  % Final    Eosinophils % 04/05/2021 6.5  % Final    Basophils % 04/05/2021 0.4  % Final    Neutrophils Absolute 04/05/2021 1.7  1.7 - 7.7 K/uL Final    Lymphocytes Absolute 04/05/2021 1.3  1.0 - 5.1 K/uL Final    Monocytes Absolute 04/05/2021 0.3  0.0 - 1.3 K/uL Final    Eosinophils Absolute 04/05/2021 0.2  0.0 - 0.6 K/uL Final    Basophils Absolute 04/05/2021 0.0  0.0 - 0.2 K/uL Final    Sodium 04/05/2021 140  136 - 145 mmol/L Final    Potassium 04/05/2021 4.4  3.5 - 5.1 mmol/L Final    Chloride 04/05/2021 106  99 - 110 mmol/L Final    CO2 04/05/2021 23  21 - 32 mmol/L Final    Anion Gap 04/05/2021 11  3 - 16 Final    Glucose 04/05/2021 82  70 - 99 mg/dL Final    BUN 04/05/2021 13  7 - 20 mg/dL Final    CREATININE 04/05/2021 1.1  0.6 - 1.2 mg/dL Final    GFR Non- 04/05/2021 50* >60 Final    Comment: >60 mL/min/1.73m2 EGFR, calc. for ages 25 and older using the  MDRD formula (not corrected for weight), is valid for stable  renal function.  GFR  04/05/2021 >60  >60 Final    Comment: Chronic Kidney Disease: less than 60 ml/min/1.73 sq.m. Kidney Failure: less than 15 ml/min/1.73 sq.m. Results valid for patients 18 years and older.       Calcium 04/05/2021 10.4  8.3 - 10.6 mg/dL Final    Phosphorus 04/05/2021 3.1  2.5 - 4.9 mg/dL Final    Albumin 04/05/2021 4.2  3.4 - 5.0 g/dL Final       Family History   Problem Relation Age of Onset    High Blood Pressure Mother     Heart Disease Mother     Other Mother         glaucoma    High Blood

## 2021-04-19 ENCOUNTER — OFFICE VISIT (OUTPATIENT)
Dept: PRIMARY CARE CLINIC | Age: 66
End: 2021-04-19
Payer: MEDICARE

## 2021-04-19 VITALS
HEIGHT: 67 IN | SYSTOLIC BLOOD PRESSURE: 178 MMHG | TEMPERATURE: 97.2 F | HEART RATE: 77 BPM | BODY MASS INDEX: 34.84 KG/M2 | DIASTOLIC BLOOD PRESSURE: 100 MMHG | WEIGHT: 222 LBS

## 2021-04-19 DIAGNOSIS — I10 ESSENTIAL HYPERTENSION: ICD-10-CM

## 2021-04-19 DIAGNOSIS — J45.909 ASTHMA WITH BRONCHITIS: ICD-10-CM

## 2021-04-19 PROCEDURE — 99213 OFFICE O/P EST LOW 20 MIN: CPT | Performed by: NURSE PRACTITIONER

## 2021-04-19 PROCEDURE — G8417 CALC BMI ABV UP PARAM F/U: HCPCS | Performed by: NURSE PRACTITIONER

## 2021-04-19 PROCEDURE — G8427 DOCREV CUR MEDS BY ELIG CLIN: HCPCS | Performed by: NURSE PRACTITIONER

## 2021-04-19 PROCEDURE — 1036F TOBACCO NON-USER: CPT | Performed by: NURSE PRACTITIONER

## 2021-04-19 PROCEDURE — G9899 SCRN MAM PERF RSLTS DOC: HCPCS | Performed by: NURSE PRACTITIONER

## 2021-04-19 PROCEDURE — 3017F COLORECTAL CA SCREEN DOC REV: CPT | Performed by: NURSE PRACTITIONER

## 2021-04-19 RX ORDER — LOSARTAN POTASSIUM 100 MG/1
100 TABLET ORAL DAILY
Qty: 30 TABLET | Refills: 1 | Status: SHIPPED | OUTPATIENT
Start: 2021-04-19 | End: 2021-04-30 | Stop reason: SDUPTHER

## 2021-04-19 RX ORDER — FLUTICASONE PROPIONATE 220 UG/1
2 AEROSOL, METERED RESPIRATORY (INHALATION) 2 TIMES DAILY
Qty: 12 G | Refills: 5 | Status: SHIPPED | OUTPATIENT
Start: 2021-04-19 | End: 2021-11-16 | Stop reason: SDUPTHER

## 2021-04-19 RX ORDER — ALBUTEROL SULFATE 90 UG/1
2 AEROSOL, METERED RESPIRATORY (INHALATION) EVERY 6 HOURS PRN
Qty: 18 G | Refills: 5 | Status: SHIPPED | OUTPATIENT
Start: 2021-04-19 | End: 2021-11-16 | Stop reason: SDUPTHER

## 2021-04-19 RX ORDER — LABETALOL 100 MG/1
TABLET, FILM COATED ORAL
Qty: 180 TABLET | Refills: 5 | Status: SHIPPED | OUTPATIENT
Start: 2021-04-19 | End: 2021-04-30

## 2021-04-19 RX ORDER — LABETALOL 100 MG/1
100 TABLET, FILM COATED ORAL 2 TIMES DAILY
Qty: 60 TABLET | Refills: 0 | Status: SHIPPED | OUTPATIENT
Start: 2021-04-19 | End: 2021-04-19

## 2021-04-19 SDOH — ECONOMIC STABILITY: FOOD INSECURITY: WITHIN THE PAST 12 MONTHS, YOU WORRIED THAT YOUR FOOD WOULD RUN OUT BEFORE YOU GOT MONEY TO BUY MORE.: NOT ASKED

## 2021-04-19 SDOH — ECONOMIC STABILITY: FOOD INSECURITY: WITHIN THE PAST 12 MONTHS, THE FOOD YOU BOUGHT JUST DIDN'T LAST AND YOU DIDN'T HAVE MONEY TO GET MORE.: NOT ASKED

## 2021-04-19 SDOH — ECONOMIC STABILITY: TRANSPORTATION INSECURITY
IN THE PAST 12 MONTHS, HAS LACK OF TRANSPORTATION KEPT YOU FROM MEETINGS, WORK, OR FROM GETTING THINGS NEEDED FOR DAILY LIVING?: NOT ASKED

## 2021-04-19 ASSESSMENT — ENCOUNTER SYMPTOMS
SHORTNESS OF BREATH: 0
COUGH: 0

## 2021-04-19 NOTE — TELEPHONE ENCOUNTER
Medication:   Requested Prescriptions     Pending Prescriptions Disp Refills    labetalol (NORMODYNE) 100 MG tablet [Pharmacy Med Name: LABETALOL 100MG TABLETS] 180 tablet      Sig: TAKE 1 TABLET BY MOUTH TWICE DAILY        Last Filled:      Patient Phone Number: [phone] (home)     Last appt: 4/19/2021   Next appt: 4/30/2021    Last OARRS:   RX Monitoring 1/9/2018   Attestation The Prescription Monitoring Report for this patient was reviewed today.

## 2021-04-19 NOTE — PATIENT INSTRUCTIONS
Take 50 mg of losartan when you get home today (to total 100 mg) today   labetalol and start taking today  Start checking your blood pressure every day, at least one hour after you take your medications. Keep record and bring with you.

## 2021-04-30 ENCOUNTER — OFFICE VISIT (OUTPATIENT)
Dept: PRIMARY CARE CLINIC | Age: 66
End: 2021-04-30
Payer: MEDICARE

## 2021-04-30 VITALS
HEIGHT: 67 IN | HEART RATE: 62 BPM | SYSTOLIC BLOOD PRESSURE: 160 MMHG | BODY MASS INDEX: 35.53 KG/M2 | TEMPERATURE: 97.2 F | OXYGEN SATURATION: 99 % | DIASTOLIC BLOOD PRESSURE: 90 MMHG | WEIGHT: 226.4 LBS

## 2021-04-30 DIAGNOSIS — N39.490 INCONTINENCE OVERFLOW, URINE: ICD-10-CM

## 2021-04-30 DIAGNOSIS — I10 ESSENTIAL HYPERTENSION: ICD-10-CM

## 2021-04-30 DIAGNOSIS — E66.01 MORBIDLY OBESE (HCC): ICD-10-CM

## 2021-04-30 DIAGNOSIS — D50.8 OTHER IRON DEFICIENCY ANEMIA: ICD-10-CM

## 2021-04-30 DIAGNOSIS — Z00.00 ROUTINE GENERAL MEDICAL EXAMINATION AT A HEALTH CARE FACILITY: ICD-10-CM

## 2021-04-30 PROCEDURE — G8427 DOCREV CUR MEDS BY ELIG CLIN: HCPCS | Performed by: INTERNAL MEDICINE

## 2021-04-30 PROCEDURE — 3017F COLORECTAL CA SCREEN DOC REV: CPT | Performed by: INTERNAL MEDICINE

## 2021-04-30 PROCEDURE — 1036F TOBACCO NON-USER: CPT | Performed by: INTERNAL MEDICINE

## 2021-04-30 PROCEDURE — G0438 PPPS, INITIAL VISIT: HCPCS | Performed by: INTERNAL MEDICINE

## 2021-04-30 PROCEDURE — G8417 CALC BMI ABV UP PARAM F/U: HCPCS | Performed by: INTERNAL MEDICINE

## 2021-04-30 PROCEDURE — G9899 SCRN MAM PERF RSLTS DOC: HCPCS | Performed by: INTERNAL MEDICINE

## 2021-04-30 PROCEDURE — 99213 OFFICE O/P EST LOW 20 MIN: CPT | Performed by: INTERNAL MEDICINE

## 2021-04-30 RX ORDER — FERROUS SULFATE 325(65) MG
325 TABLET ORAL
Qty: 90 TABLET | Refills: 3 | Status: SHIPPED | OUTPATIENT
Start: 2021-04-30

## 2021-04-30 RX ORDER — OXYBUTYNIN CHLORIDE 5 MG/1
5 TABLET, EXTENDED RELEASE ORAL DAILY
Qty: 30 TABLET | Refills: 5 | Status: SHIPPED | OUTPATIENT
Start: 2021-04-30 | End: 2021-04-30

## 2021-04-30 RX ORDER — OXYBUTYNIN CHLORIDE 5 MG/1
5 TABLET, EXTENDED RELEASE ORAL DAILY
Qty: 90 TABLET | Refills: 3 | Status: SHIPPED | OUTPATIENT
Start: 2021-04-30 | End: 2021-11-16

## 2021-04-30 RX ORDER — LABETALOL 200 MG/1
200 TABLET, FILM COATED ORAL 2 TIMES DAILY
Qty: 60 TABLET | Refills: 3 | Status: SHIPPED | OUTPATIENT
Start: 2021-04-30 | End: 2021-04-30

## 2021-04-30 RX ORDER — LABETALOL 200 MG/1
TABLET, FILM COATED ORAL
Qty: 180 TABLET | Refills: 3 | Status: SHIPPED | OUTPATIENT
Start: 2021-04-30 | End: 2021-06-03

## 2021-04-30 RX ORDER — LOSARTAN POTASSIUM 100 MG/1
100 TABLET ORAL DAILY
Qty: 90 TABLET | Refills: 3 | Status: SHIPPED | OUTPATIENT
Start: 2021-04-30 | End: 2021-06-03 | Stop reason: SDUPTHER

## 2021-04-30 ASSESSMENT — ENCOUNTER SYMPTOMS
SHORTNESS OF BREATH: 0
DIARRHEA: 0
VOICE CHANGE: 0
ABDOMINAL DISTENTION: 0
BLOOD IN STOOL: 0
CHOKING: 0
FACIAL SWELLING: 0
CONSTIPATION: 0
SORE THROAT: 0
CHEST TIGHTNESS: 0
PHOTOPHOBIA: 0

## 2021-04-30 ASSESSMENT — PATIENT HEALTH QUESTIONNAIRE - PHQ9
SUM OF ALL RESPONSES TO PHQ QUESTIONS 1-9: 0
1. LITTLE INTEREST OR PLEASURE IN DOING THINGS: 0

## 2021-04-30 ASSESSMENT — LIFESTYLE VARIABLES: HOW OFTEN DO YOU HAVE A DRINK CONTAINING ALCOHOL: 0

## 2021-04-30 NOTE — PATIENT INSTRUCTIONS
Personalized Preventive Plan for Zoey Villasenor - 4/30/2021  Medicare offers a range of preventive health benefits. Some of the tests and screenings are paid in full while other may be subject to a deductible, co-insurance, and/or copay. Some of these benefits include a comprehensive review of your medical history including lifestyle, illnesses that may run in your family, and various assessments and screenings as appropriate. After reviewing your medical record and screening and assessments performed today your provider may have ordered immunizations, labs, imaging, and/or referrals for you. A list of these orders (if applicable) as well as your Preventive Care list are included within your After Visit Summary for your review. Other Preventive Recommendations:    · A preventive eye exam performed by an eye specialist is recommended every 1-2 years to screen for glaucoma; cataracts, macular degeneration, and other eye disorders. · A preventive dental visit is recommended every 6 months. · Try to get at least 150 minutes of exercise per week or 10,000 steps per day on a pedometer . · Order or download the FREE \"Exercise & Physical Activity: Your Everyday Guide\" from [employer]. Call 2-162.487.5687 or search [employer] online. · You need 2136-2432 mg of calcium and 1239-9035 IU of vitamin D per day. It is possible to meet your calcium requirement with diet alone, but a vitamin D supplement is usually necessary to meet this goal.  · When exposed to the sun, use a sunscreen that protects against both UVA and UVB radiation with an SPF of 30 or greater. Reapply every 2 to 3 hours or after sweating, drying off with a towel, or swimming. · Always wear a seat belt when traveling in a car. Always wear a helmet when riding a bicycle or motorcycle.

## 2021-04-30 NOTE — TELEPHONE ENCOUNTER
Medication:   Requested Prescriptions     Pending Prescriptions Disp Refills    labetalol (NORMODYNE) 200 MG tablet [Pharmacy Med Name: LABETALOL 200MG TABLETS] 180 tablet      Sig: TAKE 1 TABLET BY MOUTH TWICE DAILY    oxybutynin (DITROPAN-XL) 5 MG extended release tablet [Pharmacy Med Name: OXYBUTYNIN ER 5MG TABLETS] 90 tablet      Sig: TAKE 1 TABLET BY MOUTH DAILY        Last Filled:      Patient Phone Number: [phone] (home)     Last appt: 4/30/2021   Next appt: 6/3/2021    Last OARRS:   RX Monitoring 1/9/2018   Attestation The Prescription Monitoring Report for this patient was reviewed today.

## 2021-04-30 NOTE — PROGRESS NOTES
Subjective:      Patient ID: Zoey Villasenor is a 63 y.o. female. 4/30/2021 Patient presents with:  Medicare AWV                    Last seen   3/11/2021 Patient presents with: Follow-Up from Hospital: [address] 2/8-2/10/2021- cervical spinal stenosis    Pain a lot better     Visiting Home Nurse 2 x wk . Wt down to 213 . BP controlled . Taking all meds reg               11/10/2020 Patient presents with:  Abdominal Pain . Never got labs as ordered last visit ? ??    States she did See Dr Marco Saab who has told her there was nothing he could do ? ??              Last seen   VV 10/1/2020 Patient presents with:  Back Pain 2 wks . S/P Surgery dissectomy 8/26/20 at [facility]   Urinary Frequency 3 days     Was told she has Stage 3 Renal failiure ? Labs last checked here 11/19 show Nl renal function               Last seen  VV 5/29/2020  Patient presents with:  Cough  Congestion: one sister has pneumonia but not been tested for covid 19 yet! Has not checked temperature ? Last seen  3/5/2020 Patient presents with:  Abscess: groin area                   2/27/18 total thyroidectomy      Hypertension  Pertinent negatives include no chest pain or shortness of breath. Palpitations: off and on    Epistaxis            Past Medical History:   Diagnosis Date    Anxiety state     Asthma     Back ache     chronic    Environmental allergies     GERD (gastroesophageal reflux disease)     Hypertension     MVP (mitral valve prolapse)     Neck pain, chronic     Reflux     Sleep apnea        Review of Systems   Constitutional: Negative for activity change, appetite change, chills, diaphoresis, fatigue, fever and unexpected weight change. All vaccines up to date    Flu vac 1/21    Tdap 5/17     Pneumonia vac 2012 ;  5/17     Covid Vac 3/21   HENT: Negative for ear pain, facial swelling, postnasal drip, sore throat and voice change.          Dentures fit well ; new   Eyes: Negative for photophobia and visual disturbance. Eye exam 11/20   Respiratory: Negative for choking, chest tightness and shortness of breath. Does not smoke ; No Etoh   H/o Asthma    Cardiovascular: Negative for chest pain and leg swelling. Palpitations: off and on         HTN > 5 yrs on meds . No known CAD . Father dies of MI in his 46s   Sister with CAD ? MVP  H/o palpitations   Gastrointestinal: Negative for abdominal distention, blood in stool, constipation and diarrhea. Upper Endoscopy  1/21  H/O Ulers ? Cannot take Nsaids! Post gastric bypass 2005      GERD    Repeat Colonscopy 1/21  ; Polyps; O Marilee    Endocrine: Negative for cold intolerance and heat intolerance. Followed by Dr. Karen Najera  for thyroid   Genitourinary:        Mammogram  5/20    Scheduled Pap 0n 9/127/18    Musculoskeletal: Positive for arthralgias. Sees Ortho neck / [facility]   1/21       S/P Surgery  DDD 8/26/20      DJD . Skin: Negative. Psychiatric/Behavioral: Negative for self-injury, sleep disturbance and suicidal ideas. Anxiety: followed by counselor       Objective:   Physical Exam   Constitutional: She is oriented to person, place, and time. No distress. Eyes: EOM are normal.   Neck:        Cardiovascular: Normal rate, regular rhythm and normal heart sounds. Pulmonary/Chest: Effort normal and breath sounds normal.   Abdominal: She exhibits distension. Musculoskeletal: Normal range of motion. General: No edema. Neurological: She is alert and oriented to person, place, and time. Psychiatric: She has a normal mood and affect. Her behavior is normal.       Assessment:       Custer was seen today for medicare awv, hypertension and incontinence. Diagnoses and all orders for this visit:    Routine general medical examination at a health care facility    Morbidly obese ([facility] [address]  BMI > 35  Advised low carb diet + exercise reg     Essential hypertension  BP up . Cont losartan . Increase labetalol to 200 mg  Bid . Low salt diet   -     losartan (COZAAR) 100 MG tablet; Take 1 tablet by mouth daily  -     labetalol (NORMODYNE) 200 MG tablet; Take 1 tablet by mouth 2 times daily    Other iron deficiency anemia  Eat an apple daily   -     ferrous sulfate (IRON 325) 325 (65 Fe) MG tablet; Take 1 tablet by mouth daily (with breakfast)    Incontinence overflow, urine  toviaz not covered . Try Ditropan   -     oxybutynin (DITROPAN-XL) 5 MG extended release tablet; Take 1 tablet by mouth daily      Acquired hypothyroidism  -     levothyroxine (SYNTHROID) 75 MCG tablet; Take 1 tablet by mouth Daily Indications: TAKES 1/2 TABLET      -         Iron deficiency anemia, unspecified iron deficiency anemia s/p gastric bypass       -      I    Plan:          Medicare Annual Wellness Visit  Name: Gillian Khan Date: 2021   MRN: [id number] Sex: Female   Age: 63 y.o. Ethnicity: Non-/Non    : 1955 Race: Black      Cayla Scottuman Ramya is here for Medicare AWV    Screenings for behavioral, psychosocial and functional/safety risks, and cognitive dysfunction are all negative except as indicated below. These results, as well as other patient data from the [address] [facility] form, are documented in Flowsheets linked to this Encounter. Allergies   Allergen Reactions    Gabapentin Other (See Comments)     Memory Loss    Protonix [Pantoprazole Sodium] Shortness Of Breath and Rash    Tramadol Itching    Amoxicillin     Aspirin     Augmentin [Amoxicillin-Pot Clavulanate]     Betadine [Povidone Iodine] Other (See Comments)     welts    Chlorhexidine Gluconate Other (See Comments)     Welts from ChloraPrep    Morphine Itching    Other      chlorahexidine - hives    Toradol [Ketorolac Tromethamine]     Iodides Rash    Ranitidine Nausea And Vomiting    Tape [Adhesive Tape] Rash     teradon tape also  Paper tape ok        Prior to Visit Medications    Medication Sig Taking?  Authorizing Provider   ferrous sulfate (IRON 325) 325 (65 Fe) MG tablet Take 1 tablet by mouth daily (with breakfast) Yes Yue Ybarra MD   losartan (COZAAR) 100 MG tablet Take 1 tablet by mouth daily Yes Yue Ybarra MD   labetalol (NORMODYNE) 200 MG tablet Take 1 tablet by mouth 2 times daily Yes Yue Ybarra MD   oxybutynin (DITROPAN-XL) 5 MG extended release tablet Take 1 tablet by mouth daily Yes Yue Ybarra MD   fluticasone (FLOVENT HFA) 220 MCG/ACT inhaler Inhale 2 puffs into the lungs 2 times daily Yes RIVERA Sanchez   albuterol sulfate  (90 Base) MCG/ACT inhaler Inhale 2 puffs into the lungs every 6 hours as needed for Wheezing Yes RIVERA Sanchez   levothyroxine (SYNTHROID) 75 MCG tablet Take 1 tablet by mouth Daily Indications: TAKES 1/2 TABLET Yes Yue Ybarra MD   fluticasone (FLONASE) 50 MCG/ACT nasal spray SHAKE LIQUID AND USE 2 SPRAYS IN EACH NOSTRIL DAILY Yes Sandro Jacobs MD   esomeprazole ([employer] [address]) 40 MG delayed release capsule Take 1 capsule by mouth daily Yes Yue Ybarra MD   prednisoLONE acetate (PRED FORTE) 1 % ophthalmic suspension Place 1 drop into both eyes 2 times daily Yes Sandro Jacobs MD       Past Medical History:   Diagnosis Date    Anxiety state     Asthma     Back ache     chronic    Environmental allergies     GERD (gastroesophageal reflux disease)     Hypertension     MVP (mitral valve prolapse)     Neck pain, chronic     Reflux     Sleep apnea        Past Surgical History:   Procedure Laterality Date    BACK SURGERY      BLADDER SUSPENSION      CHOLECYSTECTOMY      FOOT SURGERY  11-9-12    endoscopic plantar fasciotomy right foot    GASTRIC BYPASS SURGERY      HERNIA REPAIR      HIATAL HERNIA REPAIR      HYSTERECTOMY      JOINT REPLACEMENT Bilateral     KNEE    KNEE ARTHROPLASTY  2/28/13    L    KNEE ARTHROSCOPY Right 3/7/2014    LAMINECTOMY  7/1/11    bilateral L5-S1 laminotomy, nerve root exploration, foraminotomy    NASAL SINUS SURGERY Right 02/05/2018    Excision of rt nasal mass    [place] COLONOSCOPY FLX DX W/COLLJ SPEC WHEN PFRMD N/A 9/28/2018    COLONOSCOPY DIAGNOSTIC OR SCREENING performed by Anton Lam MD at [address] [place] [address] DX/THER AGNT PARAVERT FACET JOINT, LUMBAR/SAC, 1ST LEVEL Right 11/6/2018    RIGHT L4,L5 S1 MEDIAL BRANCH BLOCK WITH FLUOROSCOPY performed by Ofelia Lazcano MD at [address] [address], PARTIAL      TONSILLECTOMY      UPPER GASTROINTESTINAL ENDOSCOPY      with dilatation    UPPER GASTROINTESTINAL ENDOSCOPY N/A 8/14/2019    EGD ESOPHAGOGASTRODUODENOSCOPY performed by Anton Lam MD at [place] [address]       Family History   Problem Relation Age of Onset    High Blood Pressure Mother     Heart Disease Mother     Other Mother         glaucoma    High Blood Pressure Father     Heart Disease Father     Other Father         intestional problems    Cancer Sister         cancer    Kidney Disease Sister     Kidney Disease Brother     High Blood Pressure Brother     Other Sister         lupus,fibromyalgia  thyroid surgery    High Blood Pressure Sister     Kidney Disease Sister     Heart Disease Sister     Kidney Disease Brother     High Blood Pressure Brother     Anesth Problems Neg Hx     Malig Hyperten Neg Hx     Hypotension Neg Hx     Malig Hypertherm Neg Hx     Pseudochol.  Deficiency Neg Hx        CareTeam (Including outside providers/suppliers regularly involved in providing care):   Patient Care Team:  Almaz Spain MD as PCP - Tab Jennings MD as PCP - [facility] Empaneled Provider  Abran Stacy (Gastroenterology)  Carmen Redding MD as Consulting Physician (Otolaryngology)    Wt Readings from Last 3 Encounters:   04/30/21 226 lb 6.4 oz (102.7 kg)   04/19/21 222 lb (100.7 kg)   04/05/21 221 lb 6.4 oz (100.4 kg)     Vitals:    04/30/21 0932 04/30/21 0943 04/30/21 1008   BP: (!) 160/90 (!) 155/95 (!) 160/90   Pulse: 62     Temp: 97.2 °F (36.2 °C) TempSrc: Temporal     SpO2: 99%     Weight: 226 lb 6.4 oz (102.7 kg)     Height: 5' 7\" (1.702 m)       Body mass index is 35.46 kg/m². Based upon direct observation of the patient, evaluation of cognition reveals recent and remote memory intact. Patient's complete Health Risk Assessment and screening values have been reviewed and are found in Flowsheets. The following problems were reviewed today and where indicated follow up appointments were made and/or referrals ordered. Positive Risk Factor Screenings with Interventions:          General Health and ACP:  General  In general, how would you say your health is?: Very Good  In the past 7 days, have you experienced any of the following?  New or Increased Pain, New or Increased Fatigue, Loneliness, Social Isolation, Stress or Anger?: None of These  Do you get the social and emotional support that you need?: Yes  Do you have a Living Will?: (!) No  Advance Directives     Power of  Living Will ACP-Advance Directive ACP-Power of     Not on File Not on File Not on File Not on File      General Health Risk Interventions:  · No Living Will: Advance Care Planning addressed with patient today    Health Habits/Nutrition:  Health Habits/Nutrition  Do you exercise for at least 20 minutes 2-3 times per week?: Yes  Have you lost any weight without trying in the past 3 months?: No  Do you eat only one meal per day?: No  Have you seen the dentist within the past year?: (!) No  Body mass index: (!) 35.46  Health Habits/Nutrition Interventions:  · Inadequate physical activity:  patient agrees to exercise for at least 150 minutes/week  · Dental exam overdue:  patient encouraged to make appointment with his/her dentist       Personalized Preventive Plan   Current Health Maintenance Status  Immunization History   Administered Date(s) Administered    COVID-19, Ho Peter, PF, 30mcg/0.3mL 03/16/2021, 04/06/2021    Influenza Virus Vaccine 01/06/2008, 09/24/2014, 10/17/2015, 10/19/2016    Influenza Whole 11/22/2012    Influenza, Intradermal, Preservative free 10/19/2016    Influenza, Intradermal, Quadrivalent, Preservative Free 12/04/2017    Influenza, Quadv, IM, PF (6 mo and older Fluzone, Flulaval, Fluarix, and 3 yrs and older Afluria) 10/17/2015, 09/24/2018, 10/23/2019, 01/08/2021    Pneumococcal Conjugate 13-valent ([id number]) 05/08/2017    Pneumococcal Conjugate 7-valent (Prevnar7) 11/22/2012    Pneumococcal Polysaccharide ([id number]) 06/26/2019    Tdap (Boostrix, Adacel) 05/08/2017        Health Maintenance   Topic Date Due    Annual Wellness Visit (AWV)  Never done    Meningococcal B vaccine (1 of 4 - Increased Risk Bexsero 2-dose series) 07/08/2021 (Originally 6/5/1965)    Hib vaccine (1 of 1 - Risk 1-dose series) 07/30/2021 (Originally 9/5/1956)    Meningococcal (ACWY) vaccine (1 - Risk start before 7 months 4-dose series) 01/08/2022 (Originally 1955)    Cervical cancer screen  01/08/2022 (Originally 10/20/2017)    Shingles Vaccine (1 of 2) 01/08/2022 (Originally 6/5/2007)    TSH testing  11/13/2021    Potassium monitoring  04/05/2022    Creatinine monitoring  04/05/2022    Breast cancer screen  05/28/2022    Pneumococcal 0-64 years Vaccine (3 of 3 - PPSV23) 06/26/2024    Lipid screen  11/13/2025    Colon cancer screen colonoscopy  01/05/2026    DTaP/Tdap/Td vaccine (2 - Td) 05/08/2027    Flu vaccine  Completed    COVID-19 Vaccine  Completed    Hepatitis C screen  Completed    HIV screen  Completed    Hepatitis A vaccine  Aged Out    Hepatitis B vaccine  Aged Out     Recommendations for [employer] Due: see orders and patient instructions/AVS.  .   Recommended screening schedule for the next 5-10 years is provided to the patient in written form: see Patient Instructions/AVS.

## 2021-05-07 ENCOUNTER — OFFICE VISIT (OUTPATIENT)
Dept: ENT CLINIC | Age: 66
End: 2021-05-07
Payer: MEDICARE

## 2021-05-07 VITALS — DIASTOLIC BLOOD PRESSURE: 91 MMHG | TEMPERATURE: 96.6 F | SYSTOLIC BLOOD PRESSURE: 154 MMHG | HEART RATE: 57 BPM

## 2021-05-07 DIAGNOSIS — H81.09 MENIERE'S DISEASE, UNSPECIFIED LATERALITY: ICD-10-CM

## 2021-05-07 DIAGNOSIS — J30.9 ALLERGIC SINUSITIS: Primary | ICD-10-CM

## 2021-05-07 DIAGNOSIS — H61.23 BILATERAL IMPACTED CERUMEN: ICD-10-CM

## 2021-05-07 DIAGNOSIS — J32.9 RECURRENT SINUSITIS: ICD-10-CM

## 2021-05-07 PROCEDURE — 1036F TOBACCO NON-USER: CPT | Performed by: OTOLARYNGOLOGY

## 2021-05-07 PROCEDURE — 3017F COLORECTAL CA SCREEN DOC REV: CPT | Performed by: OTOLARYNGOLOGY

## 2021-05-07 PROCEDURE — G9899 SCRN MAM PERF RSLTS DOC: HCPCS | Performed by: OTOLARYNGOLOGY

## 2021-05-07 PROCEDURE — 69210 REMOVE IMPACTED EAR WAX UNI: CPT | Performed by: OTOLARYNGOLOGY

## 2021-05-07 PROCEDURE — G8427 DOCREV CUR MEDS BY ELIG CLIN: HCPCS | Performed by: OTOLARYNGOLOGY

## 2021-05-07 PROCEDURE — 99213 OFFICE O/P EST LOW 20 MIN: CPT | Performed by: OTOLARYNGOLOGY

## 2021-05-07 PROCEDURE — G8417 CALC BMI ABV UP PARAM F/U: HCPCS | Performed by: OTOLARYNGOLOGY

## 2021-05-07 RX ORDER — AZITHROMYCIN 250 MG/1
TABLET, FILM COATED ORAL
Qty: 1 PACKET | Refills: 0 | Status: ON HOLD | OUTPATIENT
Start: 2021-05-07 | End: 2021-05-27 | Stop reason: HOSPADM

## 2021-05-07 RX ORDER — METHYLPREDNISOLONE 4 MG/1
4 TABLET ORAL SEE ADMIN INSTRUCTIONS
Qty: 1 KIT | Refills: 0 | Status: ON HOLD | OUTPATIENT
Start: 2021-05-07 | End: 2021-05-27 | Stop reason: HOSPADM

## 2021-05-18 ENCOUNTER — TELEPHONE (OUTPATIENT)
Dept: ENT CLINIC | Age: 66
End: 2021-05-18

## 2021-05-19 ENCOUNTER — OFFICE VISIT (OUTPATIENT)
Dept: ENT CLINIC | Age: 66
End: 2021-05-19
Payer: MEDICARE

## 2021-05-19 VITALS — DIASTOLIC BLOOD PRESSURE: 85 MMHG | SYSTOLIC BLOOD PRESSURE: 134 MMHG | HEART RATE: 70 BPM | TEMPERATURE: 96 F

## 2021-05-19 DIAGNOSIS — M79.2 NEURITIS: Primary | ICD-10-CM

## 2021-05-19 PROCEDURE — G8417 CALC BMI ABV UP PARAM F/U: HCPCS | Performed by: OTOLARYNGOLOGY

## 2021-05-19 PROCEDURE — G9899 SCRN MAM PERF RSLTS DOC: HCPCS | Performed by: OTOLARYNGOLOGY

## 2021-05-19 PROCEDURE — 1036F TOBACCO NON-USER: CPT | Performed by: OTOLARYNGOLOGY

## 2021-05-19 PROCEDURE — 99213 OFFICE O/P EST LOW 20 MIN: CPT | Performed by: OTOLARYNGOLOGY

## 2021-05-19 PROCEDURE — 3017F COLORECTAL CA SCREEN DOC REV: CPT | Performed by: OTOLARYNGOLOGY

## 2021-05-19 PROCEDURE — G8427 DOCREV CUR MEDS BY ELIG CLIN: HCPCS | Performed by: OTOLARYNGOLOGY

## 2021-05-19 RX ORDER — PREDNISONE 10 MG/1
TABLET ORAL
Qty: 25 TABLET | Refills: 0 | Status: ON HOLD | OUTPATIENT
Start: 2021-05-19 | End: 2021-05-27 | Stop reason: HOSPADM

## 2021-05-19 RX ORDER — BUTALBITAL, ACETAMINOPHEN AND CAFFEINE 50; 325; 40 MG/1; MG/1; MG/1
2 TABLET ORAL EVERY 4 HOURS PRN
Qty: 30 TABLET | Refills: 0 | Status: SHIPPED | OUTPATIENT
Start: 2021-05-19 | End: 2021-11-16

## 2021-05-19 NOTE — PROGRESS NOTES
Patient relates over the past few days pain in her left ear primarily located anterior to it and going down the side of her neck and into the ear. She did have recent dental work done just immediately prior to this onset. She has had no fever. There has been no change in hearing or tinnitus and she has had no vertigo. Currently, she appears in no obvious acute distress. Ear examination reveals normal-appearing tympanic membranes and ear canals bilaterally. There is obvious tenderness overlying the temporomandibular joint on the left indicating a likely neuritis as the precipitating factor for her pain. Nasal examination is unremarkable. Oral examination is likewise unremarkable. Parotid glands appear normal as to the submandibular glands. The neck remains free of any adenopathy, mass, thyroid enlargement. It is suggested that she apply local heat to the area and restrict motion of her mouth a little bit. In addition, prednisone will be given along with Fioricet. She will contact the office in 1 week if no significant improvement might occur.

## 2021-05-24 PROCEDURE — 96372 THER/PROPH/DIAG INJ SC/IM: CPT

## 2021-05-24 PROCEDURE — 99283 EMERGENCY DEPT VISIT LOW MDM: CPT

## 2021-05-24 ASSESSMENT — PAIN SCALES - GENERAL: PAINLEVEL_OUTOF10: 10

## 2021-05-25 ENCOUNTER — APPOINTMENT (OUTPATIENT)
Dept: CT IMAGING | Age: 66
DRG: 446 | End: 2021-05-25
Payer: MEDICARE

## 2021-05-25 ENCOUNTER — ANESTHESIA EVENT (OUTPATIENT)
Dept: ENDOSCOPY | Age: 66
DRG: 446 | End: 2021-05-25
Payer: MEDICARE

## 2021-05-25 ENCOUNTER — HOSPITAL ENCOUNTER (INPATIENT)
Age: 66
LOS: 2 days | Discharge: HOME | DRG: 446 | End: 2021-05-27
Attending: EMERGENCY MEDICINE | Admitting: INTERNAL MEDICINE
Payer: MEDICARE

## 2021-05-25 ENCOUNTER — ANESTHESIA (OUTPATIENT)
Dept: ENDOSCOPY | Age: 66
DRG: 446 | End: 2021-05-25
Payer: MEDICARE

## 2021-05-25 ENCOUNTER — APPOINTMENT (OUTPATIENT)
Dept: GENERAL RADIOLOGY | Age: 66
DRG: 446 | End: 2021-05-25
Payer: MEDICARE

## 2021-05-25 VITALS — DIASTOLIC BLOOD PRESSURE: 62 MMHG | OXYGEN SATURATION: 100 % | SYSTOLIC BLOOD PRESSURE: 131 MMHG

## 2021-05-25 DIAGNOSIS — R10.13 ABDOMINAL PAIN, EPIGASTRIC: ICD-10-CM

## 2021-05-25 DIAGNOSIS — I10 ESSENTIAL HYPERTENSION: ICD-10-CM

## 2021-05-25 DIAGNOSIS — K83.8 PNEUMOBILIA: Primary | ICD-10-CM

## 2021-05-25 LAB
A/G RATIO: 1.1 (ref 1.1–2.2)
ALBUMIN SERPL-MCNC: 4 G/DL (ref 3.4–5)
ALP BLD-CCNC: 85 U/L (ref 40–129)
ALT SERPL-CCNC: 23 U/L (ref 10–40)
ANION GAP SERPL CALCULATED.3IONS-SCNC: 10 MMOL/L (ref 3–16)
AST SERPL-CCNC: 23 U/L (ref 15–37)
BASOPHILS ABSOLUTE: 0 K/UL (ref 0–0.2)
BASOPHILS RELATIVE PERCENT: 0.4 %
BILIRUB SERPL-MCNC: 0.3 MG/DL (ref 0–1)
BUN BLDV-MCNC: 17 MG/DL (ref 7–20)
CALCIUM SERPL-MCNC: 9.7 MG/DL (ref 8.3–10.6)
CHLORIDE BLD-SCNC: 106 MMOL/L (ref 99–110)
CO2: 23 MMOL/L (ref 21–32)
CREAT SERPL-MCNC: 0.9 MG/DL (ref 0.6–1.2)
EKG ATRIAL RATE: 52 BPM
EKG DIAGNOSIS: NORMAL
EKG P AXIS: 36 DEGREES
EKG P-R INTERVAL: 120 MS
EKG Q-T INTERVAL: 436 MS
EKG QRS DURATION: 86 MS
EKG QTC CALCULATION (BAZETT): 405 MS
EKG R AXIS: -1 DEGREES
EKG T AXIS: -7 DEGREES
EKG VENTRICULAR RATE: 52 BPM
EOSINOPHILS ABSOLUTE: 0.3 K/UL (ref 0–0.6)
EOSINOPHILS RELATIVE PERCENT: 4.7 %
GFR AFRICAN AMERICAN: >60
GFR NON-AFRICAN AMERICAN: >60
GLOBULIN: 3.5 G/DL
GLUCOSE BLD-MCNC: 82 MG/DL (ref 70–99)
HCT VFR BLD CALC: 36.5 % (ref 36–48)
HEMOGLOBIN: 11.5 G/DL (ref 12–16)
LIPASE: 13 U/L (ref 13–60)
LYMPHOCYTES ABSOLUTE: 1.8 K/UL (ref 1–5.1)
LYMPHOCYTES RELATIVE PERCENT: 24.1 %
MCH RBC QN AUTO: 28.7 PG (ref 26–34)
MCHC RBC AUTO-ENTMCNC: 31.6 G/DL (ref 31–36)
MCV RBC AUTO: 90.8 FL (ref 80–100)
MONOCYTES ABSOLUTE: 0.4 K/UL (ref 0–1.3)
MONOCYTES RELATIVE PERCENT: 5.9 %
NEUTROPHILS ABSOLUTE: 4.8 K/UL (ref 1.7–7.7)
NEUTROPHILS RELATIVE PERCENT: 64.9 %
PDW BLD-RTO: 14.1 % (ref 12.4–15.4)
PLATELET # BLD: 258 K/UL (ref 135–450)
PMV BLD AUTO: 8.1 FL (ref 5–10.5)
POTASSIUM REFLEX MAGNESIUM: 4.1 MMOL/L (ref 3.5–5.1)
PRO-BNP: 324 PG/ML (ref 0–124)
RBC # BLD: 4.03 M/UL (ref 4–5.2)
REASON FOR REJECTION: NORMAL
REJECTED TEST: NORMAL
SODIUM BLD-SCNC: 139 MMOL/L (ref 136–145)
TOTAL PROTEIN: 7.5 G/DL (ref 6.4–8.2)
TROPONIN: <0.01 NG/ML
WBC # BLD: 7.4 K/UL (ref 4–11)

## 2021-05-25 PROCEDURE — 2500000003 HC RX 250 WO HCPCS: Performed by: INTERNAL MEDICINE

## 2021-05-25 PROCEDURE — 6370000000 HC RX 637 (ALT 250 FOR IP): Performed by: EMERGENCY MEDICINE

## 2021-05-25 PROCEDURE — 2500000003 HC RX 250 WO HCPCS: Performed by: NURSE ANESTHETIST, CERTIFIED REGISTERED

## 2021-05-25 PROCEDURE — 6370000000 HC RX 637 (ALT 250 FOR IP): Performed by: NURSE PRACTITIONER

## 2021-05-25 PROCEDURE — 6360000002 HC RX W HCPCS: Performed by: EMERGENCY MEDICINE

## 2021-05-25 PROCEDURE — 96374 THER/PROPH/DIAG INJ IV PUSH: CPT

## 2021-05-25 PROCEDURE — 96375 TX/PRO/DX INJ NEW DRUG ADDON: CPT

## 2021-05-25 PROCEDURE — 84484 ASSAY OF TROPONIN QUANT: CPT

## 2021-05-25 PROCEDURE — 94640 AIRWAY INHALATION TREATMENT: CPT

## 2021-05-25 PROCEDURE — 1200000000 HC SEMI PRIVATE

## 2021-05-25 PROCEDURE — 6370000000 HC RX 637 (ALT 250 FOR IP): Performed by: INTERNAL MEDICINE

## 2021-05-25 PROCEDURE — 80053 COMPREHEN METABOLIC PANEL: CPT

## 2021-05-25 PROCEDURE — 0DBA8ZX EXCISION OF JEJUNUM, VIA NATURAL OR ARTIFICIAL OPENING ENDOSCOPIC, DIAGNOSTIC: ICD-10-PCS | Performed by: INTERNAL MEDICINE

## 2021-05-25 PROCEDURE — 87040 BLOOD CULTURE FOR BACTERIA: CPT

## 2021-05-25 PROCEDURE — 93010 ELECTROCARDIOGRAM REPORT: CPT | Performed by: INTERNAL MEDICINE

## 2021-05-25 PROCEDURE — 71045 X-RAY EXAM CHEST 1 VIEW: CPT

## 2021-05-25 PROCEDURE — 96372 THER/PROPH/DIAG INJ SC/IM: CPT

## 2021-05-25 PROCEDURE — 36415 COLL VENOUS BLD VENIPUNCTURE: CPT

## 2021-05-25 PROCEDURE — 2580000003 HC RX 258: Performed by: ANESTHESIOLOGY

## 2021-05-25 PROCEDURE — 2709999900 HC NON-CHARGEABLE SUPPLY: Performed by: INTERNAL MEDICINE

## 2021-05-25 PROCEDURE — 83690 ASSAY OF LIPASE: CPT

## 2021-05-25 PROCEDURE — 0DB58ZX EXCISION OF ESOPHAGUS, VIA NATURAL OR ARTIFICIAL OPENING ENDOSCOPIC, DIAGNOSTIC: ICD-10-PCS | Performed by: INTERNAL MEDICINE

## 2021-05-25 PROCEDURE — 93005 ELECTROCARDIOGRAM TRACING: CPT | Performed by: EMERGENCY MEDICINE

## 2021-05-25 PROCEDURE — G0378 HOSPITAL OBSERVATION PER HR: HCPCS

## 2021-05-25 PROCEDURE — 7100000000 HC PACU RECOVERY - FIRST 15 MIN: Performed by: INTERNAL MEDICINE

## 2021-05-25 PROCEDURE — 7100000001 HC PACU RECOVERY - ADDTL 15 MIN: Performed by: INTERNAL MEDICINE

## 2021-05-25 PROCEDURE — 3609012400 HC EGD TRANSORAL BIOPSY SINGLE/MULTIPLE: Performed by: INTERNAL MEDICINE

## 2021-05-25 PROCEDURE — 85025 COMPLETE CBC W/AUTO DIFF WBC: CPT

## 2021-05-25 PROCEDURE — 3700000000 HC ANESTHESIA ATTENDED CARE: Performed by: INTERNAL MEDICINE

## 2021-05-25 PROCEDURE — 6360000002 HC RX W HCPCS: Performed by: NURSE ANESTHETIST, CERTIFIED REGISTERED

## 2021-05-25 PROCEDURE — 96376 TX/PRO/DX INJ SAME DRUG ADON: CPT

## 2021-05-25 PROCEDURE — 0DB68ZX EXCISION OF STOMACH, VIA NATURAL OR ARTIFICIAL OPENING ENDOSCOPIC, DIAGNOSTIC: ICD-10-PCS | Performed by: INTERNAL MEDICINE

## 2021-05-25 PROCEDURE — 83880 ASSAY OF NATRIURETIC PEPTIDE: CPT

## 2021-05-25 PROCEDURE — 88305 TISSUE EXAM BY PATHOLOGIST: CPT

## 2021-05-25 PROCEDURE — G1010 CDSM STANSON: HCPCS

## 2021-05-25 RX ORDER — LABETALOL 200 MG/1
200 TABLET, FILM COATED ORAL 2 TIMES DAILY
Status: DISCONTINUED | OUTPATIENT
Start: 2021-05-25 | End: 2021-05-27 | Stop reason: HOSPADM

## 2021-05-25 RX ORDER — ALBUTEROL SULFATE 90 UG/1
2 AEROSOL, METERED RESPIRATORY (INHALATION) EVERY 6 HOURS PRN
Status: DISCONTINUED | OUTPATIENT
Start: 2021-05-25 | End: 2021-05-27 | Stop reason: HOSPADM

## 2021-05-25 RX ORDER — LOSARTAN POTASSIUM 100 MG/1
100 TABLET ORAL DAILY
Status: DISCONTINUED | OUTPATIENT
Start: 2021-05-25 | End: 2021-05-27 | Stop reason: HOSPADM

## 2021-05-25 RX ORDER — CALCIUM CARBONATE 200(500)MG
500 TABLET,CHEWABLE ORAL 3 TIMES DAILY PRN
Status: DISCONTINUED | OUTPATIENT
Start: 2021-05-25 | End: 2021-05-27 | Stop reason: HOSPADM

## 2021-05-25 RX ORDER — LEVOTHYROXINE SODIUM 0.07 MG/1
37.5 TABLET ORAL DAILY
Status: DISCONTINUED | OUTPATIENT
Start: 2021-05-25 | End: 2021-05-27 | Stop reason: HOSPADM

## 2021-05-25 RX ORDER — FLUTICASONE PROPIONATE 50 MCG
1 SPRAY, SUSPENSION (ML) NASAL DAILY
Status: DISCONTINUED | OUTPATIENT
Start: 2021-05-25 | End: 2021-05-27 | Stop reason: HOSPADM

## 2021-05-25 RX ORDER — PANTOPRAZOLE SODIUM 40 MG/1
40 TABLET, DELAYED RELEASE ORAL
Status: DISCONTINUED | OUTPATIENT
Start: 2021-05-25 | End: 2021-05-25

## 2021-05-25 RX ORDER — ESOMEPRAZOLE MAGNESIUM 40 MG/1
40 CAPSULE, DELAYED RELEASE ORAL DAILY
Status: DISCONTINUED | OUTPATIENT
Start: 2021-05-25 | End: 2021-05-25 | Stop reason: CLARIF

## 2021-05-25 RX ORDER — PROMETHAZINE HYDROCHLORIDE 25 MG/ML
6.25 INJECTION, SOLUTION INTRAMUSCULAR; INTRAVENOUS EVERY 6 HOURS PRN
Status: DISCONTINUED | OUTPATIENT
Start: 2021-05-25 | End: 2021-05-27 | Stop reason: HOSPADM

## 2021-05-25 RX ORDER — ACETAMINOPHEN 325 MG/1
650 TABLET ORAL EVERY 4 HOURS PRN
Status: DISCONTINUED | OUTPATIENT
Start: 2021-05-25 | End: 2021-05-27 | Stop reason: HOSPADM

## 2021-05-25 RX ORDER — BUTALBITAL, ACETAMINOPHEN AND CAFFEINE 50; 325; 40 MG/1; MG/1; MG/1
2 TABLET ORAL EVERY 4 HOURS PRN
Status: DISCONTINUED | OUTPATIENT
Start: 2021-05-25 | End: 2021-05-27 | Stop reason: HOSPADM

## 2021-05-25 RX ORDER — SUCRALFATE 1 G/1
1 TABLET ORAL EVERY 6 HOURS SCHEDULED
Status: DISCONTINUED | OUTPATIENT
Start: 2021-05-25 | End: 2021-05-27 | Stop reason: HOSPADM

## 2021-05-25 RX ORDER — ONDANSETRON 4 MG/1
4 TABLET, ORALLY DISINTEGRATING ORAL ONCE
Status: COMPLETED | OUTPATIENT
Start: 2021-05-25 | End: 2021-05-25

## 2021-05-25 RX ORDER — HYDRALAZINE HYDROCHLORIDE 20 MG/ML
10 INJECTION INTRAMUSCULAR; INTRAVENOUS ONCE
Status: COMPLETED | OUTPATIENT
Start: 2021-05-25 | End: 2021-05-25

## 2021-05-25 RX ORDER — SODIUM CHLORIDE 9 MG/ML
INJECTION, SOLUTION INTRAVENOUS CONTINUOUS
Status: DISCONTINUED | OUTPATIENT
Start: 2021-05-25 | End: 2021-05-25

## 2021-05-25 RX ORDER — PROPOFOL 10 MG/ML
INJECTION, EMULSION INTRAVENOUS PRN
Status: DISCONTINUED | OUTPATIENT
Start: 2021-05-25 | End: 2021-05-25 | Stop reason: SDUPTHER

## 2021-05-25 RX ORDER — LIDOCAINE HYDROCHLORIDE 20 MG/ML
INJECTION, SOLUTION INFILTRATION; PERINEURAL PRN
Status: DISCONTINUED | OUTPATIENT
Start: 2021-05-25 | End: 2021-05-25 | Stop reason: SDUPTHER

## 2021-05-25 RX ORDER — OXYBUTYNIN CHLORIDE 5 MG/1
5 TABLET, EXTENDED RELEASE ORAL DAILY
Status: DISCONTINUED | OUTPATIENT
Start: 2021-05-25 | End: 2021-05-27 | Stop reason: HOSPADM

## 2021-05-25 RX ORDER — FLUTICASONE PROPIONATE 220 UG/1
2 AEROSOL, METERED RESPIRATORY (INHALATION) 2 TIMES DAILY
Status: DISCONTINUED | OUTPATIENT
Start: 2021-05-25 | End: 2021-05-27 | Stop reason: HOSPADM

## 2021-05-25 RX ORDER — FERROUS SULFATE 325(65) MG
325 TABLET ORAL
Status: DISCONTINUED | OUTPATIENT
Start: 2021-05-26 | End: 2021-05-27 | Stop reason: HOSPADM

## 2021-05-25 RX ORDER — ERGOCALCIFEROL 1.25 MG/1
50000 CAPSULE ORAL WEEKLY
COMMUNITY

## 2021-05-25 RX ORDER — HYDROMORPHONE HYDROCHLORIDE 1 MG/ML
1 INJECTION, SOLUTION INTRAMUSCULAR; INTRAVENOUS; SUBCUTANEOUS
Status: COMPLETED | OUTPATIENT
Start: 2021-05-25 | End: 2021-05-25

## 2021-05-25 RX ORDER — ONDANSETRON 2 MG/ML
4 INJECTION INTRAMUSCULAR; INTRAVENOUS EVERY 6 HOURS PRN
Status: DISCONTINUED | OUTPATIENT
Start: 2021-05-25 | End: 2021-05-27 | Stop reason: HOSPADM

## 2021-05-25 RX ADMIN — ALUMINUM HYDROXIDE, MAGNESIUM HYDROXIDE, AND SIMETHICONE: 200; 200; 20 SUSPENSION ORAL at 09:04

## 2021-05-25 RX ADMIN — LEVOTHYROXINE SODIUM 37.5 MCG: 0.07 TABLET ORAL at 16:59

## 2021-05-25 RX ADMIN — LOSARTAN POTASSIUM 100 MG: 100 TABLET, FILM COATED ORAL at 16:59

## 2021-05-25 RX ADMIN — Medication 2 PUFF: at 21:50

## 2021-05-25 RX ADMIN — SODIUM CHLORIDE: 9 INJECTION, SOLUTION INTRAVENOUS at 13:47

## 2021-05-25 RX ADMIN — HYDROMORPHONE HYDROCHLORIDE 1 MG: 1 INJECTION, SOLUTION INTRAMUSCULAR; INTRAVENOUS; SUBCUTANEOUS at 04:52

## 2021-05-25 RX ADMIN — FAMOTIDINE 20 MG: 10 INJECTION, SOLUTION INTRAVENOUS at 22:34

## 2021-05-25 RX ADMIN — HYDRALAZINE HYDROCHLORIDE 10 MG: 20 INJECTION INTRAMUSCULAR; INTRAVENOUS at 09:04

## 2021-05-25 RX ADMIN — PROPOFOL 20 MG: 10 INJECTION, EMULSION INTRAVENOUS at 14:02

## 2021-05-25 RX ADMIN — FAMOTIDINE 20 MG: 10 INJECTION, SOLUTION INTRAVENOUS at 09:04

## 2021-05-25 RX ADMIN — SUCRALFATE 1 G: 1 TABLET ORAL at 16:58

## 2021-05-25 RX ADMIN — LIDOCAINE HYDROCHLORIDE 80 MG: 20 INJECTION, SOLUTION INFILTRATION; PERINEURAL at 13:59

## 2021-05-25 RX ADMIN — PROPOFOL 100 MG: 10 INJECTION, EMULSION INTRAVENOUS at 13:59

## 2021-05-25 RX ADMIN — ACETAMINOPHEN 650 MG: 325 TABLET ORAL at 22:33

## 2021-05-25 RX ADMIN — OXYBUTYNIN CHLORIDE 5 MG: 5 TABLET, EXTENDED RELEASE ORAL at 16:58

## 2021-05-25 RX ADMIN — ONDANSETRON 4 MG: 4 TABLET, ORALLY DISINTEGRATING ORAL at 04:52

## 2021-05-25 RX ADMIN — LABETALOL HYDROCHLORIDE 200 MG: 200 TABLET, FILM COATED ORAL at 22:34

## 2021-05-25 RX ADMIN — HYDROMORPHONE HYDROCHLORIDE 1 MG: 1 INJECTION, SOLUTION INTRAMUSCULAR; INTRAVENOUS; SUBCUTANEOUS at 08:18

## 2021-05-25 ASSESSMENT — PAIN SCALES - GENERAL
PAINLEVEL_OUTOF10: 10
PAINLEVEL_OUTOF10: 4
PAINLEVEL_OUTOF10: 10
PAINLEVEL_OUTOF10: 7

## 2021-05-25 ASSESSMENT — PAIN DESCRIPTION - LOCATION: LOCATION: ABDOMEN

## 2021-05-25 ASSESSMENT — PULMONARY FUNCTION TESTS
PIF_VALUE: 1
PIF_VALUE: 0
PIF_VALUE: 1

## 2021-05-25 ASSESSMENT — PAIN DESCRIPTION - DESCRIPTORS: DESCRIPTORS: ACHING

## 2021-05-25 ASSESSMENT — PAIN - FUNCTIONAL ASSESSMENT: PAIN_FUNCTIONAL_ASSESSMENT: 0-10

## 2021-05-25 NOTE — PROGRESS NOTES
Routine vitals stable. Scheduled medications given. No complaints of pain. Pt denies any further needs at this time. Call light within reach. Will continue to monitor.

## 2021-05-25 NOTE — ED NOTES
Bed: 23  Expected date:   Expected time:   Means of arrival: Walk In  Comments:     Scooter Littlejohn RN  05/25/21 [id number]

## 2021-05-25 NOTE — CONSULTS
Gastroenterology Consult Note        Patient: Tabatha Finn  : 1955  Acct#:      Date:  2021    Subjective:       History of Present Illness  Patient is a 63 y.o.  female admitted with Epigastric abdominal pain [R10.13] who is seen in consult for Abdominal pain. Her pain started on  and has been getting worse since then. Yesterday pain started to radiate towards her back and she became nauseated/vomiting. She has \"never had this pain before\", no diarrhea, melena or hematochezia. She describes emesis as green/white in color. Unable to eat since yesterday. She has had prior gastric bypass surgery, a prior choley, a prior hiatal hernia repair, and a ventral hernia repair with mesh. She has multiple drug allergies, states she is allergic to Protonix and that this caused her to have an asthma attack. She denies NSAID use since her gastric bypass.        Past Medical History:   Diagnosis Date    Anxiety state     Asthma     Back ache     chronic    Environmental allergies     GERD (gastroesophageal reflux disease)     Hypertension     MVP (mitral valve prolapse)     Neck pain, chronic     Reflux     Sleep apnea       Past Surgical History:   Procedure Laterality Date    BACK SURGERY      BLADDER SUSPENSION      CHOLECYSTECTOMY      FOOT SURGERY  12    endoscopic plantar fasciotomy right foot    GASTRIC BYPASS SURGERY      HERNIA REPAIR      HIATAL HERNIA REPAIR      HYSTERECTOMY      JOINT REPLACEMENT Bilateral     KNEE    KNEE ARTHROPLASTY  13    L    KNEE ARTHROSCOPY Right 3/7/2014    LAMINECTOMY  11    bilateral L5-S1 laminotomy, nerve root exploration, foraminotomy    NASAL SINUS SURGERY Right 2018    Excision of rt nasal mass    NM COLONOSCOPY FLX DX W/COLLJ SPEC WHEN PFRMD N/A 2018    COLONOSCOPY DIAGNOSTIC OR SCREENING performed by Wero Bass MD at [address] [place] [address] DX/THER AGNT PARAVERT FACET JOINT, LUMBAR/SAC, 1ST LEVEL Right 11/6/2018    RIGHT L4,L5 S1 MEDIAL BRANCH BLOCK WITH FLUOROSCOPY performed by Magalie Escamilla MD at [address] [address], PARTIAL      TONSILLECTOMY      UPPER GASTROINTESTINAL ENDOSCOPY      with dilatation    UPPER GASTROINTESTINAL ENDOSCOPY N/A 8/14/2019    EGD ESOPHAGOGASTRODUODENOSCOPY performed by Denise Palacios MD at [place] [address]      Past Endoscopic History    EGD 8/14/2018 with Dr. Jesus Gaming:  Normal upper gastrointestinal endoscopy, status post  gastrojejunostomy.     PLAN:  I will next obtain a small bowel series to evaluate for the  possibility of a partial distal obstruction. The patient likely is just  symptomatic from her postsurgical changes. Colonoscopy 9/28/2018 with Dr. Jesus Gaming:  1. Normal colonoscopy. 2.  External hemorrhoids.     PLAN:  By process of exclusion, the hemorrhoids appear to be causing the  bleeding. These can be treated with topical measures. The patient should  undergo a surveillance colonoscopy in 5 years. Admission Meds  No current facility-administered medications on file prior to encounter. Current Outpatient Medications on File Prior to Encounter   Medication Sig Dispense Refill    predniSONE (DELTASONE) 10 MG tablet Take 2 tablets bid for 3 days, 1 tablet bid for 2 days, 1 tablet daily for 2 days, then one half tablet daily 25 tablet 0    butalbital-acetaminophen-caffeine (FIORICET, ESGIC) -40 MG per tablet Take 2 tablets by mouth every 4 hours as needed for Headaches May take 2 tablets every 4 hours as needed for pain 30 tablet 0    methylPREDNISolone (MEDROL, VELMA,) 4 MG tablet Take 1 tablet by mouth See Admin Instructions Take by mouth.  (Patient not taking: Reported on 5/19/2021) 1 kit 0    azithromycin (ZITHROMAX) 250 MG tablet Dispense one 5 day supply pack and take as directed (Patient not taking: Reported on 5/19/2021) 1 packet 0    ferrous sulfate (IRON 325) 325 (65 Fe) MG tablet Take 1 tablet by mouth daily (with breakfast) 90 tablet 3    losartan (COZAAR) 100 MG tablet Take 1 tablet by mouth daily 90 tablet 3    labetalol (NORMODYNE) 200 MG tablet TAKE 1 TABLET BY MOUTH TWICE DAILY 180 tablet 3    oxybutynin (DITROPAN-XL) 5 MG extended release tablet TAKE 1 TABLET BY MOUTH DAILY 90 tablet 3    fluticasone (FLOVENT HFA) 220 MCG/ACT inhaler Inhale 2 puffs into the lungs 2 times daily 12 g 5    albuterol sulfate  (90 Base) MCG/ACT inhaler Inhale 2 puffs into the lungs every 6 hours as needed for Wheezing 18 g 5    levothyroxine (SYNTHROID) 75 MCG tablet Take 1 tablet by mouth Daily Indications: TAKES 1/2 TABLET 90 tablet 2    fluticasone (FLONASE) 50 MCG/ACT nasal spray SHAKE LIQUID AND USE 2 SPRAYS IN EACH NOSTRIL DAILY 48 g 3    esomeprazole (NEXIUM) 40 MG delayed release capsule Take 1 capsule by mouth daily 90 capsule 3    prednisoLONE acetate (PRED FORTE) 1 % ophthalmic suspension Place 1 drop into both eyes 2 times daily (Patient not taking: Reported on 2021) 5 mL 1       Patient denies ASA, NSAID use.     Allergies  Allergies   Allergen Reactions    Gabapentin Other (See Comments)     Memory Loss    Protonix [Pantoprazole Sodium] Shortness Of Breath and Rash    Tramadol Itching    Amoxicillin     Aspirin     Augmentin [Amoxicillin-Pot Clavulanate]     Betadine [Povidone Iodine] Other (See Comments)     welts    Chlorhexidine Gluconate Other (See Comments)     Welts from ChloraPrep    Morphine Itching    Other      chlorahexidine - hives    Toradol [Ketorolac Tromethamine]     Iodides Rash    Ranitidine Nausea And Vomiting    Tape [facility] Tape] Rash     teradon tape also  Paper tape ok       Social   Social History     Tobacco Use    Smoking status: Former Smoker     Packs/day: 0.25     Years: 2.00     Pack years: 0.50     Quit date: 2004     Years since quittin.4    Smokeless tobacco: Never Used    Tobacco comment: [id number]   Substance Use Topics    Alcohol use: No        Family History   Problem Relation Age of Onset    High Blood Pressure Mother     Heart Disease Mother     Other Mother         glaucoma    High Blood Pressure Father     Heart Disease Father     Other Father         intestional problems    Cancer Sister         cancer    Kidney Disease Sister     Kidney Disease Brother     High Blood Pressure Brother     Other Sister         lupus,fibromyalgia  thyroid surgery    High Blood Pressure Sister     Kidney Disease Sister     Heart Disease Sister     Kidney Disease Brother     High Blood Pressure Brother     Anesth Problems Neg Hx     Malig Hyperten Neg Hx     Hypotension Neg Hx     Malig Hypertherm Neg Hx     Pseudochol. Deficiency Neg Hx         Review of Systems  Constitutional: negative for fevers, chills, sweats    Ears, nose, mouth, throat, and face: negative for nasal congestion and sore throat   Respiratory: negative for cough and shortness of breath   Cardiovascular: negative for chest pain and dyspnea   Gastrointestinal: see hpi   Genitourinary:negative for dysuria and frequency   Integument/breast: negative for pruritus and rash   Hematologic/lymphatic: negative for bleeding and easy bruising   Musculoskeletal:negative for arthralgias and myalgias   Neurological: negative for dizziness and weakness         Physical Exam  Blood pressure (!) 200/107, pulse (!) 46, temperature 98.5 °F (36.9 °C), resp. rate 16, height 5' 7\" (1.702 m), weight 220 lb (99.8 kg), SpO2 99 %, not currently breastfeeding. General appearance: alert, cooperative, no distress, appears stated age  Eyes: Anicteric  Head: Normocephalic, without obvious abnormality  Lungs: clear to auscultation bilaterally, Normal Effort  Heart: regular rate and rhythm, normal S1 and S2, no murmurs or rubs  Abdomen: soft, non-tender. Bowel sounds normal. No masses,  no organomegaly.    Extremities: atraumatic, no cyanosis or edema  Skin: warm and dry, no jaundice  Neuro: Grossly intact, A&OX3  Musculoskeletal:   strength BUE      Data Review:    Recent Labs     21  0550   WBC 7.4   HGB 11.5*   HCT 36.5   MCV 90.8        Recent Labs     21  0357      K 4.1      CO2 23   BUN 17   CREATININE 0.9     Recent Labs     21  0357   AST 23   ALT 23   BILITOT 0.3   ALKPHOS 85     Recent Labs     21  0357   LIPASE 13.0     No results for input(s): PROTIME, INR in the last 72 hours. No results for input(s): PTT in the last 72 hours. No results for input(s): OCCULTBLD in the last 72 hours. Imaging Studies:                                         CT-scan of abdomen and pelvis WO contrast 21  Impression   1. Pneumobilia. 2. Changes from gastric bypass again seen. 3. Hiatal hernia. Assessment:     Active Problems:    Epigastric abdominal pain  Resolved Problems:    * No resolved hospital problems. *    Epigastric pain/Nausea/Vomiting: S/P gastric bypass, S/p Monica. Pneumobilia seen on non-contrast CT. Started , now a/w nausea, vomiting, and back pain. No melena, hematochezia, or hematemesis. Pneumobilia: Patient stated in  consult note with Dr. Julio C Yu that she had [place]. She is not ceratin at this time. Prior ERCP can explain Pneumobilia. LFT normal on presentation. Afebrile with normal white count. Recommendations:   - EGD today with Dr. Antolin Suarez, schedule permitting.   - Unable to give PPI, will give Pepcid BID.   - Hypertension/ pain management per Primary team.     Discussed with Dr. Dong Chatterjee, [address] [place] [address]     I have personally performed a face to face diagnostic evaluation on this patient. I have interviewed and examined the patient and I agree with the findings and recommended plan of care.   In summary, my findings and plan are the followin-year-old -American female with history of gastric bypass and ERCP after gallbladder surgery. She presents with several days of abdominal and back pain. She has normal liver enzymes and lipase. CT scan was unremarkable except for pneumobilia. Vital signs are stable abdomen is soft tender in the right upper quadrant and epigastric area, no rebound or guarding  Impression and plan: Severe epigastric and right upper quadrant pain. History of gastric bypass. Previous ERCP can explain the benign pneumobilia. LFTs and pancreatic enzymes normal.  IV H2 blocker (patient is allergic/intolerant to PPI), will plan for EGD later today. If EGD is negative, will order a small bowel follow-through to rule out an internal intussusception as a cause of her abdominal pain.     Ángel Carpenter MD, MSc  Fernando Lorenzo  05/25/21

## 2021-05-25 NOTE — PROGRESS NOTES
Patient arrived from ENDO to PACU spot 6. Attached to monitoring system. Report received per CRNA and ENDO nurse. No problems reported. Patient arrived drowsy but without complaints at this time. VSS.

## 2021-05-25 NOTE — PROGRESS NOTES
Admission orders placed toward end of shift. I did not see patient. Patient will be evaluated by my colleague later today, at which point, complete H&P will be dictated.

## 2021-05-25 NOTE — PROGRESS NOTES
Patient taken back to room and assisted to  Bed. Bedside report given to Moinca MARTINES. All questions and concerns addressed. VSS.

## 2021-05-25 NOTE — ANESTHESIA PRE PROCEDURE
Department of Anesthesiology  Preprocedure Note       Name:  Colton Dempsey   Age:  63 y.o.  :  1955                                          MRN:  [id number]         Date:  2021      Surgeon: Justin Puga):  Lana Pepe MD    Procedure: Procedure(s):  EGD DIAGNOSTIC ONLY    Medications prior to admission:   Prior to Admission medications    Medication Sig Start Date End Date Taking? Authorizing Provider   predniSONE (DELTASONE) 10 MG tablet Take 2 tablets bid for 3 days, 1 tablet bid for 2 days, 1 tablet daily for 2 days, then one half tablet daily 21   Sandro Jacobs MD   butalbital-acetaminophen-caffeine (FIORICET, ESGIC) -85 MG per tablet Take 2 tablets by mouth every 4 hours as needed for Headaches May take 2 tablets every 4 hours as needed for pain 21   Sandro Jacobs MD   methylPREDNISolone (MEDROL, VELMA,) 4 MG tablet Take 1 tablet by mouth See Admin Instructions Take by mouth.   Patient not taking: Reported on 2021   Sandro Jacobs MD   [facility]) 250 MG tablet Dispense one 5 day supply pack and take as directed  Patient not taking: Reported on 2021   Sandro Jacobs MD   ferrous sulfate (IRON 325) 325 (65 Fe) MG tablet Take 1 tablet by mouth daily (with breakfast) 21   Yue Ybarra MD   losartan (COZAAR) 100 MG tablet Take 1 tablet by mouth daily 21   Yue Ybarra MD   labetalol (NORMODYNE) 200 MG tablet TAKE 1 TABLET BY MOUTH TWICE DAILY 21   Yue Ybarra MD   oxybutynin (DITROPAN-XL) 5 MG extended release tablet TAKE 1 TABLET BY MOUTH DAILY 21   Yue Ybarra MD   fluticasone (FLOVENT HFA) 220 MCG/ACT inhaler Inhale 2 puffs into the lungs 2 times daily 21   RIVERA Sanchez   albuterol sulfate  (90 Base) MCG/ACT inhaler Inhale 2 puffs into the lungs every 6 hours as needed for Wheezing 21   RIVERA Sanchez   levothyroxine (SYNTHROID) 75 MCG tablet Take 1 tablet by mouth Daily Indications: TAKES 1/2 TABLET 3/11/21   David Godoy MD   fluticasone Nathaneil Hausen) 50 MCG/ACT nasal spray SHAKE LIQUID AND USE 2 SPRAYS IN [facility] NOSTRIL DAILY 3/4/21   Jonelle Merida MD   esomeprazole (NEXIUM) 40 MG delayed release capsule Take 1 capsule by mouth daily 11/10/20   David Godoy MD   prednisoLONE acetate (PRED FORTE) 1 % ophthalmic suspension Place 1 drop into both eyes 2 times daily  Patient not taking: Reported on 5/19/2021 7/14/20   Jonelle Merida MD       Current medications:    Current Facility-Administered Medications   Medication Dose Route Frequency Provider Last Rate Last Admin    calcium carbonate (TUMS) chewable tablet 500 mg  500 mg Oral TID PRN Mike Hu MD        famotidine (PEPCID) injection 20 mg  20 mg Intravenous BID Mike Hu MD   20 mg at 05/25/21 3647    sucralfate (CARAFATE) tablet 1 g  1 g Oral 4 times per day Mike Hu MD         Current Outpatient Medications   Medication Sig Dispense Refill    predniSONE (DELTASONE) 10 MG tablet Take 2 tablets bid for 3 days, 1 tablet bid for 2 days, 1 tablet daily for 2 days, then one half tablet daily 25 tablet 0    butalbital-acetaminophen-caffeine (FIORICET, ESGIC) -40 MG per tablet Take 2 tablets by mouth every 4 hours as needed for Headaches May take 2 tablets every 4 hours as needed for pain 30 tablet 0    methylPREDNISolone (MEDROL, VELMA,) 4 MG tablet Take 1 tablet by mouth See Admin Instructions Take by mouth.  (Patient not taking: Reported on 5/19/2021) 1 kit 0    azithromycin (ZITHROMAX) 250 MG tablet Dispense one 5 day supply pack and take as directed (Patient not taking: Reported on 5/19/2021) 1 packet 0    ferrous sulfate (IRON 325) 325 (65 Fe) MG tablet Take 1 tablet by mouth daily (with breakfast) 90 tablet 3    losartan (COZAAR) 100 MG tablet Take 1 tablet by mouth daily 90 tablet 3    labetalol (NORMODYNE) 200 MG tablet TAKE 1 TABLET BY MOUTH TWICE DAILY 180 Abdominal pain, other specified site R10.9    HTN (hypertension), malignant I10    Abdominal pain R10.9    Abdominal pain, epigastric R10.13    Subclinical hyperthyroidism E05.90    Thyroid nodule E04.1    Allergic sinusitis J30.9    Pharyngitis J02.9    Bilateral impacted cerumen H61.23    Nose septum deviation J34.2    Anemia D64.9    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 80    Morbidly obese (HCC) E66.01    Epigastric abdominal pain R10.13       Past Medical History:        Diagnosis Date    Anxiety state     Asthma     Back ache     chronic    Environmental allergies     GERD (gastroesophageal reflux disease)     Hypertension     MVP (mitral valve prolapse)     Neck pain, chronic     Reflux     Sleep apnea        Past Surgical History:        Procedure Laterality Date    BACK SURGERY      BLADDER SUSPENSION      CHOLECYSTECTOMY      FOOT SURGERY  11-9-12    endoscopic plantar fasciotomy right foot    GASTRIC BYPASS SURGERY      HERNIA REPAIR      HIATAL HERNIA REPAIR      HYSTERECTOMY      JOINT REPLACEMENT Bilateral     KNEE    KNEE ARTHROPLASTY  2/28/13    L    KNEE ARTHROSCOPY Right 3/7/2014    LAMINECTOMY  7/1/11    bilateral L5-S1 laminotomy, nerve root exploration, foraminotomy    NASAL SINUS SURGERY Right 02/05/2018    Excision of rt nasal mass    CT COLONOSCOPY FLX DX W/COLLJ SPEC WHEN PFRMD N/A 9/28/2018    COLONOSCOPY DIAGNOSTIC OR SCREENING performed by Vega Chavez MD at [address] [place] [address] DX/THER AGNT PARAVERT FACET JOINT, LUMBAR/SAC, 1ST LEVEL Right 11/6/2018    RIGHT L4,L5 S1 MEDIAL BRANCH BLOCK WITH FLUOROSCOPY performed by Benitez Parker MD at [facility]  SPLENECTOMY, PARTIAL      TONSILLECTOMY      UPPER GASTROINTESTINAL ENDOSCOPY      with dilatation    UPPER GASTROINTESTINAL ENDOSCOPY N/A 2019    EGD ESOPHAGOGASTRODUODENOSCOPY performed by Mariann Santillan MD at [address] [place] [address] History:    Social History     Tobacco Use    Smoking status: Former Smoker     Packs/day: 0.25     Years: 2.00     Pack years: 0.50     Quit date: 2004     Years since quittin.4    Smokeless tobacco: Never Used    Tobacco comment: [id number]   Substance Use Topics    Alcohol use: No                                Counseling given: Not Answered  Comment: [id number]      Vital Signs (Current):   Vitals:    21 0930 21 1000 21 1100 21 1200   BP: (!) 187/91 (!) 193/95 (!) 197/93 (!) 225/105   Pulse: 65 63 56 55   Resp:       Temp:       SpO2: 99% 97% 100% 98%   Weight:       Height:                                                  BP Readings from Last 3 Encounters:   21 (!) 225/105   21 134/85   21 (!) 154/91       NPO Status:  before mn                                                                                BMI:   Wt Readings from Last 3 Encounters:   21 220 lb (99.8 kg)   21 226 lb 6.4 oz (102.7 kg)   21 222 lb (100.7 kg)     Body mass index is 34.46 kg/m².     CBC:   Lab Results   Component Value Date    WBC 7.4 2021    RBC 4.03 2021    HGB 11.5 2021    HCT 36.5 2021    MCV 90.8 2021    RDW 14.1 2021     2021       CMP:   Lab Results   Component Value Date     2021    K 4.1 2021     2021    CO2 23 2021    BUN 17 2021    CREATININE 0.9 2021    GFRAA >60 2021    GFRAA >60 06/15/2013    AGRATIO 1.1 2021    LABGLOM >60 2021    GLUCOSE 82 2021    PROT 7.5 2021    PROT 7.8 2013    CALCIUM 9.7 2021    BILITOT 0.3 2021    ALKPHOS 85 2021 AST 23 05/25/2021    ALT 23 05/25/2021       POC Tests: No results for input(s): POCGLU, POCNA, POCK, POCCL, POCBUN, POCHEMO, POCHCT in the last 72 hours. Coags:   Lab Results   Component Value Date    PROTIME 21.3 03/04/2013    INR 1.93 03/04/2013    APTT 29.2 02/22/2013       HCG (If Applicable):   Lab Results   Component Value Date    PREGTESTUR negative 10/09/2014        ABGs: No results found for: PHART, PO2ART, UZG6CCF, LTI3DWJ, BEART, X7QENAXG     Type & Screen (If Applicable):  Lab Results   Component Value Date    LABABO O 02/22/2013    LABRH Positive 02/22/2013       Drug/Infectious Status (If Applicable):  No results found for: HIV, HEPCAB    COVID-19 Screening (If Applicable):   Lab Results   Component Value Date    COVID19 NOT DETECTED 12/08/2020           Anesthesia Evaluation  Patient summary reviewed  Airway: Mallampati: II  TM distance: >3 FB   Neck ROM: full  Mouth opening: > = 3 FB Dental: normal exam   (+) upper dentures  Comment: Poor dentition ,missing lower teeth ,dental decay    Pulmonary:normal exam  breath sounds clear to auscultation  (+) sleep apnea:  asthma:                            Cardiovascular:  Exercise tolerance: good (>4 METS),   (+) hypertension:,         Rhythm: regular  Rate: normal    Stress test reviewed             ROS comment: 2020  Stress ECG conclusions: [facility] scoring: exercise time of 6.5min;     maximum ST deviation of 0mm; no angina; resulting score is 7.     This score predicts a low risk of cardiac events. - Stress echo: Normal echo stress. Neuro/Psych:   (+) headaches:,             GI/Hepatic/Renal:   (+) hiatal hernia, GERD:,           Endo/Other:    (+) hyperthyroidism::., .                 Abdominal:           Vascular: negative vascular ROS. Anesthesia Plan      MAC     ASA 3       Induction: intravenous. Anesthetic plan and risks discussed with patient. Plan discussed with CRNA.     Attending anesthesiologist reviewed and agrees with Leno Urbina MD   5/25/2021

## 2021-05-25 NOTE — ED PROVIDER NOTES
[facility] Emergency Department    CHIEF COMPLAINT  Chief Complaint   Patient presents with    Flank Pain     pt reporting right side flank pain that wraps around with nausea since last night, pt reports she had her gallbladder taken out 2007 [address]  Dinorah Rea is a 63 y.o. female  who presents to the ED complaining of epigastric abdominal pain radiating to the RUQ and R flank onset last night into this morning. Caused her to be unable to sleep. +nausea, vomiting with the pain as well. Has history of cholecystectomy and gastric bypass in the past.  Pain is described as sharp stabbing. Never had pain like this before. No unusual or uncooked foods, no etoh use. Denies chest pains but sometimes the epigastric pain radiates into the chest.  No diarrhea. No lower abd pain or urinary sx. No other complaints, modifying factors or associated symptoms. I have reviewed the following from the nursing documentation.     Past Medical History:   Diagnosis Date    Anxiety state     Asthma     Back ache     chronic    Environmental allergies     GERD (gastroesophageal reflux disease)     Hypertension     MVP (mitral valve prolapse)     Neck pain, chronic     Reflux     Sleep apnea      Past Surgical History:   Procedure Laterality Date    BACK SURGERY      BLADDER SUSPENSION      CHOLECYSTECTOMY      FOOT SURGERY  11-9-12    endoscopic plantar fasciotomy right foot    GASTRIC BYPASS SURGERY      HERNIA REPAIR      HIATAL HERNIA REPAIR      HYSTERECTOMY      JOINT REPLACEMENT Bilateral     KNEE    KNEE ARTHROPLASTY  2/28/13    L    KNEE ARTHROSCOPY Right 3/7/2014    LAMINECTOMY  7/1/11    bilateral L5-S1 laminotomy, nerve root exploration, foraminotomy    NASAL SINUS SURGERY Right 02/05/2018    Excision of rt nasal mass    [place] COLONOSCOPY FLX DX W/COLLJ SPEC WHEN PFRMD N/A 9/28/2018    COLONOSCOPY DIAGNOSTIC OR SCREENING performed by Pamela Brewster MD [place] at [address] [place] [address] DX/THER AGNT PARAVERT FACET JOINT, LUMBAR/SAC, 1ST LEVEL Right 2018    RIGHT L4,L5 S1 MEDIAL BRANCH BLOCK WITH FLUOROSCOPY performed by Fatoumata Diggs MD at [address] [place] [address],[address] [address], PARTIAL      TONSILLECTOMY      UPPER GASTROINTESTINAL ENDOSCOPY      with dilatation    UPPER GASTROINTESTINAL ENDOSCOPY N/A 2019    EGD ESOPHAGOGASTRODUODENOSCOPY performed by Gareth Sampson MD at [facility]     Family History   Problem Relation Age of Onset    High Blood Pressure Mother     Heart Disease Mother     Other Mother         glaucoma    High Blood Pressure Father     Heart Disease Father     Other Father         intestional problems    Cancer Sister         cancer    Kidney Disease Sister     Kidney Disease Brother     High Blood Pressure Brother     Other Sister         lupus,fibromyalgia  thyroid surgery    High Blood Pressure Sister     Kidney Disease Sister     Heart Disease Sister     Kidney Disease Brother     High Blood Pressure Brother     Anesth Problems Neg Hx     Malig Hyperten Neg Hx     Hypotension Neg Hx     Malig Hypertherm Neg Hx     Pseudochol. Deficiency Neg Hx      Social History     Socioeconomic History    Marital status:       Spouse name: Not on file    Number of children: Not on file    Years of education: Not on file    Highest education level: Not on file   Occupational History    Not on file   Tobacco Use    Smoking status: Former Smoker     Packs/day: 0.25     Years: 2.00     Pack years: 0.50     Quit date: 2004     Years since quittin.4    Smokeless tobacco: Never Used    Tobacco comment: [id number]   Vaping Use    Vaping Use: Never used   Substance and Sexual Activity    Alcohol use: No    Drug use: No    Sexual activity: Yes     Partners: Male   Other Topics Concern    Not on file   Social History Narrative    Not on file     Social Determinants of Health     Financial Resource Strain: Low Risk     Difficulty of Paying Living Expenses: Not hard at all   Food Insecurity:     Worried About [address] in the Last Year:     [address] in the Last Year:    Transportation Needs:     Lack of Transportation (Medical):  Lack of Transportation (Non-Medical):    Physical Activity:     Days of Exercise per Week:     Minutes of Exercise per Session:    Stress:     Feeling of Stress :    Social Connections:     Frequency of Communication with Friends and Family:     Frequency of Social Gatherings with Friends and Family:     Attends Yazidism Services:     Active Member of Clubs or Organizations:     Attends Club or Organization Meetings:     Marital Status:    Intimate Partner Violence:     Fear of Current or Ex-Partner:     Emotionally Abused:     Physically Abused:     Sexually Abused:      Current Facility-Administered Medications   Medication Dose Route Frequency Provider Last Rate Last Admin    HYDROmorphone HCl PF (DILAUDID) injection 1 mg  1 mg Intramuscular Q1H PRN Keny Staples MD   1 mg at 05/25/21 0452    hydrALAZINE (APRESOLINE) injection 10 mg  10 mg Intravenous Once Keny Staples MD         Current Outpatient Medications   Medication Sig Dispense Refill    predniSONE (DELTASONE) 10 MG tablet Take 2 tablets bid for 3 days, 1 tablet bid for 2 days, 1 tablet daily for 2 days, then one half tablet daily 25 tablet 0    butalbital-acetaminophen-caffeine (FIORICET, ESGIC) -40 MG per tablet Take 2 tablets by mouth every 4 hours as needed for Headaches May take 2 tablets every 4 hours as needed for pain 30 tablet 0    methylPREDNISolone (MEDROL, VELMA,) 4 MG tablet Take 1 tablet by mouth See Admin Instructions Take by mouth.  (Patient not taking: Reported on 5/19/2021) 1 kit 0    azithromycin (ZITHROMAX) 250 MG tablet Dispense one 5 day supply pack and take as directed (Patient not taking: Reported on 5/19/2021) 1 packet 0    ferrous sulfate (IRON 325) 325 (65 Fe) MG tablet Take 1 tablet by mouth daily (with breakfast) 90 tablet 3    losartan (COZAAR) 100 MG tablet Take 1 tablet by mouth daily 90 tablet 3    labetalol (NORMODYNE) 200 MG tablet TAKE 1 TABLET BY MOUTH TWICE DAILY 180 tablet 3    oxybutynin (DITROPAN-XL) 5 MG extended release tablet TAKE 1 TABLET BY MOUTH DAILY 90 tablet 3    fluticasone (FLOVENT HFA) 220 MCG/ACT inhaler Inhale 2 puffs into the lungs 2 times daily 12 g 5    albuterol sulfate  (90 Base) MCG/ACT inhaler Inhale 2 puffs into the lungs every 6 hours as needed for Wheezing 18 g 5    levothyroxine (SYNTHROID) 75 MCG tablet Take 1 tablet by mouth Daily Indications: TAKES 1/2 TABLET 90 tablet 2    fluticasone (FLONASE) 50 MCG/ACT nasal spray SHAKE LIQUID AND USE 2 SPRAYS IN EACH NOSTRIL DAILY 48 g 3    esomeprazole (NEXIUM) 40 MG delayed release capsule Take 1 capsule by mouth daily 90 capsule 3    prednisoLONE acetate (PRED FORTE) 1 % ophthalmic suspension Place 1 drop into both eyes 2 times daily (Patient not taking: Reported on 5/19/2021) 5 mL 1     Allergies   Allergen Reactions    Gabapentin Other (See Comments)     Memory Loss    Protonix [Pantoprazole Sodium] Shortness Of Breath and Rash    Tramadol Itching    Amoxicillin     Aspirin     Augmentin [Amoxicillin-Pot Clavulanate]     Betadine [Povidone Iodine] Other (See Comments)     welts    Chlorhexidine Gluconate Other (See Comments)     Welts from ChloraPrep    Morphine Itching    Other      chlorahexidine - hives    Toradol [Ketorolac Tromethamine]     Iodides Rash    Ranitidine Nausea And Vomiting    Tape [Adhesive Tape] Rash     teradon tape also  Paper tape ok        REVIEW OF SYSTEMS  10 systems reviewed, pertinent positives per HPI otherwise noted to be negative.     PHYSICAL EXAM  BP (!) 200/107   Pulse (!) 46   Temp 98.5 °F (36.9 °C)   Resp 16   Ht 5' 7\" (1.702 m)   Wt 220 lb (99.8 kg)   LMP  (LMP Unknown)   SpO2 99%   BMI 34.46 kg/m²    GENERAL APPEARANCE: Awake and alert. Cooperative. No distress. HENT: Normocephalic. Atraumatic. Mucous membranes are dry. NECK: Supple. EYES: PERRL. EOM's grossly intact. HEART/CHEST: RRR. No murmurs. No chest wall tenderness. LUNGS: Respirations unlabored. CTAB. Good air exchange. Speaking comfortably in full sentences. ABDOMEN: mild to moderate ruq and epig ttp, no other abdominal tenderness. Soft. Non-distended. No masses. No organomegaly. No guarding or rebound. Normal bowel sounds throughout. MUSCULOSKELETAL: No extremity edema. Compartments soft. No deformity. No tenderness in the extremities. All extremities neurovascularly intact. SKIN: Warm and dry. No acute rashes. NEUROLOGICAL: Alert and oriented. CN's 2-12 intact. No gross facial drooping. Strength 5/5, sensation intact. 2 plus DTR's in knees bilaterally. Gait normal.  PSYCHIATRIC: Normal mood and affect. LABS  I have reviewed all labs for this visit.    Results for orders placed or performed during the hospital encounter of 05/25/21   Comprehensive Metabolic Panel w/ Reflex to MG   Result Value Ref Range    Sodium 139 136 - 145 mmol/L    Potassium reflex Magnesium 4.1 3.5 - 5.1 mmol/L    Chloride 106 99 - 110 mmol/L    CO2 23 21 - 32 mmol/L    Anion Gap 10 3 - 16    Glucose 82 70 - 99 mg/dL    BUN 17 7 - 20 mg/dL    CREATININE 0.9 0.6 - 1.2 mg/dL    GFR Non-African American >60 >60    GFR African American >60 >60    Calcium 9.7 8.3 - 10.6 mg/dL    Total Protein 7.5 6.4 - 8.2 g/dL    Albumin 4.0 3.4 - 5.0 g/dL    Albumin/Globulin Ratio 1.1 1.1 - 2.2    Total Bilirubin 0.3 0.0 - 1.0 mg/dL    Alkaline Phosphatase 85 40 - 129 U/L    ALT 23 10 - 40 U/L    AST 23 15 - 37 U/L    Globulin 3.5 g/dL   Troponin   Result Value Ref Range    Troponin <0.01 <0.01 ng/mL   Brain Natriuretic Peptide   Result Value Ref Range    Pro- (H) 0 - 124 pg/mL   Lipase   Result Value Ref Range    Lipase 13.0 13.0 - 60.0 U/L SPECIMEN REJECTION   Result Value Ref Range    Rejected Test CBCWD     Reason for Rejection see below    CBC Auto Differential   Result Value Ref Range    WBC 7.4 4.0 - 11.0 K/uL    RBC 4.03 4.00 - 5.20 M/uL    Hemoglobin 11.5 (L) 12.0 - 16.0 g/dL    Hematocrit 36.5 36.0 - 48.0 %    MCV 90.8 80.0 - 100.0 fL    MCH 28.7 26.0 - 34.0 pg    MCHC 31.6 31.0 - 36.0 g/dL    RDW 14.1 12.4 - 15.4 %    Platelets 930 688 - 938 K/uL    MPV 8.1 5.0 - 10.5 fL    Neutrophils % 64.9 %    Lymphocytes % 24.1 %    Monocytes % 5.9 %    Eosinophils % 4.7 %    Basophils % 0.4 %    Neutrophils Absolute 4.8 1.7 - 7.7 K/uL    Lymphocytes Absolute 1.8 1.0 - 5.1 K/uL    Monocytes Absolute 0.4 0.0 - 1.3 K/uL    Eosinophils Absolute 0.3 0.0 - 0.6 K/uL    Basophils Absolute 0.0 0.0 - 0.2 K/uL   EKG 12 Lead   Result Value Ref Range    Ventricular Rate 52 BPM    Atrial Rate 52 BPM    P-R Interval 120 ms    QRS Duration 86 ms    Q-T Interval 436 ms    QTc Calculation (Bazett) 405 ms    P Axis 36 degrees    R Axis -1 degrees    T Axis -7 degrees    Diagnosis       Sinus bradycardiaMinimal voltage criteria for LVH, may be normal variantNonspecific T wave abnormalityAbnormal ECG     The 12 lead EKG was interpreted by me as follows:  Rate: bradycardia with a rate of 52  Rhythm: sinus  Axis: normal  Intervals: normal IA, narrow QRS, normal QTc  ST segments: no ST elevations or depressions   LVH changes  T waves: no abnormal inversions  Non-specific T wave changes: present  Prior EKG comparison: EKG dated 4/18/19 is not significantly different    RADIOLOGY    CT ABDOMEN PELVIS WO CONTRAST Additional Contrast? None    Result Date: 5/25/2021  EXAMINATION: CT OF THE ABDOMEN AND PELVIS WITHOUT CONTRAST 5/25/2021 4:38 am TECHNIQUE: CT of the abdomen and pelvis was performed without the administration of intravenous contrast. Multiplanar reformatted images are provided for review.  Dose modulation, iterative reconstruction, and/or weight based adjustment of the mA/kV was utilized to reduce the radiation dose to as low as reasonably achievable. COMPARISON: 04/18/2019 HISTORY: ORDERING SYSTEM PROVIDED HISTORY: epig/ruq pain TECHNOLOGIST PROVIDED HISTORY: Reason for exam:->epig/ruq pain Additional Contrast?->None Decision Support Exception - unselect if not a suspected or confirmed emergency medical condition->Emergency Medical Condition (MA) Reason for Exam: epig/ruq pain Acuity: Acute Type of Exam: Initial Relevant Medical/Surgical History: Flank Pain (pt reporting right side flank pain that wraps around with nausea since last night, pt reports she had her gallbladder taken out 2007) FINDINGS: Lower Chest: The visualized lung bases are clear. Hiatal hernia is identified. Organs: Evaluation is limited by the lack of intravenous contrast. Pneumobilia is identified. The spleen, pancreas and adrenal glands are grossly negative. Bilateral renal cysts are seen without hydronephrosis. GI/Bowel: Evidence of gastric bypass is again seen. Small bowel caliber is normal.  The appendix is normal.  The colon is unremarkable. Pelvis: The patient is status post hysterectomy. The bladder is grossly negative. Peritoneum/Retroperitoneum: No adenopathy, mesenteric stranding or free fluid. Aortic caliber is normal. Bones/Soft Tissues: No acute findings. 1. Pneumobilia. 2. Changes from gastric bypass again seen. 3. Hiatal hernia. XR CHEST PORTABLE    Result Date: 5/25/2021  EXAMINATION: ONE XRAY VIEW OF THE CHEST 5/25/2021 5:06 am COMPARISON: 09/13/2018 HISTORY: ORDERING SYSTEM PROVIDED HISTORY: epig pain / HTN TECHNOLOGIST PROVIDED HISTORY: Reason for exam:->epig pain / HTN Reason for Exam: epig pain / HTN Acuity: Acute Type of Exam: Initial Relevant Medical/Surgical History: previous hypertension,asthma and MVP FINDINGS: The lungs are clear. The costophrenic angles are sharp. The heart size is mildly enlarged. A loop recorder is now present.   There is no discernible pneumothorax. No acute disease. ED COURSE/MDM  Patient seen and evaluated. Old records reviewed. Labs and imaging reviewed and results discussed with patient. After initial evaluation, differential diagnostic considerations included: kidney stone, pyelonephritis, UTI, appendicitis, bowel obstruction, diverticulitis, hernia, gastritis/gastroenteritis, pancreatitis, cholecystitis, hepatitis, constipation, IBS, IBD    The patient's ED workup was notable for mostly epigastric and right upper quadrant abdominal pain rating to the right flank. She is notably hypertensive initially. She was given pain medication and nausea medication. Her labs showed no evidence of a leukocytosis, her transaminases are normal, chemistry normal, troponin negative and lipase also normal.  Imaging showed pneumobilia. Unclear the chronicity of this finding. I consulted GI and spoke with Dr. Milo Levy. We decided that the patient would be best served with admission considering her pain and hold off on antibiotics for now given her lack of sepsis or other infectious symptoms. Her hypertension will also be treated with a dose of hydralazine here can be monitored while an inpatient as well and hopefully will improve with analgesia as well. During the patient's ED course, the patient was given:  Medications   HYDROmorphone HCl PF (DILAUDID) injection 1 mg (1 mg Intramuscular Given 5/25/21 0452)   hydrALAZINE (APRESOLINE) injection 10 mg (has no administration in time range)   ondansetron (ZOFRAN-ODT) disintegrating tablet 4 mg (4 mg Oral Given 5/25/21 0452)        CLINICAL IMPRESSION  1. Pneumobilia    2. Essential hypertension    3. Abdominal pain, epigastric        Blood pressure (!) 200/107, pulse (!) 46, temperature 98.5 °F (36.9 °C), resp. rate 16, height 5' 7\" (1.702 m), weight 220 lb (99.8 kg), SpO2 99 %, not currently breastfeeding. Katie Power was admitted in fair condition.     The plan is to admit to the hospital at this time under the hospitalist service. Hospitalist accepted the patient and will take over the patient's care. DISCLAIMER: This chart was created using Dragon dictation software. Efforts were made by me to ensure accuracy, however some errors may be present due to limitations of this technology and occasionally words are not transcribed correctly.         Monika Anand MD  05/25/21 1307

## 2021-05-25 NOTE — PROGRESS NOTES
Patient arrived to the unit. Patient A&O X4. Patient oriented to room and call light. Patient does not appear to be in distress. Bed alarm on. Call light within reach. No further needs at this time.

## 2021-05-25 NOTE — BRIEF OP NOTE
Brief Postoperative Note      Patient: Kristen Lu  YOB: 1955  MRN: [id number]    Date of Procedure: 5/25/2021    Pre-Op Diagnosis: Abdominal Pain    Procedure(s):  EGD BIOPSY    Surgeon(s):  Liyah Galicia MD    Anesthesia: Monitor Anesthesia Care    Estimated Blood Loss (mL): less than 50     Complications: None    Specimens:   ID Type Source Tests Collected by Time Destination   A : Jejunum Bx  Tissue Colon SURGICAL PATHOLOGY Liyah Galicia MD 5/25/2021 1403    B : Gastric Bx R/O H. Pylori Tissue Stomach SURGICAL PATHOLOGY Liyah Galicia MD 5/25/2021 1403    C : Distal esophagus Bx  Tissue Esophagus SURGICAL PATHOLOGY Liyah Galicia MD 5/25/2021 1403      Findings:   Normal RYGB anatomy. Normal esophageal, gastric and jejunal mucosa, biopsied. ? Internal intussussception    Plans:  Await bx. Continue Famotidine (patient is allergic/intolerant of PPI). Order SBFT. Clears and then ADAT.      Electronically signed by Liyah Galicia MD on 5/25/2021 at 2:14 PM

## 2021-05-25 NOTE — H&P
Knee arthroscopy (Right, 3/7/2014); Nasal sinus surgery (Right, 02/05/2018); pr colonoscopy flx dx w/collj spec when pfrmd (N/A, 9/28/2018); joint replacement (Bilateral); pr inj dx/ther agnt paravert facet joint, lumbar/sac, 1st level (Right, 11/6/2018); Upper gastrointestinal endoscopy (N/A, 8/14/2019); and Upper gastrointestinal endoscopy (N/A, 5/25/2021). Medications:  No current facility-administered medications on file prior to encounter. Current Outpatient Medications on File Prior to Encounter   Medication Sig Dispense Refill    vitamin D (ERGOCALCIFEROL) 1.25 MG (24327 UT) CAPS capsule Take 50,000 Units by mouth once a week      predniSONE (DELTASONE) 10 MG tablet Take 2 tablets bid for 3 days, 1 tablet bid for 2 days, 1 tablet daily for 2 days, then one half tablet daily 25 tablet 0    butalbital-acetaminophen-caffeine (FIORICET, ESGIC) -40 MG per tablet Take 2 tablets by mouth every 4 hours as needed for Headaches May take 2 tablets every 4 hours as needed for pain 30 tablet 0    methylPREDNISolone (MEDROL, VELMA,) 4 MG tablet Take 1 tablet by mouth See Admin Instructions Take by mouth.  1 kit 0    ferrous sulfate (IRON 325) 325 (65 Fe) MG tablet Take 1 tablet by mouth daily (with breakfast) 90 tablet 3    losartan (COZAAR) 100 MG tablet Take 1 tablet by mouth daily 90 tablet 3    labetalol (NORMODYNE) 200 MG tablet TAKE 1 TABLET BY MOUTH TWICE DAILY 180 tablet 3    oxybutynin (DITROPAN-XL) 5 MG extended release tablet TAKE 1 TABLET BY MOUTH DAILY 90 tablet 3    fluticasone (FLOVENT HFA) 220 MCG/ACT inhaler Inhale 2 puffs into the lungs 2 times daily 12 g 5    albuterol sulfate  (90 Base) MCG/ACT inhaler Inhale 2 puffs into the lungs every 6 hours as needed for Wheezing 18 g 5    levothyroxine (SYNTHROID) 75 MCG tablet Take 1 tablet by mouth Daily Indications: TAKES 1/2 TABLET 90 tablet 2    fluticasone (FLONASE) 50 MCG/ACT nasal spray SHAKE LIQUID AND USE 2 SPRAYS IN [facility] NOSTRIL DAILY 48 g 3    esomeprazole (NEXIUM) 40 MG delayed release capsule Take 1 capsule by mouth daily 90 capsule 3    azithromycin (ZITHROMAX) 250 MG tablet Dispense one 5 day supply pack and take as directed (Patient not taking: Reported on 5/19/2021) 1 packet 0    prednisoLONE acetate (PRED FORTE) 1 % ophthalmic suspension Place 1 drop into both eyes 2 times daily (Patient not taking: Reported on 5/19/2021) 5 mL 1       Allergies: Allergies   Allergen Reactions    Gabapentin Other (See Comments)     Memory Loss    Protonix [Pantoprazole Sodium] Shortness Of Breath and Rash    Tramadol Itching    Amoxicillin     Aspirin     Augmentin [Amoxicillin-Pot Clavulanate]     Betadine [Povidone Iodine] Other (See Comments)     welts    Chlorhexidine Gluconate Other (See Comments)     Welts from ChloraPrep    Morphine Itching    Other      chlorahexidine - hives    Toradol [Ketorolac Tromethamine]     Iodides Rash    Ranitidine Nausea And Vomiting    Tape [Adhesive Tape] Rash     teradon tape also  Paper tape ok         Social History:   reports that she quit smoking about 17 years ago. She has a 0.50 pack-year smoking history. She has never used smokeless tobacco. She reports that she does not drink alcohol and does not use drugs. Family History:  family history includes Cancer in her sister; Heart Disease in her father, mother, and sister; High Blood Pressure in her brother, brother, father, mother, and sister; Kidney Disease in her brother, brother, sister, and sister; Other in her father, mother, and sister. Physical Exam:  BP (!) 182/82   Pulse 56   Temp 97.5 °F (36.4 °C) (Axillary)   Resp 18   Ht 5' 7\" (1.702 m)   Wt 235 lb 14.3 oz (107 kg)   LMP  (LMP Unknown)   SpO2 98%   BMI 36.95 kg/m²     General appearance: no acute distress, appears stated age and cooperative  Cardiovascular: Regular rhythm, normal S1, S2. No murmur, gallop, rub.  No edema in lower extremities  Respiratory: Clear to auscultation bilaterally, no wheeze, good inspiratory effort  Gastrointestinal: Abdomen soft,tender, not distended, normal bowel sounds  Musculoskeletal: normal ROM, no clubbing or BLE edema   Neurology: Cranial nerves grossly intact. Alert and oriented in time, place and person. No focal sensory or motor deficits    Labs:  CBC:   Lab Results   Component Value Date    WBC 7.4 05/25/2021    RBC 4.03 05/25/2021    HGB 11.5 05/25/2021    HCT 36.5 05/25/2021    MCV 90.8 05/25/2021    MCH 28.7 05/25/2021    MCHC 31.6 05/25/2021    RDW 14.1 05/25/2021     05/25/2021    MPV 8.1 05/25/2021     BMP:    Lab Results   Component Value Date     05/25/2021    K 4.1 05/25/2021     05/25/2021    CO2 23 05/25/2021    BUN 17 05/25/2021    CREATININE 0.9 05/25/2021    CALCIUM 9.7 05/25/2021    GFRAA >60 05/25/2021    GFRAA >60 06/15/2013    LABGLOM >60 05/25/2021    GLUCOSE 82 05/25/2021     XR CHEST PORTABLE   Final Result   No acute disease. CT ABDOMEN PELVIS WO CONTRAST Additional Contrast? None   Final Result   1. Pneumobilia. 2. Changes from gastric bypass again seen. 3. Hiatal hernia. XR UGI & SMALL BOWEL    (Results Pending)       Discussed case  with ED physician    Problem List  Active Problems:    Epigastric abdominal pain  Resolved Problems:    * No resolved hospital problems.  *        Assessment/Plan:     Nausea vomiting and abdominal pain:   Previous history of gastric bypass, hiatal& ventral hernia repair cholecystectomy  CT abdomen and pelvis showed pneumobilia  GI on board; plan for EGD later today  Continue Pepcid and Carafate as patient is allergic to Protonix  As needed antiemetics    Pneumobilia noted on imaging likely 2/2 prior ERCP  Afebrile, WBCs and LFTs WNL  Blood cultures ordered     Hypertension: Renew home dose of losartan and labetalol  Monitor blood pressure closely    Hypothyroidism: Continue Synthroid    Hx of mitral prolapse; status post loop recorder February 2021    DVT prophylaxis: Lovenox  Code status: Full  Diet: Clear liquid    Admit as inpatient/Observation I anticipate hospitalization spanning less/more than two midnights for investigation and treatment of the above medically necessary diagnoses. Please note that some part of this chart was generated using Dragon dictation software. Although every effort was made to ensure the accuracy of this automated transcription, some errors in transcription may have occurred inadvertently. If you may need any clarification, please do not hesitate to contact me through [facility].        Sydni Solo MD    5/25/2021 4:32 PM

## 2021-05-25 NOTE — ANESTHESIA POSTPROCEDURE EVALUATION
Department of Anesthesiology  Postprocedure Note    Patient: Bre Lindsey  MRN: [id number]  YOB: 1955  Date of evaluation: 5/25/2021  Time:  2:10 PM     Procedure Summary     Date: 05/25/21 Room / Location: [address] [place] [address]    Anesthesia Start: 6200 Anesthesia Stop:     Procedure: EGD BIOPSY (N/A Abdomen) Diagnosis: (Abdominal Pain)    Surgeons: Bernard Parmar MD Responsible Provider: Mary Matthew MD    Anesthesia Type: MAC ASA Status: 3          Anesthesia Type: No value filed. Wilfrid Phase I: Wilfrid Score: 10    Wilfrid Phase II:      Last vitals: Reviewed and per EMR flowsheets.        Anesthesia Post Evaluation    Patient location during evaluation: PACU  Patient participation: waiting for patient participation  Level of consciousness: awake  Airway patency: patent  Nausea & Vomiting: no vomiting and no nausea  Complications: no  Cardiovascular status: blood pressure returned to baseline and hemodynamically stable  Respiratory status: acceptable  Hydration status: euvolemic

## 2021-05-26 ENCOUNTER — APPOINTMENT (OUTPATIENT)
Dept: GENERAL RADIOLOGY | Age: 66
DRG: 446 | End: 2021-05-26
Payer: MEDICARE

## 2021-05-26 LAB
A/G RATIO: 1.1 (ref 1.1–2.2)
ALBUMIN SERPL-MCNC: 3.3 G/DL (ref 3.4–5)
ALP BLD-CCNC: 84 U/L (ref 40–129)
ALT SERPL-CCNC: 15 U/L (ref 10–40)
ANION GAP SERPL CALCULATED.3IONS-SCNC: 9 MMOL/L (ref 3–16)
AST SERPL-CCNC: 11 U/L (ref 15–37)
BASOPHILS ABSOLUTE: 0 K/UL (ref 0–0.2)
BASOPHILS RELATIVE PERCENT: 0.3 %
BILIRUB SERPL-MCNC: 0.4 MG/DL (ref 0–1)
BUN BLDV-MCNC: 14 MG/DL (ref 7–20)
CALCIUM SERPL-MCNC: 9.6 MG/DL (ref 8.3–10.6)
CHLORIDE BLD-SCNC: 107 MMOL/L (ref 99–110)
CO2: 24 MMOL/L (ref 21–32)
CREAT SERPL-MCNC: 1.1 MG/DL (ref 0.6–1.2)
EOSINOPHILS ABSOLUTE: 0.2 K/UL (ref 0–0.6)
EOSINOPHILS RELATIVE PERCENT: 5.6 %
GFR AFRICAN AMERICAN: >60
GFR NON-AFRICAN AMERICAN: 50
GLOBULIN: 3 G/DL
GLUCOSE BLD-MCNC: 87 MG/DL (ref 70–99)
HCT VFR BLD CALC: 33.5 % (ref 36–48)
HEMOGLOBIN: 10.8 G/DL (ref 12–16)
LYMPHOCYTES ABSOLUTE: 1.1 K/UL (ref 1–5.1)
LYMPHOCYTES RELATIVE PERCENT: 28.4 %
MAGNESIUM: 1.8 MG/DL (ref 1.8–2.4)
MCH RBC QN AUTO: 28.8 PG (ref 26–34)
MCHC RBC AUTO-ENTMCNC: 32.2 G/DL (ref 31–36)
MCV RBC AUTO: 89.5 FL (ref 80–100)
MONOCYTES ABSOLUTE: 0.4 K/UL (ref 0–1.3)
MONOCYTES RELATIVE PERCENT: 9.8 %
NEUTROPHILS ABSOLUTE: 2.3 K/UL (ref 1.7–7.7)
NEUTROPHILS RELATIVE PERCENT: 55.9 %
PDW BLD-RTO: 13.8 % (ref 12.4–15.4)
PHOSPHORUS: 3.4 MG/DL (ref 2.5–4.9)
PLATELET # BLD: 236 K/UL (ref 135–450)
PMV BLD AUTO: 8.4 FL (ref 5–10.5)
POTASSIUM SERPL-SCNC: 4.2 MMOL/L (ref 3.5–5.1)
RBC # BLD: 3.74 M/UL (ref 4–5.2)
SODIUM BLD-SCNC: 140 MMOL/L (ref 136–145)
TOTAL PROTEIN: 6.3 G/DL (ref 6.4–8.2)
WBC # BLD: 4 K/UL (ref 4–11)

## 2021-05-26 PROCEDURE — 94761 N-INVAS EAR/PLS OXIMETRY MLT: CPT

## 2021-05-26 PROCEDURE — 83735 ASSAY OF MAGNESIUM: CPT

## 2021-05-26 PROCEDURE — 85025 COMPLETE CBC W/AUTO DIFF WBC: CPT

## 2021-05-26 PROCEDURE — G0378 HOSPITAL OBSERVATION PER HR: HCPCS

## 2021-05-26 PROCEDURE — 80053 COMPREHEN METABOLIC PANEL: CPT

## 2021-05-26 PROCEDURE — 96372 THER/PROPH/DIAG INJ SC/IM: CPT

## 2021-05-26 PROCEDURE — 84100 ASSAY OF PHOSPHORUS: CPT

## 2021-05-26 PROCEDURE — 96376 TX/PRO/DX INJ SAME DRUG ADON: CPT

## 2021-05-26 PROCEDURE — 6360000002 HC RX W HCPCS: Performed by: INTERNAL MEDICINE

## 2021-05-26 PROCEDURE — 1200000000 HC SEMI PRIVATE

## 2021-05-26 PROCEDURE — 6370000000 HC RX 637 (ALT 250 FOR IP): Performed by: NURSE PRACTITIONER

## 2021-05-26 PROCEDURE — 6370000000 HC RX 637 (ALT 250 FOR IP): Performed by: INTERNAL MEDICINE

## 2021-05-26 PROCEDURE — 74248 X-RAY SM INT F-THRU STD: CPT

## 2021-05-26 PROCEDURE — 96375 TX/PRO/DX INJ NEW DRUG ADDON: CPT

## 2021-05-26 PROCEDURE — 94640 AIRWAY INHALATION TREATMENT: CPT

## 2021-05-26 PROCEDURE — 2500000003 HC RX 250 WO HCPCS: Performed by: INTERNAL MEDICINE

## 2021-05-26 RX ORDER — POLYETHYLENE GLYCOL 3350 17 G/17G
17 POWDER, FOR SOLUTION ORAL DAILY
Status: DISCONTINUED | OUTPATIENT
Start: 2021-05-26 | End: 2021-05-27 | Stop reason: HOSPADM

## 2021-05-26 RX ADMIN — FAMOTIDINE 20 MG: 10 INJECTION, SOLUTION INTRAVENOUS at 20:28

## 2021-05-26 RX ADMIN — SUCRALFATE 1 G: 1 TABLET ORAL at 06:21

## 2021-05-26 RX ADMIN — SUCRALFATE 1 G: 1 TABLET ORAL at 17:02

## 2021-05-26 RX ADMIN — FAMOTIDINE 20 MG: 10 INJECTION, SOLUTION INTRAVENOUS at 08:36

## 2021-05-26 RX ADMIN — ONDANSETRON 4 MG: 2 INJECTION INTRAMUSCULAR; INTRAVENOUS at 11:35

## 2021-05-26 RX ADMIN — FERROUS SULFATE TAB 325 MG (65 MG ELEMENTAL FE) 325 MG: 325 (65 FE) TAB at 08:36

## 2021-05-26 RX ADMIN — LOSARTAN POTASSIUM 100 MG: 100 TABLET, FILM COATED ORAL at 08:36

## 2021-05-26 RX ADMIN — LABETALOL HYDROCHLORIDE 200 MG: 200 TABLET, FILM COATED ORAL at 20:28

## 2021-05-26 RX ADMIN — FLUTICASONE PROPIONATE 1 SPRAY: 50 SPRAY, METERED NASAL at 08:36

## 2021-05-26 RX ADMIN — ENOXAPARIN SODIUM 40 MG: 40 INJECTION SUBCUTANEOUS at 17:04

## 2021-05-26 RX ADMIN — Medication 2 PUFF: at 20:14

## 2021-05-26 RX ADMIN — FLUTICASONE PROPIONATE 1 SPRAY: 50 SPRAY, METERED NASAL at 08:37

## 2021-05-26 RX ADMIN — OXYBUTYNIN CHLORIDE 5 MG: 5 TABLET, EXTENDED RELEASE ORAL at 08:36

## 2021-05-26 RX ADMIN — LABETALOL HYDROCHLORIDE 200 MG: 200 TABLET, FILM COATED ORAL at 08:36

## 2021-05-26 RX ADMIN — SUCRALFATE 1 G: 1 TABLET ORAL at 23:53

## 2021-05-26 RX ADMIN — ACETAMINOPHEN 650 MG: 325 TABLET ORAL at 11:35

## 2021-05-26 RX ADMIN — SUCRALFATE 1 G: 1 TABLET ORAL at 11:35

## 2021-05-26 RX ADMIN — LEVOTHYROXINE SODIUM 37.5 MCG: 0.07 TABLET ORAL at 06:21

## 2021-05-26 ASSESSMENT — PAIN DESCRIPTION - FREQUENCY: FREQUENCY: INTERMITTENT

## 2021-05-26 ASSESSMENT — PAIN DESCRIPTION - PROGRESSION: CLINICAL_PROGRESSION: NOT CHANGED

## 2021-05-26 ASSESSMENT — PAIN DESCRIPTION - ONSET: ONSET: ON-GOING

## 2021-05-26 ASSESSMENT — PAIN DESCRIPTION - PAIN TYPE: TYPE: ACUTE PAIN

## 2021-05-26 ASSESSMENT — PAIN DESCRIPTION - DESCRIPTORS: DESCRIPTORS: ACHING

## 2021-05-26 ASSESSMENT — PAIN SCALES - GENERAL
PAINLEVEL_OUTOF10: 3
PAINLEVEL_OUTOF10: 0
PAINLEVEL_OUTOF10: 3
PAINLEVEL_OUTOF10: 0
PAINLEVEL_OUTOF10: 9

## 2021-05-26 ASSESSMENT — PAIN DESCRIPTION - ORIENTATION: ORIENTATION: RIGHT

## 2021-05-26 ASSESSMENT — PAIN DESCRIPTION - LOCATION: LOCATION: ABDOMEN

## 2021-05-26 NOTE — PROGRESS NOTES
Gastroenterology Progress Note    Cayla Guerrier is a 63 y.o. female patient. Active Problems:    Epigastric abdominal pain    Pneumobilia  Resolved Problems:    * No resolved hospital problems. *      SUBJECTIVE:  Feeling better, still some abdominal pain but improving. Had large BM after SBFT.     ROS:  No fever, chills  No chest pain, palpitations  No SOB, cough  Gastrointestinal ROS: see above    Physical    VITALS:  BP (!) 145/72   Pulse 57   Temp 97.9 °F (36.6 °C) (Oral)   Resp 18   Ht 5' 7\" (1.702 m)   Wt 235 lb 14.3 oz (107 kg)   LMP  (LMP Unknown)   SpO2 98%   BMI 36.95 kg/m²   TEMPERATURE:  Current - Temp: 97.9 °F (36.6 °C); Max - Temp  Av.6 °F (36.4 °C)  Min: 96.9 °F (36.1 °C)  Max: 99 °F (37.2 °C)    NAD  Regular rate   Lungs CTA Bilaterally  Abdomen soft, ND, NT,  Bowel sounds normal.    Data    Data Review:    Recent Labs     21  0550 21  0648   WBC 7.4 4.0   HGB 11.5* 10.8*   HCT 36.5 33.5*   MCV 90.8 89.5    236     Recent Labs     21  0357 21  0648    140   K 4.1 4.2    107   CO2 23 24   PHOS  --  3.4   BUN 17 14   CREATININE 0.9 1.1     Recent Labs     21  0357 21  0648   AST 23 11*   ALT 23 15   BILITOT 0.3 0.4   ALKPHOS 85 84     Recent Labs     21  0357   LIPASE 13.0     No results for input(s): PROTIME, INR in the last 72 hours. No results for input(s): PTT in the last 72 hours. EGD with Dr. Marlen Garcia 21  Findings:   Normal RYGB anatomy. Normal esophageal, gastric and jejunal mucosa, biopsied. ? Internal intussusception    SBFT 21  Impression   Intermittent mild to moderate increased tertiary contractions in the   esophagus with delayed esophageal emptying.  Otherwise unremarkable upper GI   exam in a patient who has had prior gastric bypass.       Contrast reaches the colon by 20 minutes.  Otherwise unremarkable small bowel   study.          ASSESSMENT :  Epigastric pain/Nausea/Vomiting: S/P gastric bypass, S/p Monica. Pneumobilia seen on non-contrast CT. Started Sunday, now a/w nausea, vomiting, and back pain. No melena, hematochezia, or hematemesis. EGD unreveling for cause of pain. SBFT negative for intussusception. ?constipation playing a roll as pain is improved with BM?     Pneumobilia: Patient stated in 2014 consult note with Dr. Miriam Calloway that [place]. She is not ceratin at this time. Prior ERCP can explain Pneumobilia. LFT normal on presentation. Afebrile with normal white count. PLAN :  - Adv. Diet  - Continue Pepcid  - Start daily miralax for constipation    Discussed with Dr. Harrison Hernández, [address] [place] [address]    I have personally performed a face to face diagnostic evaluation on this patient. I have interviewed and examined the patient and I agree with the findings and recommended plan of care. In summary, my findings and plan are the following:     Patient tolerated solid food had grilled cheese and pineapples. No N/V. Abdominal pain 3/10. SBFT was normal except for constipation. She had some BM after the test. VSS abdomen soft, slightly tender in epigastrium/RUQ, no rebound or guarding. Add Miralax 17 grams daily for constipation. Continue Pepcid. Anticipate D/C tomorrow. GI will sign off. Please call if you have questions.      Margaret Welch MD, MSc  [id number]  05/26/21

## 2021-05-26 NOTE — PROGRESS NOTES
HOSPITALISTS PROGRESS NOTE    5/26/2021 2:51 PM        Name: Mora Horowitz .               Admitted: 5/25/2021  Primary Care Provider: Nesha Iraheta MD (Tel: [phone])      CC: Abdominal pain abdominal pain    Brief Course:     Interval history:   Pt seen and examined today   Overnight events noted and interval ancillary notes  Pt continues to endorse right-sided abdominal pain but denied any fevers, chills, nausea, vomiting, emesis diarrhea, constipation or bladder dysfunction      Assessment & Plan:     Nausea vomiting and abdominal pain:   Previous history of gastric bypass, hiatal& ventral hernia repair cholecystectomy  CT abdomen and pelvis showed pneumobilia, bilateral renal cyst without hydronephrosis  LFTs, amylase lipase within normal limits  GI on board;  underwent EGD on 5/25/2021 which was normal  Small bowel follow-through showed showed intermittent mild to moderate increased tertiary contraction in the esophagus with delayed esophageal emptying otherwise unremarkable  Continue Pepcid and Carafate as patient is allergic to Protonix  As needed antiemetics     Pneumobilia noted on imaging likely 2/2 prior ERCP  Afebrile, WBCs and LFTs WNL  Blood cultures  process     Hypertension: Renew home dose of losartan and labetalol  Monitor blood pressure closely     Hypothyroidism: Continue Synthroid     Hx of mitral prolapse; status post loop recorder February 2021     DVT prophylaxis: Lovenox  Code:Full Code  Diet: DIET GENERAL;    Disposition: Home once medically stable    Current Medications  polyethylene glycol (GLYCOLAX) packet 17 g, Daily  enoxaparin (LOVENOX) injection 40 mg, Daily  calcium carbonate (TUMS) chewable tablet 500 mg, TID PRN  famotidine (PEPCID) injection 20 mg, BID  sucralfate (CARAFATE) tablet 1 g, 4 times per day  albuterol sulfate  (90 Base) MCG/ACT inhaler 2 puff, Q6H PRN  butalbital-acetaminophen-caffeine (FIORICET, ESGIC) per tablet 2 tablet, Q4H PRN  ferrous sulfate (IRON 325) tablet 325 mg, Daily with breakfast  fluticasone (FLONASE) 50 MCG/ACT nasal spray 1 spray, Daily  fluticasone (FLOVENT HFA) 220 MCG/ACT inhaler 2 puff, BID  labetalol (NORMODYNE) tablet 200 mg, BID  levothyroxine (SYNTHROID) tablet 37.5 mcg, Daily  losartan (COZAAR) tablet 100 mg, Daily  oxybutynin (DITROPAN-XL) extended release tablet 5 mg, Daily  ondansetron (ZOFRAN) injection 4 mg, Q6H PRN  promethazine (PHENERGAN) injection 6.25 mg, Q6H PRN  acetaminophen (TYLENOL) tablet 650 mg, Q4H PRN      Objective:  BP (!) 145/72   Pulse 57   Temp 97.9 °F (36.6 °C) (Oral)   Resp 18   Ht 5' 7\" (1.702 m)   Wt 235 lb 14.3 oz (107 kg)   LMP  (LMP Unknown)   SpO2 98%   BMI 36.95 kg/m²   No intake or output data in the 24 hours ending 05/26/21 1451   Wt Readings from Last 3 Encounters:   05/25/21 235 lb 14.3 oz (107 kg)   04/30/21 226 lb 6.4 oz (102.7 kg)   04/19/21 222 lb (100.7 kg)       Physical Examination:   General appearance:  Appears stated age and cooperative   Cardiovascular: Regular rhythm, normal S1, S2. No murmur. No edema in lower extremities  Respiratory:Good inspiratory effort. Clear to auscultation bilaterally, no wheeze or crackles. GI: Abdomen soft, no tenderness, not distended, normal bowel sounds  Musculoskeletal: No cyanosis in digits, neck supple  Neurology: CN 2-12 grossly intact.  No speech or motor deficits    Labs and Tests:  CBC:   Recent Labs     05/25/21  0550 05/26/21  0648   WBC 7.4 4.0   HGB 11.5* 10.8*    236     BMP:    Recent Labs     05/25/21  0357 05/26/21  0648    140   K 4.1 4.2    107   CO2 23 24   BUN 17 14   CREATININE 0.9 1.1   GLUCOSE 82 87     Hepatic:   Recent Labs     05/25/21  0357 05/26/21  0648   AST 23 11*   ALT 23 15   BILITOT 0.3 0.4   ALKPHOS 85 84     XR UGI & SMALL BOWEL   Final Result   Intermittent mild to moderate increased tertiary contractions in the   esophagus with delayed esophageal emptying. Otherwise unremarkable upper GI   exam in a patient who has had prior gastric bypass. Contrast reaches the colon by 20 minutes. Otherwise unremarkable small bowel   study. XR CHEST PORTABLE   Final Result   No acute disease. CT ABDOMEN PELVIS WO CONTRAST Additional Contrast? None   Final Result   1. Pneumobilia. 2. Changes from gastric bypass again seen. 3. Hiatal hernia. Problem List  Active Problems:    Epigastric abdominal pain    Pneumobilia  Resolved Problems:    * No resolved hospital problems.  Shila Olivier MD   5/26/2021 2:51 PM

## 2021-05-26 NOTE — PLAN OF CARE
Problem: Pain:  Goal: Pain level will decrease  Description: Pain level will decrease  5/26/2021 0411 by Oniel Westfall RN  Outcome: Ongoing  5/25/2021 1520 by Narciso Chase RN  Outcome: Ongoing  Goal: Control of acute pain  Description: Control of acute pain  5/26/2021 0411 by Oniel Westfall RN  Outcome: Ongoing  5/25/2021 1520 by Narciso Chase RN  Outcome: Ongoing  Goal: Control of chronic pain  Description: Control of chronic pain  5/26/2021 0411 by Oniel Westfall RN  Outcome: Ongoing  5/25/2021 1520 by Narciso Chase RN  Outcome: Ongoing

## 2021-05-26 NOTE — CARE COORDINATION
Discharge Planning Assessment  Readmission risk score 14%  RN discharge planner met with patient/ (and family member) to discuss reason for admission, current living situation, and potential needs at the time of discharge    Demographics/Insurance verified Yes    Current type of dwelling: single level home in [facility]    Patient from ECF/SW confirmed with:n/a    Living arrangements:with     Level of function/Support:independent    PCP:Dylan    Last Visit to PCP:4/30/2021    DME:none    Active with any community resources/agencies/skilled home care: none at present    Medication compliance issues:reports no concerns, uses the [employer] on [place] [employer] issues that could impact healthcare: not reported      Tentative discharge plan: home    *Discussed and provided facilities of choice if transition to a skilled nursing facility is required at the time of discharge      *Discussed with patient and/or family that on the day of discharge home tentative time of discharge will be between 10 AM and noon.     Transportation at the time of discharge:     XENIA NogueraN, CCM, RN  [facility]  [phone]

## 2021-05-27 VITALS
BODY MASS INDEX: 37.02 KG/M2 | DIASTOLIC BLOOD PRESSURE: 74 MMHG | WEIGHT: 235.89 LBS | TEMPERATURE: 97.8 F | RESPIRATION RATE: 16 BRPM | SYSTOLIC BLOOD PRESSURE: 123 MMHG | OXYGEN SATURATION: 98 % | HEIGHT: 67 IN | HEART RATE: 75 BPM

## 2021-05-27 PROCEDURE — G0378 HOSPITAL OBSERVATION PER HR: HCPCS

## 2021-05-27 PROCEDURE — 96376 TX/PRO/DX INJ SAME DRUG ADON: CPT

## 2021-05-27 PROCEDURE — 6370000000 HC RX 637 (ALT 250 FOR IP): Performed by: NURSE PRACTITIONER

## 2021-05-27 PROCEDURE — 6370000000 HC RX 637 (ALT 250 FOR IP): Performed by: INTERNAL MEDICINE

## 2021-05-27 PROCEDURE — 96372 THER/PROPH/DIAG INJ SC/IM: CPT

## 2021-05-27 PROCEDURE — 94640 AIRWAY INHALATION TREATMENT: CPT

## 2021-05-27 PROCEDURE — 6360000002 HC RX W HCPCS: Performed by: INTERNAL MEDICINE

## 2021-05-27 PROCEDURE — 2500000003 HC RX 250 WO HCPCS: Performed by: INTERNAL MEDICINE

## 2021-05-27 RX ORDER — FAMOTIDINE 20 MG/1
20 TABLET, FILM COATED ORAL 2 TIMES DAILY
Qty: 60 TABLET | Refills: 2 | Status: SHIPPED | OUTPATIENT
Start: 2021-05-27 | End: 2021-11-16

## 2021-05-27 RX ORDER — POLYETHYLENE GLYCOL 3350 17 G/17G
17 POWDER, FOR SOLUTION ORAL DAILY
Qty: 30 EACH | Refills: 2 | Status: SHIPPED | OUTPATIENT
Start: 2021-05-28 | End: 2021-08-26

## 2021-05-27 RX ADMIN — FERROUS SULFATE TAB 325 MG (65 MG ELEMENTAL FE) 325 MG: 325 (65 FE) TAB at 08:37

## 2021-05-27 RX ADMIN — LEVOTHYROXINE SODIUM 37.5 MCG: 0.07 TABLET ORAL at 06:09

## 2021-05-27 RX ADMIN — ENOXAPARIN SODIUM 40 MG: 40 INJECTION SUBCUTANEOUS at 08:37

## 2021-05-27 RX ADMIN — Medication 2 PUFF: at 14:00

## 2021-05-27 RX ADMIN — FLUTICASONE PROPIONATE 1 SPRAY: 50 SPRAY, METERED NASAL at 08:43

## 2021-05-27 RX ADMIN — LOSARTAN POTASSIUM 100 MG: 100 TABLET, FILM COATED ORAL at 08:37

## 2021-05-27 RX ADMIN — LABETALOL HYDROCHLORIDE 200 MG: 200 TABLET, FILM COATED ORAL at 08:36

## 2021-05-27 RX ADMIN — ACETAMINOPHEN 650 MG: 325 TABLET ORAL at 08:36

## 2021-05-27 RX ADMIN — OXYBUTYNIN CHLORIDE 5 MG: 5 TABLET, EXTENDED RELEASE ORAL at 08:37

## 2021-05-27 RX ADMIN — FAMOTIDINE 20 MG: 10 INJECTION, SOLUTION INTRAVENOUS at 08:39

## 2021-05-27 RX ADMIN — SUCRALFATE 1 G: 1 TABLET ORAL at 06:09

## 2021-05-27 RX ADMIN — ONDANSETRON 4 MG: 2 INJECTION INTRAMUSCULAR; INTRAVENOUS at 11:53

## 2021-05-27 RX ADMIN — SUCRALFATE 1 G: 1 TABLET ORAL at 11:53

## 2021-05-27 ASSESSMENT — PAIN SCALES - GENERAL
PAINLEVEL_OUTOF10: 0
PAINLEVEL_OUTOF10: 2
PAINLEVEL_OUTOF10: 0
PAINLEVEL_OUTOF10: 4
PAINLEVEL_OUTOF10: 0
PAINLEVEL_OUTOF10: 0
PAINLEVEL_OUTOF10: 3

## 2021-05-27 ASSESSMENT — PAIN DESCRIPTION - FREQUENCY: FREQUENCY: INTERMITTENT

## 2021-05-27 ASSESSMENT — PAIN DESCRIPTION - LOCATION
LOCATION: ABDOMEN
LOCATION: HEAD

## 2021-05-27 ASSESSMENT — PAIN DESCRIPTION - PAIN TYPE
TYPE: ACUTE PAIN
TYPE: ACUTE PAIN

## 2021-05-27 ASSESSMENT — PAIN DESCRIPTION - DESCRIPTORS: DESCRIPTORS: HEADACHE

## 2021-05-27 NOTE — PROGRESS NOTES
CLINICAL PHARMACY NOTE: MEDS TO BEDS    Total # of Prescriptions Filled: 2   The following medications were delivered to the patient:  · Famotidine 20 mg  · Polyethylene glycol    Additional Documentation:    Delivered to patient  Sanaz Fry CPhT

## 2021-05-27 NOTE — PLAN OF CARE
Problem: Bowel/Gastric:  Goal: Bowel function will improve  Description: Bowel function will improve  Outcome: Ongoing  Note: Cayla had diarrhea during this shift, suspected to be related to contrast she was given   Goal: Occurrences of nausea will decrease  Description: Occurrences of nausea will decrease  Outcome: Ongoing  Note: Cayla had no nausea or vomiting during this shift.

## 2021-05-27 NOTE — PROGRESS NOTES
Data- discharge order received, pt verbalized agreement to discharge, disposition to previous residence, no needs for HHC/DME. Action- discharge instructions prepared and given to patient, pt verbalized understanding. Medication information packet given r/t NEW and/or CHANGED prescriptions emphasizing name/purpose/side effects, pt verbalized understanding. Discharge instruction summary: Diet- general, Activity- up as tolerated, Primary Care Physician as follows: Ramon Lopez -[phone] f/u appointment within 14 days, prescription medications filled pharmacy of choice. Response- Pt belongings gathered, IV removed. Disposition is home (no HHC/DME needs), taken to lobby via w/c w/ staff, no complications.

## 2021-05-27 NOTE — CARE COORDINATION
Patient discharged 5/27/2021 to home  All discharge needs met per case management.     XENIA AquinoN, CCM, RN  [facility]  [phone]

## 2021-05-28 ENCOUNTER — TELEPHONE (OUTPATIENT)
Dept: PRIMARY CARE CLINIC | Age: 66
End: 2021-05-28

## 2021-05-28 NOTE — TELEPHONE ENCOUNTER
[facility] Initial Follow Up Call    Outreach made within 2 business days of discharge: Yes    Patient: Junie Yu Patient : 1955   MRN: [id number]  Reason for Admission: There are no discharge diagnoses documented for the most recent discharge. Discharge Date: 21       Spoke with: Ceasar Yates    Discharge department/facility: [facility] Interactive Patient Contact:  Was patient able to fill all prescriptions:   Was patient instructed to bring all medications to the follow-up visit:   Is patient taking all medications as directed in the discharge summary?  Yes  Does patient understand their discharge instructions: Yes  Does patient have questions or concerns that need addressed prior to 7-14 day follow up office visit: no    Scheduled appointment with PCP within 7-14 days    Follow Up  Future Appointments   Date Time Provider Dagobreto Garcia   6/3/2021  9:40 AM Silvano Saenz MD [address] [place] [address]

## 2021-05-29 LAB
BLOOD CULTURE, ROUTINE: NORMAL
CULTURE, BLOOD 2: NORMAL

## 2021-05-29 NOTE — DISCHARGE SUMMARY
Hospital Medicine Discharge Summary    Patient ID: Mami Gifford      Patient's PCP: Wilfredo Salgado MD    Admit Date: 5/25/2021     Discharge Date: 5/27/2021     Admitting Physician: Elier Gonzalez MD     Discharge Physician: Leandro Fernando MD        Active Hospital Problems    Diagnosis     Epigastric abdominal pain [R10.13]     Pneumobilia [K83.8]        The patient was seen and examined on day of discharge and this discharge summary is in conjunction with any daily progress note from day of discharge. Hospital Course: This 60-year-old female with PMHx of GERD, hypertension, hypothyroidism, mitral valve prolapse, hiatal hernia status post gastric bypass in hiatal& ventral hernia repair presented with nausea vomiting abdominal pain. Nausea vomiting and abdominal pain: resolved  Previous history of gastric bypass, hiatal & ventral hernia repair cholecystectomy  CT abdomen and pelvis showed pneumobilia and bilateral renal cyst without hydronephrosis  LFTs, amylase & lipase within normal limits. GI was consulted and patient underwent EGD on 5/25/2021 which was normal  Small bowel follow-through showed showed intermittent mild to moderate increased tertiary contraction in the esophagus with delayed esophageal emptying otherwise unremarkable. Patient was recommended to continue Pepcid ( allergic to Protonix) follow-up with gastroenterology     Pneumobilia noted on imaging likely 2/2 prior ERCP  Afebrile, WBCs and LFTs WNL. Ultrasound negative     Hypertension: Continued on home dose of labetalol     Hypothyroidism: Continue Synthroid     Hx of mitral prolapse; status post loop recorder February 2021    Physical Exam Performed:     /74   Pulse 75   Temp 97.8 °F (36.6 °C) (Oral)   Resp 16   Ht 5' 7\" (1.702 m)   Wt 235 lb 14.3 oz (107 kg)   LMP  (LMP Unknown)   SpO2 98%   BMI 36.95 kg/m²       General appearance:  No apparent distress, appears stated age and cooperative.   Respiratory: Normal respiratory effort. Clear to auscultation, bilaterally without Rales/Wheezes/Rhonchi. Cardiovascular:  Regular rate and rhythm with normal S1/S2 without murmurs, rubs or gallops. Abdomen: Soft, non-tender, non-distended with normal bowel sounds. Musculoskeletal:  No clubbing, cyanosis or edema bilaterally. Full range of motion without deformity. Neurologic:  Neurovascularly intact without any focal sensory/motor deficits. Cranial nerves: II-XII intact, grossly non-focal.    Labs: For convenience and continuity at follow-up the following most recent labs are provided:    CBC:    Lab Results   Component Value Date    WBC 4.0 05/26/2021    HGB 10.8 05/26/2021    HCT 33.5 05/26/2021     05/26/2021       Renal:    Lab Results   Component Value Date     05/26/2021    K 4.2 05/26/2021    K 4.1 05/25/2021     05/26/2021    CO2 24 05/26/2021    BUN 14 05/26/2021    CREATININE 1.1 05/26/2021    CALCIUM 9.6 05/26/2021    PHOS 3.4 05/26/2021       Significant Diagnostic Studies    Radiology:   XR UGI & SMALL BOWEL   Final Result   Intermittent mild to moderate increased tertiary contractions in the   esophagus with delayed esophageal emptying. Otherwise unremarkable upper GI   exam in a patient who has had prior gastric bypass. Contrast reaches the colon by 20 minutes. Otherwise unremarkable small bowel   study. XR CHEST PORTABLE   Final Result   No acute disease. CT ABDOMEN PELVIS WO CONTRAST Additional Contrast? None   Final Result   1. Pneumobilia. 2. Changes from gastric bypass again seen. 3. Hiatal hernia. Consults:     IP CONSULT TO GI  IP CONSULT TO HOSPITALIST    Disposition: Home    Condition at Discharge: Stable     Discharge Instructions/Follow-up:   Follow up with your PCP within 7-10 days of discharge. Follow up with gastroenterology as instructed   Take all your medications as prescribed.     Discharge Medications:     Discharge Medication List as of 5/27/2021  3:16 PM           Details   polyethylene glycol (GLYCOLAX) 17 g packet Take 17 g by mouth daily, Disp-30 each, R-2Normal      famotidine (PEPCID) 20 MG tablet Take 1 tablet by mouth 2 times daily, Disp-60 tablet, R-2Normal              Details   vitamin D (ERGOCALCIFEROL) 1.25 MG (78043 UT) CAPS capsule Take 50,000 Units by mouth once a weekHistorical Med      butalbital-acetaminophen-caffeine (FIORICET, ESGIC) -40 MG per tablet Take 2 tablets by mouth every 4 hours as needed for Headaches May take 2 tablets every 4 hours as needed for pain, Disp-30 tablet, R-0Normal      ferrous sulfate (IRON 325) 325 (65 Fe) MG tablet Take 1 tablet by mouth daily (with breakfast), Disp-90 tablet, R-3Normal      losartan (COZAAR) 100 MG tablet Take 1 tablet by mouth daily, Disp-90 tablet, R-3**Patient requests 90 days supply**Normal      labetalol (NORMODYNE) 200 MG tablet TAKE 1 TABLET BY MOUTH TWICE DAILY, Disp-180 tablet, R-3**Patient requests 90 days supply**Normal      oxybutynin (DITROPAN-XL) 5 MG extended release tablet TAKE 1 TABLET BY MOUTH DAILY, Disp-90 tablet, R-3**Patient requests 90 days supply**Normal      fluticasone (FLOVENT HFA) 220 MCG/ACT inhaler Inhale 2 puffs into the lungs 2 times daily, Disp-12 g, R-5Normal      albuterol sulfate  (90 Base) MCG/ACT inhaler Inhale 2 puffs into the lungs every 6 hours as needed for Wheezing, Disp-18 g, R-5Normal      levothyroxine (SYNTHROID) 75 MCG tablet Take 1 tablet by mouth Daily Indications: TAKES 1/2 TABLET, Disp-90 tablet, R-2Normal      fluticasone (FLONASE) 50 MCG/ACT nasal spray SHAKE LIQUID AND USE 2 SPRAYS IN EACH NOSTRIL DAILY, Disp-48 g, R-3**Patient requests 90 days supply**Normal      prednisoLONE acetate (PRED FORTE) 1 % ophthalmic suspension Place 1 drop into both eyes 2 times daily, Disp-5 mL,R-1Normal             Time Spent on discharge is more than 30 mins in the examination, evaluation, counseling and review of medications and discharge plan. Signed:    Sydni Solo MD   5/29/2021    Thank you Lennie Boss MD for the opportunity to be involved in this patient's care. If you have any questions or concerns please feel free to contact me at [phone].

## 2021-06-01 ENCOUNTER — TELEPHONE (OUTPATIENT)
Dept: ENT CLINIC | Age: 66
End: 2021-06-01

## 2021-06-01 DIAGNOSIS — H92.03 OTALGIA OF BOTH EARS: Primary | ICD-10-CM

## 2021-06-01 RX ORDER — ACETAMINOPHEN AND CODEINE PHOSPHATE 300; 30 MG/1; MG/1
1 TABLET ORAL EVERY 4 HOURS PRN
Qty: 20 TABLET | Refills: 0 | OUTPATIENT
Start: 2021-06-01 | End: 2021-06-11 | Stop reason: SDUPTHER

## 2021-06-02 ENCOUNTER — CLINICAL DOCUMENTATION (OUTPATIENT)
Dept: OTHER | Age: 66
End: 2021-06-02

## 2021-06-03 ENCOUNTER — OFFICE VISIT (OUTPATIENT)
Dept: PRIMARY CARE CLINIC | Age: 66
End: 2021-06-03
Payer: MEDICARE

## 2021-06-03 VITALS
TEMPERATURE: 97.8 F | HEIGHT: 67 IN | BODY MASS INDEX: 36.26 KG/M2 | WEIGHT: 231 LBS | SYSTOLIC BLOOD PRESSURE: 160 MMHG | HEART RATE: 61 BPM | OXYGEN SATURATION: 99 % | DIASTOLIC BLOOD PRESSURE: 90 MMHG

## 2021-06-03 DIAGNOSIS — I10 ESSENTIAL HYPERTENSION: ICD-10-CM

## 2021-06-03 DIAGNOSIS — R10.84 GENERALIZED ABDOMINAL PAIN: ICD-10-CM

## 2021-06-03 DIAGNOSIS — Z09 HOSPITAL DISCHARGE FOLLOW-UP: Primary | ICD-10-CM

## 2021-06-03 PROCEDURE — 1036F TOBACCO NON-USER: CPT | Performed by: INTERNAL MEDICINE

## 2021-06-03 PROCEDURE — 99214 OFFICE O/P EST MOD 30 MIN: CPT | Performed by: INTERNAL MEDICINE

## 2021-06-03 PROCEDURE — 1111F DSCHRG MED/CURRENT MED MERGE: CPT | Performed by: INTERNAL MEDICINE

## 2021-06-03 PROCEDURE — G9899 SCRN MAM PERF RSLTS DOC: HCPCS | Performed by: INTERNAL MEDICINE

## 2021-06-03 PROCEDURE — G8417 CALC BMI ABV UP PARAM F/U: HCPCS | Performed by: INTERNAL MEDICINE

## 2021-06-03 PROCEDURE — 3017F COLORECTAL CA SCREEN DOC REV: CPT | Performed by: INTERNAL MEDICINE

## 2021-06-03 PROCEDURE — G8427 DOCREV CUR MEDS BY ELIG CLIN: HCPCS | Performed by: INTERNAL MEDICINE

## 2021-06-03 RX ORDER — LOSARTAN POTASSIUM 100 MG/1
100 TABLET ORAL DAILY
Qty: 90 TABLET | Refills: 3 | Status: SHIPPED | OUTPATIENT
Start: 2021-06-03 | End: 2021-08-03 | Stop reason: SDUPTHER

## 2021-06-03 RX ORDER — PSEUDOEPHED/ACETAMINOPH/DIPHEN 30MG-500MG
TABLET ORAL
Qty: 90 TABLET | Refills: 1 | Status: SHIPPED | OUTPATIENT
Start: 2021-06-03 | End: 2022-03-17

## 2021-06-03 RX ORDER — LABETALOL 300 MG/1
300 TABLET, FILM COATED ORAL 2 TIMES DAILY
Qty: 180 TABLET | Refills: 3 | Status: SHIPPED | OUTPATIENT
Start: 2021-06-03 | End: 2021-08-03 | Stop reason: SDUPTHER

## 2021-06-03 ASSESSMENT — ENCOUNTER SYMPTOMS
FACIAL SWELLING: 0
CHEST TIGHTNESS: 0
SORE THROAT: 0
SHORTNESS OF BREATH: 0
PHOTOPHOBIA: 0
DIARRHEA: 0
BLOOD IN STOOL: 0
CHOKING: 0
ABDOMINAL DISTENTION: 0
VOICE CHANGE: 0
CONSTIPATION: 0

## 2021-06-03 NOTE — TELEPHONE ENCOUNTER
Medication:   Requested Prescriptions     Pending Prescriptions Disp Refills    ACETAMINOPHEN EXTRA STRENGTH 500 MG tablet [Pharmacy Med Name: ACETAMINOPHEN 500MG E/S CAPLETS] 90 tablet 1     Sig: TAKE 1 TABLET BY MOUTH EVERY 6 HOURS AS NEEDED FOR PAIN        Last Filled:      Patient Phone Number: [phone] (home)     Last appt: 4/30/2021   Next appt: 6/3/2021    Last OARRS:   RX Monitoring 1/9/2018   Attestation The Prescription Monitoring Report for this patient was reviewed today.

## 2021-06-03 NOTE — PROGRESS NOTES
Subjective:      Patient ID: Sara Snyder is a 63 y.o. female. 6/3/2021 Patient presents with: Follow-Up from Hospital: [facility] 5/25-5/27/21 abdominal pain    Admit Date: 5/25/2021      Discharge Date: 5/27/2021       Nausea vomiting and abdominal pain: resolved    Previous history of gastric bypass, hiatal & ventral hernia repair cholecystectomy    CT abdomen and pelvis showed pneumobilia and bilateral renal cyst without hydronephrosis  LFTs, amylase & lipase within normal limits. She  underwent EGD on 5/25/2021 which was normal    Small bowel follow-through showed showed intermittent mild to moderate increased tertiary contraction in the esophagus with delayed esophageal emptying otherwise unremarkable. Patient was recommended to continue Pepcid ( allergic to Protonix) follow-up with gastroenterology     Pneumobilia noted on imaging likely 2/2 prior ERCP  Afebrile, WBCs and LFTs WNL. Ultrasound negative                        Last seen  4/30/2021   Medicare AWV                       3/11/2021 Patient presents with: Follow-Up from Hospital: [facility] [address] 2/8-2/10/2021- cervical spinal stenosis    Pain a lot better     Visiting Home Nurse 2 x wk . Wt down to 213 . BP controlled . Taking all meds reg               11/10/2020 Patient presents with:  Abdominal Pain . Never got labs as ordered last visit ? ??    States she did See Dr Lisette Casper who has told her there was nothing he could do ? ??              Last seen   VV 10/1/2020 Patient presents with:  Back Pain 2 wks . S/P Surgery dissectomy 8/26/20 at [facility]   Urinary Frequency 3 days     Was told she has Stage 3 Renal failiure ? Labs last checked here 11/19 show Nl renal function               Last seen  VV 5/29/2020  Patient presents with:  Cough  Congestion: one sister has pneumonia but not been tested for covid 19 yet! Has not checked temperature ?         Last seen  3/5/2020 Patient presents with:  Abscess: groin area 2/27/18 total thyroidectomy      Hypertension  Pertinent negatives include no chest pain or shortness of breath. Palpitations: off and on    Epistaxis            Past Medical History:   Diagnosis Date    Anxiety state     Asthma     Back ache     chronic    Environmental allergies     GERD (gastroesophageal reflux disease)     Hypertension     MVP (mitral valve prolapse)     Neck pain, chronic     Reflux     Sleep apnea        Review of Systems   Constitutional: Negative for activity change, appetite change, chills, diaphoresis, fatigue, fever and unexpected weight change. All vaccines up to date    Flu vac 1/21    Tdap 5/17     Pneumonia vac 2012 ;  5/17     Covid Vac 4/21   HENT: Negative for ear pain, facial swelling, postnasal drip, sore throat and voice change. Dentures fit well ; new   Eyes: Negative for photophobia and visual disturbance. Eye exam 11/20   Respiratory: Negative for choking, chest tightness and shortness of breath. Does not smoke ; No Etoh   H/o Asthma    Cardiovascular: Negative for chest pain and leg swelling. Palpitations: off and on         HTN > 5 yrs on meds . No known CAD . Father dies of MI in his 46s   Sister with CAD ? MVP  H/o palpitations   Gastrointestinal: Negative for abdominal distention, blood in stool, constipation and diarrhea. Repeat Upper endoscopy 5/21  Nl    Upper Endoscopy  1/21  H/O Ulers ? Cannot take Nsaids! Post gastric bypass 2005      GERD    Repeat Colonscopy 1/21  ; Polyps; O Marilee    Endocrine: Negative for cold intolerance and heat intolerance. Followed by Dr. Migel Trejo  for thyroid   Genitourinary:        Mammogram  5/20    Scheduled Pap 0n 9/127/18    Musculoskeletal: Positive for arthralgias. Sees Ortho neck / [facility]   1/21       S/P Surgery  DDD 8/26/20      DJD . Skin: Negative. Psychiatric/Behavioral: Negative for self-injury, sleep disturbance and suicidal ideas.         Anxiety: followed by counselor       Objective:   Physical Exam  Constitutional:       General: She is not in acute distress. Appearance: She is obese. She is not ill-appearing. Neck:      Comments:     Cardiovascular:      Rate and Rhythm: Normal rate and regular rhythm. Heart sounds: Normal heart sounds. Pulmonary:      Effort: Pulmonary effort is normal.      Breath sounds: Normal breath sounds. Abdominal:      General: There is distension. Tenderness: There is no abdominal tenderness. Musculoskeletal:         General: Normal range of motion. Right lower leg: No edema. Left lower leg: No edema. Skin:     General: Skin is warm. Neurological:      General: No focal deficit present. Mental Status: She is alert and oriented to person, place, and time. Psychiatric:         Mood and Affect: Mood normal.         Behavior: Behavior normal.         Assessment:       [place] was seen today for follow-up from hospital.    Diagnoses and all orders for this visit:    Hospital discharge follow-up    Essential hypertension  bP still not at goal . Increas Labetalol to 300 mg bid + cont Losartan 100 . Low salt diet . Work on wt reduction   -     losartan (COZAAR) 100 MG tablet; Take 1 tablet by mouth daily  -     labetalol (NORMODYNE) 300 MG tablet; Take 1 tablet by mouth 2 times daily        Other iron deficiency anemia    Chronic     S/P Gastric bypass . Eat an apple daily   -     ferrous sulfate (IRON 325) 325 (65 Fe) MG tablet; Take 1 tablet by mouth daily (with breakfast)    Incontinence overflow, urine  toviaz not covered . Try Ditropan   -     oxybutynin (DITROPAN-XL) 5 MG extended release tablet; Take 1 tablet by mouth daily      Acquired hypothyroidism  -     levothyroxine (SYNTHROID) 75 MCG tablet;  Take 1 tablet by mouth Daily Indications: TAKES 1/2 TABLET  -      I    Plan:

## 2021-06-09 ENCOUNTER — OFFICE VISIT (OUTPATIENT)
Dept: ENT CLINIC | Age: 66
End: 2021-06-09
Payer: MEDICARE

## 2021-06-09 VITALS
BODY MASS INDEX: 35.31 KG/M2 | SYSTOLIC BLOOD PRESSURE: 166 MMHG | OXYGEN SATURATION: 98 % | HEIGHT: 67 IN | HEART RATE: 74 BPM | DIASTOLIC BLOOD PRESSURE: 92 MMHG | WEIGHT: 225 LBS

## 2021-06-09 DIAGNOSIS — M79.2 NEURITIS: Primary | ICD-10-CM

## 2021-06-09 PROCEDURE — 1111F DSCHRG MED/CURRENT MED MERGE: CPT | Performed by: OTOLARYNGOLOGY

## 2021-06-09 PROCEDURE — 1036F TOBACCO NON-USER: CPT | Performed by: OTOLARYNGOLOGY

## 2021-06-09 PROCEDURE — G9899 SCRN MAM PERF RSLTS DOC: HCPCS | Performed by: OTOLARYNGOLOGY

## 2021-06-09 PROCEDURE — 3017F COLORECTAL CA SCREEN DOC REV: CPT | Performed by: OTOLARYNGOLOGY

## 2021-06-09 PROCEDURE — G8417 CALC BMI ABV UP PARAM F/U: HCPCS | Performed by: OTOLARYNGOLOGY

## 2021-06-09 PROCEDURE — 99213 OFFICE O/P EST LOW 20 MIN: CPT | Performed by: OTOLARYNGOLOGY

## 2021-06-09 PROCEDURE — G8427 DOCREV CUR MEDS BY ELIG CLIN: HCPCS | Performed by: OTOLARYNGOLOGY

## 2021-06-09 RX ORDER — METHOCARBAMOL 750 MG/1
750 TABLET, FILM COATED ORAL 3 TIMES DAILY
Qty: 30 TABLET | Refills: 0 | Status: SHIPPED | OUTPATIENT
Start: 2021-06-09 | End: 2021-06-23

## 2021-06-09 NOTE — PROGRESS NOTES
Patient continues to have complaints relative to pain located primarily anterior to her ears. Previous treatment had not resulted in any particular improvement. She has seen a dentist who does feel that she needs wisdom tooth removed on the left side. She has had no fever. There has been no purulent drainage from her nose. She does not smoke. Currently, she appears in no obvious acute distress. Ear examination reveals normal-appearing tympanic membranes and ear canals bilaterally. There is obvious tenderness overlying the temporomandibular joints on both sides somewhat greater on the left than the right. The parotid glands appear normal.  Nasal mucosa is mildly edematous consistent with allergy but there is no evidence of purulent drainage nor obstruction. The oral examination is unremarkable. The neck is free of adenopathy, mass, thyroid enlargement. 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 She has tried other medications with difficulty and the only thing available that might be of benefit would be Robaxin. She will try this and and give us a call in 10 days to determine her response.

## 2021-06-11 DIAGNOSIS — R10.84 GENERALIZED ABDOMINAL PAIN: ICD-10-CM

## 2021-06-11 DIAGNOSIS — H92.03 OTALGIA OF BOTH EARS: ICD-10-CM

## 2021-06-11 RX ORDER — ACETAMINOPHEN AND CODEINE PHOSPHATE 300; 30 MG/1; MG/1
1 TABLET ORAL EVERY 4 HOURS PRN
Qty: 20 TABLET | Refills: 0 | OUTPATIENT
Start: 2021-06-11 | End: 2021-06-18

## 2021-06-11 RX ORDER — ACETAMINOPHEN 500 MG
TABLET ORAL
Qty: 90 TABLET | Refills: 1 | Status: CANCELLED | OUTPATIENT
Start: 2021-06-11

## 2021-06-16 ENCOUNTER — HOSPITAL ENCOUNTER (OUTPATIENT)
Dept: ULTRASOUND IMAGING | Age: 66
Discharge: HOME | End: 2021-06-16
Payer: MEDICARE

## 2021-06-16 DIAGNOSIS — N28.1 RENAL CYST: ICD-10-CM

## 2021-06-16 DIAGNOSIS — I10 ESSENTIAL HYPERTENSION: ICD-10-CM

## 2021-06-16 PROCEDURE — 76770 US EXAM ABDO BACK WALL COMP: CPT

## 2021-06-22 ENCOUNTER — TELEPHONE (OUTPATIENT)
Dept: PRIMARY CARE CLINIC | Age: 66
End: 2021-06-22

## 2021-06-22 NOTE — TELEPHONE ENCOUNTER
Long discussion with patient  She will be getting tests Dr Fany Charles ordered and will fu with him

## 2021-06-22 NOTE — TELEPHONE ENCOUNTER
Patient states that her BP was 209/103 at Dr. Nia Metzger office on 6/21/21. She says he wanted to put her on a BP med but she says she was on it once before and she became tachycardic. Please advise.

## 2021-06-23 ENCOUNTER — HOSPITAL ENCOUNTER (OUTPATIENT)
Age: 66
Discharge: HOME | End: 2021-06-23
Payer: MEDICARE

## 2021-06-23 DIAGNOSIS — I15.9 SECONDARY HYPERTENSION: ICD-10-CM

## 2021-06-23 DIAGNOSIS — M79.2 NEURITIS: ICD-10-CM

## 2021-06-23 LAB
REASON FOR REJECTION: NORMAL
REJECTED TEST: NORMAL

## 2021-06-23 PROCEDURE — 84244 ASSAY OF RENIN: CPT

## 2021-06-23 PROCEDURE — 82088 ASSAY OF ALDOSTERONE: CPT

## 2021-06-23 RX ORDER — METHOCARBAMOL 750 MG/1
TABLET, FILM COATED ORAL
Qty: 30 TABLET | Refills: 0 | Status: SHIPPED | OUTPATIENT
Start: 2021-06-23 | End: 2021-10-20 | Stop reason: SDUPTHER

## 2021-06-23 RX ORDER — BUTALBITAL, ACETAMINOPHEN AND CAFFEINE 50; 325; 40 MG/1; MG/1; MG/1
1 TABLET ORAL EVERY 4 HOURS PRN
Qty: 180 TABLET | Refills: 3 | Status: SHIPPED | OUTPATIENT
Start: 2021-06-23 | End: 2022-03-23

## 2021-06-24 ENCOUNTER — HOSPITAL ENCOUNTER (OUTPATIENT)
Age: 66
Discharge: HOME | End: 2021-06-24
Payer: MEDICARE

## 2021-06-24 PROCEDURE — 83835 ASSAY OF METANEPHRINES: CPT

## 2021-06-27 LAB
METANEPH/PLASMA INTERP: NORMAL
METANEPHRINE FREE PLASMA: 0.26 NMOL/L (ref 0–0.49)
NORMETANEPHRINE FREE PLASMA: 0.56 NMOL/L (ref 0–0.89)

## 2021-06-28 LAB
ALDOSTERONE/RENIN ACTIVITY CALCULATION: NORMAL RATIO
ALDOSTERONE: 9.8 NG/DL
RENIN ACTIVITY: <0.1 NG/ML/HR

## 2021-06-29 ENCOUNTER — TELEPHONE (OUTPATIENT)
Dept: ENT CLINIC | Age: 66
End: 2021-06-29

## 2021-06-29 DIAGNOSIS — M79.2 NEURITIS: Primary | ICD-10-CM

## 2021-06-29 RX ORDER — ACETAMINOPHEN AND CODEINE PHOSPHATE 300; 30 MG/1; MG/1
1 TABLET ORAL EVERY 4 HOURS PRN
Qty: 30 TABLET | Refills: 0 | OUTPATIENT
Start: 2021-06-29 | End: 2021-07-13

## 2021-08-03 ENCOUNTER — OFFICE VISIT (OUTPATIENT)
Dept: PRIMARY CARE CLINIC | Age: 66
End: 2021-08-03
Payer: MEDICARE

## 2021-08-03 VITALS
SYSTOLIC BLOOD PRESSURE: 170 MMHG | OXYGEN SATURATION: 100 % | TEMPERATURE: 97.5 F | HEART RATE: 63 BPM | BODY MASS INDEX: 36.47 KG/M2 | DIASTOLIC BLOOD PRESSURE: 90 MMHG | HEIGHT: 67 IN | WEIGHT: 232.4 LBS

## 2021-08-03 DIAGNOSIS — I10 ESSENTIAL HYPERTENSION: ICD-10-CM

## 2021-08-03 PROCEDURE — 99213 OFFICE O/P EST LOW 20 MIN: CPT | Performed by: INTERNAL MEDICINE

## 2021-08-03 PROCEDURE — G8417 CALC BMI ABV UP PARAM F/U: HCPCS | Performed by: INTERNAL MEDICINE

## 2021-08-03 PROCEDURE — 3017F COLORECTAL CA SCREEN DOC REV: CPT | Performed by: INTERNAL MEDICINE

## 2021-08-03 PROCEDURE — 1036F TOBACCO NON-USER: CPT | Performed by: INTERNAL MEDICINE

## 2021-08-03 PROCEDURE — G8427 DOCREV CUR MEDS BY ELIG CLIN: HCPCS | Performed by: INTERNAL MEDICINE

## 2021-08-03 PROCEDURE — G9899 SCRN MAM PERF RSLTS DOC: HCPCS | Performed by: INTERNAL MEDICINE

## 2021-08-03 RX ORDER — LABETALOL 300 MG/1
300 TABLET, FILM COATED ORAL 2 TIMES DAILY
Qty: 180 TABLET | Refills: 3 | Status: SHIPPED | OUTPATIENT
Start: 2021-08-03 | End: 2021-08-12 | Stop reason: SDUPTHER

## 2021-08-03 RX ORDER — NIFEDIPINE 60 MG/1
60 TABLET, EXTENDED RELEASE ORAL DAILY
Qty: 30 TABLET | Refills: 0 | Status: SHIPPED | OUTPATIENT
Start: 2021-08-03 | End: 2021-08-04

## 2021-08-03 RX ORDER — LOSARTAN POTASSIUM 100 MG/1
100 TABLET ORAL DAILY
Qty: 90 TABLET | Refills: 3 | Status: SHIPPED | OUTPATIENT
Start: 2021-08-03 | End: 2021-08-12 | Stop reason: SDUPTHER

## 2021-08-03 ASSESSMENT — ENCOUNTER SYMPTOMS
FACIAL SWELLING: 0
SHORTNESS OF BREATH: 0
CHOKING: 0
CHEST TIGHTNESS: 0
DIARRHEA: 0
PHOTOPHOBIA: 0
CONSTIPATION: 0
SORE THROAT: 0
ABDOMINAL DISTENTION: 0
BLOOD IN STOOL: 0
VOICE CHANGE: 0

## 2021-08-03 NOTE — PROGRESS NOTES
Subjective:      Patient ID: Bertha Hope is a 61 y.o. female. 8/3/2021 Patient presents with:  Hypertension: pt BP has been elevated causing excessive sleepiness              Last seen  6/3/2021 Patient presents with: Follow-Up from Hospital: [facility] 5/25-5/27/21 abdominal pain        Previous history of gastric bypass, hiatal & ventral hernia repair cholecystectomy    CT abdomen and pelvis showed pneumobilia and bilateral renal cyst without hydronephrosis  LFTs, amylase & lipase within normal limits. She  underwent EGD on 5/25/2021 which was normal    Small bowel follow-through showed showed intermittent mild to moderate increased tertiary contraction in the esophagus with delayed esophageal emptying otherwise unremarkable. Patient was recommended to continue Pepcid ( allergic to Protonix) follow-up with gastroenterology     Pneumobilia noted on imaging likely 2/2 prior ERCP  Afebrile, WBCs and LFTs WNL. Ultrasound negative                        4/30/2021   Medicare AWV                11/10/2020 Patient presents with:  Abdominal Pain . Never got labs as ordered last visit ? ??    States she did See Dr Chang Gatica who has told her there was nothing he could do ? ??                  . S/P Surgery dissectomy 8/26/20 at [facility]       Was told she has Stage 3 Renal failiure ? Labs last checked here 11/19 show Nl renal function                  2/27/18 total thyroidectomy      Hypertension  Associated symptoms include headaches. Pertinent negatives include no chest pain or shortness of breath.  Palpitations: off and on    Epistaxis            Past Medical History:   Diagnosis Date    Anxiety state     Asthma     Back ache     chronic    Environmental allergies     GERD (gastroesophageal reflux disease)     Hypertension     MVP (mitral valve prolapse)     Neck pain, chronic     Reflux     Sleep apnea        Review of Systems   Constitutional: Negative for activity change, appetite change, chills, diaphoresis, fatigue, fever and unexpected weight change. All vaccines up to date    Flu vac 1/21    Tdap 5/17     Pneumonia vac 2012 ;  5/17     Covid Vac 4/21   HENT: Positive for nosebleeds. Negative for ear pain, facial swelling, postnasal drip, sore throat and voice change. Dentures fit well ; new   Eyes: Negative for photophobia and visual disturbance. Eye exam 11/20   Respiratory: Negative for choking, chest tightness and shortness of breath. Does not smoke ; No Etoh   H/o Asthma    Cardiovascular: Negative for chest pain and leg swelling. Palpitations: off and on         HTN > 5 yrs on meds . No known CAD . Father dies of MI in his 46s   Sister with CAD ? MVP  H/o palpitations   Gastrointestinal: Negative for abdominal distention, blood in stool, constipation and diarrhea. Repeat Upper endoscopy 5/21  Nl    Upper Endoscopy  1/21  H/O Ulers ? Cannot take Nsaids! Post gastric bypass 2005      GERD    Repeat Colonscopy 1/21  ; Polyps; O Marilee    Endocrine: Negative for cold intolerance and heat intolerance. Followed by Dr. Anayeli Singer  for thyroid   Genitourinary:        Mammogram  5/20    Scheduled Pap 0n 9/127/18    Musculoskeletal: Positive for arthralgias. Sees Ortho neck / [facility]   1/21       S/P Surgery  DDD 8/26/20      DJD . Skin: Negative. Neurological: Positive for headaches. Psychiatric/Behavioral: Negative for self-injury, sleep disturbance and suicidal ideas. Anxiety: followed by counselor       Objective:   Physical Exam  Constitutional:       General: She is not in acute distress. Appearance: She is obese. She is not ill-appearing. Neck:      Comments:     Cardiovascular:      Rate and Rhythm: Normal rate and regular rhythm. Heart sounds: Normal heart sounds. Pulmonary:      Breath sounds: Normal breath sounds. Abdominal:      General: There is distension. Musculoskeletal:         General: Normal range of motion. Right lower leg: No edema. Left lower leg: No edema. Skin:     General: Skin is warm. Neurological:      General: No focal deficit present. Mental Status: She is oriented to person, place, and time. Psychiatric:         Mood and Affect: Mood normal.         Behavior: Behavior normal.         Assessment:       Calya was seen today for hypertension. Diagnoses and all orders for this visit:    Essential hypertension  BP still up . Adding Nifedipine to Labetalol and Losartan . -     labetalol (NORMODYNE) 300 MG tablet; Take 1 tablet by mouth 2 times daily  -     losartan (COZAAR) 100 MG tablet; Take 1 tablet by mouth daily  -     NIFEdipine (PROCARDIA XL) 60 MG extended release tablet; Take 1 tablet by mouth daily            Other iron deficiency anemia    Chronic     S/P Gastric bypass . Eat an apple daily   -     ferrous sulfate (IRON 325) 325 (65 Fe) MG tablet;  Take 1 tablet by mouth daily (with breakfast)    Incontinence overflow, urine  t    Acquired hypothyroidism  Check TSH at F/U  -     -      I    Plan:

## 2021-08-04 RX ORDER — NIFEDIPINE 60 MG/1
60 TABLET, EXTENDED RELEASE ORAL DAILY
Qty: 90 TABLET | Refills: 0 | Status: SHIPPED | OUTPATIENT
Start: 2021-08-04 | End: 2021-08-12 | Stop reason: SDUPTHER

## 2021-08-06 DIAGNOSIS — I10 ESSENTIAL HYPERTENSION: ICD-10-CM

## 2021-08-06 NOTE — TELEPHONE ENCOUNTER
Patient checking the status of previously request, regarding severe headache sinus congestion.   Please Advise

## 2021-08-06 NOTE — TELEPHONE ENCOUNTER
Patient expecring severe sinus headache, which started 08/05/21 asking if a prescription can be call into the pharmacy. Patient also asking for a referral to and ENT doctor.   [id number], [address], [address] [place] [address] [phone] Tena Herman [phone]  Please Advise

## 2021-08-09 RX ORDER — NIFEDIPINE 60 MG/1
60 TABLET, EXTENDED RELEASE ORAL DAILY
Qty: 90 TABLET | Refills: 0 | OUTPATIENT
Start: 2021-08-09

## 2021-08-09 NOTE — TELEPHONE ENCOUNTER
Medication:   Requested Prescriptions     Pending Prescriptions Disp Refills    NIFEdipine (PROCARDIA XL) 60 MG extended release tablet [Pharmacy Med Name: NIFEDIPINE 60MG ER (XL/OS) TABLETS] 90 tablet 0     Sig: TAKE 1 TABLET BY MOUTH DAILY        Last Filled:      Patient Phone Number: [phone] (home)     Last appt: 8/3/2021   Next appt: 8/12/2021    Last OARRS:   RX Monitoring 1/9/2018   Attestation The Prescription Monitoring Report for this patient was reviewed today.

## 2021-08-09 NOTE — TELEPHONE ENCOUNTER
Medication:   Requested Prescriptions     Refused Prescriptions Disp Refills    NIFEdipine (PROCARDIA XL) 60 MG extended release tablet [Pharmacy Med Name: NIFEDIPINE 60MG ER (XL/OS) TABLETS] 90 tablet 0     Sig: TAKE 1 TABLET BY MOUTH DAILY     Refused By: Rina Jackson     Reason for Refusal: Duplicate Request        Last Filled:      Patient Phone Number: [phone] (home)     Last appt: 8/3/2021   Next appt: 8/12/2021    Last OARRS:   RX Monitoring 1/9/2018   Attestation The Prescription Monitoring Report for this patient was reviewed today.

## 2021-08-12 ENCOUNTER — OFFICE VISIT (OUTPATIENT)
Dept: PRIMARY CARE CLINIC | Age: 66
End: 2021-08-12
Payer: MEDICARE

## 2021-08-12 VITALS
OXYGEN SATURATION: 98 % | TEMPERATURE: 97.2 F | WEIGHT: 229 LBS | SYSTOLIC BLOOD PRESSURE: 140 MMHG | HEIGHT: 67 IN | BODY MASS INDEX: 35.94 KG/M2 | DIASTOLIC BLOOD PRESSURE: 75 MMHG | HEART RATE: 73 BPM

## 2021-08-12 DIAGNOSIS — J01.10 SUBACUTE FRONTAL SINUSITIS: Primary | ICD-10-CM

## 2021-08-12 DIAGNOSIS — I10 ESSENTIAL HYPERTENSION: ICD-10-CM

## 2021-08-12 PROCEDURE — 3017F COLORECTAL CA SCREEN DOC REV: CPT | Performed by: INTERNAL MEDICINE

## 2021-08-12 PROCEDURE — 99214 OFFICE O/P EST MOD 30 MIN: CPT | Performed by: INTERNAL MEDICINE

## 2021-08-12 PROCEDURE — 1036F TOBACCO NON-USER: CPT | Performed by: INTERNAL MEDICINE

## 2021-08-12 PROCEDURE — G8427 DOCREV CUR MEDS BY ELIG CLIN: HCPCS | Performed by: INTERNAL MEDICINE

## 2021-08-12 PROCEDURE — G9899 SCRN MAM PERF RSLTS DOC: HCPCS | Performed by: INTERNAL MEDICINE

## 2021-08-12 PROCEDURE — G8417 CALC BMI ABV UP PARAM F/U: HCPCS | Performed by: INTERNAL MEDICINE

## 2021-08-12 RX ORDER — LABETALOL 300 MG/1
300 TABLET, FILM COATED ORAL 2 TIMES DAILY
Qty: 180 TABLET | Refills: 3 | Status: SHIPPED | OUTPATIENT
Start: 2021-08-12 | End: 2021-08-31 | Stop reason: SDUPTHER

## 2021-08-12 RX ORDER — GUAIFENESIN 600 MG/1
600 TABLET, EXTENDED RELEASE ORAL 2 TIMES DAILY
Qty: 10 TABLET | Refills: 0 | Status: SHIPPED | OUTPATIENT
Start: 2021-08-12 | End: 2021-08-17

## 2021-08-12 RX ORDER — AZITHROMYCIN 250 MG/1
250 TABLET, FILM COATED ORAL SEE ADMIN INSTRUCTIONS
Qty: 6 TABLET | Refills: 0 | Status: SHIPPED | OUTPATIENT
Start: 2021-08-12 | End: 2021-08-17

## 2021-08-12 RX ORDER — LOSARTAN POTASSIUM 100 MG/1
100 TABLET ORAL DAILY
Qty: 90 TABLET | Refills: 3 | Status: SHIPPED | OUTPATIENT
Start: 2021-08-12 | End: 2021-08-31 | Stop reason: SDUPTHER

## 2021-08-12 RX ORDER — NIFEDIPINE 60 MG/1
60 TABLET, EXTENDED RELEASE ORAL DAILY
Qty: 90 TABLET | Refills: 3 | Status: SHIPPED | OUTPATIENT
Start: 2021-08-12 | End: 2021-08-31 | Stop reason: SDUPTHER

## 2021-08-12 ASSESSMENT — ENCOUNTER SYMPTOMS
SHORTNESS OF BREATH: 0
CONSTIPATION: 0
DIARRHEA: 0
FACIAL SWELLING: 0
PHOTOPHOBIA: 0
SINUS COMPLAINT: 1
ABDOMINAL DISTENTION: 0
CHOKING: 0
VOICE CHANGE: 0
CHEST TIGHTNESS: 0
BLOOD IN STOOL: 0
SORE THROAT: 0

## 2021-08-12 NOTE — PROGRESS NOTES
Subjective:      Patient ID: Darryle Fulling is a 61 y.o. female. 8/12/2021 Patient presents with:  Blood Pressure Check  Headache  Sinus Problem                Last seen  8/3/2021 Patient presents with:  Hypertension: pt BP has been elevated causing excessive sleepiness                6/3/2021 Patient presents with: Follow-Up from Hospital: [facility] 5/25-5/27/21 abdominal pain        Previous history of gastric bypass, hiatal & ventral hernia repair cholecystectomy    CT abdomen and pelvis showed pneumobilia and bilateral renal cyst without hydronephrosis  LFTs, amylase & lipase within normal limits. She  underwent EGD on 5/25/2021 which was normal    Small bowel follow-through showed showed intermittent mild to moderate increased tertiary contraction in the esophagus with delayed esophageal emptying otherwise unremarkable. Patient was recommended to continue Pepcid ( allergic to Protonix) follow-up with gastroenterology     Pneumobilia noted on imaging likely 2/2 prior ERCP  Afebrile, WBCs and LFTs WNL. Ultrasound negative                4/30/2021   Medicare AWV                11/10/2020 Patient presents with:  Abdominal Pain . Never got labs as ordered last visit ? ??    States she did See Dr Dianna Madrigal who has told her there was nothing he could do ? ??                  . S/P Surgery dissectomy 8/26/20 at [facility]       Was told she has Stage 3 Renal failiure ? Labs last checked here 11/19 show Nl renal function                  2/27/18 total thyroidectomy           Past Medical History:   Diagnosis Date    Anxiety state     Asthma     Back ache     chronic    Environmental allergies     GERD (gastroesophageal reflux disease)     Hypertension     MVP (mitral valve prolapse)     Neck pain, chronic     Reflux     Sleep apnea        Review of Systems   Constitutional: Negative for activity change, appetite change, chills, diaphoresis, fatigue, fever and unexpected weight change.         All vaccines up to date    Flu vac 1/21    Tdap 5/17     Pneumonia vac 2012 ;  5/17     Covid Vac 4/21   HENT: Positive for nosebleeds. Negative for ear pain, facial swelling, postnasal drip, sore throat and voice change. Dentures fit well ; new   Eyes: Negative for photophobia and visual disturbance. Eye exam 11/20   Respiratory: Negative for choking, chest tightness and shortness of breath. Does not smoke ; No Etoh   H/o Asthma    Cardiovascular: Negative for chest pain and leg swelling. Palpitations: off and on         HTN > 5 yrs on meds . No known CAD . Father dies of MI in his 46s   Sister with CAD ? MVP  H/o palpitations   Gastrointestinal: Negative for abdominal distention, blood in stool, constipation and diarrhea. Repeat Upper endoscopy 5/21  Nl    Upper Endoscopy  1/21  H/O Ulers ? Cannot take Nsaids! Post gastric bypass 2005      GERD    Repeat Colonscopy 1/21  ; Polyps; O Blanco    Endocrine: Negative for cold intolerance and heat intolerance. Followed by Dr. Micah Chow  for thyroid   Genitourinary:        Mammogram  5/20    Scheduled Pap 0n 9/127/18    Musculoskeletal: Positive for arthralgias. Sees Ortho neck / [facility]   1/21       S/P Surgery  DDD 8/26/20      DJD . Skin: Negative. Neurological: Positive for headaches. Psychiatric/Behavioral: Negative for self-injury, sleep disturbance and suicidal ideas. Anxiety: followed by counselor       Objective:   Physical Exam  Constitutional:       General: She is in acute distress (Upset with  ! ). Appearance: She is not ill-appearing. HENT:      Nose:      Right Sinus: Frontal sinus tenderness present. Left Sinus: Frontal sinus tenderness present. Neck:      Comments:     Cardiovascular:      Rate and Rhythm: Regular rhythm. Heart sounds: Normal heart sounds. Pulmonary:      Breath sounds: Normal breath sounds. Abdominal:      General: There is distension.    Musculoskeletal: General: Normal range of motion. Right lower leg: No edema. Left lower leg: No edema. Skin:     General: Skin is warm. Neurological:      General: No focal deficit present. Mental Status: She is oriented to person, place, and time. Psychiatric:         Mood and Affect: Mood normal. Affect is angry. Behavior: Behavior is agitated. Assessment:       Cayla was seen today for blood pressure check, headache and sinus problem. Diagnoses and all orders for this visit:    Subacute frontal sinusitis  -     azithromycin (ZITHROMAX) 250 MG tablet; Take 1 tablet by mouth See Admin Instructions for 5 days 500mg on day 1 followed by 250mg on days 2 - 5  -     guaiFENesin (MUCINEX) 600 MG extended release tablet; Take 1 tablet by mouth 2 times daily for 5 days  -     Chris Mcclure MD, Otolaryngology, [facility]    Essential hypertension  BP controlled with addition of Nifedipine . Cont meds regularly + low salt diet   -     NIFEdipine (PROCARDIA XL) 60 MG extended release tablet; Take 1 tablet by mouth daily  -     labetalol (NORMODYNE) 300 MG tablet; Take 1 tablet by mouth 2 times daily  -     losartan (COZAAR) 100 MG tablet; Take 1 tablet by mouth daily                Other iron deficiency anemia    Chronic     S/P Gastric bypass . Eat an apple daily   -     ferrous sulfate (IRON 325) 325 (65 Fe) MG tablet; Take 1 tablet by mouth daily (with breakfast)    Incontinence overflow, urine  t    Acquired hypothyroidism  C/O  Endocrine / [facility]   .  On synthroid 0.25   -     -      I    Plan:

## 2021-08-17 DIAGNOSIS — J01.10 SUBACUTE FRONTAL SINUSITIS: ICD-10-CM

## 2021-08-17 RX ORDER — AZITHROMYCIN 250 MG/1
TABLET, FILM COATED ORAL
Qty: 6 TABLET | Refills: 0 | Status: SHIPPED | OUTPATIENT
Start: 2021-08-17 | End: 2021-11-16

## 2021-08-17 NOTE — TELEPHONE ENCOUNTER
Medication:   Requested Prescriptions     Pending Prescriptions Disp Refills    azithromycin (ZITHROMAX) 250 MG tablet [Pharmacy Med Name: AZITHROMYCIN 250MG TABLETS 6-VELMA] 6 tablet 0     Sig: TAKE 2 TABLETS BY MOUTH ON DAY 1, THEN TAKE 1 TABLET BY MOUTH DAILY FOR 4 DAYS        Last Filled:      Patient Phone Number: [phone] (home)     Last appt: 8/12/2021   Next appt: 8/31/2021    Last OARRS:   RX Monitoring 1/9/2018   Attestation The Prescription Monitoring Report for this patient was reviewed today.

## 2021-08-30 ENCOUNTER — HOSPITAL ENCOUNTER (OUTPATIENT)
Dept: GENERAL RADIOLOGY | Age: 66
Discharge: HOME | End: 2021-08-30
Payer: MEDICARE

## 2021-08-30 ENCOUNTER — HOSPITAL ENCOUNTER (OUTPATIENT)
Age: 66
Discharge: HOME | End: 2021-08-30
Payer: MEDICARE

## 2021-08-30 DIAGNOSIS — R10.10 PAIN OF UPPER ABDOMEN: ICD-10-CM

## 2021-08-30 PROCEDURE — 74018 RADEX ABDOMEN 1 VIEW: CPT

## 2021-08-31 ENCOUNTER — OFFICE VISIT (OUTPATIENT)
Dept: PRIMARY CARE CLINIC | Age: 66
End: 2021-08-31
Payer: MEDICARE

## 2021-08-31 VITALS
SYSTOLIC BLOOD PRESSURE: 130 MMHG | BODY MASS INDEX: 36.57 KG/M2 | DIASTOLIC BLOOD PRESSURE: 80 MMHG | WEIGHT: 233 LBS | TEMPERATURE: 97.2 F | HEART RATE: 86 BPM | HEIGHT: 67 IN | OXYGEN SATURATION: 99 %

## 2021-08-31 DIAGNOSIS — I10 ESSENTIAL HYPERTENSION: ICD-10-CM

## 2021-08-31 PROCEDURE — G9899 SCRN MAM PERF RSLTS DOC: HCPCS | Performed by: INTERNAL MEDICINE

## 2021-08-31 PROCEDURE — 3017F COLORECTAL CA SCREEN DOC REV: CPT | Performed by: INTERNAL MEDICINE

## 2021-08-31 PROCEDURE — G8417 CALC BMI ABV UP PARAM F/U: HCPCS | Performed by: INTERNAL MEDICINE

## 2021-08-31 PROCEDURE — 99213 OFFICE O/P EST LOW 20 MIN: CPT | Performed by: INTERNAL MEDICINE

## 2021-08-31 PROCEDURE — G8427 DOCREV CUR MEDS BY ELIG CLIN: HCPCS | Performed by: INTERNAL MEDICINE

## 2021-08-31 PROCEDURE — 1036F TOBACCO NON-USER: CPT | Performed by: INTERNAL MEDICINE

## 2021-08-31 RX ORDER — LOSARTAN POTASSIUM 100 MG/1
100 TABLET ORAL DAILY
Qty: 90 TABLET | Refills: 3 | Status: SHIPPED | OUTPATIENT
Start: 2021-08-31 | End: 2021-11-16 | Stop reason: SDUPTHER

## 2021-08-31 RX ORDER — LABETALOL 300 MG/1
300 TABLET, FILM COATED ORAL 2 TIMES DAILY
Qty: 180 TABLET | Refills: 3 | Status: SHIPPED | OUTPATIENT
Start: 2021-08-31 | End: 2021-11-16 | Stop reason: SDUPTHER

## 2021-08-31 RX ORDER — NIFEDIPINE 60 MG/1
60 TABLET, EXTENDED RELEASE ORAL DAILY
Qty: 90 TABLET | Refills: 3 | Status: SHIPPED | OUTPATIENT
Start: 2021-08-31 | End: 2021-11-07 | Stop reason: SDUPTHER

## 2021-08-31 ASSESSMENT — ENCOUNTER SYMPTOMS
FACIAL SWELLING: 0
DIARRHEA: 0
PHOTOPHOBIA: 0
VOICE CHANGE: 0
ABDOMINAL DISTENTION: 0
CHEST TIGHTNESS: 0
BLOOD IN STOOL: 0
CHOKING: 0
SORE THROAT: 0
CONSTIPATION: 0
SHORTNESS OF BREATH: 0

## 2021-08-31 NOTE — PROGRESS NOTES
Subjective:      Patient ID: Mora Horowitz is a 61 y.o. female. 8/31/2021 Patient presents with:  Blood Pressure Check                Last seen  8/12/2021 Patient presents with:  Blood Pressure Check  Headache  Sinus Problem                  8/3/2021 Patient presents with:  Hypertension: pt BP has been elevated causing excessive sleepiness                6/3/2021 Patient presents with: Follow-Up from Hospital: [facility] 5/25-5/27/21 abdominal pain        Previous history of gastric bypass, hiatal & ventral hernia repair cholecystectomy    CT abdomen and pelvis showed pneumobilia and bilateral renal cyst without hydronephrosis  LFTs, amylase & lipase within normal limits. She  underwent EGD on 5/25/2021 which was normal    Small bowel follow-through showed showed intermittent mild to moderate increased tertiary contraction in the esophagus with delayed esophageal emptying otherwise unremarkable. Patient was recommended to continue Pepcid ( allergic to Protonix) follow-up with gastroenterology     Pneumobilia noted on imaging likely 2/2 prior ERCP  Afebrile, WBCs and LFTs WNL. Ultrasound negative                4/30/2021   Medicare AWV                11/10/2020 Patient presents with:  Abdominal Pain . Never got labs as ordered last visit ? ??    States she did See Dr Joanna Neely who has told her there was nothing he could do ? ??                  . S/P Surgery dissectomy 8/26/20 at [facility]       Was told she has Stage 3 Renal failiure ?   Labs last checked here 11/19 show Nl renal function                  2/27/18 total thyroidectomy           Past Medical History:   Diagnosis Date    Anxiety state     Asthma     Back ache     chronic    Environmental allergies     GERD (gastroesophageal reflux disease)     Hypertension     MVP (mitral valve prolapse)     Neck pain, chronic     Reflux     Sleep apnea        Review of Systems   Constitutional: Negative for activity change, appetite change, chills, diaphoresis, fatigue, fever and unexpected weight change. All vaccines up to date    Flu vac 1/21    Tdap 5/17     Pneumonia vac 2012 ;  5/17     Covid Vac 4/21   HENT: Positive for nosebleeds. Negative for ear pain, facial swelling, postnasal drip, sore throat and voice change. Dentures fit well ; new   Eyes: Negative for photophobia and visual disturbance. Eye exam 11/20   Respiratory: Negative for choking, chest tightness and shortness of breath. Does not smoke ; No Etoh   H/o Asthma    Cardiovascular: Negative for chest pain and leg swelling. Palpitations: off and on         HTN > 5 yrs on meds . No known CAD . Father dies of MI in his 46s   Sister with CAD ? MVP  H/o palpitations   Gastrointestinal: Negative for abdominal distention, blood in stool, constipation and diarrhea. Repeat Upper endoscopy 5/21  Nl    Upper Endoscopy  1/21  H/O Ulers ? Cannot take Nsaids! Post gastric bypass 2005      GERD    Repeat Colonscopy 1/21  ; Polyps; O Marilee    Endocrine: Negative for cold intolerance and heat intolerance. Followed by Dr. Torsten Bourne  for thyroid   Genitourinary:        Mammogram  5/20    Scheduled Pap 0n 9/127/18    Musculoskeletal: Positive for arthralgias. Sees Ortho neck / [facility]   1/21       S/P Surgery  DDD 8/26/20      DJD . Skin: Negative. Neurological: Positive for headaches. Psychiatric/Behavioral: Negative for self-injury, sleep disturbance and suicidal ideas. Anxiety: followed by counselor       Objective:   Physical Exam  Constitutional:       General: Distressed: Upset with  ! Appearance: She is not ill-appearing. HENT:      Nose:      Right Sinus: Frontal sinus tenderness present. Left Sinus: Frontal sinus tenderness present. Neck:      Comments:     Cardiovascular:      Rate and Rhythm: Regular rhythm. Heart sounds: Normal heart sounds. Pulmonary:      Breath sounds: Normal breath sounds. Musculoskeletal:         General: Normal range of motion. Right lower leg: No edema. Left lower leg: No edema. Skin:     General: Skin is warm. Neurological:      General: No focal deficit present. Mental Status: She is oriented to person, place, and time. Psychiatric:         Mood and Affect: Mood normal.         Assessment:       Cayla was seen today for blood pressure check. Diagnoses and all orders for this visit:    Essential hypertension  Controlled with addition of Nifedipine . Cont combo . + low salt diet + reduce wt   -     NIFEdipine (PROCARDIA XL) 60 MG extended release tablet; Take 1 tablet by mouth daily  -     labetalol (NORMODYNE) 300 MG tablet; Take 1 tablet by mouth 2 times daily  -     losartan (COZAAR) 100 MG tablet; Take 1 tablet by mouth daily                  Other iron deficiency anemia    Chronic     S/P Gastric bypass . Eat an apple daily   -     ferrous sulfate (IRON 325) 325 (65 Fe) MG tablet; Take 1 tablet by mouth daily (with breakfast)    Incontinence overflow, urine  t    Acquired hypothyroidism  C/O  Endocrine / [facility]   .  On synthroid 0.25   -     -      I    Plan:

## 2021-09-02 ENCOUNTER — OFFICE VISIT (OUTPATIENT)
Dept: SURGERY | Age: 66
End: 2021-09-02
Payer: MEDICARE

## 2021-09-02 VITALS — SYSTOLIC BLOOD PRESSURE: 149 MMHG | BODY MASS INDEX: 36.34 KG/M2 | WEIGHT: 232 LBS | DIASTOLIC BLOOD PRESSURE: 85 MMHG

## 2021-09-02 DIAGNOSIS — R10.9 RIGHT SIDED ABDOMINAL PAIN: Primary | ICD-10-CM

## 2021-09-02 PROCEDURE — G8427 DOCREV CUR MEDS BY ELIG CLIN: HCPCS | Performed by: SURGERY

## 2021-09-02 PROCEDURE — 99203 OFFICE O/P NEW LOW 30 MIN: CPT | Performed by: SURGERY

## 2021-09-02 PROCEDURE — G8417 CALC BMI ABV UP PARAM F/U: HCPCS | Performed by: SURGERY

## 2021-09-02 PROCEDURE — G9899 SCRN MAM PERF RSLTS DOC: HCPCS | Performed by: SURGERY

## 2021-09-02 ASSESSMENT — ENCOUNTER SYMPTOMS
VOMITING: 1
ABDOMINAL PAIN: 1
EYES NEGATIVE: 1
DIARRHEA: 1
CONSTIPATION: 1
RESPIRATORY NEGATIVE: 1
ALLERGIC/IMMUNOLOGIC NEGATIVE: 1
NAUSEA: 1

## 2021-09-02 NOTE — PROGRESS NOTES
[facility] GENERAL AND LAPAROSCOPIC SURGERY                       PATIENT NAME: Janna Bullock        TODAY'S DATE: 9/2/2021    Reason for Consult:  Abd pain    Requesting Physician:  Dr. Bard Magallon:              The patient is a 61 y.o. female who presents with concerns of pain, recurring, right abd, sharp, crampong and throbbing, rare on the left. Improved with massage and rubbing the area out by . No emesis, stable weight. Several prior abdominal operations, GBP, with redo, ventral / incisional hernia repairs, HH repair, cholecystectomy, partial splenectomy, hysterectomy. GBP with HH repair goes back to early 2000's, done at [facility]. Several operations since. EGD without current ulcer with Dr. Antolin Suarez, 5/25/2021.     Past Medical History:        Diagnosis Date    Anxiety state     Asthma     Back ache     chronic    Environmental allergies     GERD (gastroesophageal reflux disease)     Hypertension     MVP (mitral valve prolapse)     Neck pain, chronic     Reflux     Sleep apnea        Past Surgical History:        Procedure Laterality Date    BACK SURGERY      BLADDER SUSPENSION      CHOLECYSTECTOMY      FOOT SURGERY  11-9-12    endoscopic plantar fasciotomy right foot    GASTRIC BYPASS SURGERY      HERNIA REPAIR      HIATAL HERNIA REPAIR      HYSTERECTOMY      JOINT REPLACEMENT Bilateral     KNEE    KNEE ARTHROPLASTY  2/28/13    L    KNEE ARTHROSCOPY Right 3/7/2014    LAMINECTOMY  7/1/11    bilateral L5-S1 laminotomy, nerve root exploration, foraminotomy    NASAL SINUS SURGERY Right 02/05/2018    Excision of rt nasal mass    RI COLONOSCOPY FLX DX W/COLLJ SPEC WHEN PFRMD N/A 9/28/2018    COLONOSCOPY DIAGNOSTIC OR SCREENING performed by Toy Crowley MD at [address] [place] [address] DX/THER AGNT PARAVERT FACET JOINT, LUMBAR/SAC, 1ST LEVEL Right 11/6/2018    RIGHT L4,L5 S1 MEDIAL BRANCH BLOCK WITH FLUOROSCOPY performed by Aicha Lazcano MD at [facility]  SPLENECTOMY, PARTIAL      TONSILLECTOMY      UPPER GASTROINTESTINAL ENDOSCOPY      with dilatation    UPPER GASTROINTESTINAL ENDOSCOPY N/A 8/14/2019    EGD ESOPHAGOGASTRODUODENOSCOPY performed by Petra Schwab, MD at [address] 5/25/2021    EGD BIOPSY performed by Cecy Vazquez MD at [facility]       Current Medications:   No current facility-administered medications for this visit. Prior to Admission medications    Medication Sig Start Date End Date Taking?  Authorizing Provider   NIFEdipine (PROCARDIA XL) 60 MG extended release tablet Take 1 tablet by mouth daily 8/31/21  Yes Deshaun German MD   labetalol (NORMODYNE) 300 MG tablet Take 1 tablet by mouth 2 times daily 8/31/21  Yes Deshaun German MD   losartan (COZAAR) 100 MG tablet Take 1 tablet by mouth daily 8/31/21  Yes Deshaun German MD   butalbital-acetaminophen-caffeine (FIORICET, ESGIC) -85 MG per tablet Take 1 tablet by mouth every 4 hours as needed for Headaches 6/23/21  Yes Zack Contreras MD   methocarbamol (ROBAXIN) 750 MG tablet TAKE 1 TABLET BY MOUTH THREE TIMES DAILY FOR 10 DAYS 6/23/21  Yes Zack Contreras MD   vitamin D (ERGOCALCIFEROL) 1.25 MG (19574 UT) CAPS capsule Take 50,000 Units by mouth once a week   Yes Historical Provider, MD   butalbital-acetaminophen-caffeine (FIORICET, ESGIC) -40 MG per tablet Take 2 tablets by mouth every 4 hours as needed for Headaches May take 2 tablets every 4 hours as needed for pain 5/19/21  Yes Zack Contreras MD   ferrous sulfate (IRON 325) 325 (65 Fe) MG tablet Take 1 tablet by mouth daily (with breakfast) 4/30/21  Yes Deshaun German MD   oxybutynin (DITROPAN-XL) 5 MG extended release tablet TAKE 1 TABLET BY MOUTH DAILY 4/30/21  Yes Deshaun German MD   fluticasone (FLOVENT HFA) 220 MCG/ACT inhaler Inhale 2 puffs into the lungs 2 times daily 4/19/21  Yes RIVERA Sanchez   albuterol sulfate  (90 Base) MCG/ACT inhaler Inhale 2 puffs into the lungs every 6 hours as needed for Wheezing 4/19/21  Yes RIVERA Sanchez   fluticasone (FLONASE) 50 MCG/ACT nasal spray SHAKE LIQUID AND USE 2 SPRAYS IN EACH NOSTRIL DAILY 3/4/21  Yes Clarissa Wolfe MD   azithromycin (ZITHROMAX) 250 MG tablet TAKE 2 TABLETS BY MOUTH ON DAY 1, THEN TAKE 1 TABLET BY MOUTH DAILY FOR 4 DAYS  Patient not taking: Reported on 8/31/2021 8/17/21   Marcelle Grant MD   ACETAMINOPHEN EXTRA STRENGTH 500 MG tablet TAKE 1 TABLET BY MOUTH EVERY 6 HOURS AS NEEDED FOR PAIN 6/3/21   Marcelle Grant MD   famotidine (PEPCID) 20 MG tablet Take 1 tablet by mouth 2 times daily 5/27/21   Daisy Pichardo MD   prednisoLONE acetate (PRED FORTE) 1 % ophthalmic suspension Place 1 drop into both eyes 2 times daily 7/14/20   Clarissa Wolfe MD        Allergies:  Gabapentin, Protonix [pantoprazole sodium], Tramadol, Amoxicillin, Aspirin, Augmentin [amoxicillin-pot clavulanate], Betadine [povidone iodine], Chlorhexidine gluconate, Morphine, Other, Toradol [ketorolac tromethamine], Iodides, Ranitidine, and Tape [adhesive tape]    Social History:    reports that she quit smoking about 17 years ago. She has a 0.50 pack-year smoking history. She has never used smokeless tobacco. She reports that she does not drink alcohol and does not use drugs.     Family History:        Problem Relation Age of Onset    High Blood Pressure Mother     Heart Disease Mother     Other Mother         glaucoma    High Blood Pressure Father     Heart Disease Father     Other Father         intestional problems    Cancer Sister         cancer    Kidney Disease Sister     Kidney Disease Brother     High Blood Pressure Brother     Other Sister         lupus,fibromyalgia  thyroid surgery    High Blood Pressure Sister     Kidney Disease Sister     Heart Disease Sister     Kidney Disease Brother     High Blood Pressure Brother     Anesth Problems Neg Hx     Malig Hyperten HISTORY:   Reason for exam:->epig/ruq pain   Additional Contrast?->None   Decision Support Exception - unselect if not a suspected or confirmed   emergency medical condition->Emergency Medical Condition (MA)   Reason for Exam: epig/ruq pain   Acuity: Acute   Type of Exam: Initial   Relevant Medical/Surgical History: Flank Pain (pt reporting right side flank   pain that wraps around with nausea since last night, pt reports she had her   gallbladder taken out 2007)       FINDINGS:   Lower Chest: The visualized lung bases are clear.  Hiatal hernia is   identified.       Organs: Evaluation is limited by the lack of intravenous contrast.   Pneumobilia is identified.  The spleen, pancreas and adrenal glands are   grossly negative.  Bilateral renal cysts are seen without hydronephrosis.       GI/Bowel: Evidence of gastric bypass is again seen.  Small bowel caliber is   normal.  The appendix is normal.  The colon is unremarkable.       Pelvis: The patient is status post hysterectomy.  The bladder is grossly   negative.       Peritoneum/Retroperitoneum: No adenopathy, mesenteric stranding or free   fluid.  Aortic caliber is normal.       Bones/Soft Tissues: No acute findings.           Impression   1. Pneumobilia. 2. Changes from gastric bypass again seen. 3. Hiatal hernia.         IMPRESSION/RECOMMENDATIONS:    Right abdominal pain  Multiple prior abdominal surgeries    Has abd wall / MS possibly, more than intraabdominal problem. Possible adhesions, but adhesiolysis would carry sig risks. Will refer to PT to try abd wall physiotherapy an see if this helps her more chronic pain    The problem and plan were discussed in detail with the patient today. All questions have been answered, and they agree to proceed.     Sesar Long MD

## 2021-09-10 ENCOUNTER — OFFICE VISIT (OUTPATIENT)
Dept: ORTHOPEDIC SURGERY | Age: 66
End: 2021-09-10
Payer: MEDICARE

## 2021-09-10 VITALS — RESPIRATION RATE: 18 BRPM | HEIGHT: 67 IN | WEIGHT: 232 LBS | BODY MASS INDEX: 36.41 KG/M2

## 2021-09-10 DIAGNOSIS — M79.671 RIGHT FOOT PAIN: Primary | ICD-10-CM

## 2021-09-10 DIAGNOSIS — L84 FOOT CALLUS: ICD-10-CM

## 2021-09-10 PROCEDURE — G8417 CALC BMI ABV UP PARAM F/U: HCPCS | Performed by: ORTHOPAEDIC SURGERY

## 2021-09-10 PROCEDURE — G8427 DOCREV CUR MEDS BY ELIG CLIN: HCPCS | Performed by: ORTHOPAEDIC SURGERY

## 2021-09-10 PROCEDURE — 99203 OFFICE O/P NEW LOW 30 MIN: CPT | Performed by: ORTHOPAEDIC SURGERY

## 2021-09-10 PROCEDURE — G9899 SCRN MAM PERF RSLTS DOC: HCPCS | Performed by: ORTHOPAEDIC SURGERY

## 2021-09-10 RX ORDER — NAPROXEN 500 MG/1
500 TABLET ORAL 2 TIMES DAILY WITH MEALS
Qty: 28 TABLET | Refills: 0 | Status: SHIPPED | OUTPATIENT
Start: 2021-09-10 | End: 2021-11-04

## 2021-09-10 NOTE — PROGRESS NOTES
CHIEF COMPLAINT: Right heel pain/ callus. HISTORY:  Ms. Jennifer Khan 61 y.o.  female presents today for consultation request from [facility], MD for evaluation of right heel pain which started to worsen over the past few years with no injury or trauma. She is complaining of achy pain 10/10 that is now radiating up her lower leg when walking. Pain is burning and sharp and is worse with standing and wallking. Pain is sharp early in the morning with first few steps, dull achy pain by the end of the day. Improved with rest and is a 7/10 VAS. No numbness and tingling sensation. She states that she has calluses on the bottoms of both feet, but the right is worse. She shaves them down intermittently. No other complaint. She states she had a bone spur removed off the sole of her right foot years ago. She had a h/o surgery at age 23 after injury on her right foot, but does not remember what was done. She is currently not working.      Past Medical History:   Diagnosis Date    Anxiety state     Asthma     Back ache     chronic    Environmental allergies     GERD (gastroesophageal reflux disease)     Hypertension     MVP (mitral valve prolapse)     Neck pain, chronic     Reflux     Sleep apnea        Past Surgical History:   Procedure Laterality Date    BACK SURGERY      BLADDER SUSPENSION      CHOLECYSTECTOMY      FOOT SURGERY  11-9-12    endoscopic plantar fasciotomy right foot    GASTRIC BYPASS SURGERY      HERNIA REPAIR      HIATAL HERNIA REPAIR      HYSTERECTOMY      JOINT REPLACEMENT Bilateral     KNEE    KNEE ARTHROPLASTY  2/28/13    L    KNEE ARTHROSCOPY Right 3/7/2014    LAMINECTOMY  7/1/11    bilateral L5-S1 laminotomy, nerve root exploration, foraminotomy    NASAL SINUS SURGERY Right 02/05/2018    Excision of rt nasal mass    AK COLONOSCOPY FLX DX W/COLLJ SPEC WHEN PFRMD N/A 9/28/2018    COLONOSCOPY DIAGNOSTIC OR SCREENING performed by Toy Crowley MD at [facility] END[facility]    [place] INJ DX/THER AGNT PARAVERT FACET JOINT, LUMBAR/SAC, 1ST LEVEL Right 2018    RIGHT L4,L5 S1 MEDIAL BRANCH BLOCK WITH FLUOROSCOPY performed by Ramon Oquendo MD at [address] [address], PARTIAL      TONSILLECTOMY      UPPER GASTROINTESTINAL ENDOSCOPY      with dilatation    UPPER GASTROINTESTINAL ENDOSCOPY N/A 2019    EGD ESOPHAGOGASTRODUODENOSCOPY performed by Holley Lopez MD at [address] 2021    [facility] Mar[facility] status:      Spouse name: Not on file    Number of children: Not on file    Years of education: Not on file    Highest education level: Not on file   Occupational History    Not on file   Tobacco Use    Smoking status: Former Smoker     Packs/day: 0.25     Years: 2.00     Pack years: 0.50     Quit date: 2004     Years since quittin.7    Smokeless tobacco: Never Used    Tobacco comment: [id number]   Vaping Use    Vaping Use: Never used   Substance and Sexual Activity    Alcohol use: No    Drug use: No    Sexual activity: Yes     Partners: Male   Other Topics Concern    Not on file   Social History Narrative    Not on file     Social Determinants of Health     Financial Resource Strain: Low Risk     Difficulty of Paying Living Expenses: Not hard at all   Food Insecurity:     Worried About [address] in the Last Year:     [address]  in the Last Year:    Transportation Needs:     Lack of Transportation (Medical):      Lack of Transportation (Non-Medical):    Physical Activity:     Days of Exercise per Week:     Minutes of Exercise per Session:    Stress:     Feeling of Stress :    Social Connections:     Frequency of Communication with Friends and Family:     Frequency of Social Gatherings with Friends and Family:     Attends Rastafari Services:     Active Member of Clubs or Organizations:     Attends Club or Organization Meetings:     Marital Status:    Intimate Partner Violence:     Fear of Current or Ex-Partner:     Emotionally Abused:     Physically Abused:     Sexually Abused:        Family History   Problem Relation Age of Onset    High Blood Pressure Mother     Heart Disease Mother     Other Mother         glaucoma    High Blood Pressure Father     Heart Disease Father     Other Father         intestional problems    Cancer Sister         cancer    Kidney Disease Sister     Kidney Disease Brother     High Blood Pressure Brother     Other Sister         lupus,fibromyalgia  thyroid surgery    High Blood Pressure Sister     Kidney Disease Sister     Heart Disease Sister     Kidney Disease Brother     High Blood Pressure Brother     Anesth Problems Neg Hx     Malig Hyperten Neg Hx     Hypotension Neg Hx     Malig Hypertherm Neg Hx     Pseudochol.  Deficiency Neg Hx        Current Outpatient Medications on File Prior to Visit   Medication Sig Dispense Refill    NIFEdipine (PROCARDIA XL) 60 MG extended release tablet Take 1 tablet by mouth daily 90 tablet 3    labetalol (NORMODYNE) 300 MG tablet Take 1 tablet by mouth 2 times daily 180 tablet 3    losartan (COZAAR) 100 MG tablet Take 1 tablet by mouth daily 90 tablet 3    azithromycin (ZITHROMAX) 250 MG tablet TAKE 2 TABLETS BY MOUTH ON DAY 1, THEN TAKE 1 TABLET BY MOUTH DAILY FOR 4 DAYS (Patient not taking: Reported on 8/31/2021) 6 tablet 0    butalbital-acetaminophen-caffeine (FIORICET, ESGIC) -40 MG per tablet Take 1 tablet by mouth every 4 hours as needed for Headaches 180 tablet 3    methocarbamol (ROBAXIN) 750 MG tablet TAKE 1 TABLET BY MOUTH THREE TIMES DAILY FOR 10 DAYS 30 tablet 0    ACETAMINOPHEN EXTRA STRENGTH 500 MG tablet TAKE 1 TABLET BY MOUTH EVERY 6 HOURS AS NEEDED FOR PAIN 90 tablet 1    famotidine (PEPCID) 20 MG tablet Take 1 tablet by mouth 2 times daily 60 tablet 2  vitamin D (ERGOCALCIFEROL) 1.25 MG (68330 UT) CAPS capsule Take 50,000 Units by mouth once a week      butalbital-acetaminophen-caffeine (FIORICET, ESGIC) -40 MG per tablet Take 2 tablets by mouth every 4 hours as needed for Headaches May take 2 tablets every 4 hours as needed for pain 30 tablet 0    ferrous sulfate (IRON 325) 325 (65 Fe) MG tablet Take 1 tablet by mouth daily (with breakfast) 90 tablet 3    oxybutynin (DITROPAN-XL) 5 MG extended release tablet TAKE 1 TABLET BY MOUTH DAILY 90 tablet 3    fluticasone (FLOVENT HFA) 220 MCG/ACT inhaler Inhale 2 puffs into the lungs 2 times daily 12 g 5    albuterol sulfate  (90 Base) MCG/ACT inhaler Inhale 2 puffs into the lungs every 6 hours as needed for Wheezing 18 g 5    fluticasone (FLONASE) 50 MCG/ACT nasal spray SHAKE LIQUID AND USE 2 SPRAYS IN EACH NOSTRIL DAILY 48 g 3    prednisoLONE acetate (PRED FORTE) 1 % ophthalmic suspension Place 1 drop into both eyes 2 times daily 5 mL 1     No current facility-administered medications on file prior to visit. Pertinent items are noted in HPI  Review of systems reviewed from Patient History Form dated on 9/10/2021 and available in the patient's chart under the Media tab. No change. PHYSICAL EXAMINATION:  Ms. Guillermo Butler is a very pleasant 61 y.o.  female who presents today in no acute distress, awake, alert, and oriented. She is well dressed, nourished and  groomed. Patient with normal affect. Height is  5' 7\" (1.702 m), weight is 232 lb (105.2 kg), Body mass index is 36.34 kg/m². Resting respiratory rate is 16. Examination of the gait, showed that the patient walks heel-toe with a non-antalgic gait and no limp. Examination of both ankles showing a good range of motion. She has dorsiflexion to about 10 degrees bilaterally, which increased with knee flexion. She has intact sensation and good pedal pulses. She has good strength in all four planes, including eversion, and has moderate tenderness on callus at the base of the plantar fascia insertion near the calcaneus, compared to the other side. There is a lesion/callus on the left side as well, but that is not tender. The ankles are stable to drawer test bilaterally, equally. Right pes planus. IMAGING:Xray's were reviewed, 3 views of the right foot taken in office today, and showed no acute fracture. No other abnormality. IMPRESSION: Right plantar surface callus. PLAN: I discussed with the patient the treatment options. We recommended stretching exercises of the calf as well as stretching of the plantar fascia which was taught to the patient today. She will take NSAIDS as needed, rx sent. Use silicone heel pad . Recommend referral to dermatology, rx given. Thank you very much for the kind consultation and allowing me to participate in this patient's care. I will continue to keep you apprised of her progress.         Supriya Gold MD

## 2021-09-20 ENCOUNTER — OFFICE VISIT (OUTPATIENT)
Dept: ENT CLINIC | Age: 66
End: 2021-09-20
Payer: MEDICARE

## 2021-09-20 VITALS — TEMPERATURE: 97.1 F | DIASTOLIC BLOOD PRESSURE: 84 MMHG | HEART RATE: 55 BPM | SYSTOLIC BLOOD PRESSURE: 141 MMHG

## 2021-09-20 DIAGNOSIS — E04.9 GOITER: Primary | Chronic | ICD-10-CM

## 2021-09-20 DIAGNOSIS — M26.609 TEMPOROMANDIBULAR JOINT SYNDROME: Chronic | ICD-10-CM

## 2021-09-20 PROCEDURE — G8417 CALC BMI ABV UP PARAM F/U: HCPCS | Performed by: OTOLARYNGOLOGY

## 2021-09-20 PROCEDURE — G8427 DOCREV CUR MEDS BY ELIG CLIN: HCPCS | Performed by: OTOLARYNGOLOGY

## 2021-09-20 PROCEDURE — G9899 SCRN MAM PERF RSLTS DOC: HCPCS | Performed by: OTOLARYNGOLOGY

## 2021-09-20 PROCEDURE — 1036F TOBACCO NON-USER: CPT | Performed by: OTOLARYNGOLOGY

## 2021-09-20 PROCEDURE — 99214 OFFICE O/P EST MOD 30 MIN: CPT | Performed by: OTOLARYNGOLOGY

## 2021-09-20 PROCEDURE — 3017F COLORECTAL CA SCREEN DOC REV: CPT | Performed by: OTOLARYNGOLOGY

## 2021-09-20 RX ORDER — METHYLPREDNISOLONE 4 MG/1
TABLET ORAL
Qty: 1 KIT | Refills: 0 | Status: SHIPPED | OUTPATIENT
Start: 2021-09-20 | End: 2021-11-04

## 2021-09-20 ASSESSMENT — ENCOUNTER SYMPTOMS
SORE THROAT: 0
SINUS PAIN: 0
RHINORRHEA: 0

## 2021-09-20 NOTE — PATIENT INSTRUCTIONS
microorganisms in tap water, which can result in a fatal disease. Alternatively, you could use a blue bulb syringe and solution of 1/4 tsp of table salt in 8 ounces (one cup or 240 ml) of distilled water.

## 2021-09-20 NOTE — PROGRESS NOTES
[facility]       PCP:  Gurinder Cheng MD      [address]  Chief Complaint   Patient presents with    Ear Problem     ear pain , headaches       HISTORY OF PRESENT ILLNESS       Cayla Severino is a 61 y.o. female here for evaluation and treatment of pain under the ear when swallowing \"like electricity. \"  When eating, drinking, and swallowing. Had crusting in the right nose with odor. Post right SMG excision when age 6-8 y/o and tonsillectomy. Had right thyroid lobectomy, Dr. Amanda Ellis and [facility], 2 years ago. REVIEW OF SYSTEMS   Review of Systems   Constitutional: Negative for chills, fever and unexpected weight change. HENT: Positive for congestion (right side always clogged up). Negative for ear discharge, ear pain, hearing loss, rhinorrhea, sinus pain, sore throat and tinnitus.         PAST MEDICAL HISTORY    Past Medical History:   Diagnosis Date    Anxiety state     Asthma     Back ache     chronic    Environmental allergies     GERD (gastroesophageal reflux disease)     Hypertension     MVP (mitral valve prolapse)     Neck pain, chronic     Reflux     Sleep apnea        Past Surgical History:   Procedure Laterality Date    BACK SURGERY      BLADDER SUSPENSION      CHOLECYSTECTOMY      FOOT SURGERY  11-9-12    endoscopic plantar fasciotomy right foot    GASTRIC BYPASS SURGERY      HERNIA REPAIR      HIATAL HERNIA REPAIR      HYSTERECTOMY      JOINT REPLACEMENT Bilateral     KNEE    KNEE ARTHROPLASTY  2/28/13    L    KNEE ARTHROSCOPY Right 3/7/2014    LAMINECTOMY  7/1/11    bilateral L5-S1 laminotomy, nerve root exploration, foraminotomy    NASAL SINUS SURGERY Right 02/05/2018    Excision of rt nasal mass    MO COLONOSCOPY FLX DX W/COLLJ SPEC WHEN PFRMD N/A 9/28/2018    COLONOSCOPY DIAGNOSTIC OR SCREENING performed by Yang Stoner MD at [address] [place] [address] DX/THER AGNT abscesses. Neck:      Thyroid: No thyroid mass, thyromegaly or thyroid tenderness. Trachea: No tracheal deviation. Comments: No cervical masses or tenderness. Musculoskeletal:      Cervical back: Normal range of motion and neck supple. Lymphadenopathy:      Cervical: No cervical adenopathy. Neurological:      Mental Status: She is alert. [facility] / [facility] / Jimbo Valle was seen today for ear problem. Diagnoses and all orders for this visit:    Goiter  -     US THYROID    Temporomandibular joint syndrome  Comments:  pain under and in front of ear  Orders:  -     methylPREDNISolone (MEDROL DOSEPACK) 4 MG tablet; Take by mouth, as directed by package instructions. RECOMMENDATIONS/PLAN      1. See Patient Instructions on file for this visit, which were discussed with the patient. 2. Acetaminophen (eg. Tylenol) (over the counter medications) as needed for pain. 3. Return in about 1 month (around 10/20/2021).

## 2021-09-22 ENCOUNTER — HOSPITAL ENCOUNTER (OUTPATIENT)
Dept: ULTRASOUND IMAGING | Age: 66
Discharge: HOME | End: 2021-09-22
Payer: MEDICARE

## 2021-09-22 DIAGNOSIS — E04.9 GOITER: ICD-10-CM

## 2021-09-22 PROCEDURE — 76536 US EXAM OF HEAD AND NECK: CPT

## 2021-09-29 RX ORDER — TIZANIDINE 2 MG/1
2 TABLET ORAL EVERY 8 HOURS PRN
Qty: 21 TABLET | Refills: 0 | Status: SHIPPED | OUTPATIENT
Start: 2021-09-29 | End: 2021-10-06

## 2021-10-15 ENCOUNTER — HOSPITAL ENCOUNTER (OUTPATIENT)
Dept: PHYSICAL THERAPY | Age: 66
Setting detail: THERAPIES SERIES
Discharge: HOME | End: 2021-10-15
Payer: MEDICARE

## 2021-10-15 PROCEDURE — 97162 PT EVAL MOD COMPLEX 30 MIN: CPT

## 2021-10-15 PROCEDURE — 97110 THERAPEUTIC EXERCISES: CPT
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
## 2021-10-15 NOTE — FLOWSHEET NOTE
[facility]  Phone: [phone]   Fax: [phone]    Physical Therapy Daily Treatment Note  Date:  10/15/2021    Patient Name:  Charles Alonso    :  1955  MRN: [id number]  Referring Physician:  Fabiola Wood                                        Evaluation Date: 10/15/2021                                       Medical Diagnosis Information:        R10.9 (ICD-10-CM) - Right sided abdominal pain        Treatment Diagnosis: abdominal pain, Cspine pain and radicular symptoms including N/T in R hand, h/o cervical spinal fusion x2, Lspine pain, LE weakness, UE weakness, decreased ROM Cspine/ Lspine/B UE elevation  Insurance information:  [facility] Medicare 80/plan   Plan of care signed (Y/N): N    Date of Patient follow up with Physician: ? Functional scale:Quick dash: raw score = 20; dysfunction = 20.45%    Progress Report: []  Yes  [x]  No     Date Range for reporting period:  Beginning 10/15/21  Ending    Progress report due (10 Rx/or 30 days whichever is less): visit #10 or      Recertification due (POC duration/ or 90 days whichever is less): visit #20 or 12/15/21     Visit # Insurance Allowable Auth required? Date Range    [place] []  Yes  [x]  No         Units approved Units used Date Range          Latex Allergy:  [x]NO      []YES  Preferred Language for Healthcare:   [x]English       []other:      Pain Scale: 0/10 - stomach pain currently, 7/5/10 neck pain, 10/10pain LB to B LE's  Easing factors: self-massage  Provocative factors: nothing seems to bring it on; pt states that it \"pops up\" and stays for a few days; then it goes away until the next time. During the few days that it is there, pt states that she can ease it with self- massage, but when she moves, it comes right back. Pt uses prayer during the times that it is very painful.         SUBJECTIVE:  Patient stated complaint:Pt's main concern is stomach pain with nausea and vomiting. Pt reports recent neck surgery - cervical fusion - pt states that pain is really bad today. Pt reports pain running down B LE's is also really bad today. She had to push herself to come to PT this am.     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 Education:  -patient educated on diagnosis, prognosis and expectations for rehab  -all patient questions were answered    HEP instruction: Written HEP instructions provided and reviewed  Access Code: [email or web address]  URL: [email or web address]  Date: 10/15/2021  Prepared by: Lisa Castro     Exercises  Supine Posterior Pelvic Tilt - 2 x daily - 7 x weekly - 1 sets - 5 reps - 5sec hold  Supine Bridge - 2 x daily - 7 x weekly - 1 sets - 10 reps  Supine Lower Trunk Rotation - 2 x daily - 7 x weekly - 1 sets - 10 reps  Small Range Straight Leg Raise - 2 x daily - 7 x weekly - 1 sets - 10 reps       Therapeutic Exercise and NMR EXR  [x] (23020) Provided verbal/tactile cueing for activities related to strengthening, flexibility, endurance, ROM for improvements in  [x] LE / Lumbar: LE, proximal hip, and core control with self care, mobility, lifting, ambulation.   [x] UE / Cervical: cervical, postural, scapular, scapulothoracic and UE control with self care, reaching, carrying, lifting, house/yardwork, driving, computer work.  [] (98951) Provided verbal/tactile cueing for activities related to improving balance, coordination, kinesthetic sense, posture, motor skill, proprioception to assist with   [] LE / lumbar: LE, proximal hip, and core control in self care, mobility, lifting, ambulation and eccentric single leg control. [] UE / cervical: cervical, scapular, scapulothoracic and UE control with self care, reaching, carrying, lifting, house/yardwork, driving, computer work.   [] (77822) Therapist is in constant attendance of 2 or more patients providing skilled therapy interventions, but not providing any significant amount of measurable one-on-one time to either patient, for improvements in  [] LE / lumbar: LE, proximal hip, and core control in self care, mobility, lifting, ambulation and eccentric single leg control. [] UE / cervical: cervical, scapular, scapulothoracic and UE control with self care, reaching, carrying, lifting, house/yardwork, driving, computer work.      NMR and Therapeutic Activities:    [x] (61886 or 10495) Provided verbal/tactile cueing for activities related to improving balance, coordination, kinesthetic sense, posture, motor skill, proprioception and motor activation to allow for proper function of   [x] LE: / Lumbar core, proximal hip and LE with self care and ADLs  [x] UE / Cervical: cervical, postural, scapular, scapulothoracic and UE control with self care, carrying, lifting, driving, computer work.   [] (71692) Gait Re-education- Provided training and instruction to the patient for proper LE, core and proximal hip recruitment and positioning and eccentric body weight control with ambulation re-education including up and down stairs     Home Management Training / Self Care:  [] (55355) Provided self-care/home management training related to activities of daily living and compensatory training, and/or use of adaptive equipment for improvement with: ADLs and compensatory training, meal preparation, safety procedures and instruction in use of adaptive equipment, including bathing, grooming, dressing, personal hygiene, basic household cleaning and chores. Home Exercise Program:    [x] (37727) Reviewed/Progressed HEP activities related to strengthening, flexibility, endurance, ROM of   [x] LE / Lumbar: core, proximal hip and LE for functional self-care, mobility, lifting and ambulation/stair navigation   [x] UE / Cervical: cervical, postural, scapular, scapulothoracic and UE control with self care, reaching, carrying, lifting, house/yardwork, driving, computer work  [] (10868)Reviewed/Progressed HEP activities related to improving balance, coordination, kinesthetic sense, posture, motor skill, proprioception of   [] LE: core, proximal hip and LE for self care, mobility, lifting, and ambulation/stair navigation    [] UE / Cervical: cervical, postural,  scapular, scapulothoracic and UE control with self care, reaching, carrying, lifting, house/yardwork, driving, computer work    Manual Treatments:  PROM / STM / Oscillations-Mobs:  G-I, II, III, IV (PA's, Inf., Post.)  [x] (74447) Provided manual therapy to mobilize LE, proximal hip and/or LS spine soft tissue/joints for the purpose of modulating pain, promoting relaxation,  increasing ROM, reducing/eliminating soft tissue swelling/inflammation/restriction, improving soft tissue extensibility and allowing for proper ROM for normal function with   [x] LE / lumbar: self care, mobility, lifting and ambulation. [x] UE / Cervical: self care, reaching, carrying, lifting, house/yardwork, driving, computer work.      Modalities:  [x] (52965) Vasopneumatic compression: Utilized vasopneumatic compression to decrease edema / swelling for the purpose of improving mobility and quad tone / recruitment which will allow for increased overall function including but not limited to self-care, transfers, ambulation, and ascending / descending stairs. Charges:  Timed Code Treatment Minutes: 15   Total Treatment Minutes: 45     [] EVAL - LOW (62827)   [x] EVAL - MOD (28029)  [] EVAL - HIGH (97345)  [] RE-EVAL (65749)  [x] GW(41210) x  1     [] Ionto  [] NMR (90808) x       [] Vaso  [] Manual (32430) x       [] Ultrasound  [] TA x        [] Mech Traction (01674)  [] Aquatic Therapy x     [] ES (un) (67581):   [] Home Management Training x  [] ES(attended) (64449)   [] Dry Needling 1-2 muscles (89340):  [] Dry Needling 3+ muscles (852562  [] Group:      [] Other:     GOALS:  Patient stated goal: to feel better     Therapist goals for Patient:   Short Term Goals: To be achieved in: 2 weeks  1. Independent in HEP and progression per patient tolerance, in order to prevent re-injury. 2. Patient will have a decrease in pain to facilitate improvement in movement, function, and ADLs as indicated by Functional Deficits.     Long Term Goals: To be achieved in: 4 weeks  1. Disability index score of 20% or less for the NDI to assist with reaching prior level of function. 2. Patient will demonstrate increased AROM to [facility] to allow for proper joint functioning as indicated by patients Functional Deficits. 3. Patient will demonstrate an increase in postural awareness and control to allow for proper functional mobility as indicated by patients Functional Deficits. 4. Patient will demonstrate an increase in Strength to at least 4/5 B UE to allow for proper functional mobility as indicated by patients Functional Deficits. 5. Patient will return to functional activities including carrying a bag of groceries without increased symptoms or restriction. 6. Pt will report fewer incidents of abdominal pain.           Overall Progression Towards Functional goals/ Treatment Progress Update:  [] Patient is progressing as expected towards functional goals listed.     [] Progression is slowed due to complexities/Impairments listed. [] Progression has been slowed due to co-morbidities. [x] Plan just implemented, too soon to assess goals progression <30days   [] Goals require adjustment due to lack of progress  [] Patient is not progressing as expected and requires additional follow up with physician  [] Other    Persisting Functional Limitations/Impairments:  [x]Sleeping []Sitting               [x]Standing []Transfers        [x]Walking []Kneeling               [x]Stairs [x]Squatting / bending   [x]ADLs [x]Reaching  [x]Lifting  [x]Housework  [x]Driving []Job related tasks  [x]Sports/Recreation []Other:        ASSESSMENT:  See eval  Treatment/Activity Tolerance:  [x] Patient able to complete tx [] Patient limited by fatigue  [x] Patient limited by pain  [] Patient limited by other medical complications  [] Other:     Prognosis: [x] Good [] Fair  [] Poor    Patient Requires Follow-up: [x] Yes  [] No    Plan for next treatment session:PLAN: LE stretching, UE stretching, cspine ROM, Lspine ROM, strengthening for all extremities, core stretching and strengthening for: Lspine pain and radicular symptoms, s/p Cspine fusion and abdominal pain       PLAN: See eval. PT 2x / week for 4 weeks. [] Continue per plan of care [] Alter current plan (see comments)  [x] Plan of care initiated [] Hold pending MD visit [] Discharge    Electronically signed by: Min Lao, PT MPT [id number]    Note: If patient does not return for scheduled/ recommended follow up visits, this note will serve as a discharge from care along with most recent update on progress.

## 2021-10-20 ENCOUNTER — TELEPHONE (OUTPATIENT)
Dept: PRIMARY CARE CLINIC | Age: 66
End: 2021-10-20

## 2021-10-20 ENCOUNTER — OFFICE VISIT (OUTPATIENT)
Dept: ENT CLINIC | Age: 66
End: 2021-10-20
Payer: MEDICARE

## 2021-10-20 VITALS — SYSTOLIC BLOOD PRESSURE: 120 MMHG | HEART RATE: 80 BPM | DIASTOLIC BLOOD PRESSURE: 79 MMHG | TEMPERATURE: 96.9 F

## 2021-10-20 DIAGNOSIS — H90.0 CONDUCTIVE HEARING LOSS, BILATERAL: ICD-10-CM

## 2021-10-20 DIAGNOSIS — H61.23 IMPACTED CERUMEN OF BOTH EARS: ICD-10-CM

## 2021-10-20 DIAGNOSIS — M54.2 NECK PAIN: Primary | Chronic | ICD-10-CM

## 2021-10-20 DIAGNOSIS — M79.2 NEURITIS: Chronic | ICD-10-CM

## 2021-10-20 DIAGNOSIS — R13.10 ODYNOPHAGIA: Chronic | ICD-10-CM

## 2021-10-20 PROCEDURE — G8484 FLU IMMUNIZE NO ADMIN: HCPCS | Performed by: OTOLARYNGOLOGY

## 2021-10-20 PROCEDURE — G8417 CALC BMI ABV UP PARAM F/U: HCPCS | Performed by: OTOLARYNGOLOGY

## 2021-10-20 PROCEDURE — 1036F TOBACCO NON-USER: CPT | Performed by: OTOLARYNGOLOGY

## 2021-10-20 PROCEDURE — 69210 REMOVE IMPACTED EAR WAX UNI: CPT | Performed by: OTOLARYNGOLOGY

## 2021-10-20 PROCEDURE — G9899 SCRN MAM PERF RSLTS DOC: HCPCS | Performed by: OTOLARYNGOLOGY

## 2021-10-20 PROCEDURE — 3017F COLORECTAL CA SCREEN DOC REV: CPT | Performed by: OTOLARYNGOLOGY

## 2021-10-20 PROCEDURE — 99214 OFFICE O/P EST MOD 30 MIN: CPT | Performed by: OTOLARYNGOLOGY

## 2021-10-20 PROCEDURE — G8427 DOCREV CUR MEDS BY ELIG CLIN: HCPCS | Performed by: OTOLARYNGOLOGY

## 2021-10-20 RX ORDER — DIPHENHYDRAMINE HCL 25 MG
50 TABLET ORAL ONCE
Qty: 2 TABLET | Refills: 0 | Status: SHIPPED | OUTPATIENT
Start: 2021-10-20 | End: 2021-10-20

## 2021-10-20 RX ORDER — METHOCARBAMOL 750 MG/1
TABLET, FILM COATED ORAL
Qty: 30 TABLET | Refills: 0 | Status: SHIPPED | OUTPATIENT
Start: 2021-10-20 | End: 2021-11-04

## 2021-10-20 RX ORDER — PREDNISONE 50 MG/1
50 TABLET ORAL EVERY 6 HOURS
Qty: 3 TABLET | Refills: 0 | Status: SHIPPED | OUTPATIENT
Start: 2021-10-20 | End: 2021-10-21

## 2021-10-20 ASSESSMENT — ENCOUNTER SYMPTOMS
RHINORRHEA: 0
SORE THROAT: 0
SINUS PAIN: 0

## 2021-10-20 NOTE — PATIENT INSTRUCTIONS
1. Schedule an audiogram (hearing test), if your hearing is not back to your usual ability, or if hearing loss or tinnitus (ringing or other noise) persists, despite removal of ear wax,.  2. You may use an over the counter ear wax removal kit (such as Murine, Bausch and Lomb, NeilMed, or Debrox wax removal system) for ear wax removal, as needed. 3. It may help to use Debrox (OTC) for 4 days prior to future visits for ear cleaning. This may soften your ear wax and facilitate removal of the wax. NO Q-TIPS OR OTHER INSTRUMENTS/OBJECTS IN THE EARS   You should never clean your ears with a Q-tip, cotton tipped applicator, Eri pin, paper clip, safety pin, pen cap, or any other instrument. This will tend to push wax in deeper and pack the ear canal with wax. There is a high risk and danger of this practice, especially rupture of ear drum, dislocation or other damage to ossicles, and permanent, irreversible, and irreparable hearing loss. It may cause inflammation and irritation of the ear canal and cause itching or pain. I recommend only use of one the several ear wax removal kits available \"over the counter\" if you feel a need to try to remove ear wax. For example, Murine, Bausch and Lomb, NeilMed, or Debrox ear wax removal kits may be used for ear wax removal, as needed. No other methods should be self used for cleaning your ears.

## 2021-10-20 NOTE — TELEPHONE ENCOUNTER
----- Message from Ernestina Dempsey sent at 10/19/2021  4:40 PM EDT -----  Subject: Appointment Request    Reason for Call: Routine (Patient Request) No Script    QUESTIONS  Type of Appointment? Established Patient  Reason for appointment request? Available appointments did not meet   patient need  Additional Information for Provider? The patient is wanting her blood   pressure checked and a flu shot and the next available isn't until the end   of November and shed like something sooner. Requesting call back to   schedule.  ---------------------------------------------------------------------------  --------------  CALL BACK INFO  What is the best way for the office to contact you? OK to leave message on   voicemail  Preferred Call Back Phone Number? [phone]  ---------------------------------------------------------------------------  --------------  SCRIPT ANSWERS  Relationship to Patient? Self  Specialty Confirmation? Primary Care  (Is the patient requesting to see the provider for a procedure?)? No  (Is the patient requesting to see the provider urgently  today or   tomorrow. )? No  Have you been diagnosed with, awaiting test results for, or told that you   are suspected of having COVID-19 (Coronavirus)? (If patient has tested   negative or was tested as a requirement for work, school, or travel and   not based on symptoms, answer no)? No  Within the past two weeks have you developed any of the following symptoms   (answer no if symptoms have been present longer than 2 weeks or began   more than 2 weeks ago)? Fever or Chills, Cough, Shortness of breath or   difficulty breathing, Loss of taste or smell, Sore throat, Nasal   congestion, Sneezing or runny nose, Fatigue or generalized body aches   (answer no if pain is specific to a body part e.g. back pain), Diarrhea,   Headache? No  Have you had close contact with someone with COVID-19 in the last 14 days?    No  (Service Expert  click yes below to proceed with Brandon Griffin As Usual   Scheduling)?  Yes

## 2021-10-20 NOTE — PROGRESS NOTES
[facility]       PCP:  Eliazar Victor MD      [facility]  Chief Complaint   Patient presents with    Follow-up     Goiter    Ear Problem     right ear is clogged        HISTORY OF PRESENT ILLNESS       Cayla Hobson is a 61 y.o. female here for recheck and follow up of pain in the neck along the SCM muscle. No relief with steroid. \"Feels like electricity or something. \"  Robaxin seemed to help a little bit. Could not take gabapentin due to side effects. Neck hurts more with eat or drink anything, water or food. Pain will last for two hours and then goes away. And will flare up again if drink or eat something. \"Feels like electrocuting you. \"  Pain only happens when swallowing. cannot take aspirin or ibuprofen or other NSAIDS due to stomach problem. REVIEW OF SYSTEMS   Review of Systems   Constitutional: Negative for chills, fever and unexpected weight change. HENT: Positive for hearing loss and tinnitus. Negative for congestion, ear discharge, ear pain, rhinorrhea, sinus pain and sore throat.         PAST MEDICAL HISTORY    Past Medical History:   Diagnosis Date    Anxiety state     Asthma     Back ache     chronic    Environmental allergies     GERD (gastroesophageal reflux disease)     Hypertension     MVP (mitral valve prolapse)     Neck pain, chronic     Reflux     Sleep apnea        Past Surgical History:   Procedure Laterality Date    BACK SURGERY      BLADDER SUSPENSION      CHOLECYSTECTOMY      FOOT SURGERY  11-9-12    endoscopic plantar fasciotomy right foot    GASTRIC BYPASS SURGERY      HERNIA REPAIR      HIATAL HERNIA REPAIR      HYSTERECTOMY      JOINT REPLACEMENT Bilateral     KNEE    KNEE ARTHROPLASTY  2/28/13    L    KNEE ARTHROSCOPY Right 3/7/2014    LAMINECTOMY  7/1/11    bilateral L5-S1 laminotomy, nerve root exploration, foraminotomy    NASAL SINUS SURGERY Right 02/05/2018    Excision of rt nasal mass    NV COLONOSCOPY FLX DX W/COLLJ SPEC WHEN PFRMD N/A 9/28/2018    COLONOSCOPY DIAGNOSTIC OR SCREENING performed by Millie Ojeda MD at [facility] DX/THER AGNT PARAVERT FACET JOINT, LUMBAR/SAC, 1ST LEVEL Right 11/6/2018    RIGHT L4,L5 S1 MEDIAL BRANCH BLOCK WITH FLUOROSCOPY performed by Delfina Kayser, MD at [address] [address], PARTIAL      TONSILLECTOMY      UPPER GASTROINTESTINAL ENDOSCOPY      with dilatation    UPPER GASTROINTESTINAL ENDOSCOPY N/A 8/14/2019    EGD ESOPHAGOGASTRODUODENOSCOPY performed by Millie Ojeda MD at [facility] 19 5/25/2021    EGD BIOPSY performed by Rina Aldridge MD at [address] [place] [address] Street:    10/20/21 0922   BP: 120/79   Site: Right Upper Arm   Position: Sitting   Cuff Size: Medium Adult   Pulse: 80   Temp: 96.9 °F (36.1 °C)   TempSrc: Temporal       Physical Exam  Vitals reviewed. Constitutional:       General: She is awake. She is not in acute distress. Appearance: Normal appearance. She is well-developed. She is not ill-appearing or toxic-appearing. HENT:      Head: Normocephalic and atraumatic. Salivary Glands: Right salivary gland is not diffusely enlarged or tender. Left salivary gland is not diffusely enlarged or tender. Right Ear: Tympanic membrane, ear canal and external ear normal. There is impacted cerumen. Left Ear: Tympanic membrane, ear canal and external ear normal. There is impacted cerumen. Ears:      Plascencia exam findings: lateralizes right. Right Rinne: AC > BC. Left Rinne: AC > BC. Comments: Finger rub:  Able to hear finger rub at the external meatus bilaterally. Nose: Nose normal. No nasal deformity, septal deviation, mucosal edema, congestion or rhinorrhea. Right Turbinates: Not enlarged. Left Turbinates: Not enlarged.       Right Sinus: No maxillary sinus tenderness or frontal sinus tenderness. Left Sinus: No maxillary sinus tenderness or frontal sinus tenderness. Mouth/Throat:      Lips: Pink. No lesions. Mouth: Mucous membranes are moist. No oral lesions. Tongue: No lesions. Palate: No mass and lesions. Pharynx: Oropharynx is clear. Uvula midline. No oropharyngeal exudate or posterior oropharyngeal erythema. Tonsils: No tonsillar exudate or tonsillar abscesses. Comments: INDIRECT LARYNGOSCOPY:  Visualization was suboptimal due to a strong gag reflex and guarding. The base of tongue and epiglottis appeared to be normal.  Unable to visualize the vocal cords and hypopharynx, and endolarynx. Neck:      Thyroid: No thyroid mass, thyromegaly or thyroid tenderness. Trachea: No tracheal deviation. Comments: Tenderness of left side of hyoid bone. No cervical masses or other tenderness. Musculoskeletal:      Cervical back: Normal range of motion and neck supple. Lymphadenopathy:      Cervical: No cervical adenopathy. Neurological:      Mental Status: She is alert. PROCEDURE - REMOVAL OF CERUMEN IMPACTION (42165):  Obstructing cerumen impaction occluding both of the EACs  and obscuring visualization of the bilateralTMs, was removed under otomicroscopic visualization, with instrumentation, using a Billeau wire loop  Under otomicroscopic examination, the bilateral EACs and TM s appeared to be normal.  The bilateral tympanic membranes appeared to be normal.  [place] reported improved hearing, back to usual and normal level, after cerumen removal.  \"that feels better. \"    HEARING ASSESSMENT (after ear cleaning):  Finger rub:  Able to hear finger rub bilaterally.  Tuning fork tests, [address] tuning fork:  Plascencia test:  heard in both ears   Rinne test: right ear air greater than bone and left ear  air greater than bone        REVIEW OF IMAGING            Narrative   EXAMINATION:   THYROID ULTRASOUND       9/22/2021 FINDINGS:   Right thyroid lobe:  Surgically absent       Left thyroid lobe:  4.7 x 2.6 x 2 cm       Isthmus:  5 mm       Thyroid Gland: The right thyroid is surgically absent.  Thyroid bed is   unremarkable.  Left thyroid is heterogeneous containing several small   scattered 7 mm or less nodules in the left thyroid.  Several these are cystic   in appearance and a few are TR 4 nodules.  No suspicious nodules or nodules   measuring greater than 1 cm.       Cervical lymphadenopathy: No abnormal lymph nodes in the imaged portions of   the neck.           Impression   Status post right thyroidectomy.  Thyroid fossa is unremarkable.       Heterogeneous left thyroid containing several small nodules which do not   require further evaluation or follow-up.                 IMPRESSION / Candelaria Bass / Haile Rice was seen today for follow-up and ear problem. Diagnoses and all orders for this visit:    Neck pain  -     FL MODIFIED BARIUM SWALLOW W VIDEO  -     CT SOFT TISSUE NECK W CONTRAST  -     diphenhydrAMINE (BENADRYL) 25 MG tablet; Take 2 tablets by mouth once for 1 dose Dose should be given 1 hour prior to contrast media administration. -     predniSONE (DELTASONE) 50 MG tablet; Take 1 tablet by mouth every 6 hours for 3 doses First dose should be given 13 hours prior to contrast media administration. Odynophagia  -     FL MODIFIED BARIUM SWALLOW W VIDEO  -     CT SOFT TISSUE NECK W CONTRAST    Neuritis  -     methocarbamol (ROBAXIN) 750 MG tablet; TAKE 1 TABLET BY MOUTH THREE TIMES DAILY FOR 10 DAYS    Impacted cerumen of both ears    Conductive hearing loss, bilateral             RECOMMENDATIONS/PLAN      Acetaminophen (eg. Tylenol) (over the counter medications) as needed for pain. Robaxin. Return in about 3 weeks (around 11/10/2021).         Patient Instructions   Schedule an audiogram (hearing test), if your hearing is not back to your usual ability, or if hearing loss or tinnitus (ringing or other noise) persists, despite removal of ear wax,. You may use an over the counter ear wax removal kit (such as Murine, Bausch and Lomb, NeilMed, or Debrox wax removal system) for ear wax removal, as needed. It may help to use Debrox (OTC) for 4 days prior to future visits for ear cleaning. This may soften your ear wax and facilitate removal of the wax. NO Q-TIPS OR OTHER INSTRUMENTS/OBJECTS IN THE EARS   You should never clean your ears with a Q-tip, cotton tipped applicator, Eri pin, paper clip, safety pin, pen cap, or any other instrument. This will tend to push wax in deeper and pack the ear canal with wax. There is a high risk and danger of this practice, especially rupture of ear drum, dislocation or other damage to ossicles, and permanent, irreversible, and irreparable hearing loss. It may cause inflammation and irritation of the ear canal and cause itching or pain. I recommend only use of one the several ear wax removal kits available \"over the counter\" if you feel a need to try to remove ear wax. For example, Murine, Bausch and Lomb, NeilMed, or Debrox ear wax removal kits may be used for ear wax removal, as needed. No other methods should be self used for cleaning your ears.

## 2021-10-20 NOTE — TELEPHONE ENCOUNTER
----- Message from Darren Jacobson sent at 10/19/2021  4:40 PM EDT -----  Subject: Appointment Request    Reason for Call: Routine (Patient Request) No Script    QUESTIONS  Type of Appointment? Established Patient  Reason for appointment request? Available appointments did not meet   patient need  Additional Information for Provider? The patient is wanting her blood   pressure checked and a flu shot and the next available isn't until the end   of November and shed like something sooner. Requesting call back to   schedule.  ---------------------------------------------------------------------------  --------------  CALL BACK INFO  What is the best way for the office to contact you? OK to leave message on   voicemail  Preferred Call Back Phone Number? [phone]  ---------------------------------------------------------------------------  --------------  SCRIPT ANSWERS  Relationship to Patient? Self  Specialty Confirmation? Primary Care  (Is the patient requesting to see the provider for a procedure?)? No  (Is the patient requesting to see the provider urgently  today or   tomorrow. )? No  Have you been diagnosed with, awaiting test results for, or told that you   are suspected of having COVID-19 (Coronavirus)? (If patient has tested   negative or was tested as a requirement for work, school, or travel and   not based on symptoms, answer no)? No  Within the past two weeks have you developed any of the following symptoms   (answer no if symptoms have been present longer than 2 weeks or began   more than 2 weeks ago)? Fever or Chills, Cough, Shortness of breath or   difficulty breathing, Loss of taste or smell, Sore throat, Nasal   congestion, Sneezing or runny nose, Fatigue or generalized body aches   (answer no if pain is specific to a body part e.g. back pain), Diarrhea,   Headache? No  Have you had close contact with someone with COVID-19 in the last 14 days?    No  (Service Expert  click yes below to proceed with

## 2021-10-26 ENCOUNTER — HOSPITAL ENCOUNTER (OUTPATIENT)
Dept: PHYSICAL THERAPY | Age: 66
Setting detail: THERAPIES SERIES
Discharge: HOME | End: 2021-10-26
Payer: MEDICARE

## 2021-10-26 NOTE — FLOWSHEET NOTE
Physical Therapy  Cancellation/No-show Note  Patient Name:  Charles Alonso  :  1955   Date:  10/26/2021  Cancelled visits to date: 1  No-shows to date: 0    Patient status for today's appointment patient:  [x]  Cancelled  []  Rescheduled appointment  []  No-show     Reason given by patient:  [x]  Patient ill  []  Conflicting appointment  []  No transportation    []  Conflict with work  []  No reason given  []  Other:     Comments:      Phone call information:   []  Phone call made today to patient at _ time at number provided:      []  Patient answered, conversation as follows:    []  Patient did not answer, message left as follows:  []  Phone call not made today  [x]  Phone call not needed - pt contacted us to cancel and provided reason for cancellation.      Electronically signed by:  Adarsh Alexandre PT

## 2021-10-29 ENCOUNTER — HOSPITAL ENCOUNTER (OUTPATIENT)
Dept: CT IMAGING | Age: 66
Discharge: HOME | End: 2021-10-29
Payer: MEDICARE

## 2021-10-29 DIAGNOSIS — R13.10 ODYNOPHAGIA: ICD-10-CM

## 2021-10-29 DIAGNOSIS — M54.2 NECK PAIN: ICD-10-CM

## 2021-10-29 LAB
GFR AFRICAN AMERICAN: 50
GFR NON-AFRICAN AMERICAN: 41
PERFORMED ON: ABNORMAL
POC CREATININE: 1.3 MG/DL (ref 0.6–1.2)
POC SAMPLE TYPE: ABNORMAL

## 2021-10-29 PROCEDURE — 70491 CT SOFT TISSUE NECK W/DYE: CPT

## 2021-10-29 PROCEDURE — 82565 ASSAY OF CREATININE: CPT

## 2021-10-29 PROCEDURE — 6360000004 HC RX CONTRAST MEDICATION: Performed by: OTOLARYNGOLOGY

## 2021-10-29 RX ADMIN — IOPAMIDOL 75 ML: 755 INJECTION, SOLUTION INTRAVENOUS at 15:26

## 2021-11-02 ENCOUNTER — HOSPITAL ENCOUNTER (OUTPATIENT)
Dept: PHYSICAL THERAPY | Age: 66
Setting detail: THERAPIES SERIES
Discharge: HOME | End: 2021-11-02
Payer: MEDICARE

## 2021-11-02 NOTE — FLOWSHEET NOTE
Physical Therapy  Cancellation/No-show Note  Patient Name:  iMrna De La Vega  :  1955   Date:  2021  Cancelled visits to date: 1 (10/26/21)  No-shows to date:  2  (10/29/21, 21)    Patient status for today's appointment patient:  []  Cancelled  []  Rescheduled appointment  [x]  No-show     Reason given by patient:  []  Patient ill  []  Conflicting appointment  []  No transportation    []  Conflict with work  []  No reason given  []  Other:     Comments:      Phone call information:   [x]  Phone call made today to patient at11:00am time at number provided:      []  Patient answered, conversation as follows:    []  Patient did not answer, message left as follows:PT asked pt to call back. []  Phone call not made today  []  Phone call not needed - pt contacted us to cancel and provided reason for cancellation.      Will d/c from PT at this time due to two no-shows, one cancel    Electronically signed by:  Min Lao, PT

## 2021-11-04 ENCOUNTER — TELEPHONE (OUTPATIENT)
Dept: PRIMARY CARE CLINIC | Age: 66
End: 2021-11-04

## 2021-11-04 DIAGNOSIS — M54.50 CHRONIC MIDLINE LOW BACK PAIN, UNSPECIFIED WHETHER SCIATICA PRESENT: Primary | ICD-10-CM

## 2021-11-04 DIAGNOSIS — G89.29 CHRONIC MIDLINE LOW BACK PAIN, UNSPECIFIED WHETHER SCIATICA PRESENT: Primary | ICD-10-CM

## 2021-11-04 DIAGNOSIS — M26.609 TEMPOROMANDIBULAR JOINT SYNDROME: Chronic | ICD-10-CM

## 2021-11-04 RX ORDER — TIZANIDINE 2 MG/1
2 TABLET ORAL 3 TIMES DAILY PRN
Qty: 30 TABLET | Refills: 0 | Status: SHIPPED | OUTPATIENT
Start: 2021-11-04 | End: 2021-11-14

## 2021-11-04 RX ORDER — LIDOCAINE 4 G/G
1-2 PATCH TOPICAL DAILY
Qty: 30 PATCH | Refills: 0 | Status: SHIPPED | OUTPATIENT
Start: 2021-11-04 | End: 2021-12-04

## 2021-11-05 ENCOUNTER — APPOINTMENT (OUTPATIENT)
Dept: PHYSICAL THERAPY | Age: 66
End: 2021-11-05
Payer: MEDICARE

## 2021-11-07 DIAGNOSIS — I10 ESSENTIAL HYPERTENSION: ICD-10-CM

## 2021-11-08 NOTE — TELEPHONE ENCOUNTER
Medication:   Requested Prescriptions     Pending Prescriptions Disp Refills    NIFEdipine (PROCARDIA XL) 60 MG extended release tablet 90 tablet 3     Sig: Take 1 tablet by mouth daily        Last Filled:      Patient Phone Number: [phone] (home) [phone] (work)    Last appt: 8/31/2021   Next appt: 11/16/2021    Last OARRS:   RX Monitoring 1/9/2018   Attestation The Prescription Monitoring Report for this patient was reviewed today.

## 2021-11-09 ENCOUNTER — APPOINTMENT (OUTPATIENT)
Dept: PHYSICAL THERAPY | Age: 66
End: 2021-11-09
Payer: MEDICARE

## 2021-11-09 RX ORDER — NIFEDIPINE 60 MG/1
60 TABLET, EXTENDED RELEASE ORAL DAILY
Qty: 90 TABLET | Refills: 3 | Status: SHIPPED | OUTPATIENT
Start: 2021-11-09 | End: 2021-11-16 | Stop reason: SDUPTHER

## 2021-11-10 ENCOUNTER — HOSPITAL ENCOUNTER (OUTPATIENT)
Dept: GENERAL RADIOLOGY | Age: 66
Discharge: HOME | End: 2021-11-10
Payer: MEDICARE

## 2021-11-10 ENCOUNTER — OFFICE VISIT (OUTPATIENT)
Dept: ENT CLINIC | Age: 66
End: 2021-11-10
Payer: MEDICARE

## 2021-11-10 VITALS — TEMPERATURE: 97.1 F | DIASTOLIC BLOOD PRESSURE: 89 MMHG | SYSTOLIC BLOOD PRESSURE: 159 MMHG | HEART RATE: 85 BPM

## 2021-11-10 DIAGNOSIS — R13.10 ODYNOPHAGIA: Chronic | ICD-10-CM

## 2021-11-10 DIAGNOSIS — R13.19 ESOPHAGEAL DYSPHAGIA: Chronic | ICD-10-CM

## 2021-11-10 DIAGNOSIS — M54.2 NECK PAIN: Chronic | ICD-10-CM

## 2021-11-10 DIAGNOSIS — R13.10 ODYNOPHAGIA: Primary | Chronic | ICD-10-CM

## 2021-11-10 PROCEDURE — G8427 DOCREV CUR MEDS BY ELIG CLIN: HCPCS | Performed by: OTOLARYNGOLOGY

## 2021-11-10 PROCEDURE — G9899 SCRN MAM PERF RSLTS DOC: HCPCS | Performed by: OTOLARYNGOLOGY

## 2021-11-10 PROCEDURE — 1036F TOBACCO NON-USER: CPT | Performed by: OTOLARYNGOLOGY

## 2021-11-10 PROCEDURE — G8482 FLU IMMUNIZE ORDER/ADMIN: HCPCS | Performed by: OTOLARYNGOLOGY

## 2021-11-10 PROCEDURE — 3017F COLORECTAL CA SCREEN DOC REV: CPT | Performed by: OTOLARYNGOLOGY

## 2021-11-10 PROCEDURE — 99213 OFFICE O/P EST LOW 20 MIN: CPT | Performed by: OTOLARYNGOLOGY

## 2021-11-10 PROCEDURE — 74230 X-RAY XM SWLNG FUNCJ C+: CPT

## 2021-11-10 PROCEDURE — G8417 CALC BMI ABV UP PARAM F/U: HCPCS | Performed by: OTOLARYNGOLOGY

## 2021-11-10 ASSESSMENT — ENCOUNTER SYMPTOMS
SORE THROAT: 0
RHINORRHEA: 0
SINUS PAIN: 0

## 2021-11-10 NOTE — PROGRESS NOTES
[facility]       PCP:  Christiano Doe MD      [facility]  Chief Complaint   Patient presents with    Follow-up     Neck pain, CT scan results, MBS        HISTORY OF PRESENT ILLNESS       Cayla Morales is a 61 y.o. female here for recheck and follow up of odynophagia, neck pain, and probable neuritis. No neck pain, gone for two weeks, was on left side. swallowing is better than it was and it's getting better. I feel better today than I have in the past three weeks, I'm feeling better today. REVIEW OF SYSTEMS   Review of Systems   Constitutional: Negative for fever. HENT: Negative for ear discharge, ear pain, rhinorrhea, sinus pain and sore throat.         PAST MEDICAL HISTORY    Past Medical History:   Diagnosis Date    Anxiety state     Asthma     Back ache     chronic    Environmental allergies     GERD (gastroesophageal reflux disease)     Hypertension     MVP (mitral valve prolapse)     Neck pain, chronic     Reflux     Sleep apnea        Past Surgical History:   Procedure Laterality Date    BACK SURGERY      BLADDER SUSPENSION      CHOLECYSTECTOMY      FOOT SURGERY  11-9-12    endoscopic plantar fasciotomy right foot    GASTRIC BYPASS SURGERY      HERNIA REPAIR      HIATAL HERNIA REPAIR      HYSTERECTOMY      JOINT REPLACEMENT Bilateral     KNEE    KNEE ARTHROPLASTY  2/28/13    L    KNEE ARTHROSCOPY Right 3/7/2014    LAMINECTOMY  7/1/11    bilateral L5-S1 laminotomy, nerve root exploration, foraminotomy    NASAL SINUS SURGERY Right 02/05/2018    Excision of rt nasal mass    ME COLONOSCOPY FLX DX W/COLLJ SPEC WHEN PFRMD N/A 9/28/2018    COLONOSCOPY DIAGNOSTIC OR SCREENING performed by Rosario Trujillo MD at [address] [place] [address] DX/THER AGNT PARAVERT FACET JOINT, LUMBAR/SAC, 1ST LEVEL Right 11/6/2018    RIGHT L4,L5 S1 MEDIAL BRANCH BLOCK WITH FLUOROSCOPY performed by Iker Machado Norma Hernandez MD at [address] [address], PARTIAL      TONSILLECTOMY      UPPER GASTROINTESTINAL ENDOSCOPY      with dilatation    UPPER GASTROINTESTINAL ENDOSCOPY N/A 8/14/2019    EGD ESOPHAGOGASTRODUODENOSCOPY performed by Ava Sigala MD at [facility] 5/25/2021    EGD BIOPSY performed by Snehal Stubbs MD at [address]:    11/10/21 1401   BP: (!) 159/89   Site: Right Upper Arm   Position: Sitting   Cuff Size: Large Adult   Pulse: 85   Temp: 97.1 °F (36.2 °C)   TempSrc: Temporal       Physical Exam  Vitals reviewed. Constitutional:       General: She is not in acute distress. Appearance: Normal appearance. She is not ill-appearing. HENT:      Head: Normocephalic. Right Ear: Tympanic membrane, ear canal and external ear normal. There is no impacted cerumen. Left Ear: Tympanic membrane, ear canal and external ear normal. There is no impacted cerumen. Nose: Nose normal. No congestion or rhinorrhea. Mouth/Throat:      Mouth: Mucous membranes are moist.      Pharynx: Oropharynx is clear. No posterior oropharyngeal erythema. Neck:      Comments: No cervical masses or tenderness. Musculoskeletal:      Cervical back: No tenderness. Lymphadenopathy:      Cervical: No cervical adenopathy. Neurological:      Mental Status: She is alert.            REVIEW OF IMAGING           Narrative   EXAMINATION:   MODIFIED BARIUM SWALLOW WAS PERFORMED IN CONJUNCTION WITH SPEECH PATHOLOGY   SERVICES     FINDINGS:   Oral phase of swallowing was grossly within normal limits.       No evidence of laryngeal penetration or aspiration.       Incidental note is made of cervical fusion hardware.           Impression   No evidence of aspiration.       Please see separate speech pathology report for full discussion of findings   and recommendations.                [facility] SPEECH THERAPY  MODIFIED BARIUM SWALLOW EVALUATION     Patient's Name: Justin Robison. O.B: 1955  Medical Diagnosis: Neck pain [M54.2]  Odynophagia [R13.10]  Treatment Diagnosis: Dysphagia     Ordering MD: Dr. Bryan Hanks MD  Radiologist: Dr. Nory Escobedo  Date of Onset: October 2021  Date of Evaluation: 11/10/2021  Type of Study: Modified Barium Swallowing Study (MBS)  Diet Prior to Study: Regular/Thin; no diet on file  Pain Level: None per pt    Impression:  Modified Barium Swallow evaluation completed on 11/10/2021. Results indicate minimal oropharyngeal dysphagia. Cervical hardware visualized from C2-C7. Oral phase of swallow characterized by adequate labial seal with no anterior spillage, good lingual control for bolus prep. Bolus transit was mildly delayed with occasional lingual rocking noted. Swallow initiation at posterior angle of ramus for solids, valleculae for liquids; no premature posterior spillage. Pharyngeal phase characterized by mildly reduced base of tongue retraction contributing to trace lingual and base of tongue residue, spontaneously cleared with sequential swallows. Epiglottic inversion and laryngeal vestibule closure were complete and consistent. Although laryngeal elevation and anterior hyoid excursion judged to be reduced, no pharyngeal residue of penetration/aspiration across study. Slight retention of all consistencies (increased for solids) around C7 and mildly diminished pharyngeal stripping wave, both likely impacted by cervical hardware. Slow flow was also noted in mid esophagus in A-P view. All residue was spontaneously cleared with sequential swallows.  Overall, despite above deficits, oropharyngeal swallow is functional. Pt may benefit from GI referral for further evaluation of esophageal phase of swallow.         Aspiration/Penetration Risk:  Minimal across consistencies     Penetration/Aspiration Scores across consistencies   CONSISTENCY  Pen/Asp rating Description    Thin  1 Material does not enter the airway   Mildly (nectar) thick  1 Material does not enter the airway   Moderately (honey) thick  1 Material does not enter the airway   Puree  1 Material does not enter the airway   Soft Solid  1 Material does not enter the airway   Regular Solid  1 Material does not enter the airway         Recommendations:    Diet Level: Continue regular textured diet with thin liquids  Referral: F/u ENT re: neck/throat pain, consider GI for further evaluation of esophageal phase of swallow  Strategies: Alternate solids/liquids , Upright as possible with all PO intake , Small bites/sips , Eat/feed slowly, Remain upright 30-45 min   Treatments: No further speech intervention indicated at this time, return for repeat instrumental swallow study as clinically indicated      Narrative   EXAMINATION:   CT OF THE NECK SOFT TISSUE WITH CONTRAST  10/29/2021     FINDINGS:   PHARYNX/LARYNX: Nasopharynx and oropharynx appear normal.Parapharyngeal   tissue planes are preserved. Oral cavity and floor of mouth appear   normal. spaces appear normal.Hypopharynx, larynx, and imaged   infraglottic trachea appear normal.Imaged upper esophagus is unremarkable.       SALIVARY GLANDS/THYROID:The parotid and submandibular glands appear   unremarkable. Status post right thyroidectomy.  Few scattered subcentimeter   low-density nodules identified throughout the left thyroid.  Correlate with   recent thyroid ultrasound.       LYMPH NODES: No cervical or supraclavicular lymphadenopathy is seen.       SOFT TISSUES: No appreciable soft tissue swelling. There is mild perivascular   inflammatory stranding at the left carotid bifurcation. Patent extracranial   carotid systems and internal jugular veins bilaterally.       BRAIN/ORBITS/SINUSES: No abnormal intracranial enhancement, mass effect, or   hydrocephalus within the imaged brain. Orbital soft tissue planes are   preserved. Imaged paranasal sinuses are clear. Mastoid air cells and middle ear   cavities are clear.       LUNG APICES/SUPERIOR MEDIASTINUM:Imaged lung apices are clear of focal   consolidation or mass.       BONES:  No aggressive appearing lytic or blastic bony lesion. Status post   ACDF C3 through C7 with disc spacers.           Impression   1. Findings compatible with mild left carotidynia involving the bifurcation   without significant luminal narrowing.                  IMPRESSION / DIAGNOSES / Rolf Idalmis was seen today for follow-up. Diagnoses and all orders for this visit:    Sultana Valentino MD, Gastroenterology, [facility]    Esophageal dysphagia  -     AFL - Go, Milena Argueta MD, Gastroenterology, [facility]    Neck pain  Comments:  probably carotidynia, resolved/resolving        RECOMMENDATIONS/PLAN      Referral to gastroenterology. Acetaminophen (eg. Tylenol) or Ibuprofen (eg. Advil) (over the counter medications) as needed for pain. Return for any further ENT or sinus problems or symptoms.

## 2021-11-12 ENCOUNTER — APPOINTMENT (OUTPATIENT)
Dept: PHYSICAL THERAPY | Age: 66
End: 2021-11-12
Payer: MEDICARE

## 2021-11-16 ENCOUNTER — OFFICE VISIT (OUTPATIENT)
Dept: PRIMARY CARE CLINIC | Age: 66
End: 2021-11-16
Payer: MEDICARE

## 2021-11-16 ENCOUNTER — APPOINTMENT (OUTPATIENT)
Dept: PHYSICAL THERAPY | Age: 66
End: 2021-11-16
Payer: MEDICARE

## 2021-11-16 VITALS
OXYGEN SATURATION: 99 % | DIASTOLIC BLOOD PRESSURE: 75 MMHG | WEIGHT: 234.6 LBS | SYSTOLIC BLOOD PRESSURE: 125 MMHG | HEART RATE: 66 BPM | HEIGHT: 67 IN | TEMPERATURE: 97.3 F | BODY MASS INDEX: 36.82 KG/M2

## 2021-11-16 DIAGNOSIS — J45.991 ASTHMA, COUGH VARIANT: ICD-10-CM

## 2021-11-16 DIAGNOSIS — I10 ESSENTIAL HYPERTENSION: ICD-10-CM

## 2021-11-16 DIAGNOSIS — Z23 HIGH PRIORITY FOR COVID-19 VACCINATION: ICD-10-CM

## 2021-11-16 DIAGNOSIS — Z23 NEED FOR INFLUENZA VACCINATION: ICD-10-CM

## 2021-11-16 PROCEDURE — G8482 FLU IMMUNIZE ORDER/ADMIN: HCPCS | Performed by: INTERNAL MEDICINE

## 2021-11-16 PROCEDURE — 90674 CCIIV4 VAC NO PRSV 0.5 ML IM: CPT | Performed by: INTERNAL MEDICINE

## 2021-11-16 PROCEDURE — G0008 ADMIN INFLUENZA VIRUS VAC: HCPCS | Performed by: INTERNAL MEDICINE

## 2021-11-16 PROCEDURE — G9899 SCRN MAM PERF RSLTS DOC: HCPCS | Performed by: INTERNAL MEDICINE

## 2021-11-16 PROCEDURE — 3017F COLORECTAL CA SCREEN DOC REV: CPT | Performed by: INTERNAL MEDICINE

## 2021-11-16 PROCEDURE — G8417 CALC BMI ABV UP PARAM F/U: HCPCS | Performed by: INTERNAL MEDICINE

## 2021-11-16 PROCEDURE — G8427 DOCREV CUR MEDS BY ELIG CLIN: HCPCS | Performed by: INTERNAL MEDICINE

## 2021-11-16 PROCEDURE — 99214 OFFICE O/P EST MOD 30 MIN: CPT | Performed by: INTERNAL MEDICINE

## 2021-11-16 PROCEDURE — 1036F TOBACCO NON-USER: CPT | Performed by: INTERNAL MEDICINE

## 2021-11-16 RX ORDER — ESOMEPRAZOLE MAGNESIUM 40 MG/1
40 CAPSULE, DELAYED RELEASE ORAL DAILY
Qty: 30 CAPSULE | Refills: 3 | Status: SHIPPED | OUTPATIENT
Start: 2021-11-16 | End: 2021-12-21

## 2021-11-16 RX ORDER — ALBUTEROL SULFATE 90 UG/1
2 AEROSOL, METERED RESPIRATORY (INHALATION) EVERY 6 HOURS PRN
Qty: 18 G | Refills: 5 | Status: SHIPPED | OUTPATIENT
Start: 2021-11-16

## 2021-11-16 RX ORDER — LABETALOL 300 MG/1
300 TABLET, FILM COATED ORAL 2 TIMES DAILY
Qty: 180 TABLET | Refills: 3 | Status: SHIPPED | OUTPATIENT
Start: 2021-11-16 | End: 2022-04-15 | Stop reason: SDUPTHER

## 2021-11-16 RX ORDER — NIFEDIPINE 60 MG/1
60 TABLET, EXTENDED RELEASE ORAL DAILY
Qty: 90 TABLET | Refills: 3 | Status: SHIPPED | OUTPATIENT
Start: 2021-11-16 | End: 2022-02-28 | Stop reason: ALTCHOICE

## 2021-11-16 RX ORDER — FLUTICASONE PROPIONATE 220 UG/1
2 AEROSOL, METERED RESPIRATORY (INHALATION) 2 TIMES DAILY
Qty: 12 G | Refills: 5 | Status: SHIPPED | OUTPATIENT
Start: 2021-11-16 | End: 2022-10-10

## 2021-11-16 RX ORDER — LOSARTAN POTASSIUM 100 MG/1
100 TABLET ORAL DAILY
Qty: 90 TABLET | Refills: 3 | Status: SHIPPED | OUTPATIENT
Start: 2021-11-16 | End: 2022-04-15 | Stop reason: SDUPTHER

## 2021-11-16 ASSESSMENT — ENCOUNTER SYMPTOMS
CONSTIPATION: 0
CHOKING: 0
DIARRHEA: 0
ABDOMINAL DISTENTION: 0
PHOTOPHOBIA: 0
SORE THROAT: 0
SHORTNESS OF BREATH: 0
VOICE CHANGE: 0
BLOOD IN STOOL: 0
COUGH: 1
CHEST TIGHTNESS: 0
FACIAL SWELLING: 0

## 2021-11-16 NOTE — PROGRESS NOTES
Subjective:      Patient ID: Edi Szymanski is a 61 y.o. female. 11/16/2021 Patient presents with:  Hypertension  Cough: 3 wks     Has had extensive w/u with ENT including Barium swallow Study and CT soft tissue neck             Last seen  8/31/2021 Patient presents with:  Blood Pressure Check                  8/12/2021 Patient presents with:  Blood Pressure Check  Headache  Sinus Problem                  8/3/2021 Patient presents with:  Hypertension: pt BP has been elevated causing excessive sleepiness                6/3/2021 Patient presents with: Follow-Up from Hospital: [facility] [place] 5/25-5/27/21 abdominal pain        Previous history of gastric bypass, hiatal & ventral hernia repair cholecystectomy    CT abdomen and pelvis showed pneumobilia and bilateral renal cyst without hydronephrosis  LFTs, amylase & lipase within normal limits. She  underwent EGD on 5/25/2021 which was normal    Small bowel follow-through showed showed intermittent mild to moderate increased tertiary contraction in the esophagus with delayed esophageal emptying otherwise unremarkable. Patient was recommended to continue Pepcid ( allergic to Protonix) follow-up with gastroenterology     Pneumobilia noted on imaging likely 2/2 prior ERCP  Afebrile, WBCs and LFTs WNL. Ultrasound negative                4/30/2021   Medicare AW                11/10/2020 Patient presents with:  Abdominal Pain . Never got labs as ordered last visit ? ??    States she did See Dr Domitila Fam who has told her there was nothing he could do ? ??                  . S/P Surgery dissectomy 8/26/20 at [facility]       Was told she has Stage 3 Renal failiure ? Labs last checked here 11/19 show Nl renal function                  2/27/18 total thyroidectomy      Hypertension  Pertinent negatives include no chest pain or shortness of breath.           Past Medical History:   Diagnosis Date    Anxiety state     Asthma     Back ache     chronic    Environmental allergies  GERD (gastroesophageal reflux disease)     Hypertension     MVP (mitral valve prolapse)     Neck pain, chronic     Reflux     Sleep apnea        Review of Systems   Constitutional: Negative for activity change, appetite change, chills, diaphoresis, fatigue, fever and unexpected weight change. All vaccines up to date    Flu vac 1/21    Tdap 5/17     Pneumonia vac 2012 ;  5/17     Covid Vac 4/21   HENT: Negative for ear pain, facial swelling, postnasal drip, sore throat and voice change. Dentures fit well ; new   Eyes: Negative for photophobia and visual disturbance. Eye exam 11/20   Respiratory: Positive for cough. Negative for choking, chest tightness and shortness of breath. Does not smoke ; No Etoh   H/o Asthma    Cardiovascular: Negative for chest pain and leg swelling. HTN > 5 yrs on meds . No known CAD . Father dies of MI in his 46s   Sister with CAD ? MVP  H/o palpitations   Gastrointestinal: Negative for abdominal distention, blood in stool, constipation and diarrhea. Repeat Upper endoscopy 5/21  Nl    Upper Endoscopy  1/21  H/O [place] ? Cannot take Nsaids! Post gastric bypass 2005      GERD    Repeat Colonscopy 1/21  ; Polyps; O [place]    Endocrine: Negative for cold intolerance and heat intolerance. Followed by Dr. Karan Dukes  for thyroid   Genitourinary:        Mammogram  5/20    Scheduled Pap 0n 9/127/18    Musculoskeletal:        Sees Ortho neck / [facility]   1/21       S/P Surgery  DDD 8/26/20      DJD . Skin: Negative. Psychiatric/Behavioral: Negative for self-injury, sleep disturbance and suicidal ideas. Anxiety: followed by counselor       Objective:   Physical Exam  Constitutional:       General: She is not in acute distress. Appearance: She is not ill-appearing. Neck:      Comments:     Cardiovascular:      Rate and Rhythm: Regular rhythm. Heart sounds: Normal heart sounds.    Pulmonary:      Breath sounds: Normal breath sounds. No wheezing, rhonchi or rales. Musculoskeletal:         General: Normal range of motion. Right lower leg: No edema. Left lower leg: No edema. Skin:     General: Skin is warm. Neurological:      General: No focal deficit present. Mental Status: She is oriented to person, place, and time. Psychiatric:         Mood and Affect: Mood normal.         Assessment:     Cayla was seen today for hypertension and cough. Diagnoses and all orders for this visit:    Essential hypertension  Controlled with addition of Nifedipine   -     NIFEdipine (PROCARDIA XL) 60 MG extended release tablet; Take 1 tablet by mouth daily  -     labetalol (NORMODYNE) 300 MG tablet; Take 1 tablet by mouth 2 times daily  -     losartan (COZAAR) 100 MG tablet; Take 1 tablet by mouth daily    Asthma, cough variant  Must take Inhalers reg . If couch does not resolve , consider removing Losartan   -     fluticasone (FLOVENT HFA) 220 MCG/ACT inhaler; Inhale 2 puffs into the lungs 2 times daily  -     albuterol sulfate  (90 Base) MCG/ACT inhaler; Inhale 2 puffs into the lungs every 6 hours as needed for Wheezing  -     esomeprazole (NEXIUM) 40 MG delayed release capsule; Take 1 capsule by mouth daily    High priority for COVID-19 vaccination  Needs third booster     Need for influenza vaccination  -     INFLUENZA, MDCK QUADV, 2 YRS AND OLDER, IM, PF, PREFILL SYR OR SDV, 0.5ML (FLUCELVAX QUADV, PF)                Other iron deficiency anemia    Chronic     S/P Gastric bypass . Eat an apple daily   -     ferrous sulfate (IRON 325) 325 (65 Fe) MG tablet; Take 1 tablet by mouth daily (with breakfast)    Incontinence overflow, urine      Acquired hypothyroidism  C/O  Endocrine /    .  On synthroid 0.25   -     -      I    Plan:

## 2021-11-19 ENCOUNTER — APPOINTMENT (OUTPATIENT)
Dept: PHYSICAL THERAPY | Age: 66
End: 2021-11-19
Payer: MEDICARE

## 2021-12-02 ENCOUNTER — OFFICE VISIT (OUTPATIENT)
Dept: SURGERY | Age: 66
End: 2021-12-02
Payer: MEDICARE

## 2021-12-02 VITALS — SYSTOLIC BLOOD PRESSURE: 156 MMHG | DIASTOLIC BLOOD PRESSURE: 92 MMHG | WEIGHT: 232.4 LBS | BODY MASS INDEX: 36.4 KG/M2

## 2021-12-02 DIAGNOSIS — R10.12 LUQ PAIN: Primary | ICD-10-CM

## 2021-12-02 PROCEDURE — G8427 DOCREV CUR MEDS BY ELIG CLIN: HCPCS | Performed by: SURGERY

## 2021-12-02 PROCEDURE — 1036F TOBACCO NON-USER: CPT | Performed by: SURGERY

## 2021-12-02 PROCEDURE — G8482 FLU IMMUNIZE ORDER/ADMIN: HCPCS | Performed by: SURGERY

## 2021-12-02 PROCEDURE — G9899 SCRN MAM PERF RSLTS DOC: HCPCS | Performed by: SURGERY

## 2021-12-02 PROCEDURE — 3017F COLORECTAL CA SCREEN DOC REV: CPT | Performed by: SURGERY

## 2021-12-02 PROCEDURE — 99213 OFFICE O/P EST LOW 20 MIN: CPT | Performed by: SURGERY

## 2021-12-02 PROCEDURE — G8417 CALC BMI ABV UP PARAM F/U: HCPCS | Performed by: SURGERY

## 2021-12-02 NOTE — PROGRESS NOTES
[facility] GENERAL AND LAPAROSCOPIC SURGERY          PATIENT NAME: Cayla Farfan     TODAY'S DATE: 12/2/2021    SUBJECTIVE:  CC abd pain  Pt with pain, LUQ, recent illness, with n/v, and lots of abd pressure. Having diarrhea at the same time. Has felt pain and lump develop in the LUQ, moderate, mild improvement, still worse with pressure. Pt seen many mos ago for other ab pain, no surgery needed at that time, tried abd PT without much success then. No F/C. Able to maintain stable weight. No F/C. OBJECTIVE:  VITALS:  BP (!) 156/92   Wt 232 lb 6.4 oz (105.4 kg)   LMP  (LMP Unknown)   BMI 36.40 kg/m²     CONSTITUTIONAL:  awake and alert  LUNGS:  clear to auscultation  HEART: RRR  ABDOMEN:  normal bowel sounds, soft, non-distended, tenderness noted in the LUQ, with fullness present, firm tender area present. Old scars healed      Radiology Review:  No new studies      ASSESSMENT AND PLAN:  LUQ abd pain  Assoc with stress and pressure  Pt feels fullness and bulge in the region  No certain palpable hernia on exam  Could have MS strain, hematoma, or hernia  Will do an abd CT scan. Hernia repair if present. Local care and referral back to Dr. Tamika Dias for GI issues if no surgical problem noted.      25 minutes spent    eVronica Foster MD

## 2021-12-03 ENCOUNTER — HOSPITAL ENCOUNTER (OUTPATIENT)
Dept: CT IMAGING | Age: 66
Discharge: HOME | End: 2021-12-03
Payer: MEDICARE

## 2021-12-03 DIAGNOSIS — R10.12 LUQ PAIN: ICD-10-CM

## 2021-12-03 PROCEDURE — 74176 CT ABD & PELVIS W/O CONTRAST: CPT

## 2021-12-07 NOTE — RESULT ENCOUNTER NOTE
Please call pt with CT results, no new problem or hernia noted to do surgery for. Recommend local heating pad, Advil, and stretching for LUQ soreness - probably muscle related. See Dr. Maria D Rondon for any further GI concerns. No surgery needed.  Thanks

## 2021-12-08 ENCOUNTER — TELEPHONE (OUTPATIENT)
Dept: SURGERY | Age: 66
End: 2021-12-08

## 2021-12-08 NOTE — TELEPHONE ENCOUNTER
LM for pt to call back        ----- Message from Geraldo Rodriguez MD sent at 12/7/2021 11:05 AM EST -----  Please call pt with CT results, no new problem or hernia noted to do surgery for. Recommend local heating pad, Advil, and stretching for LUQ soreness - probably muscle related. See Dr. Stephie Chapa for any further GI concerns. No surgery needed.  Thanks

## 2021-12-09 NOTE — TELEPHONE ENCOUNTER
Pt called back but when I answered no one was there. I hung up and called her back and left another vm.

## 2021-12-09 NOTE — TELEPHONE ENCOUNTER
Pt called back and gave a different phone number for me to call her back on.   [phone]    I called her and informed her of her results

## 2021-12-15 ENCOUNTER — TELEPHONE (OUTPATIENT)
Dept: PRIMARY CARE CLINIC | Age: 66
End: 2021-12-15

## 2021-12-15 NOTE — TELEPHONE ENCOUNTER
Patient would like medication to help her sleep, just epidural shot; also can she be scheduled for booster shot with [facility]; please send medication to [facility]

## 2021-12-16 DIAGNOSIS — F19.982 DRUG-INDUCED INSOMNIA (HCC): Primary | ICD-10-CM

## 2021-12-16 RX ORDER — ZOLPIDEM TARTRATE 5 MG/1
5 TABLET ORAL NIGHTLY PRN
Qty: 10 TABLET | Refills: 0 | Status: SHIPPED | OUTPATIENT
Start: 2021-12-16 | End: 2022-02-11 | Stop reason: SDUPTHER

## 2021-12-20 DIAGNOSIS — J45.991 ASTHMA, COUGH VARIANT: ICD-10-CM

## 2021-12-20 RX ORDER — ESOMEPRAZOLE MAGNESIUM 40 MG/1
40 CAPSULE, DELAYED RELEASE ORAL DAILY
Qty: 90 CAPSULE | Status: CANCELLED | OUTPATIENT
Start: 2021-12-20

## 2021-12-21 RX ORDER — ESOMEPRAZOLE MAGNESIUM 40 MG/1
40 CAPSULE, DELAYED RELEASE ORAL DAILY
Qty: 90 CAPSULE | Refills: 1 | Status: SHIPPED | OUTPATIENT
Start: 2021-12-21 | End: 2021-12-28 | Stop reason: SDUPTHER

## 2021-12-28 DIAGNOSIS — J45.991 ASTHMA, COUGH VARIANT: ICD-10-CM

## 2021-12-28 RX ORDER — ESOMEPRAZOLE MAGNESIUM 40 MG/1
40 CAPSULE, DELAYED RELEASE ORAL DAILY
Qty: 90 CAPSULE | Refills: 1 | Status: SHIPPED | OUTPATIENT
Start: 2021-12-28 | End: 2022-03-22

## 2021-12-28 NOTE — TELEPHONE ENCOUNTER
Medication:   Requested Prescriptions     Pending Prescriptions Disp Refills    esomeprazole (NEXIUM) 40 MG delayed release capsule 90 capsule 1     Sig: Take 1 capsule by mouth daily TAKE 1 CAPSULE BY MOUTH DAILY        Last Filled:      Patient Phone Number: [phone] (home) [phone] (work)    Last appt: 11/16/2021   Next appt: Visit date not found    Last OARRS:   RX Monitoring 1/9/2018   Attestation The Prescription Monitoring Report for this patient was reviewed today.

## 2021-12-28 NOTE — TELEPHONE ENCOUNTER
Follow up/med issues:   Regarding the generic version of the Nexium medication. She states that she is not able to take the magnesium in the generic form and states that this should be on her file because it has made her sick in the past.  The generic version causes dizziness, N/V/D  Pt wants the Nexium to be sent in to pharmacy written as a OLGA so that the only brand name is filled in the future. pls send in for her when you can. Thank you!     Pt brittanei: [phone]    Carolyn: [phone]

## 2022-01-04 ENCOUNTER — TELEPHONE (OUTPATIENT)
Dept: PRIMARY CARE CLINIC | Age: 67
End: 2022-01-04

## 2022-01-04 NOTE — TELEPHONE ENCOUNTER
----- Message from Son Mike sent at 1/4/2022  3:16 PM EST -----  Subject: Message to Provider    QUESTIONS  Information for Provider? Wanted [facility] or Gustavo Don to call her back says she   had talked to them about insomnia problems and cant get anything to sleep. Wanted something called in for her to help with that. says she only can   sleep for about 10 mins at a time. ---------------------------------------------------------------------------  --------------  Humberto Danger INFO  What is the best way for the office to contact you? OK to leave message on   voicemail  Preferred Call Back Phone Number? [phone]  ---------------------------------------------------------------------------  --------------  SCRIPT ANSWERS  Relationship to Patient?  Self

## 2022-01-05 DIAGNOSIS — G47.09 OTHER INSOMNIA: Primary | ICD-10-CM

## 2022-01-05 RX ORDER — TEMAZEPAM 15 MG/1
15 CAPSULE ORAL NIGHTLY PRN
Qty: 14 CAPSULE | Refills: 0 | Status: SHIPPED | OUTPATIENT
Start: 2022-01-05 | End: 2022-01-19

## 2022-01-06 ENCOUNTER — TELEPHONE (OUTPATIENT)
Dept: PRIMARY CARE CLINIC | Age: 67
End: 2022-01-06

## 2022-01-06 NOTE — TELEPHONE ENCOUNTER
Follow up:   Pt is wanting to know if you will contact her pharmacy regarding a refill on the following:  ~Temazepam (RESTORIL) 15 MG capsule  The pharmacy indicates that they did not receive it. Also, pt requested a c/b today. Thank you!   Pt brittanie: [phone]

## 2022-01-10 ENCOUNTER — APPOINTMENT (OUTPATIENT)
Dept: CT IMAGING | Age: 67
End: 2022-01-10
Payer: MEDICARE

## 2022-01-10 ENCOUNTER — HOSPITAL ENCOUNTER (EMERGENCY)
Age: 67
Discharge: HOME | End: 2022-01-10
Attending: EMERGENCY MEDICINE
Payer: MEDICARE

## 2022-01-10 ENCOUNTER — TELEPHONE (OUTPATIENT)
Dept: ENT CLINIC | Age: 67
End: 2022-01-10

## 2022-01-10 VITALS
HEART RATE: 70 BPM | RESPIRATION RATE: 18 BRPM | HEIGHT: 67 IN | DIASTOLIC BLOOD PRESSURE: 80 MMHG | BODY MASS INDEX: 36.78 KG/M2 | SYSTOLIC BLOOD PRESSURE: 140 MMHG | OXYGEN SATURATION: 98 % | TEMPERATURE: 98.5 F | WEIGHT: 234.35 LBS

## 2022-01-10 DIAGNOSIS — S02.2XXA CLOSED FRACTURE OF NASAL BONE, INITIAL ENCOUNTER: Primary | ICD-10-CM

## 2022-01-10 PROCEDURE — 6370000000 HC RX 637 (ALT 250 FOR IP): Performed by: EMERGENCY MEDICINE

## 2022-01-10 PROCEDURE — 99283 EMERGENCY DEPT VISIT LOW MDM: CPT

## 2022-01-10 PROCEDURE — 70450 CT HEAD/BRAIN W/O DYE: CPT

## 2022-01-10 PROCEDURE — 70486 CT MAXILLOFACIAL W/O DYE: CPT

## 2022-01-10 PROCEDURE — 72125 CT NECK SPINE W/O DYE: CPT

## 2022-01-10 RX ORDER — HYDROCODONE BITARTRATE AND ACETAMINOPHEN 5; 325 MG/1; MG/1
1 TABLET ORAL ONCE
Status: COMPLETED | OUTPATIENT
Start: 2022-01-10 | End: 2022-01-10

## 2022-01-10 RX ORDER — HYDROCODONE BITARTRATE AND ACETAMINOPHEN 5; 325 MG/1; MG/1
1 TABLET ORAL EVERY 4 HOURS PRN
Qty: 5 TABLET | Refills: 0 | Status: SHIPPED | OUTPATIENT
Start: 2022-01-10 | End: 2022-01-13 | Stop reason: SDUPTHER

## 2022-01-10 RX ORDER — DOXYCYCLINE HYCLATE 100 MG
100 TABLET ORAL 2 TIMES DAILY
Qty: 20 TABLET | Refills: 0 | Status: SHIPPED | OUTPATIENT
Start: 2022-01-10 | End: 2022-01-20

## 2022-01-10 RX ADMIN — HYDROCODONE BITARTRATE AND ACETAMINOPHEN 1 TABLET: 5; 325 TABLET ORAL at 22:34

## 2022-01-10 ASSESSMENT — PAIN DESCRIPTION - DESCRIPTORS: DESCRIPTORS: ACHING

## 2022-01-10 ASSESSMENT — PAIN DESCRIPTION - LOCATION: LOCATION: HEAD

## 2022-01-10 ASSESSMENT — PAIN SCALES - GENERAL: PAINLEVEL_OUTOF10: 8

## 2022-01-10 NOTE — TELEPHONE ENCOUNTER
Received call from patient, patient stated she slipped on ice and hit her head, she stated she is bleeding from her head and is having extreme neck pain. Informed patient to go to the ER, but patient stated she did not want to due to covid being in hospitals and the ER is a 12 hours wait. Patient wanted to set up an appointment, but the earliest available appointment is 1/13/22, scheduled appointment for this date at 10am.    Repeated to patient on the emergent need for her to be seen by a physician immediately in the ER, but patient still refused.

## 2022-01-11 ASSESSMENT — ENCOUNTER SYMPTOMS
ABDOMINAL DISTENTION: 0
EYE DISCHARGE: 0
CHEST TIGHTNESS: 0
EYE ITCHING: 0
APNEA: 0
STRIDOR: 0
SHORTNESS OF BREATH: 0
PHOTOPHOBIA: 0
COUGH: 0
CHOKING: 0
EYE REDNESS: 0
WHEEZING: 0
EYE PAIN: 0

## 2022-01-11 NOTE — ED PROVIDER NOTES
[address] [facility]      Pt Name: Vira Councilman  MRN: [id number]  [place] 1955  Date of evaluation: 1/10/2022  Provider: Lee Ann Maya MD    CHIEF COMPLAINT       Chief Complaint   Patient presents with    Fall     slipped on ice, hit right side of head, x1 hour, no blood thinners, no LOC       HISTORY OF PRESENT ILLNESS    Cayla Britton is a 61 y.o. female who presents to the emergency department with fall. Patient states she slipped on some ice fell hit her face. 4-10 achy pain. Positive for nasal pain. No difficulty breathing. No shortness of breath. No chest pain. No other injuries or insults. Rest makes symptoms better. Movement makes symptoms worse. Never happened before. No neck pain or back pain. Nursing Notes were reviewed. Including nursing noted for FM, Surgical History, Past Medical History, Social History, vitals, and allergies; agree with all. REVIEW OF SYSTEMS       Review of Systems   Constitutional: Negative for activity change, appetite change, chills, diaphoresis, fatigue, fever and unexpected weight change. HENT: Negative for congestion, dental problem, drooling and ear discharge. Eyes: Negative for photophobia, pain, discharge, redness and itching. Respiratory: Negative for apnea, cough, choking, chest tightness, shortness of breath, wheezing and stridor. Cardiovascular: Negative for chest pain and leg swelling. Gastrointestinal: Negative for abdominal distention. Endocrine: Negative for polyphagia and polyuria. Genitourinary: Negative for vaginal bleeding, vaginal discharge and vaginal pain. Musculoskeletal: Negative for arthralgias. Neurological: Negative for tremors, facial asymmetry and headaches. Hematological: Negative for adenopathy. Does not bruise/bleed easily.    Psychiatric/Behavioral: Negative for agitation, behavioral problems, confusion, decreased concentration, dysphoric mood, hallucinations, self-injury, sleep disturbance and suicidal ideas. The patient is not nervous/anxious and is not hyperactive. Except as noted above the remainder of the review of systems was reviewed and negative.      PAST MEDICAL HISTORY     Past Medical History:   Diagnosis Date    Anxiety state     Asthma     Back ache     chronic    Environmental allergies     GERD (gastroesophageal reflux disease)     Hypertension     MVP (mitral valve prolapse)     Neck pain, chronic     Reflux     Sleep apnea        SURGICAL HISTORY       Past Surgical History:   Procedure Laterality Date    BACK SURGERY      BLADDER SUSPENSION      CHOLECYSTECTOMY      FOOT SURGERY  11-9-12    endoscopic plantar fasciotomy right foot    GASTRIC BYPASS SURGERY      HERNIA REPAIR      HIATAL HERNIA REPAIR      HYSTERECTOMY      JOINT REPLACEMENT Bilateral     KNEE    KNEE ARTHROPLASTY  2/28/13    L    KNEE ARTHROSCOPY Right 3/7/2014    LAMINECTOMY  7/1/11    bilateral L5-S1 laminotomy, nerve root exploration, foraminotomy    NASAL SINUS SURGERY Right 02/05/2018    Excision of rt nasal mass    MD COLONOSCOPY FLX DX W/COLLJ SPEC WHEN PFRMD N/A 9/28/2018    COLONOSCOPY DIAGNOSTIC OR SCREENING performed by Kay Crowe MD at [address] [place] [address] DX/THER AGNT PARAVERT FACET JOINT, LUMBAR/SAC, 1ST LEVEL Right 11/6/2018    RIGHT L4,L5 S1 MEDIAL BRANCH BLOCK WITH FLUOROSCOPY performed by Derrick Raymond MD at [address] [address], PARTIAL      TONSILLECTOMY      UPPER GASTROINTESTINAL ENDOSCOPY      with dilatation    UPPER GASTROINTESTINAL ENDOSCOPY N/A 8/14/2019    EGD ESOPHAGOGASTRODUODENOSCOPY [facility]    EGD BIOPSY performed by Bienvenido Cisneros MD at [address] [place] [address]       Discharge Medication List as of 1/10/2022 10:20 PM      CONTINUE these medications which have NOT CHANGED    Details   temazepam (RESTORIL) 15 MG capsule Take 1 capsule by mouth nightly as needed for Sleep for up to 14 days. , Disp-14 capsule, R-0Normal      esomeprazole (NEXIUM) 40 MG delayed release capsule Take 1 capsule by mouth daily TAKE 1 CAPSULE BY MOUTH DAILY, Disp-90 capsule, R-1, DAWNormal      NIFEdipine (PROCARDIA XL) 60 MG extended release tablet Take 1 tablet by mouth daily, Disp-90 tablet, R-3**Patient requests 90 days supply**Normal      labetalol (NORMODYNE) 300 MG tablet Take 1 tablet by mouth 2 times daily, Disp-180 tablet, R-3**Patient requests 90 days supply**Normal      losartan (COZAAR) 100 MG tablet Take 1 tablet by mouth daily, Disp-90 tablet, R-3**Patient requests 90 days supply**Normal      fluticasone (FLOVENT HFA) 220 MCG/ACT inhaler Inhale 2 puffs into the lungs 2 times daily, Disp-12 g, R-5Normal      albuterol sulfate  (90 Base) MCG/ACT inhaler Inhale 2 puffs into the lungs every 6 hours as needed for Wheezing, Disp-18 g, R-5Normal      butalbital-acetaminophen-caffeine (FIORICET, ESGIC) -40 MG per tablet Take 1 tablet by mouth every 4 hours as needed for Headaches, Disp-180 tablet, R-3Normal      ACETAMINOPHEN EXTRA STRENGTH 500 MG tablet TAKE 1 TABLET BY MOUTH EVERY 6 HOURS AS NEEDED FOR PAIN, Disp-90 tablet, R-1Normal      vitamin D (ERGOCALCIFEROL) 1.25 MG (50174 UT) CAPS capsule Take 50,000 Units by mouth once a weekHistorical Med      ferrous sulfate (IRON 325) 325 (65 Fe) MG tablet Take 1 tablet by mouth daily (with breakfast), Disp-90 tablet, R-3Normal      fluticasone (FLONASE) 50 MCG/ACT nasal spray SHAKE LIQUID AND USE 2 SPRAYS IN EACH NOSTRIL DAILY, Disp-48 g, R-3**Patient requests 90 days supply**Normal             ALLERGIES     Gabapentin, Protonix [pantoprazole sodium], Tramadol, Amoxicillin, Aspirin, Augmentin [amoxicillin-pot clavulanate], Betadine [povidone iodine], Chlorhexidine gluconate, Morphine, Other, Toradol [ketorolac tromethamine], Iodides, Ranitidine, and Tape [adhesive tape]    FAMILY HISTORY        Family History   Problem Relation Age of Onset    High Blood Pressure Mother     Heart Disease Mother     Other Mother         glaucoma    High Blood Pressure Father     Heart Disease Father     Other Father         intestional problems    Cancer Sister         cancer    Kidney Disease Sister     Kidney Disease Brother     High Blood Pressure Brother     Other Sister         lupus,fibromyalgia  thyroid surgery    High Blood Pressure Sister     Kidney Disease Sister     Heart Disease Sister     Kidney Disease Brother     High Blood Pressure Brother     Anesth Problems Neg Hx     Malig Hyperten Neg Hx     Hypotension Neg Hx     Malig Hypertherm Neg Hx     Pseudochol. Deficiency Neg Hx        SOCIAL HISTORY       Social History     Socioeconomic History    Marital status:      Spouse name: None    Number of children: None    Years of education: None    Highest education level: None   Occupational History    None   Tobacco Use    Smoking status: Former Smoker     Packs/day: 0.25     Years: 2.00     Pack years: 0.50     Quit date: 2004     Years since quittin.0    Smokeless tobacco: Never Used    Tobacco comment: [id number]   Vaping Use    Vaping Use: Never used   Substance and Sexual Activity    Alcohol use: No    Drug use: No    Sexual activity: Yes     Partners: Male   Other Topics Concern    None   Social History Narrative    None     Social Determinants of Health     Financial Resource Strain: Low Risk     Difficulty of Paying Living Expenses: Not hard at all   Food Insecurity:     Worried About [address] in the Last Year: Not on file    [address] [facility] [address] in the Last Year: Not on file   Transportation Needs:     Lack of Transportation (Medical): Not on file    Lack of Transportation (Non-Medical):  Not on file   Physical Activity:     Days of Exercise per Week: Not on file    Minutes of Exercise per Session: Not on file   Stress:     Feeling of Stress : Not on file   Social Connections:     Frequency of Communication with Friends and Family: Not on file    Frequency of Social Gatherings with Friends and Family: Not on file    Attends Sabianist Services: Not on file    Active Member of [address] [place] [address] or Organizations: Not on file    Attends Club or Organization Meetings: Not on file    Marital Status: Not on file   Intimate Partner Violence:     Fear of Current or Ex-Partner: Not on file    Emotionally Abused: Not on file    Physically Abused: Not on file    Sexually Abused: Not on file   Housing Stability:     Unable to Pay for Housing in the Last Year: Not on file    Number of Nikimohazel in the Last Year: Not on file    Unstable Housing in the Last Year: Not on file       PHYSICAL EXAM       ED Triage Vitals [01/10/22 1853]   BP Temp Temp Source Pulse Resp SpO2 Height Weight   (!) 150/101 98.5 °F (36.9 °C) Oral 71 16 98 % 5' 7\" (1.702 m) 234 lb 5.6 oz (106.3 kg)       Physical Exam  Vitals and nursing note reviewed. Constitutional:       General: She is not in acute distress. Appearance: She is well-developed. She is not ill-appearing, toxic-appearing or diaphoretic. HENT:      Head: Normocephalic and atraumatic. Right Ear: External ear normal.      Left Ear: External ear normal.      Nose:      Comments: Minimal swelling tenderness to palpation over the naris and nasal bridge. No septal hematoma noted. Both nasal passages patent. No bleeding. No difficulty breathing. Eyes:      General:         Right eye: No discharge. Left eye: No discharge. Conjunctiva/sclera: Conjunctivae normal.      Pupils: Pupils are equal, round, and reactive to light. Cardiovascular:      Rate and Rhythm: Normal rate and regular rhythm. Heart sounds: No murmur heard. Pulmonary:      Effort: Pulmonary effort is normal. No respiratory distress.       Breath sounds: Normal breath sounds. No wheezing or rales. Abdominal:      General: Bowel sounds are normal. There is no distension. Palpations: Abdomen is soft. There is no mass. Tenderness: There is no abdominal tenderness. There is no guarding or rebound. Genitourinary:     Comments: Deferred  Musculoskeletal:         General: No deformity. Normal range of motion. Cervical back: Normal range of motion and neck supple. Skin:     General: Skin is warm. Findings: No erythema or rash. Neurological:      Mental Status: She is alert and oriented to person, place, and time. She is not disoriented. Cranial Nerves: No cranial nerve deficit. Motor: No atrophy or abnormal muscle tone. Coordination: Coordination normal.   Psychiatric:         Behavior: Behavior normal.         Thought Content: Thought content normal.         DIAGNOSTIC RESULTS     EKG: All EKG's are interpreted by the Emergency Department Physician who either signs or Co-signs this chart in the absence of acardiologist.    None    RADIOLOGY:   Non-plain film images such as CT, Ultrasoundand MRI are read by the radiologist. Plain radiographic images are visualized and preliminarily interpreted by the emergency physician with the below findings:    Impression   1. No acute hemorrhage or large intracranial mass-effect. 2. Acute nasal bone fracture. Refer to same-day CT maxillofacial.   3. Other findings as described.         ED BEDSIDE ULTRASOUND:   Performed by ED Physician - none    LABS:  Labs Reviewed - No data to display    All other labs were withinnormal range or not returned as of this dictation. EMERGENCY DEPARTMENT COURSE and DIFFERENTIAL DIAGNOSIS/MDM:     PMH, Surgical Hx, FH, Social Hx reviewed by myself (ETOH usage, Tobacco usage, Drug usage reviewed by myself, no pertinent Hx)- No Pertinent Hx     Old records were reviewed by me    61-year-old with comminuted and displaced nasal bone fracture. Airway is patent.   No septal hematoma noted. ENT follow-up given for possible reduction outpatient. Antibiotics were prescribed as she did note a nosebleed. I estimate there is LOW risk for Sepsis, MI, Stroke, Tamponade, PTX, Toxicity or other life threatening etiology thus I consider the discharge disposition reasonable. The patient is at low risk for mortality based on demographic, history and clinical factors. Given the best available information and clinical assessment, I estimate the risk of hospitalization to be greater than risk of treatment at home. I have explained to the patient that the risk could rapidly change, given precautions for return and instructions. Explained to patient that the risk for mortality is low based on demographic, history and clinical factors. I discussed with patient the results of evaluation in the ED, diagnosis, care, and prognosis. The plan is to discharge to home. Patient is in agreement with plan and questions have been answered. I also discussed with patient the reasons which may require a return visit and the importance of follow-up care. The patient is well-appearing, nontoxic, and improved at the time of discharge. Patient agrees to call to arrange follow-up care as directed. Patient understands to return immediately for worsening/change in symptoms. CRITICAL CARE TIME   Total Critical Caretime was 21 minutes, excluding separately reportable procedures. There was a high probability of clinically significant/life threatening deterioration in the patient's condition which required my urgent intervention. PROCEDURES:  Unlessotherwise noted below, none    FINAL IMPRESSION      1.  Closed fracture of nasal bone, initial encounter          DISPOSITION/PLAN   DISPOSITION Decision To Discharge 01/10/2022 10:07:06 PM    PATIENT REFERRED TO:  Vicky Toney MD  [address] [place] [address] Emelina Lemus MD  [facility] [address]            DISCHARGE MEDICATIONS:  Discharge Medication List as of 1/10/2022 10:20 PM      START taking these medications    Details   HYDROcodone-acetaminophen (NORCO) 5-325 MG per tablet Take 1 tablet by mouth every 4 hours as needed for Pain for up to 3 days. Intended supply: 3 days.  Take lowest dose possible to manage pain, Disp-5 tablet, R-0Print      doxycycline hyclate (VIBRA-TABS) 100 MG tablet Take 1 tablet by mouth 2 times daily for 10 days, Disp-20 tablet, R-0Print                (Please note that portions ofthis note were completed with a voice recognition program.  Efforts were made to edit the dictations but occasionally words are mis-transcribed.)    Jason Narvaez MD(electronically signed)  Attending Emergency Physician        Jason Narveaz MD  01/11/22 8658

## 2022-01-11 NOTE — ED NOTES
Discharge and education instructions reviewed. Patient verbalized understanding, teach-back successful. Patient denied questions at this time. No acute distress noted. Patient instructed to follow-up as noted - return to emergency department if symptoms worsen. Patient verbalized understanding. Discharged per EDMD with discharge instructions.         Marilin Machado RN  01/10/22 6495

## 2022-01-13 ENCOUNTER — OFFICE VISIT (OUTPATIENT)
Dept: ENT CLINIC | Age: 67
End: 2022-01-13
Payer: MEDICARE

## 2022-01-13 VITALS
DIASTOLIC BLOOD PRESSURE: 90 MMHG | OXYGEN SATURATION: 96 % | SYSTOLIC BLOOD PRESSURE: 171 MMHG | HEART RATE: 59 BPM | TEMPERATURE: 96.9 F

## 2022-01-13 DIAGNOSIS — S02.2XXA CLOSED FRACTURE OF NASAL BONE, INITIAL ENCOUNTER: ICD-10-CM

## 2022-01-13 PROCEDURE — G8482 FLU IMMUNIZE ORDER/ADMIN: HCPCS | Performed by: OTOLARYNGOLOGY

## 2022-01-13 PROCEDURE — 99214 OFFICE O/P EST MOD 30 MIN: CPT | Performed by: OTOLARYNGOLOGY

## 2022-01-13 PROCEDURE — G8417 CALC BMI ABV UP PARAM F/U: HCPCS | Performed by: OTOLARYNGOLOGY

## 2022-01-13 PROCEDURE — G8427 DOCREV CUR MEDS BY ELIG CLIN: HCPCS | Performed by: OTOLARYNGOLOGY

## 2022-01-13 PROCEDURE — 1036F TOBACCO NON-USER: CPT | Performed by: OTOLARYNGOLOGY

## 2022-01-13 PROCEDURE — 3017F COLORECTAL CA SCREEN DOC REV: CPT | Performed by: OTOLARYNGOLOGY

## 2022-01-13 RX ORDER — HYDROCODONE BITARTRATE AND ACETAMINOPHEN 5; 325 MG/1; MG/1
1 TABLET ORAL EVERY 4 HOURS PRN
Qty: 30 TABLET | Refills: 0 | Status: SHIPPED | OUTPATIENT
Start: 2022-01-13 | End: 2022-01-18

## 2022-01-13 ASSESSMENT — ENCOUNTER SYMPTOMS
SORE THROAT: 0
SINUS PAIN: 0
RHINORRHEA: 0

## 2022-01-13 NOTE — PROGRESS NOTES
[facility]       PCP:  Pritesh Mcconnell MD      [facility]  Chief Complaint   Patient presents with    Facial Injury     fall causing nasal fracture        HISTORY OF PRESENT ILLNESS       [place] JEFF Muhammad is a 61 y.o. female here for evaluation and treatment of Facial Injury (fall causing nasal fracture )  Fell on ice Monday, 01/10/2022, at about 5:30 - 6 PM, face down and face hit concrete, went to [facility] ER  And did a CT scan. Bleeding from nose. I always had a little hump there. Was straight      REVIEW OF SYSTEMS   Review of Systems   Constitutional: Negative for chills and fever. HENT: Positive for congestion. Negative for ear discharge, ear pain, nosebleeds, rhinorrhea, sinus pain and sore throat.         PAST MEDICAL HISTORY    Past Medical History:   Diagnosis Date    Anxiety state     Asthma     Back ache     chronic    Environmental allergies     GERD (gastroesophageal reflux disease)     Hypertension     MVP (mitral valve prolapse)     Neck pain, chronic     Reflux     Sleep apnea          Past Surgical History:   Procedure Laterality Date    BACK SURGERY      BLADDER SUSPENSION      CHOLECYSTECTOMY      FOOT SURGERY  11-9-12    endoscopic plantar fasciotomy right foot    GASTRIC BYPASS SURGERY      HERNIA REPAIR      HIATAL HERNIA REPAIR      HYSTERECTOMY      JOINT REPLACEMENT Bilateral     KNEE    KNEE ARTHROPLASTY  2/28/13    L    KNEE ARTHROSCOPY Right 3/7/2014    LAMINECTOMY  7/1/11    bilateral L5-S1 laminotomy, nerve root exploration, foraminotomy    NASAL SINUS SURGERY Right 02/05/2018    Excision of rt nasal mass    IA COLONOSCOPY FLX DX W/COLLJ SPEC WHEN PFRMD N/A 9/28/2018    COLONOSCOPY DIAGNOSTIC OR SCREENING performed by Monica Merino MD at [facility] DX/THER AGNT PARAVERT FACET JOINT, LUMBAR/SAC, 1ST LEVEL Right 11/6/2018    RIGHT L4,L5 S1 MEDIAL BRANCH BLOCK WITH FLUOROSCOPY performed by Veena Johnson MD at [address] [address], PARTIAL      TONSILLECTOMY      UPPER GASTROINTESTINAL ENDOSCOPY      with dilatation    UPPER GASTROINTESTINAL ENDOSCOPY N/A 8/14/2019    EGD ESOPHAGOGASTRODUODENOSCOPY performed by Monica Merino MD at [address] 5/25/2021    EGD BIOPSY performed by Hellen Hilton MD at [address]:    01/13/22 1027   BP: (!) 171/90   Site: Right Upper Arm   Position: Sitting   Cuff Size: Medium Adult   Pulse: 59   Temp: 96.9 °F (36.1 °C)   TempSrc: Temporal   SpO2: 96%       Physical Exam  Vitals reviewed. Constitutional:       General: She is not in acute distress. Appearance: Normal appearance. She is not ill-appearing. HENT:      Head: Normocephalic. Comments: No gross deformity of the facial skeleton, including the zygoma. No step off deformity of the orbital rims. No evidence of Le Forte, malar-maxillary, orbital, zygomatic, and mandibular fracture. Medial canthal ligaments intact. No diplopia. Right Ear: Tympanic membrane, ear canal and external ear normal. There is no impacted cerumen. Left Ear: Tympanic membrane, ear canal and external ear normal. There is no impacted cerumen. Nose: Nasal deformity (widened but straight with no lateral deflection and a mild dorsal hump), septal deviation (rightward), signs of injury, mucosal edema and congestion (mild on the right side) present. No rhinorrhea. Mouth/Throat:      Mouth: Mucous membranes are moist.      Pharynx: Oropharynx is clear. No oropharyngeal exudate or posterior oropharyngeal erythema. Neck:      Comments: No cervical masses or tenderness. Lymphadenopathy:      Cervical: No cervical adenopathy. Neurological:      Mental Status: She is alert. AUDRA / Araceli Robledo / eKrry Cope was seen today for facial injury.     Diagnoses and all orders for this visit:    Closed fracture of nasal bone, initial encounter  -     HYDROcodone-acetaminophen (NORCO) 5-325 MG per tablet; Take 1 tablet by mouth every 4 hours as needed for Pain for up to 5 days. Intended supply: 3 days. Take lowest dose possible to manage pain         RECOMMENDATIONS/PLAN      1. Acetaminophen (eg. Tylenol) or Ibuprofen (eg. Advil) (over the counter medications) as needed for mild to moderate pain. 2. Norco for moderate to severe pain as prescribed. 3. Return in about 4 days (around 1/17/2022) for recheck nasal fracture. TIME:  I spent a total of 30 minutes with this patient for pre-visit review of the medical record, history, physical examination, counseling, reviewing CT scan, reviewing the medical record, and documenting the visit.

## 2022-01-17 ENCOUNTER — OFFICE VISIT (OUTPATIENT)
Dept: ENT CLINIC | Age: 67
End: 2022-01-17
Payer: MEDICARE

## 2022-01-17 VITALS — SYSTOLIC BLOOD PRESSURE: 153 MMHG | OXYGEN SATURATION: 93 % | HEART RATE: 85 BPM | DIASTOLIC BLOOD PRESSURE: 79 MMHG

## 2022-01-17 DIAGNOSIS — S02.2XXA CLOSED FRACTURE OF NASAL BONE, INITIAL ENCOUNTER: Primary | ICD-10-CM

## 2022-01-17 PROCEDURE — 1036F TOBACCO NON-USER: CPT | Performed by: OTOLARYNGOLOGY

## 2022-01-17 PROCEDURE — G8482 FLU IMMUNIZE ORDER/ADMIN: HCPCS | Performed by: OTOLARYNGOLOGY

## 2022-01-17 PROCEDURE — 3017F COLORECTAL CA SCREEN DOC REV: CPT | Performed by: OTOLARYNGOLOGY

## 2022-01-17 PROCEDURE — G8417 CALC BMI ABV UP PARAM F/U: HCPCS | Performed by: OTOLARYNGOLOGY

## 2022-01-17 PROCEDURE — G8427 DOCREV CUR MEDS BY ELIG CLIN: HCPCS | Performed by: OTOLARYNGOLOGY

## 2022-01-17 PROCEDURE — 99213 OFFICE O/P EST LOW 20 MIN: CPT | Performed by: OTOLARYNGOLOGY

## 2022-01-17 NOTE — PROGRESS NOTES
[facility]       PCP:  Wesly Sanon MD      [facility]  Chief Complaint   Patient presents with    Follow-up     Closed fracture of nasal bone, initial encounter       HISTORY OF PRESENT ILLNESS       Cayla Baeza is a 61 y.o. female here for recheck and follow up of comminuted nasal bone fracture. Patient stated that she is able to breathe through both sides of her nose. She is having no nosebleed. She feels her nose is perhaps wider than it had been but is otherwise straight. She had a mild dorsal hump prior to the injury.       PAST MEDICAL HISTORY    Past Medical History:   Diagnosis Date    Anxiety state     Asthma     Back ache     chronic    Environmental allergies     GERD (gastroesophageal reflux disease)     Hypertension     MVP (mitral valve prolapse)     Neck pain, chronic     Reflux     Sleep apnea        Past Surgical History:   Procedure Laterality Date    BACK SURGERY      BLADDER SUSPENSION      CHOLECYSTECTOMY      FOOT SURGERY  11-9-12    endoscopic plantar fasciotomy right foot    GASTRIC BYPASS SURGERY      HERNIA REPAIR      HIATAL HERNIA REPAIR      HYSTERECTOMY      JOINT REPLACEMENT Bilateral     KNEE    KNEE ARTHROPLASTY  2/28/13    L    KNEE ARTHROSCOPY Right 3/7/2014    LAMINECTOMY  7/1/11    bilateral L5-S1 laminotomy, nerve root exploration, foraminotomy    NASAL SINUS SURGERY Right 02/05/2018    Excision of rt nasal mass    OR COLONOSCOPY FLX DX W/COLLJ SPEC WHEN PFRMD N/A 9/28/2018    COLONOSCOPY DIAGNOSTIC OR SCREENING performed by Anton Lam MD at [address] [place] [address] DX/THER AGNT PARAVERT FACET JOINT, LUMBAR/SAC, 1ST LEVEL Right 11/6/2018    RIGHT L4,L5 S1 MEDIAL BRANCH BLOCK WITH FLUOROSCOPY performed by Ofelia Lazcano MD at [address] [address], PARTIAL      TONSILLECTOMY      UPPER GASTROINTESTINAL ENDOSCOPY      with dilatation    UPPER GASTROINTESTINAL ENDOSCOPY N/A 8/14/2019    EGD ESOPHAGOGASTRODUODENOSCOPY performed by Monica Merino MD at [address] 5/25/2021    EGD BIOPSY performed by Hellen Hilton MD at [facility]:    01/17/22 1301   BP: (!) 153/79   Site: Right Upper Arm   Position: Sitting   Cuff Size: Large Adult   Pulse: 85   SpO2: 93%       Physical Exam  Vitals reviewed. Constitutional:       General: She is not in acute distress. Appearance: Normal appearance. She is not ill-appearing. HENT:      Head: Normocephalic. Comments: There was mild to moderate periorbital ecchymosis. No gross deformity of the facial skeleton, including the zygoma. No step off deformity of the orbital rims. No evidence of Le Forte, malar-maxillary, orbital, zygomatic, and mandibular fracture. Medial canthal ligaments intact. No diplopia. Nose: Nasal deformity (Mild dorsal deflection to the left), septal deviation (Mild leftward) and nasal tenderness (Mild to moderate, nasal bones) present. Right Nostril: No epistaxis or septal hematoma. Left Nostril: No epistaxis or septal hematoma. Right Turbinates: Not enlarged. Left Turbinates: Not enlarged. Comments: The nasal dorsum appeared to be straight with minimal deflection to the left. It was perhaps mildly widened. There was no depression of the nasal bones. Nasal septum was mildly deviated to the left. There was moderate tenderness of the nasal bones bilaterally. Neck:      Comments: No cervical masses or tenderness. Neurological:      Mental Status: She is alert. AUDRA / Araceli Robledo / Kerry Cope was seen today for follow-up. Diagnoses and all orders for this visit:    Closed fracture of nasal bone, initial encounter         RECOMMENDATIONS/PLAN      1. No indication for surgical intervention at this time.   2. Consider delayed septorhinoplasty if significant deformity or nasal dyspnea persist past 12 weeks post injury. 3. Acetaminophen (eg. Tylenol) or Ibuprofen (eg. Advil) (over the counter medications) as needed for pain. 4. Return unacceptable nasal deformity after 12 weeks after injury, or difficulty breathing through nose, or, for any further ENT or sinus problems or symptoms.

## 2022-01-20 ENCOUNTER — VIRTUAL VISIT (OUTPATIENT)
Dept: PRIMARY CARE CLINIC | Age: 67
End: 2022-01-20
Payer: MEDICARE

## 2022-01-20 DIAGNOSIS — N39.490 INCONTINENCE OVERFLOW, URINE: ICD-10-CM

## 2022-01-20 DIAGNOSIS — S02.2XXD CLOSED FRACTURE OF NASAL BONE WITH ROUTINE HEALING, SUBSEQUENT ENCOUNTER: ICD-10-CM

## 2022-01-20 DIAGNOSIS — Z71.89 COUNSELING ON INJURY PREVENTION: Primary | ICD-10-CM

## 2022-01-20 DIAGNOSIS — I10 ESSENTIAL HYPERTENSION: ICD-10-CM

## 2022-01-20 PROCEDURE — G8417 CALC BMI ABV UP PARAM F/U: HCPCS | Performed by: INTERNAL MEDICINE

## 2022-01-20 PROCEDURE — G8482 FLU IMMUNIZE ORDER/ADMIN: HCPCS | Performed by: INTERNAL MEDICINE

## 2022-01-20 PROCEDURE — 1036F TOBACCO NON-USER: CPT | Performed by: INTERNAL MEDICINE

## 2022-01-20 PROCEDURE — 99213 OFFICE O/P EST LOW 20 MIN: CPT | Performed by: INTERNAL MEDICINE

## 2022-01-20 PROCEDURE — 3017F COLORECTAL CA SCREEN DOC REV: CPT | Performed by: INTERNAL MEDICINE

## 2022-01-20 PROCEDURE — G8427 DOCREV CUR MEDS BY ELIG CLIN: HCPCS | Performed by: INTERNAL MEDICINE

## 2022-01-20 RX ORDER — SOLIFENACIN SUCCINATE 10 MG/1
10 TABLET, FILM COATED ORAL DAILY
Qty: 90 TABLET | Refills: 1 | Status: SHIPPED | OUTPATIENT
Start: 2022-01-20 | End: 2023-01-20

## 2022-01-20 ASSESSMENT — ENCOUNTER SYMPTOMS
CHOKING: 0
ABDOMINAL DISTENTION: 0
SORE THROAT: 0
CONSTIPATION: 0
BLOOD IN STOOL: 0
VOICE CHANGE: 0
COUGH: 1
FACIAL SWELLING: 0
CHEST TIGHTNESS: 0
SHORTNESS OF BREATH: 0
PHOTOPHOBIA: 0
DIARRHEA: 0

## 2022-01-20 NOTE — PROGRESS NOTES
Subjective:      Patient ID: Johnnie Pratt is a 61 y.o. female. 11/16/2021 Patient presents with:  Hypertension  Cough: 3 wks     Has had extensive w/u with ENT including Barium swallow Study and CT soft tissue neck             Last seen  8/31/2021 Patient presents with:  Blood Pressure Check                  8/12/2021 Patient presents with:  Blood Pressure Check  Headache  Sinus Problem                  8/3/2021 Patient presents with:  Hypertension: pt BP has been elevated causing excessive sleepiness                6/3/2021 Patient presents with: Follow-Up from Hospital: [facility] 5/25-5/27/21 abdominal pain        Previous history of gastric bypass, hiatal & ventral hernia repair cholecystectomy    CT abdomen and pelvis showed pneumobilia and bilateral renal cyst without hydronephrosis  LFTs, amylase & lipase within normal limits. She  underwent EGD on 5/25/2021 which was normal    Small bowel follow-through showed showed intermittent mild to moderate increased tertiary contraction in the esophagus with delayed esophageal emptying otherwise unremarkable. Patient was recommended to continue Pepcid ( allergic to Protonix) follow-up with gastroenterology     Pneumobilia noted on imaging likely 2/2 prior ERCP  Afebrile, WBCs and LFTs WNL. Ultrasound negative                4/30/2021   Medicare AW                11/10/2020 Patient presents with:  Abdominal Pain . Never got labs as ordered last visit ? ??    States she did See Dr Morgan Hanks who has told her there was nothing he could do ? ??                  . S/P Surgery dissectomy 8/26/20 at [facility]       Was told she has Stage 3 Renal failiure ? Labs last checked here 11/19 show Nl renal function                  2/27/18 total thyroidectomy      Hypertension  Pertinent negatives include no chest pain or shortness of breath.           Past Medical History:   Diagnosis Date    Anxiety state     Asthma     Back ache     chronic    Environmental allergies  GERD (gastroesophageal reflux disease)     Hypertension     MVP (mitral valve prolapse)     Neck pain, chronic     Reflux     Sleep apnea        Review of Systems   Constitutional: Negative for activity change, appetite change, chills, diaphoresis, fatigue, fever and unexpected weight change. All vaccines up to date    Flu vac 1/21    Tdap 5/17     Pneumonia vac 2012 ;  5/17     Covid Vac 4/21   HENT: Negative for ear pain, facial swelling, postnasal drip, sore throat and voice change. Dentures fit well ; new   Eyes: Negative for photophobia and visual disturbance. Eye exam 11/20   Respiratory: Positive for cough. Negative for choking, chest tightness and shortness of breath. Does not smoke ; No Etoh   H/o Asthma    Cardiovascular: Negative for chest pain and leg swelling. HTN > 5 yrs on meds . No known CAD . Father dies of MI in his 46s   Sister with CAD ? MVP  H/o palpitations   Gastrointestinal: Negative for abdominal distention, blood in stool, constipation and diarrhea. Repeat Upper endoscopy 5/21  Nl    Upper Endoscopy  1/21  H/O [place] ? Cannot take Nsaids! Post gastric bypass 2005      GERD    Repeat Colonscopy 1/21  ; Polyps; O [place]    Endocrine: Negative for cold intolerance and heat intolerance. Followed by Dr. Holland Jordan  for thyroid   Genitourinary:        Mammogram  5/20    Scheduled Pap 0n 9/127/18    Musculoskeletal:        Sees Ortho neck / [facility]   1/21       S/P Surgery  DDD 8/26/20      DJD . Skin: Negative. Psychiatric/Behavioral: Negative for self-injury, sleep disturbance and suicidal ideas. Anxiety: followed by counselor       Objective:   Physical Exam  Constitutional:       General: She is not in acute distress. Appearance: She is not ill-appearing.       Comments: Vitals as noted 1/17/22   HENT:      Head:      Comments: Some swelling / nose  Neck:      Comments:     Pulmonary:      Effort: Pulmonary effort is normal. Neurological:      Mental Status: She is oriented to person, place, and time. Psychiatric:         Mood and Affect: Mood normal.         Assessment:       Cayla Patel is a 61 y.o. female evaluated via  [address] on 1/20/2022. Consent:  She and/or health care decision maker is aware that that she may receive a bill for this telephone service, which includes applicable co-pays, depending on her insurance coverage, and has provided verbal consent to proceed. Documentation:  I communicated with the patient about  This   Details of this discussion including any medical advice provided:as noted       I affirm this is a Patient Initiated Episode with a Patient who has not had a related appointment within my department in the past 7 days or scheduled within the next 24 hours. Patient identification was verified at the start of the visit: Yes    Total Time: minutes: 11-20 minutes    Cayla Farfan, was evaluated through a synchronous (real-time) audio-video encounter. The patient (or guardian if applicable) is aware that this is a billable service, which includes applicable co-pays. This Virtual Visit was conducted with patient's (and/or legal guardian's) consent. The visit was conducted pursuant to the emergency declaration under the [address] [place] [address] authority and the [employer] and [employer] General Act. Patient identification was verified, and a caregiver was present when appropriate. The patient was located at home in a state where the provider was licensed to provide care. Note: not billable if this call serves to triage the patient into an appointment for the relevant concern      Brina Yuen MD       Cayla was seen today for insomnia and fall.     Diagnoses and all orders for this visit:    Counseling on injury prevention    Closed fracture of nasal bone with routine healing, subsequent encounter        Essential hypertension    BP elevated possibly due to pain Keep close check   -     NIFEdipine (PROCARDIA XL) 60 MG extended release tablet; Take 1 tablet by mouth daily  -     labetalol (NORMODYNE) 300 MG tablet; Take 1 tablet by mouth 2 times daily  -     losartan (COZAAR) 100 MG tablet; Take 1 tablet by mouth daily        Other iron deficiency anemia    Chronic     S/P Gastric bypass . Incontinence overflow, urine    solifenacin (VESICARE) 10 MG tablet; Take 1 tablet by mouth daily      Acquired hypothyroidism  C/O  Endocrine / [facility]   .  On synthroid 0.25   -     -      I    Plan:

## 2022-02-10 ENCOUNTER — TELEPHONE (OUTPATIENT)
Dept: PRIMARY CARE CLINIC | Age: 67
End: 2022-02-10

## 2022-02-10 NOTE — TELEPHONE ENCOUNTER
Pt was advised additional images were needed on her left breast.    Pt would like to know if she can have another rx for medication to help her sleep, the zolpidem (AMBIEN) 5 MG tablet did help with getting her to sleep

## 2022-02-10 NOTE — TELEPHONE ENCOUNTER
----- Message from Aisha Vincent sent at 2/10/2022  1:58 PM EST -----  Subject: Message to Provider    QUESTIONS  Information for Provider? Pt was told by [facility] in [facility] that she needs   to come back in next week. Not sure why. Would like results from the   mammogram and info on why she has to return.   ---------------------------------------------------------------------------  --------------  [address]  What is the best way for the office to contact you? OK to leave message on   voicemail, OK to respond with electronic message via [employer] portal (only   for patients who have registered [employer] account)  Preferred Call Back Phone Number? [phone]  ---------------------------------------------------------------------------  --------------  SCRIPT ANSWERS  Relationship to Patient?  Self

## 2022-02-11 DIAGNOSIS — F19.982 DRUG-INDUCED INSOMNIA (HCC): ICD-10-CM

## 2022-02-11 RX ORDER — ZOLPIDEM TARTRATE 5 MG/1
5 TABLET ORAL NIGHTLY PRN
Qty: 10 TABLET | Refills: 0 | Status: SHIPPED | OUTPATIENT
Start: 2022-02-11 | End: 2022-02-21

## 2022-02-28 ENCOUNTER — TELEPHONE (OUTPATIENT)
Dept: PRIMARY CARE CLINIC | Age: 67
End: 2022-02-28

## 2022-02-28 ENCOUNTER — HOSPITAL ENCOUNTER (OUTPATIENT)
Age: 67
Discharge: HOME | End: 2022-02-28
Payer: MEDICARE

## 2022-02-28 DIAGNOSIS — N18.31 STAGE 3A CHRONIC KIDNEY DISEASE (HCC): ICD-10-CM

## 2022-02-28 DIAGNOSIS — J40 BRONCHITIS: Primary | ICD-10-CM

## 2022-02-28 LAB
ANION GAP SERPL CALCULATED.3IONS-SCNC: 13 MMOL/L (ref 3–16)
BUN BLDV-MCNC: 15 MG/DL (ref 7–20)
CALCIUM SERPL-MCNC: 9.7 MG/DL (ref 8.3–10.6)
CHLORIDE BLD-SCNC: 111 MMOL/L (ref 99–110)
CO2: 20 MMOL/L (ref 21–32)
CREAT SERPL-MCNC: 1 MG/DL (ref 0.6–1.2)
CREATININE URINE: 235.2 MG/DL (ref 28–259)
GFR AFRICAN AMERICAN: >60
GFR NON-AFRICAN AMERICAN: 56
GLUCOSE BLD-MCNC: 77 MG/DL (ref 70–99)
MICROALBUMIN UR-MCNC: <1.2 MG/DL
MICROALBUMIN/CREAT UR-RTO: NORMAL MG/G (ref 0–30)
POTASSIUM SERPL-SCNC: 4.2 MMOL/L (ref 3.5–5.1)
SODIUM BLD-SCNC: 144 MMOL/L (ref 136–145)

## 2022-02-28 PROCEDURE — 36415 COLL VENOUS BLD VENIPUNCTURE: CPT

## 2022-02-28 PROCEDURE — 80048 BASIC METABOLIC PNL TOTAL CA: CPT

## 2022-02-28 PROCEDURE — 82570 ASSAY OF URINE CREATININE: CPT

## 2022-02-28 PROCEDURE — 82043 UR ALBUMIN QUANTITATIVE: CPT

## 2022-02-28 RX ORDER — AZITHROMYCIN 250 MG/1
250 TABLET, FILM COATED ORAL SEE ADMIN INSTRUCTIONS
Qty: 6 TABLET | Refills: 0 | Status: SHIPPED | OUTPATIENT
Start: 2022-02-28 | End: 2022-03-05

## 2022-02-28 NOTE — TELEPHONE ENCOUNTER
Pt called in about a cough she is sore and is having some wheezing and she is having a fever @ night only Pt stated that when she cough its like a roar in her  chest  And the middle of her chest is very sore and her nose is dry and decongestant Pt would like some Antibiotic called in to [facility] Ph [phone] please be advise

## 2022-03-17 PROBLEM — E55.9 VITAMIN D DEFICIENCY: Status: ACTIVE | Noted: 2018-10-22

## 2022-03-17 PROBLEM — M47.817 LUMBOSACRAL SPONDYLOSIS WITHOUT MYELOPATHY: Status: ACTIVE | Noted: 2019-02-08

## 2022-03-22 ENCOUNTER — PATIENT MESSAGE (OUTPATIENT)
Dept: PRIMARY CARE CLINIC | Age: 67
End: 2022-03-22

## 2022-03-22 DIAGNOSIS — R68.89 COLD FEELING: ICD-10-CM

## 2022-03-22 DIAGNOSIS — R05.9 COUGH: ICD-10-CM

## 2022-03-22 DIAGNOSIS — I10 ESSENTIAL HYPERTENSION: Primary | ICD-10-CM

## 2022-03-22 DIAGNOSIS — J45.991 ASTHMA, COUGH VARIANT: ICD-10-CM

## 2022-03-22 NOTE — TELEPHONE ENCOUNTER
From: Savanah Rangel  To: Dr. Monique Sanders: 3/22/2022 12:07 PM EDT  Subject: Daisy luna    Call talk to dr khan nurse [facility] on monday. March  about i stay cold and cough . trouble sleep . and my headace dr Avila Shaun ent doctor say i have see dr khan .oh office had wrong number on my health record they have [phone] number. my cell is [phone]

## 2022-03-22 NOTE — TELEPHONE ENCOUNTER
Medication:   Requested Prescriptions     Pending Prescriptions Disp Refills    NEXIUM 40 MG delayed release capsule [Pharmacy Med Name: Tiburcio Saab 40MG CAPSULES] 90 capsule 1     Sig: TAKE 1 CAPSULE BY MOUTH DAILY        Last Filled:      Patient Phone Number: [phone] (home) [phone] (work)    Last appt: 1/20/2022   Next appt: 4/15/2022    Last OARRS:   RX Monitoring 1/9/2018   Attestation The Prescription Monitoring Report for this patient was reviewed today.

## 2022-03-23 ENCOUNTER — OFFICE VISIT (OUTPATIENT)
Dept: ENT CLINIC | Age: 67
End: 2022-03-23
Payer: MEDICARE

## 2022-03-23 VITALS — TEMPERATURE: 97.1 F | HEART RATE: 63 BPM | SYSTOLIC BLOOD PRESSURE: 119 MMHG | DIASTOLIC BLOOD PRESSURE: 76 MMHG

## 2022-03-23 DIAGNOSIS — R42 DIZZINESS: Primary | ICD-10-CM

## 2022-03-23 PROCEDURE — G8482 FLU IMMUNIZE ORDER/ADMIN: HCPCS | Performed by: OTOLARYNGOLOGY

## 2022-03-23 PROCEDURE — G8417 CALC BMI ABV UP PARAM F/U: HCPCS | Performed by: OTOLARYNGOLOGY

## 2022-03-23 PROCEDURE — 99214 OFFICE O/P EST MOD 30 MIN: CPT | Performed by: OTOLARYNGOLOGY

## 2022-03-23 PROCEDURE — G8428 CUR MEDS NOT DOCUMENT: HCPCS | Performed by: OTOLARYNGOLOGY

## 2022-03-23 PROCEDURE — 3017F COLORECTAL CA SCREEN DOC REV: CPT | Performed by: OTOLARYNGOLOGY

## 2022-03-23 PROCEDURE — G9899 SCRN MAM PERF RSLTS DOC: HCPCS | Performed by: OTOLARYNGOLOGY

## 2022-03-23 PROCEDURE — 1036F TOBACCO NON-USER: CPT | Performed by: OTOLARYNGOLOGY

## 2022-03-23 ASSESSMENT — ENCOUNTER SYMPTOMS
RHINORRHEA: 0
SORE THROAT: 0
SINUS PAIN: 0

## 2022-03-23 NOTE — PROGRESS NOTES
[facility]       PCP:  Amy Mendoza MD      CHIEF COMPLAINT  Chief Complaint   Patient presents with    Dizziness       HISTORY OF PRESENT ILLNESS       Cayla Joseph is a 61 y.o. female here for evaluation and treatment of Dizziness started two weeks after the fall. Having headache mid forehead and temple right side of face and In back of head. Onset shortly after the fall about two weeks after the fall. Dizziness was described as a sensation of when get up feel like I'm gonna pass out, have to hold onto something, head pounding real bad. Has not passed out. Just sit down and let it pass , cleared in about 5 minutes. Three to four times a day. Stand up from lying or sitting. When get dizzy fo lay down and go to sleep for 1 -1.5 hours, wake up and not dizzy. No turning or spinning sensation. Submandibular neck feel like electricity pain off and on for three weeks twice a day, comes and goes over 10 minutes. Jo Gunn REVIEW OF SYSTEMS   Review of Systems   Constitutional: Negative for fever. HENT: Positive for tinnitus. Negative for ear discharge, ear pain, hearing loss, rhinorrhea, sinus pain and sore throat.           PAST MEDICAL HISTORY    Past Medical History:   Diagnosis Date    Anxiety state     Asthma     Back ache     chronic    Environmental allergies     GERD (gastroesophageal reflux disease)     Hypertension     MVP (mitral valve prolapse)     Neck pain, chronic     Reflux     Sleep apnea        Past Surgical History:   Procedure Laterality Date    BACK SURGERY      BLADDER SUSPENSION      CHOLECYSTECTOMY      FOOT SURGERY  11-9-12    endoscopic plantar fasciotomy right foot    GASTRIC BYPASS SURGERY      HERNIA REPAIR      HIATAL HERNIA REPAIR      HYSTERECTOMY      JOINT REPLACEMENT Bilateral     KNEE    KNEE ARTHROPLASTY  2/28/13    L    KNEE ARTHROSCOPY Right 3/7/2014    LAMINECTOMY  7/1/11    bilateral L5-S1 laminotomy, nerve root exploration, foraminotomy    NASAL SINUS SURGERY Right 02/05/2018    Excision of rt nasal mass    MN COLONOSCOPY FLX DX W/COLLJ SPEC WHEN PFRMD N/A 9/28/2018    COLONOSCOPY DIAGNOSTIC OR SCREENING performed by Ronnie Narayan MD at [facility] DX/THER AGNT PARAVERT FACET JOINT, LUMBAR/SAC, 1ST LEVEL Right 11/6/2018    RIGHT L4,L5 S1 MEDIAL BRANCH BLOCK WITH FLUOROSCOPY performed by Rosanne Crowder MD at [address] [address], PARTIAL      TONSILLECTOMY      UPPER GASTROINTESTINAL ENDOSCOPY      with dilatation    UPPER GASTROINTESTINAL ENDOSCOPY N/A 8/14/2019    EGD ESOPHAGOGASTRODUODENOSCOPY performed by Ronnie Narayan MD at [address] [facility] 5/25/2021    EGD BIOPSY performed by Junie Sánchez MD at [address]:    03/23/22 1503   BP: 119/76   Site: Left Upper Arm   Position: Sitting   Cuff Size: Large Adult   Pulse: 63   Temp: 97.1 °F (36.2 °C)   TempSrc: Temporal         ENT Physical Exam  Consititutional  Appearance: patient appears well-developed, well-nourished and well-groomed, patient is cooperative;  Communication/Voice: communication appropriate for developmental age; vocal quality normal;  Head and Face  Appearance: head appears normal and face appears atraumatic; facial scars (reconstructed nose post fore head flap) present;  Palpation: facial palpation normal; no sinus tenderness present; Salivary: glands normal;  Ear  Hearing: not impaired to conversational voice;  normal to finger rub bilaterally; Rinne AC > BC bilaterally; does not lateralize;   Auricles: right auricle normal; left auricle normal;  Ear Canals: right ear canal normal; left ear canal normal;  Tympanic Membranes: right tympanic membrane normal; left tympanic membrane normal;  Nose  External Nose: nares patent bilaterally; external nose normal;  Internal Nose: nasal mucosa normal; bilateral inferior turbinates normal;  Nose comments: Crusting on left anterior septum with prominent vessel which bled profusely when rubbed with cotton tipped applicator. Oral Cavity/Oropharynx  Lips: normal;  Tongue: normal;  Oral mucosa: normal;  Hard palate: normal;  Soft palate: normal;  Tonsils: bilateral tonsils 1+,  Posterior pharyngeal wall: normal;  Neck  Neck: neck normal; neck palpation normal;  Thyroid: thyroid normal;  Lymphatic  Palpation: no cervical adenopathy noted; no preauricular adenopathy palpable;           AUDRA / Mateusz Preciado / Miguelangel Rodriguez was seen today for dizziness. Diagnoses and all orders for this visit:    Dizziness  -     [address], [place], [place]. D., Audiology, [facility], [facility], [address] [place], [place] [address], [facility], [facility]         RECOMMENDATIONS/PLAN      1. Audiogram and VNG. 2. Suggest neurology consultation, she said she will discuss with Dr. Rebecca Morgan. 3. Return in about 1 month (around 4/23/2022) for recheck/follow-up, and sooner if condition worsens. TIME:  I spent a total of 35 minutes with this patient for pre-visit review of the medical record, history, physical examination, counseling, coordination of care, discussing and ordering an and ENG, reviewing the medical record, and documenting the visit.

## 2022-03-28 ENCOUNTER — HOSPITAL ENCOUNTER (OUTPATIENT)
Age: 67
Discharge: HOME | End: 2022-03-28
Payer: MEDICARE

## 2022-03-28 DIAGNOSIS — I10 ESSENTIAL HYPERTENSION: ICD-10-CM

## 2022-03-28 LAB
A/G RATIO: 1.2 (ref 1.1–2.2)
ALBUMIN SERPL-MCNC: 4.2 G/DL (ref 3.4–5)
ALP BLD-CCNC: 143 U/L (ref 40–129)
ALT SERPL-CCNC: 16 U/L (ref 10–40)
ANION GAP SERPL CALCULATED.3IONS-SCNC: 12 MMOL/L (ref 3–16)
AST SERPL-CCNC: 18 U/L (ref 15–37)
BASOPHILS ABSOLUTE: 0 K/UL (ref 0–0.2)
BASOPHILS RELATIVE PERCENT: 1.1 %
BILIRUB SERPL-MCNC: <0.2 MG/DL (ref 0–1)
BUN BLDV-MCNC: 14 MG/DL (ref 7–20)
CALCIUM SERPL-MCNC: 9.9 MG/DL (ref 8.3–10.6)
CHLORIDE BLD-SCNC: 107 MMOL/L (ref 99–110)
CHOLESTEROL, TOTAL: 320 MG/DL (ref 0–199)
CO2: 22 MMOL/L (ref 21–32)
CREAT SERPL-MCNC: 0.9 MG/DL (ref 0.6–1.2)
EOSINOPHILS ABSOLUTE: 0.4 K/UL (ref 0–0.6)
EOSINOPHILS RELATIVE PERCENT: 11.3 %
GFR AFRICAN AMERICAN: >60
GFR NON-AFRICAN AMERICAN: >60
GLUCOSE BLD-MCNC: 85 MG/DL (ref 70–99)
HCT VFR BLD CALC: 37.1 % (ref 36–48)
HDLC SERPL-MCNC: 96 MG/DL (ref 40–60)
HEMOGLOBIN: 12 G/DL (ref 12–16)
LDL CHOLESTEROL CALCULATED: 208 MG/DL
LYMPHOCYTES ABSOLUTE: 1.1 K/UL (ref 1–5.1)
LYMPHOCYTES RELATIVE PERCENT: 32.8 %
MCH RBC QN AUTO: 28.6 PG (ref 26–34)
MCHC RBC AUTO-ENTMCNC: 32.2 G/DL (ref 31–36)
MCV RBC AUTO: 88.8 FL (ref 80–100)
MONOCYTES ABSOLUTE: 0.2 K/UL (ref 0–1.3)
MONOCYTES RELATIVE PERCENT: 7.3 %
NEUTROPHILS ABSOLUTE: 1.6 K/UL (ref 1.7–7.7)
NEUTROPHILS RELATIVE PERCENT: 47.5 %
PDW BLD-RTO: 14.3 % (ref 12.4–15.4)
PLATELET # BLD: 245 K/UL (ref 135–450)
PMV BLD AUTO: 8.9 FL (ref 5–10.5)
POTASSIUM SERPL-SCNC: 4.2 MMOL/L (ref 3.5–5.1)
RBC # BLD: 4.18 M/UL (ref 4–5.2)
SODIUM BLD-SCNC: 141 MMOL/L (ref 136–145)
TOTAL PROTEIN: 7.6 G/DL (ref 6.4–8.2)
TRIGL SERPL-MCNC: 78 MG/DL (ref 0–150)
TSH SERPL DL<=0.05 MIU/L-ACNC: 3.35 UIU/ML (ref 0.27–4.2)
VITAMIN D 25-HYDROXY: 33.1 NG/ML
VLDLC SERPL CALC-MCNC: 16 MG/DL
WBC # BLD: 3.4 K/UL (ref 4–11)

## 2022-03-28 PROCEDURE — 80061 LIPID PANEL: CPT

## 2022-03-28 PROCEDURE — 83036 HEMOGLOBIN GLYCOSYLATED A1C: CPT

## 2022-03-28 PROCEDURE — 85025 COMPLETE CBC W/AUTO DIFF WBC: CPT

## 2022-03-28 PROCEDURE — 82306 VITAMIN D 25 HYDROXY: CPT

## 2022-03-28 PROCEDURE — 80053 COMPREHEN METABOLIC PANEL: CPT

## 2022-03-28 PROCEDURE — 84443 ASSAY THYROID STIM HORMONE: CPT

## 2022-03-28 PROCEDURE — 36415 COLL VENOUS BLD VENIPUNCTURE: CPT

## 2022-03-29 LAB
ESTIMATED AVERAGE GLUCOSE: 102.5 MG/DL
HBA1C MFR BLD: 5.2 %

## 2022-04-05 ENCOUNTER — PATIENT MESSAGE (OUTPATIENT)
Dept: PRIMARY CARE CLINIC | Age: 67
End: 2022-04-05

## 2022-04-05 DIAGNOSIS — G47.09 OTHER INSOMNIA: Primary | ICD-10-CM

## 2022-04-05 RX ORDER — ZOLPIDEM TARTRATE 5 MG/1
5 TABLET ORAL NIGHTLY PRN
Qty: 30 TABLET | Refills: 0 | Status: SHIPPED | OUTPATIENT
Start: 2022-04-05 | End: 2022-04-15

## 2022-04-05 NOTE — TELEPHONE ENCOUNTER
From: Coreen Slater  To: Dr. Cesar Grubbs: 4/5/2022 1:41 PM EDT  Subject: Trouble with insomina    I. still having trouble with my insomina. Have not been able sleep can she call me in medicine. u can reach me [phone]

## 2022-04-13 ENCOUNTER — PROCEDURE VISIT (OUTPATIENT)
Dept: AUDIOLOGY | Age: 67
End: 2022-04-13
Payer: MEDICARE

## 2022-04-13 DIAGNOSIS — R42 DIZZINESS AND GIDDINESS: Primary | ICD-10-CM

## 2022-04-13 DIAGNOSIS — H90.3 SENSORINEURAL HEARING LOSS (SNHL) OF BOTH EARS: Primary | ICD-10-CM

## 2022-04-13 PROCEDURE — 92557 COMPREHENSIVE HEARING TEST: CPT | Performed by: AUDIOLOGIST

## 2022-04-13 PROCEDURE — 92537 CALORIC VSTBLR TEST W/REC: CPT | Performed by: AUDIOLOGIST

## 2022-04-13 PROCEDURE — 92567 TYMPANOMETRY: CPT | Performed by: AUDIOLOGIST

## 2022-04-13 PROCEDURE — 92540 BASIC VESTIBULAR EVALUATION: CPT | Performed by: AUDIOLOGIST

## 2022-04-13 NOTE — Clinical Note
Dr. Joselin Melissa,    Please see note from this patient's VNG from today. Please let me know if there is anything further you need.       Christine  [address] [place]  Audiologist

## 2022-04-13 NOTE — PROGRESS NOTES
ARTIFICIAL TEARS to affected eye(s) as needed. Neurological Factors:  none    Audiologic/Otologic History:  Last audiogram: Today prior to VNG, Results: mild sensorineural hearing loss. Vision History:  Not taken    Headache/Migraine History:  Headaches, has been told she has migraines   Headache/migraine frequency and duration: off/on all day long    Pain intensity: varies, can be pretty severe   Localize: right side   Throbbing: yes, sometimes   Headache/Migraine Triggers:  o None known    Fall Risk History:  Number of falls in past 12 months: 1  Fall injury?: No      Family History:   Family History   Problem Relation Age of Onset    High Blood Pressure Mother     Heart Disease Mother     Other Mother         glaucoma    High Blood Pressure Father     Heart Disease Father     Other Father         intestional problems    Cancer Sister         cancer    Kidney Disease Sister     Kidney Disease Brother     High Blood Pressure Brother     Other Sister         lupus,fibromyalgia  thyroid surgery    High Blood Pressure Sister     Kidney Disease Sister     Heart Disease Sister     Kidney Disease Brother     High Blood Pressure Brother     Anesth Problems Neg Hx     Malig Hyperten Neg Hx     Hypotension Neg Hx     Malig Hypertherm Neg Hx     Pseudochol.  Deficiency Neg Hx      Positive for: mother and sister with dizziness, history of headaches/migraines in family    Medical History:  Patient Active Problem List   Diagnosis    Bariatric surgery status    Epidural lipomatosis    Adhesive capsulitis    Rotator cuff tendinitis    Plantar fasciitis, right    Mitral valve disease    GERD (gastroesophageal reflux disease)    HTN (hypertension)    Anxiety    Asthma    Osteoarthritis of right knee    Right knee DJD    Chest pain    Hesitancy of micturition    HTN (hypertension), malignant    Abdominal pain    Subclinical hyperthyroidism    Thyroid nodule    Allergic sinusitis    Pharyngitis    Bilateral impacted cerumen  Nose septum deviation    Anemia    Mau's syndrome    Obstructive apnea    Pancreatitis    Recurrent sinusitis    Headache, cervicogenic    Nasal mass    Inverted papilloma of nasal cavity    Intractable cluster headache syndrome    Migraine with aura and with status migrainosus, not intractable    Morbidly obese (HCC)    Pneumobilia    Closed fracture of nasal bones    Vitamin D deficiency    Malnutrition of mild degree (HCC)    Lumbosacral spondylosis without myelopathy        Medication, Drugs, Alcohol:  Patient reports use of the following in the past 48 hours: none  Patient reported adhering to pre-test instructions: Yes    TESTS COMPLETED AND RESULTS:      QUESTIONAIRES:  Dizziness Handicap Inventory: Patient's score = 56       0-14 = No handicap     16-34 = Mild handicap     36-52 = Moderate handicap     54+ = Severe handicap    GAZE:   - CENTER: With Fixation: Normal, Without Fixation: Normal   - DOWN: With Fixation: Normal, Without Fixation: Normal   - UP: With Fixation: Normal, Without Fixation: Normal   - LEFT: With Fixation: Normal, Without Fixation: Normal   - RIGHT: With Fixation: Normal, Without Fixation: Normal    SACCADES: Essentially normal     SMOOTH PURSUIT: Abnormal    OPTOKINETICS: Abnormal    HORIZONAL HEAD SHAKE TEST: Negative    XU-HALLPIKE:   - HEAD LEFT: Normal   - HEAD RIGHT: Normal    HEAD POSITIONALS, SUPINE POSITION 30 DEGREES:   - HEAD RIGHT: Normal   - HEAD CENTER: Normal   - HEAD LEFT: Normal**    BODY POSITIONALS:   - BODY RIGHT: Normal   - BODY LEFT: Normal     BITHERMAL CALORIC IRRIGATIONS: Bithermal caloric irrigations bilateral hypofunction.      UW: N/A               DP: N/A                Hypofunction: YES    STIMULATION Cool Right Cool Left Warm Right Warm Left   PEAK  0 0 1 0         PATIENT EDUCATION:      The following items were discussed with the patient:    - Vestibular Dizziness, handout provided      RECOMMENDATIONS:       - Continue medical follow-up with Charlene Madrigal MD.   - Retest hearing as medically indicated and/or sooner if a change in hearing is noted. - Vestibular and balance therapy as needed.  Coinciding physical therapy may be beneficial.     Asya Lawson, AuD  Audiologist    Chart CC'd to: Charlene Madrigal MD

## 2022-04-13 NOTE — PROGRESS NOTES
middle ear function. Acoustic Reflexes: Ipsilateral: Absent. COUNSELING:        Reviewed purpose of testing completed today, general auditory system function, and results obtained today. Chart CC'd to: Iraida Galindo MD      Degree of   Hearing Sensitivity dB Range   Within Normal Limits (WNL) 0 - 20   Mild 20 - 40   Moderate 40 - 55   Moderately-Severe 55 - 70   Severe 70 - 90   Profound 90 +        RECOMMENDATIONS:        Iraida Galindo MD to medically advise.       Electronically signed by Asya Brown on 4/13/22 at 11:31 AM.

## 2022-04-15 ENCOUNTER — HOSPITAL ENCOUNTER (OUTPATIENT)
Age: 67
Discharge: HOME | End: 2022-04-15
Payer: MEDICARE

## 2022-04-15 ENCOUNTER — OFFICE VISIT (OUTPATIENT)
Dept: PRIMARY CARE CLINIC | Age: 67
End: 2022-04-15
Payer: MEDICARE

## 2022-04-15 VITALS
BODY MASS INDEX: 38.71 KG/M2 | WEIGHT: 246.6 LBS | HEIGHT: 67 IN | TEMPERATURE: 97.2 F | OXYGEN SATURATION: 100 % | DIASTOLIC BLOOD PRESSURE: 80 MMHG | SYSTOLIC BLOOD PRESSURE: 160 MMHG | HEART RATE: 73 BPM

## 2022-04-15 DIAGNOSIS — I10 ESSENTIAL HYPERTENSION: ICD-10-CM

## 2022-04-15 DIAGNOSIS — G44.89 OTHER HEADACHE SYNDROME: ICD-10-CM

## 2022-04-15 DIAGNOSIS — G47.09 OTHER INSOMNIA: ICD-10-CM

## 2022-04-15 DIAGNOSIS — E78.2 MIXED HYPERLIPIDEMIA: ICD-10-CM

## 2022-04-15 DIAGNOSIS — E78.2 MIXED HYPERLIPIDEMIA: Primary | ICD-10-CM

## 2022-04-15 DIAGNOSIS — G44.89 OTHER HEADACHE SYNDROME: Primary | ICD-10-CM

## 2022-04-15 LAB
CHOLESTEROL, TOTAL: 273 MG/DL (ref 0–199)
HDLC SERPL-MCNC: 90 MG/DL (ref 40–60)
LDL CHOLESTEROL CALCULATED: 171 MG/DL
SEDIMENTATION RATE, ERYTHROCYTE: 89 MM/HR (ref 0–30)
TRIGL SERPL-MCNC: 60 MG/DL (ref 0–150)
VLDLC SERPL CALC-MCNC: 12 MG/DL

## 2022-04-15 PROCEDURE — 1036F TOBACCO NON-USER: CPT | Performed by: INTERNAL MEDICINE

## 2022-04-15 PROCEDURE — 36415 COLL VENOUS BLD VENIPUNCTURE: CPT

## 2022-04-15 PROCEDURE — 99214 OFFICE O/P EST MOD 30 MIN: CPT | Performed by: INTERNAL MEDICINE

## 2022-04-15 PROCEDURE — G8417 CALC BMI ABV UP PARAM F/U: HCPCS | Performed by: INTERNAL MEDICINE

## 2022-04-15 PROCEDURE — 3017F COLORECTAL CA SCREEN DOC REV: CPT | Performed by: INTERNAL MEDICINE

## 2022-04-15 PROCEDURE — G8427 DOCREV CUR MEDS BY ELIG CLIN: HCPCS | Performed by: INTERNAL MEDICINE

## 2022-04-15 PROCEDURE — G9899 SCRN MAM PERF RSLTS DOC: HCPCS | Performed by: INTERNAL MEDICINE

## 2022-04-15 PROCEDURE — 80061 LIPID PANEL: CPT

## 2022-04-15 PROCEDURE — 85652 RBC SED RATE AUTOMATED: CPT

## 2022-04-15 RX ORDER — ATORVASTATIN CALCIUM 20 MG/1
20 TABLET, FILM COATED ORAL DAILY
Qty: 90 TABLET | Refills: 3 | Status: SHIPPED | OUTPATIENT
Start: 2022-04-15

## 2022-04-15 RX ORDER — LABETALOL 300 MG/1
300 TABLET, FILM COATED ORAL 2 TIMES DAILY
Qty: 180 TABLET | Refills: 3 | Status: SHIPPED | OUTPATIENT
Start: 2022-04-15 | End: 2022-08-03 | Stop reason: SDUPTHER

## 2022-04-15 RX ORDER — LOSARTAN POTASSIUM 100 MG/1
100 TABLET ORAL DAILY
Qty: 90 TABLET | Refills: 3 | Status: SHIPPED | OUTPATIENT
Start: 2022-04-15 | End: 2022-04-25

## 2022-04-15 RX ORDER — LORAZEPAM 0.5 MG/1
0.5 TABLET ORAL EVERY EVENING
Qty: 30 TABLET | Refills: 0 | Status: SHIPPED | OUTPATIENT
Start: 2022-04-15 | End: 2022-05-15

## 2022-04-15 RX ORDER — PREDNISONE 20 MG/1
60 TABLET ORAL DAILY
Qty: 90 TABLET | Refills: 1 | Status: SHIPPED | OUTPATIENT
Start: 2022-04-15 | End: 2022-04-28 | Stop reason: SDUPTHER

## 2022-04-15 RX ORDER — HYDRALAZINE HYDROCHLORIDE 50 MG/1
50 TABLET, FILM COATED ORAL 2 TIMES DAILY
Qty: 180 TABLET | OUTPATIENT
Start: 2022-04-15

## 2022-04-15 RX ORDER — HYDRALAZINE HYDROCHLORIDE 50 MG/1
50 TABLET, FILM COATED ORAL 2 TIMES DAILY
Qty: 60 TABLET | Refills: 3 | Status: SHIPPED | OUTPATIENT
Start: 2022-04-15 | End: 2022-08-03 | Stop reason: SDUPTHER

## 2022-04-15 RX ORDER — NIFEDIPINE 90 MG/1
90 TABLET, EXTENDED RELEASE ORAL DAILY
Qty: 90 TABLET | Refills: 1 | Status: SHIPPED | OUTPATIENT
Start: 2022-04-15 | End: 2022-08-03 | Stop reason: SDUPTHER

## 2022-04-15 ASSESSMENT — ENCOUNTER SYMPTOMS
BLOOD IN STOOL: 0
VOICE CHANGE: 0
SHORTNESS OF BREATH: 0
SORE THROAT: 0
CHOKING: 0
CONSTIPATION: 0
FACIAL SWELLING: 0
CHEST TIGHTNESS: 0
DIARRHEA: 0
PHOTOPHOBIA: 0
ABDOMINAL DISTENTION: 0
COUGH: 1

## 2022-04-15 NOTE — PROGRESS NOTES
Very high ESR .  H/O Bad Headaches     Will start Prednisone and have her F/U with Dr Zandra Peace / Rheumatology for further W/U

## 2022-04-15 NOTE — PROGRESS NOTES
Subjective:      Patient ID: Arvin Lowe is a 61 y.o. female. 4/15/2022 Patient presents with:  Hypertension  Headache  Insomnia: pt states she is allergic to Ambien and cant take                Last visit VV 1/22 11/16/2021 Patient presents with:  Hypertension  Cough: 3 wks     Has had extensive w/u with ENT including Barium swallow Study and CT soft tissue neck               8/31/2021 Patient presents with:  Blood Pressure Check                  8/12/2021 Patient presents with:  Blood Pressure Check  Headache  Sinus Problem                  8/3/2021 Patient presents with:  Hypertension: pt BP has been elevated causing excessive sleepiness                6/3/2021 Patient presents with: Follow-Up from Hospital: [facility] 5/25-5/27/21 abdominal pain        Previous history of gastric bypass, hiatal & ventral hernia repair cholecystectomy    CT abdomen and pelvis showed pneumobilia and bilateral renal cyst without hydronephrosis  LFTs, amylase & lipase within normal limits. She  underwent EGD on 5/25/2021 which was normal    Small bowel follow-through showed showed intermittent mild to moderate increased tertiary contraction in the esophagus with delayed esophageal emptying otherwise unremarkable. Patient was recommended to continue Pepcid ( allergic to Protonix) follow-up with gastroenterology     Pneumobilia noted on imaging likely 2/2 prior ERCP  Afebrile, WBCs and LFTs WNL. Ultrasound negative                4/30/2021   Medicare AWV                11/10/2020 Patient presents with:  Abdominal Pain . Never got labs as ordered last visit ? ??    States she did See Dr Nestora Collet who has told her there was nothing he could do ? ??                  . S/P Surgery dissectomy 8/26/20 at [facility]       Was told she has Stage 3 Renal failiure ?   Labs last checked here 11/19 show Nl renal function                  2/27/18 total thyroidectomy      Hypertension  Associated symptoms include headaches (since Nov 2021). Pertinent negatives include no chest pain or shortness of breath. Headache   Associated symptoms include coughing. Pertinent negatives include no ear pain, fever, photophobia or sore throat. Past Medical History:   Diagnosis Date    Anxiety state     Asthma     Back ache     chronic    Environmental allergies     GERD (gastroesophageal reflux disease)     Hypertension     MVP (mitral valve prolapse)     Neck pain, chronic     Reflux     Sleep apnea        Review of Systems   Constitutional: Negative for activity change, appetite change, chills, diaphoresis, fatigue, fever and unexpected weight change. All vaccines up to date    Flu vac 1/21    Tdap 5/17     Pneumonia vac 2012 ;  5/17     Covid Vac 12 /21   HENT: Negative for ear pain, facial swelling, postnasal drip, sore throat and voice change. Dentures fit well ; new   Eyes: Negative for photophobia and visual disturbance. Eye exam 1/22   Respiratory: Positive for cough. Negative for choking, chest tightness and shortness of breath. Does not smoke ; No Etoh   H/o Asthma    Cardiovascular: Negative for chest pain and leg swelling. HTN > 5 yrs on meds . No known CAD . Father dies of MI in his 46s   Sister with CAD ? MVP  H/o palpitations   Gastrointestinal: Negative for abdominal distention, blood in stool, constipation and diarrhea. Repeat Upper endoscopy 5/21  Nl    Upper Endoscopy  1/21  H/O Ulers ? Cannot take Nsaids! Post gastric bypass 2005      GERD    Repeat Colonscopy 1/21  ; Polyps; O Marilee    Endocrine: Negative for cold intolerance and heat intolerance. Followed by Dr. Alexis Pruett  for thyroid   Genitourinary:        Mammogram  5/20    Scheduled Pap 0n 9/127/18    Musculoskeletal:        Sees Ortho neck / [facility]   1/21       S/P Surgery  DDD 8/26/20      DJD . Skin: Negative. Neurological: Positive for headaches (since Nov 2021).    Psychiatric/Behavioral: Negative for self-injury, sleep disturbance and suicidal ideas. Anxiety: followed by counselor       Objective:   Physical Exam  Constitutional:       General: She is not in acute distress. Appearance: She is obese. She is not ill-appearing. Neck:      Comments:     Cardiovascular:      Rate and Rhythm: Regular rhythm. Pulmonary:      Effort: Pulmonary effort is normal.      Breath sounds: Normal breath sounds. Musculoskeletal:         General: Normal range of motion. Skin:     General: Skin is warm. Neurological:      General: No focal deficit present. Mental Status: She is oriented to person, place, and time. Psychiatric:         Mood and Affect: Mood normal.         Assessment:       Cayla was seen today for hypertension, headache and insomnia. Diagnoses and all orders for this visit:    Mixed hyperlipidemia  LDL > 200 . Rechk today . Starting Lipitor   -     atorvastatin (LIPITOR) 20 MG tablet; Take 1 tablet by mouth daily  -     Lipid Panel; Future    Other headache syndrome  Control BP   -     Sedimentation Rate; Future    Essential hypertension  Adding hydralazine . Advised low salt diet . DC Mrs Dioni Reese !!!  -     NIFEdipine (PROCARDIA XL) 90 MG extended release tablet; Take 1 tablet by mouth daily  -     labetalol (NORMODYNE) 300 MG tablet; Take 1 tablet by mouth 2 times daily  -     losartan (COZAAR) 100 MG tablet; Take 1 tablet by mouth daily  -     hydrALAZINE (APRESOLINE) 50 MG tablet; Take 1 tablet by mouth in the morning and at bedtime    Other insomnia  Did not tolerated ambien  . Try Ativan   -     LORazepam (ATIVAN) 0.5 MG tablet; Take 1 tablet by mouth every evening for 30 days. Closed fracture of nasal bone with routine healing, subsequent encounter        Other iron deficiency anemia    Chronic     S/P Gastric bypass . Incontinence overflow, urine  On Myrbetric now        Acquired hypothyroidism  C/O  Endocrine / [facility]   .  On synthroid 0.25   -     -      I    Plan:

## 2022-04-24 PROCEDURE — 96372 THER/PROPH/DIAG INJ SC/IM: CPT

## 2022-04-24 PROCEDURE — 99285 EMERGENCY DEPT VISIT HI MDM: CPT

## 2022-04-25 ENCOUNTER — APPOINTMENT (OUTPATIENT)
Dept: GENERAL RADIOLOGY | Age: 67
End: 2022-04-25
Payer: MEDICARE

## 2022-04-25 ENCOUNTER — HOSPITAL ENCOUNTER (EMERGENCY)
Age: 67
Discharge: HOME | End: 2022-04-25
Payer: MEDICARE

## 2022-04-25 VITALS
HEART RATE: 70 BPM | BODY MASS INDEX: 38.75 KG/M2 | TEMPERATURE: 97.6 F | OXYGEN SATURATION: 99 % | DIASTOLIC BLOOD PRESSURE: 78 MMHG | SYSTOLIC BLOOD PRESSURE: 124 MMHG | WEIGHT: 246.91 LBS | RESPIRATION RATE: 16 BRPM | HEIGHT: 67 IN

## 2022-04-25 DIAGNOSIS — I10 ESSENTIAL HYPERTENSION: ICD-10-CM

## 2022-04-25 DIAGNOSIS — R51.9 HEADACHE, UNSPECIFIED HEADACHE TYPE: Primary | ICD-10-CM

## 2022-04-25 DIAGNOSIS — R42 EPISODIC LIGHTHEADEDNESS: ICD-10-CM

## 2022-04-25 LAB
BILIRUBIN URINE: NEGATIVE
BLOOD, URINE: NEGATIVE
CLARITY: CLEAR
COLOR: YELLOW
EKG ATRIAL RATE: 61 BPM
EKG DIAGNOSIS: NORMAL
EKG P-R INTERVAL: 128 MS
EKG Q-T INTERVAL: 420 MS
EKG QRS DURATION: 84 MS
EKG QTC CALCULATION (BAZETT): 422 MS
EKG R AXIS: 1 DEGREES
EKG T AXIS: -13 DEGREES
EKG VENTRICULAR RATE: 61 BPM
GLUCOSE URINE: NEGATIVE MG/DL
KETONES, URINE: NEGATIVE MG/DL
LEUKOCYTE ESTERASE, URINE: NEGATIVE
MICROSCOPIC EXAMINATION: NORMAL
NITRITE, URINE: NEGATIVE
PH UA: 5.5 (ref 5–8)
PROTEIN UA: NEGATIVE MG/DL
SPECIFIC GRAVITY UA: 1.02 (ref 1–1.03)
URINE REFLEX TO CULTURE: NORMAL
URINE TYPE: NORMAL
UROBILINOGEN, URINE: 0.2 E.U./DL

## 2022-04-25 PROCEDURE — 93010 ELECTROCARDIOGRAM REPORT: CPT | Performed by: INTERNAL MEDICINE

## 2022-04-25 PROCEDURE — 81003 URINALYSIS AUTO W/O SCOPE: CPT

## 2022-04-25 PROCEDURE — 6370000000 HC RX 637 (ALT 250 FOR IP): Performed by: PHYSICIAN ASSISTANT

## 2022-04-25 PROCEDURE — 71045 X-RAY EXAM CHEST 1 VIEW: CPT

## 2022-04-25 PROCEDURE — 93005 ELECTROCARDIOGRAM TRACING: CPT | Performed by: PHYSICIAN ASSISTANT

## 2022-04-25 PROCEDURE — 6360000002 HC RX W HCPCS: Performed by: PHYSICIAN ASSISTANT

## 2022-04-25 RX ORDER — LOSARTAN POTASSIUM 100 MG/1
100 TABLET ORAL DAILY
Qty: 90 TABLET | Refills: 3 | Status: SHIPPED | OUTPATIENT
Start: 2022-04-25 | End: 2022-08-03 | Stop reason: SDUPTHER

## 2022-04-25 RX ORDER — IBUPROFEN 400 MG/1
800 TABLET ORAL ONCE
Status: DISCONTINUED | OUTPATIENT
Start: 2022-04-25 | End: 2022-04-25 | Stop reason: HOSPADM

## 2022-04-25 RX ORDER — METOCLOPRAMIDE HYDROCHLORIDE 5 MG/ML
10 INJECTION INTRAMUSCULAR; INTRAVENOUS ONCE
Status: COMPLETED | OUTPATIENT
Start: 2022-04-25 | End: 2022-04-25

## 2022-04-25 RX ORDER — SUMATRIPTAN 50 MG/1
TABLET, FILM COATED ORAL
Qty: 12 TABLET | Refills: 0 | Status: SHIPPED | OUTPATIENT
Start: 2022-04-25 | End: 2022-06-10 | Stop reason: ALTCHOICE

## 2022-04-25 RX ORDER — DIPHENHYDRAMINE HCL 25 MG
25 TABLET ORAL ONCE
Status: COMPLETED | OUTPATIENT
Start: 2022-04-25 | End: 2022-04-25

## 2022-04-25 RX ADMIN — METOCLOPRAMIDE HYDROCHLORIDE 10 MG: 5 INJECTION INTRAMUSCULAR; INTRAVENOUS at 02:04

## 2022-04-25 RX ADMIN — DIPHENHYDRAMINE HCL 25 MG: 25 TABLET ORAL at 02:04

## 2022-04-25 ASSESSMENT — PAIN DESCRIPTION - DESCRIPTORS: DESCRIPTORS: SHARP

## 2022-04-25 ASSESSMENT — PAIN DESCRIPTION - FREQUENCY: FREQUENCY: INTERMITTENT

## 2022-04-25 ASSESSMENT — PAIN - FUNCTIONAL ASSESSMENT
PAIN_FUNCTIONAL_ASSESSMENT: PREVENTS OR INTERFERES SOME ACTIVE ACTIVITIES AND ADLS
PAIN_FUNCTIONAL_ASSESSMENT: 0-10
PAIN_FUNCTIONAL_ASSESSMENT: 0-10

## 2022-04-25 ASSESSMENT — PAIN SCALES - GENERAL
PAINLEVEL_OUTOF10: 6
PAINLEVEL_OUTOF10: 5

## 2022-04-25 ASSESSMENT — PAIN DESCRIPTION - LOCATION
LOCATION: HEAD
LOCATION: HEAD

## 2022-04-25 ASSESSMENT — PAIN DESCRIPTION - ONSET: ONSET: ON-GOING

## 2022-04-25 NOTE — TELEPHONE ENCOUNTER
Medication:   Requested Prescriptions     Pending Prescriptions Disp Refills    losartan (COZAAR) 100 MG tablet [Pharmacy Med Name: LOSARTAN 100MG TABLETS] 90 tablet 3     Sig: TAKE 1 TABLET BY MOUTH DAILY        Last Filled:      Patient Phone Number: [phone] (home) [phone] (work)    Last appt: 4/15/2022   Next appt: 4/28/2022    Last OARRS:   RX Monitoring 1/9/2018   Attestation The Prescription Monitoring Report for this patient was reviewed today.

## 2022-04-25 NOTE — ED PROVIDER NOTES
I was available for consultation during patient's ED stay. Patient was cared for by PAT. I did not evaluate or participate in patient care. EKG Interpretation    Interpreted by emergency department physician    Rhythm: normal sinus   Rate: normal  Axis: normal  Ectopy: none  Conduction: normal  ST Segments: nonspecific changes  T Waves: non specific changes  Q Waves: none    Clinical Impression: Normal sinus rhythm with nonspecific ST and T wave changes with no signs of acute ischemia. MI interval 128. QRS duration 84.   QT and QTc 420/422 respectively    MD Prerna King MD  04/25/22 0537

## 2022-04-25 NOTE — ED PROVIDER NOTES
[address] [facility]        Pt Name: Janna Bullock  MRN: [id number]  Demetriustrongfsurinder 1955  Date of evaluation: 4/24/2022  Provider: DONNA Leung  PCP: Diana Mace MD  Note Started: 3:01 AM EDT     The ED Attending Physician was available for consultation but did not see or evaluate this patient. CHIEF COMPLAINT       Chief Complaint   Patient presents with    Headache     pt states she has had headaches and dizziness x 2 weeks pt saw her primary for this and given prednisone and another bp med    Nausea    Dizziness       HISTORY OF PRESENT ILLNESS   (Location, Timing/Onset, Context/Setting, Quality, Duration, Modifying Factors, Severity, Associated Signs and Symptoms)  Note limiting factors. Chief Complaint: Headache, occasional lightheadedness, sleeplessness    Frederick Marci Garza is a 61 y.o. female who presents reporting that she has had a headache for weeks now, and she saw her primary care doctor recently about this, but she has not seen much improvement. She says that today the headaches seem to get worse, and its only on the right side of her head. She says it is keeping her awake. She denies any sudden onset or thunderclap. Says she did have a broken nose in January of this year, had some brain imaging done at that time. She denies visual disturbance. She says that several times today she is also felt like she might faint, but she denies any loss of consciousness. Denies chest pain or shortness of breath. She had an episode of coughing today that led to what she thought was an episode of vomiting, but those symptoms all got better. No urinary complaints. Denies numbness or focal weakness. No recent head trauma. Nursing Notes were all reviewed and agreed with or any disagreements were addressed in the HPI.     REVIEW OF SYSTEMS    (2-9 systems for level 4, 10 or more for level 5)     Review of Systems    Positives and pertinent negatives as per HPI.      PAST MEDICAL HISTORY     Past Medical History:   Diagnosis Date    Anxiety state     Asthma     Back ache     chronic    Environmental allergies     GERD (gastroesophageal reflux disease)     Hypertension     MVP (mitral valve prolapse)     Neck pain, chronic     Reflux     Sleep apnea        SURGICAL HISTORY     Past Surgical History:   Procedure Laterality Date    BACK SURGERY      BLADDER SUSPENSION      CHOLECYSTECTOMY      FOOT SURGERY  11-9-12    endoscopic plantar fasciotomy right foot    GASTRIC BYPASS SURGERY      HERNIA REPAIR      HIATAL HERNIA REPAIR      HYSTERECTOMY      JOINT REPLACEMENT Bilateral     KNEE    KNEE ARTHROPLASTY  2/28/13    L    KNEE ARTHROSCOPY Right 3/7/2014    LAMINECTOMY  7/1/11    bilateral L5-S1 laminotomy, nerve root exploration, foraminotomy    NASAL SINUS SURGERY Right 02/05/2018    Excision of rt nasal mass    LA COLONOSCOPY FLX DX W/COLLJ SPEC WHEN PFRMD N/A 9/28/2018    COLONOSCOPY DIAGNOSTIC OR SCREENING performed by Clementine Hobson MD at [address] [place] [address] DX/THER AGNT PARAVERT FACET JOINT, LUMBAR/SAC, 1ST LEVEL Right 11/6/2018    RIGHT L4,L5 S1 MEDIAL BRANCH BLOCK WITH FLUOROSCOPY performed by Norma Bowers MD at [address] [address], PARTIAL      TONSILLECTOMY      UPPER GASTROINTESTINAL ENDOSCOPY      with dilatation    UPPER GASTROINTESTINAL ENDOSCOPY N/A 8/14/2019    EGD ESOPHAGOGASTRODUODENOSCOPY performed by Clementine Hobson MD at [facility] 5/25/2021    EGD BIOPSY performed by Rosalia Brown MD at [facility]       Previous Medications    ALBUTEROL SULFATE  (90 BASE) MCG/ACT INHALER    Inhale 2 puffs into the lungs every 6 hours as needed for Wheezing    ATORVASTATIN (LIPITOR) 20 MG TABLET    Take 1 tablet by mouth daily    FERROUS SULFATE (IRON 325) 325 (65 FE) MG TABLET    Take 1 tablet by mouth daily (with breakfast)    FLUTICASONE (FLONASE) 50 MCG/ACT NASAL SPRAY    SHAKE LIQUID AND USE 2 SPRAYS IN EACH NOSTRIL DAILY    FLUTICASONE (FLOVENT HFA) 220 MCG/ACT INHALER    Inhale 2 puffs into the lungs 2 times daily    HYDRALAZINE (APRESOLINE) 50 MG TABLET    Take 1 tablet by mouth in the morning and at bedtime    LABETALOL (NORMODYNE) 300 MG TABLET    Take 1 tablet by mouth 2 times daily    LORAZEPAM (ATIVAN) 0.5 MG TABLET    Take 1 tablet by mouth every evening for 30 days.     LOSARTAN (COZAAR) 100 MG TABLET    Take 1 tablet by mouth daily    NEXIUM 40 MG DELAYED RELEASE CAPSULE    TAKE 1 CAPSULE BY MOUTH DAILY    NIFEDIPINE (PROCARDIA XL) 90 MG EXTENDED RELEASE TABLET    Take 1 tablet by mouth daily    PREDNISONE (DELTASONE) 20 MG TABLET    Take 3 tablets by mouth daily    SOLIFENACIN (VESICARE) 10 MG TABLET    Take 1 tablet by mouth daily    VITAMIN D (ERGOCALCIFEROL) 1.25 MG (21257 UT) CAPS CAPSULE    Take 50,000 Units by mouth once a week       ALLERGIES     Dye [barium-containing compounds], Furosemide, Gabapentin, Protonix [pantoprazole sodium], Tramadol, Protonix [pantoprazole], Tegaderm alginate ag rope, Amoxicillin, Aspirin, Augmentin [amoxicillin-pot clavulanate], Betadine [povidone iodine], Chlorhexidine gluconate, Morphine, Other, Pancrelipase (lip-prot-amyl), Toradol [ketorolac tromethamine], Iodides, Ranitidine, and Tape [adhesive tape]    FAMILYHISTORY       Family History   Problem Relation Age of Onset    High Blood Pressure Mother     Heart Disease Mother     Other Mother         glaucoma    High Blood Pressure Father     Heart Disease Father     Other Father         intestional problems    Cancer Sister         cancer    Kidney Disease Sister     Kidney Disease Brother     High Blood Pressure Brother     Other Sister         lupus,fibromyalgia  thyroid surgery    High Blood Pressure Sister     Kidney Disease Sister     Heart Disease Sister     Kidney Disease Brother    Estela Reyna High Blood Pressure Brother     Anesth Problems Neg Hx     Malig Hyperten Neg Hx     Hypotension Neg Hx     Malig Hypertherm Neg Hx     Pseudochol. Deficiency Neg Hx         SOCIAL HISTORY       Social History     Tobacco Use    Smoking status: Former Smoker     Packs/day: 0.25     Years: 2.00     Pack years: 0.50     Quit date: 2004     Years since quittin.3    Smokeless tobacco: Never Used    Tobacco comment: [email or web address]   Vaping Use    Vaping Use: Never used   Substance Use Topics    Alcohol use: No    Drug use: No       SCREENINGS           PHYSICAL EXAM    (up to 7 for level 4, 8 or more for level 5)     ED Triage Vitals [22 0110]   BP Temp Temp Source Pulse Resp SpO2 Height Weight   135/87 97.6 °F (36.4 °C) Oral 69 17 99 % 5' 7\" (1.702 m) 246 lb 14.6 oz (112 kg)       Physical Exam  Vitals and nursing note reviewed. Constitutional:       General: She is not in acute distress. Appearance: Normal appearance. She is not ill-appearing. HENT:      Head: Normocephalic and atraumatic. Nose: Nose normal.   Eyes:      General: No visual field deficit. Right eye: No discharge. Left eye: No discharge. Cardiovascular:      Rate and Rhythm: Normal rate and regular rhythm. Heart sounds: Normal heart sounds. No murmur heard. No gallop. Pulmonary:      Effort: Pulmonary effort is normal. No respiratory distress. Breath sounds: Normal breath sounds. No stridor. No wheezing, rhonchi or rales. Musculoskeletal:         General: Normal range of motion. Cervical back: Normal range of motion. Skin:     General: Skin is warm and dry. Neurological:      General: No focal deficit present. Mental Status: She is alert and oriented to person, place, and time. GCS: GCS eye subscore is 4. GCS verbal subscore is 5. GCS motor subscore is 6. Cranial Nerves: No cranial nerve deficit, dysarthria or facial asymmetry. Sensory: Sensation is intact. essentially normal.  No UTI. She was given headache medications in the ED and reported some improvement. No indication for hospitalization or further work-up. She will be discharged with prescription for a trial course of sumatriptan and advised to follow-up with her primary care doctor. The patient verbalized understanding and agreement with this plan of care. The patient was advised to return to the emergency department if symptoms should significantly worsen or if new and concerning symptoms should appear. I estimate there is LOW risk for SUBARACHNOID HEMORRHAGE, MENINGITIS, INTRACRANIAL HEMORRHAGE, SUBDURAL OR EPIDURAL HEMATOMA, OR STROKE, thus I consider the discharge disposition reasonable. CRITICAL CARE TIME   None    FINAL IMPRESSION      1. Headache, unspecified headache type    2. Episodic lightheadedness          DISPOSITION/PLAN   DISPOSITION Decision To Discharge 04/25/2022 02:52:31 AM      PATIENT REFERRED TO:  Laxmi Bach MD  [address] [place] [address]  [phone]    Call   As needed, For follow-up care      DISCHARGE MEDICATIONS:  New Prescriptions    SUMATRIPTAN (IMITREX) 50 MG TABLET    Take 1 tablet as needed for headache. May take a second dose if needed after 2 hours. No more than 4 tablets per day.        DISCONTINUED MEDICATIONS:  Discontinued Medications    No medications on file            (Please note that portions of this note were completed with a voice recognition program.  Efforts were made to edit the dictations but occasionally words are mis-transcribed.)    DONNA Figueroa (electronically signed)       Maria A Figueroa [id number]

## 2022-04-27 ENCOUNTER — OFFICE VISIT (OUTPATIENT)
Dept: ENT CLINIC | Age: 67
End: 2022-04-27
Payer: MEDICARE

## 2022-04-27 VITALS
SYSTOLIC BLOOD PRESSURE: 130 MMHG | TEMPERATURE: 97.3 F | DIASTOLIC BLOOD PRESSURE: 74 MMHG | OXYGEN SATURATION: 97 % | HEART RATE: 81 BPM

## 2022-04-27 DIAGNOSIS — R94.113 ABNORMAL ENG (ELECTRONYSTAGMOGRAM): ICD-10-CM

## 2022-04-27 DIAGNOSIS — R42 DIZZINESS: Primary | ICD-10-CM

## 2022-04-27 PROCEDURE — G8417 CALC BMI ABV UP PARAM F/U: HCPCS | Performed by: OTOLARYNGOLOGY

## 2022-04-27 PROCEDURE — 3017F COLORECTAL CA SCREEN DOC REV: CPT | Performed by: OTOLARYNGOLOGY

## 2022-04-27 PROCEDURE — 1036F TOBACCO NON-USER: CPT | Performed by: OTOLARYNGOLOGY

## 2022-04-27 PROCEDURE — G9899 SCRN MAM PERF RSLTS DOC: HCPCS | Performed by: OTOLARYNGOLOGY

## 2022-04-27 PROCEDURE — G8427 DOCREV CUR MEDS BY ELIG CLIN: HCPCS | Performed by: OTOLARYNGOLOGY

## 2022-04-27 PROCEDURE — 99214 OFFICE O/P EST MOD 30 MIN: CPT | Performed by: OTOLARYNGOLOGY

## 2022-04-27 RX ORDER — DIAZEPAM 10 MG/1
10 TABLET ORAL ONCE
Qty: 1 TABLET | Refills: 0 | Status: SHIPPED | OUTPATIENT
Start: 2022-04-27 | End: 2022-04-27

## 2022-04-27 NOTE — PATIENT INSTRUCTIONS
· I recommend an MRI scan of the IACs/brain, with contrast, to rule out a vestibular schwannoma (\"acoustic neuroma\") or central nervous system disease as the reason for your dizziness, abnormal ENG or VNG, or tinnitus. There are adverse consequences of untreated acoustic neuroma, i.e. total loss of hearing, paralysis of the face, permanent dizziness or pressure on the brain. This may occur suddenly due to bleeding into the tumor or sudden growth. Call the office for the results of the MRI scan 10 days after the test, if you have not been informed previously. · Avoid working or being at heights, on ladders or scaffolding or near the edges of platforms or using heavy or complicated machinery or operating a motorized vehicle if you are experiencing dizziness and until having been dizzy free for 72 hours. Even then, take appropriate care and precautions as dizziness could occur at any time. · Avoid any motions or activity that results in the off balance sensation. Stand up or arise slowly and in stages, as we discussed. · You should consider amplification therapy, hearing aid, if you are having difficulty communicating and/or hearing important sounds. · You should return for an annual hearing test to monitor your hearing, and whenever your hearing further decreases significantly. · You should return if your ears plug up with ear wax causing difficulty hearing or pressure. · You should employ the tinnitus masking techniques and strategies we discussed, as needed. Bedside tinnitus masking devices (eg. a white noise machine, noise machine, noise molly on your cell phone, fan on in the room, radio with light music or tuned between stations to white noise static) can be very helpful. · You should avoid exposure to excessively high levels of noise/sound and use hearing protection measures we discussed, as needed, if such exposure is unavoidable.   · I recommend that you use Lipoflavonoid Plus, (use brand name, not generic, for the first six months), for treatment of your tinnitus and dizziness. This may be obtained \"over the counter\" at your pharmacy. You should take two orally three times a day for the first 60 days, then one orally three times a day thereafter. Take this medication continuously for at least six months. If you have seen no improvement in your tinnitus or dizziness after six months, you should consider cessation of this treatment. · NO Q-TIPS IN THE EARS  You should never clean your ears with a Q-tip, cotton tipped applicator, Eri pin, paper clip, pen cap, nail file, or any other instrument. I recommend only use of one the several ear wax removal kits available \"over the counter\" if you feel a need to try to remove ear wax. No other methods should be self used for this purpose as there is danger of injury to the ear and risk of irreparable and irreversible permanent hearing loss.

## 2022-04-27 NOTE — PROGRESS NOTES
[facility]       CHIEF COMPLAINT  Chief Complaint   Patient presents with    Follow-up     Dizziness       HISTORY OF PRESENT ILLNESS       Cayla Tafoya is a 61 y.o. female here for recheck and follow up of dizziness. \"Pain behind the ear only when I lay on it. \"           EXAMINATION  Ears normal  Neck normal      AUDIOGRAM    I independently assessed and evaluated the results of the audiogram, a test performed by another healthcare provider. VNG  (see full report for details)      I independently assessed and evaluated the results of the audiogram, a test performed by another healthcare provider. The VNG appeared to demonstrate abnormal vestibular function.         VESTIBULAR EVALUATION - VIDEONYSTAGMOGRAPHY (VNG)        Cayla Guillen was seen today, 4/13/2022, for videonystagmography (VNG) evaluation. The VNG is an examination of the central vestibulo-ocular pathway that is measured via eye movements.     IMPRESSIONS:       Abnormal VNG. Central subtests were abnormal for smooth pursuit and OPKs; saccades were essentially normal, however, morphology was somewhat reduced. Gaze and positional subtests were wtihin normal limits. Caloric irrigations revealed bilateral hypofunction. Today's findings are likely consistent with central vestibular dysfunction, however, peripheral involvement bilaterally cannot be ruled out at this time. COUNSELING    The audiogram results were reviewed and discussed with the patient, whom I advised of the type and severity of hearing loss, the probable etiology, need for noise protection and avoidance, possible benefits of hearing aids, or other amplification therapy. I discussed the etiology of tinnitus and treatment/coping measures including masking techniques, and need for annual and prn hearing tests.   Patient was advised of difficulty understanding speech in the presence of reason for your dizziness, abnormal ENG or VNG, or tinnitus. There are adverse consequences of untreated acoustic neuroma, i.e. total loss of hearing, paralysis of the face, permanent dizziness or pressure on the brain. This may occur suddenly due to bleeding into the tumor or sudden growth. Call the office for the results of the MRI scan 10 days after the test, if you have not been informed previously. · Avoid working or being at heights, on ladders or scaffolding or near the edges of platforms or using heavy or complicated machinery or operating a motorized vehicle if you are experiencing dizziness and until having been dizzy free for 72 hours. Even then, take appropriate care and precautions as dizziness could occur at any time. · Avoid any motions or activity that results in the off balance sensation. Stand up or arise slowly and in stages, as we discussed. · You should consider amplification therapy, hearing aid, if you are having difficulty communicating and/or hearing important sounds. · You should return for an annual hearing test to monitor your hearing, and whenever your hearing further decreases significantly. · You should return if your ears plug up with ear wax causing difficulty hearing or pressure. · You should employ the tinnitus masking techniques and strategies we discussed, as needed. Bedside tinnitus masking devices (eg. a white noise machine, noise machine, noise molly on your cell phone, fan on in the room, radio with light music or tuned between stations to white noise static) can be very helpful. · You should avoid exposure to excessively high levels of noise/sound and use hearing protection measures we discussed, as needed, if such exposure is unavoidable. · I recommend that you use Lipoflavonoid Plus, (use brand name, not generic, for the first six months), for treatment of your tinnitus and dizziness. This may be obtained \"over the counter\" at your pharmacy.    You should take two orally three times a day for the first 60 days, then one orally three times a day thereafter. Take this medication continuously for at least six months. If you have seen no improvement in your tinnitus or dizziness after six months, you should consider cessation of this treatment. · NO Q-TIPS IN THE EARS  You should never clean your ears with a Q-tip, cotton tipped applicator, Eri pin, paper clip, pen cap, nail file, or any other instrument. I recommend only use of one the several ear wax removal kits available \"over the counter\" if you feel a need to try to remove ear wax. No other methods should be self used for this purpose as there is danger of injury to the ear and risk of irreparable and irreversible permanent hearing loss. Follow-up  Return in about 3 months (around 7/27/2022) for recheck/follow-up, and sooner if condition worsens. MEDICAL DECISION MAKING    TIME:  I spent a total of 30 minutes with this patient and visit for counseling, coordination of care, evaluation and management, reviewing the medical record, and documenting the visit.

## 2022-04-28 ENCOUNTER — HOSPITAL ENCOUNTER (OUTPATIENT)
Age: 67
Discharge: HOME | End: 2022-04-28
Payer: MEDICARE

## 2022-04-28 ENCOUNTER — OFFICE VISIT (OUTPATIENT)
Dept: PRIMARY CARE CLINIC | Age: 67
End: 2022-04-28
Payer: MEDICARE

## 2022-04-28 VITALS
DIASTOLIC BLOOD PRESSURE: 74 MMHG | HEART RATE: 74 BPM | SYSTOLIC BLOOD PRESSURE: 117 MMHG | HEIGHT: 67 IN | WEIGHT: 245 LBS | TEMPERATURE: 97.5 F | BODY MASS INDEX: 38.45 KG/M2 | OXYGEN SATURATION: 96 %

## 2022-04-28 DIAGNOSIS — I10 ESSENTIAL HYPERTENSION: ICD-10-CM

## 2022-04-28 DIAGNOSIS — G44.89 OTHER HEADACHE SYNDROME: ICD-10-CM

## 2022-04-28 LAB — SEDIMENTATION RATE, ERYTHROCYTE: 85 MM/HR (ref 0–30)

## 2022-04-28 PROCEDURE — 1036F TOBACCO NON-USER: CPT | Performed by: INTERNAL MEDICINE

## 2022-04-28 PROCEDURE — 85652 RBC SED RATE AUTOMATED: CPT

## 2022-04-28 PROCEDURE — 36415 COLL VENOUS BLD VENIPUNCTURE: CPT

## 2022-04-28 PROCEDURE — 99214 OFFICE O/P EST MOD 30 MIN: CPT | Performed by: INTERNAL MEDICINE

## 2022-04-28 PROCEDURE — G8417 CALC BMI ABV UP PARAM F/U: HCPCS | Performed by: INTERNAL MEDICINE

## 2022-04-28 PROCEDURE — G8427 DOCREV CUR MEDS BY ELIG CLIN: HCPCS | Performed by: INTERNAL MEDICINE

## 2022-04-28 PROCEDURE — G9899 SCRN MAM PERF RSLTS DOC: HCPCS | Performed by: INTERNAL MEDICINE

## 2022-04-28 PROCEDURE — 3017F COLORECTAL CA SCREEN DOC REV: CPT | Performed by: INTERNAL MEDICINE

## 2022-04-28 RX ORDER — PREDNISONE 20 MG/1
60 TABLET ORAL DAILY
Qty: 90 TABLET | Refills: 3 | Status: SHIPPED | OUTPATIENT
Start: 2022-04-28 | End: 2022-05-12

## 2022-04-28 RX ORDER — BUTALBITAL, ACETAMINOPHEN AND CAFFEINE 50; 325; 40 MG/1; MG/1; MG/1
1 TABLET ORAL 3 TIMES DAILY PRN
Qty: 90 TABLET | Refills: 3 | Status: SHIPPED | OUTPATIENT
Start: 2022-04-28

## 2022-04-28 SDOH — ECONOMIC STABILITY: FOOD INSECURITY: WITHIN THE PAST 12 MONTHS, YOU WORRIED THAT YOUR FOOD WOULD RUN OUT BEFORE YOU GOT MONEY TO BUY MORE.: NEVER TRUE

## 2022-04-28 SDOH — ECONOMIC STABILITY: FOOD INSECURITY: WITHIN THE PAST 12 MONTHS, THE FOOD YOU BOUGHT JUST DIDN'T LAST AND YOU DIDN'T HAVE MONEY TO GET MORE.: NEVER TRUE

## 2022-04-28 ASSESSMENT — SOCIAL DETERMINANTS OF HEALTH (SDOH): HOW HARD IS IT FOR YOU TO PAY FOR THE VERY BASICS LIKE FOOD, HOUSING, MEDICAL CARE, AND HEATING?: PATIENT DECLINED

## 2022-04-28 ASSESSMENT — ENCOUNTER SYMPTOMS
ABDOMINAL DISTENTION: 0
CHOKING: 0
VOICE CHANGE: 0
CHEST TIGHTNESS: 0
FACIAL SWELLING: 0
CONSTIPATION: 0
PHOTOPHOBIA: 0
DIARRHEA: 0
BLOOD IN STOOL: 0
SORE THROAT: 0
SHORTNESS OF BREATH: 0

## 2022-04-28 ASSESSMENT — PATIENT HEALTH QUESTIONNAIRE - PHQ9
SUM OF ALL RESPONSES TO PHQ QUESTIONS 1-9: 0
2. FEELING DOWN, DEPRESSED OR HOPELESS: 0
SUM OF ALL RESPONSES TO PHQ QUESTIONS 1-9: 0
SUM OF ALL RESPONSES TO PHQ9 QUESTIONS 1 & 2: 0
1. LITTLE INTEREST OR PLEASURE IN DOING THINGS: 0
SUM OF ALL RESPONSES TO PHQ QUESTIONS 1-9: 0
SUM OF ALL RESPONSES TO PHQ QUESTIONS 1-9: 0

## 2022-04-28 NOTE — PROGRESS NOTES
Subjective:      Patient ID: Marcelino Arshad is a 61 y.o. female. 4/28/2022 Patient presents with:  Hypertension: 2 week follow up              Last seen 4/15/2022 Patient presents with:  Hypertension  Headache  Insomnia: pt states she is allergic to Ambien and cant take                Last visit [facility] 1/22 11/16/2021 Patient presents with:  Hypertension  Cough: 3 wks     Has had extensive w/u with ENT including Barium swallow Study and CT soft tissue neck               8/31/2021 Patient presents with:  Blood Pressure Check                  8/12/2021 Patient presents with:  Blood Pressure Check  Headache  Sinus Problem                  8/3/2021 Patient presents with:  Hypertension: pt BP has been elevated causing excessive sleepiness                6/3/2021 Patient presents with: Follow-Up from Hospital: [facility] 5/25-5/27/21 abdominal pain        Previous history of gastric bypass, hiatal & ventral hernia repair cholecystectomy    CT abdomen and pelvis showed pneumobilia and bilateral renal cyst without hydronephrosis  LFTs, amylase & lipase within normal limits. She  underwent EGD on 5/25/2021 which was normal    Small bowel follow-through showed showed intermittent mild to moderate increased tertiary contraction in the esophagus with delayed esophageal emptying otherwise unremarkable. Patient was recommended to continue Pepcid ( allergic to Protonix) follow-up with gastroenterology     Pneumobilia noted on imaging likely 2/2 prior ERCP  Afebrile, WBCs and LFTs WNL. Ultrasound negative                4/30/2021   Medicare AWV                11/10/2020 Patient presents with:  Abdominal Pain . Never got labs as ordered last visit ? ??    States she did See Dr Scarlet Prader who has told her there was nothing he could do ? ??                  . S/P Surgery dissectomy 8/26/20 at [facility]       Was told she has Stage 3 Renal failiure ?   Labs last checked here 11/19 show Nl renal function                  2/27/18 total thyroidectomy      Hypertension  Associated symptoms include headaches (since Nov 2021). Pertinent negatives include no chest pain or shortness of breath. Headache   Pertinent negatives include no ear pain, fever, photophobia or sore throat. Past Medical History:   Diagnosis Date    Anxiety state     Asthma     Back ache     chronic    Environmental allergies     GERD (gastroesophageal reflux disease)     Hypertension     MVP (mitral valve prolapse)     Neck pain, chronic     Reflux     Sleep apnea        Review of Systems   Constitutional: Negative for activity change, appetite change, chills, diaphoresis, fatigue, fever and unexpected weight change. All vaccines up to date    Flu vac 1/21    Tdap 5/17     Pneumonia vac 2012 ;  5/17     Covid Vac 12 /21   HENT: Negative for ear pain, facial swelling, postnasal drip, sore throat and voice change. Dentures fit well ; new   Eyes: Negative for photophobia and visual disturbance. Eye exam 1/22   Respiratory: Negative for choking, chest tightness and shortness of breath. Does not smoke ; No Etoh   H/o Asthma    Cardiovascular: Negative for chest pain and leg swelling. HTN > 5 yrs on meds . No known CAD . Father dies of MI in his 46s   Sister with CAD ? MVP  H/o palpitations   Gastrointestinal: Negative for abdominal distention, blood in stool, constipation and diarrhea. Repeat Upper endoscopy 5/21  Nl    Upper Endoscopy  1/21  H/O [place] ? Cannot take Nsaids! Post gastric bypass 2005      GERD    Repeat Colonscopy 1/21  ; Polyps; O [place]    Endocrine: Negative for cold intolerance and heat intolerance. Followed by Dr. Loly Holliday  for thyroid   Genitourinary:        Mammogram  5/20    Scheduled Pap 0n 9/127/18    Musculoskeletal:        Sees Ortho neck / [facility]   1/21       S/P Surgery  DDD 8/26/20      DJD . Skin: Negative. Neurological: Positive for headaches (since Nov 2021). Psychiatric/Behavioral: Negative for self-injury, sleep disturbance and suicidal ideas. Anxiety: followed by counselor       Objective:   Physical Exam  Constitutional:       General: She is not in acute distress. Appearance: She is obese. She is not ill-appearing. Neck:      Comments:     Cardiovascular:      Rate and Rhythm: Regular rhythm. Pulmonary:      Effort: Pulmonary effort is normal.      Breath sounds: Normal breath sounds. Musculoskeletal:         General: Normal range of motion. Skin:     General: Skin is warm. Neurological:      General: No focal deficit present. Mental Status: She is oriented to person, place, and time. Psychiatric:         Mood and Affect: Mood normal.         Assessment:     Nekoma was seen today for hypertension. Diagnoses and all orders for this visit:    Other headache syndrome   . Been on prednisone 60 since 4/16/22  . To see Dr Gabino Oconnell tomorrow . Also seeing ENT . MRI pending  -     predniSONE (DELTASONE) 20 MG tablet; Take 3 tablets by mouth daily  -     Jayne Chapa MD, Rheumatology, [facility]  -     butalbital-acetaminophen-caffeine (FIORICET, ESGIC) -46 MG per tablet; Take 1 tablet by mouth 3 times daily as needed for Headaches  -     Sedimentation Rate; Future      Mixed hyperlipidemia  LDL > 200 . Restarted  Lipitor   -     atorvastatin (LIPITOR) 20 MG tablet; Take 1 tablet by mouth daily        Essential hypertension    BP better since adding Adding hydralazine . Advised low salt diet . DC Mrs Kellie Delacruz !!!  -     NIFEdipine (PROCARDIA XL) 90 MG extended release tablet; Take 1 tablet by mouth daily  -     labetalol (NORMODYNE) 300 MG tablet; Take 1 tablet by mouth 2 times daily  -     losartan (COZAAR) 100 MG tablet; Take 1 tablet by mouth daily  -     hydrALAZINE (APRESOLINE) 50 MG tablet; Take 1 tablet by mouth in the morning and at bedtime    Other insomnia  Did not tolerated ambien  .  Try Ativan   -     LORazepam (ATIVAN) 0.5 MG tablet; Take 1 tablet by mouth every evening for 30 days. Closed fracture of nasal bone with routine healing, subsequent encounter        Other iron deficiency anemia    Chronic     S/P Gastric bypass . Incontinence overflow, urine  On Myrbetric now        Acquired hypothyroidism  C/O  Endocrine / [facility]   .  On synthroid 0.25   -     -      I    Plan:

## 2022-04-29 ENCOUNTER — OFFICE VISIT (OUTPATIENT)
Dept: RHEUMATOLOGY | Age: 67
End: 2022-04-29
Payer: MEDICARE

## 2022-04-29 VITALS
WEIGHT: 245 LBS | SYSTOLIC BLOOD PRESSURE: 120 MMHG | BODY MASS INDEX: 38.37 KG/M2 | DIASTOLIC BLOOD PRESSURE: 80 MMHG | TEMPERATURE: 98.4 F

## 2022-04-29 DIAGNOSIS — F41.9 ANXIETY: ICD-10-CM

## 2022-04-29 DIAGNOSIS — R70.0 ELEVATED SED RATE: ICD-10-CM

## 2022-04-29 DIAGNOSIS — Z11.59 ENCOUNTER FOR SCREENING FOR OTHER VIRAL DISEASES: ICD-10-CM

## 2022-04-29 DIAGNOSIS — R51.9 CHRONIC INTRACTABLE HEADACHE, UNSPECIFIED HEADACHE TYPE: Primary | ICD-10-CM

## 2022-04-29 DIAGNOSIS — G89.29 CHRONIC INTRACTABLE HEADACHE, UNSPECIFIED HEADACHE TYPE: Primary | ICD-10-CM

## 2022-04-29 DIAGNOSIS — E55.9 VITAMIN D DEFICIENCY: ICD-10-CM

## 2022-04-29 DIAGNOSIS — R42 DIZZINESS: ICD-10-CM

## 2022-04-29 PROCEDURE — G8427 DOCREV CUR MEDS BY ELIG CLIN: HCPCS | Performed by: INTERNAL MEDICINE

## 2022-04-29 PROCEDURE — G8417 CALC BMI ABV UP PARAM F/U: HCPCS | Performed by: INTERNAL MEDICINE

## 2022-04-29 PROCEDURE — 99205 OFFICE O/P NEW HI 60 MIN: CPT | Performed by: INTERNAL MEDICINE

## 2022-04-29 PROCEDURE — 1036F TOBACCO NON-USER: CPT | Performed by: INTERNAL MEDICINE

## 2022-04-29 PROCEDURE — G9899 SCRN MAM PERF RSLTS DOC: HCPCS | Performed by: INTERNAL MEDICINE

## 2022-04-29 PROCEDURE — 3017F COLORECTAL CA SCREEN DOC REV: CPT | Performed by: INTERNAL MEDICINE

## 2022-04-29 NOTE — PATIENT INSTRUCTIONS
Mri brain and cervical spine   Taper off prednisone to 2 tabs for 1 week, 1 tab for 1 week, 1 tab every other day for 1 week and stop   Labs

## 2022-04-29 NOTE — LETTER
[address] [facility] [address] [place] [id number]  Phone: [phone]  Fax: [phone]    Cyril Rick MD    April 29, 2022     Fatoumata Ashby, [facility] [address]    Patient: Marcelino Arshad   MR Number: [id number]   YOB: 1955   Date of Visit: 4/29/2022       Dear Fatoumata Ashby:    Thank you for referring Aicha Ayala to me for evaluation/treatment. Below are the relevant portions of my assessment and plan of care. If you have questions, please do not hesitate to call me. I look forward to following [place] along with you.     Sincerely,      Cyril Rick MD

## 2022-04-29 NOTE — PROGRESS NOTES
2022  Patient Name: Alejandrina Tucker  : 1955  Medical Record: [id number]      ASSESSMENT AND PLAN    Assessment/Plan:      ASSESSMENT:    1. Chronic intractable headache, unspecified headache type    2. Elevated sed rate    3. Anxiety    4. Encounter for screening for other viral diseases     5. Vitamin D deficiency    6. Dizziness        PLAN:     [place] was seen today for new patient. Diagnoses and all orders for this visit:    Chronic intractable headache, unspecified headache type  -     MRI BRAIN WO CONTRAST; Future  -     MRI CERVICAL SPINE WO CONTRAST; Future  -     C-Reactive Protein; Future  -     Sedimentation Rate; Future  -     Vitamin B12; Future  -     AFL - Serena Hernandez MD, Neurology, [facility]    Elevated sed rate  -     Miscellaneous Sendout; Future  -     C-Reactive Protein; Future  -     Sedimentation Rate; Future  -     Anti SSB; Future  -     Anti SSA; Future  -     Electrophoresis Protein, Serum; Future  -     Kappa/Lambda Quantitative Free Light Chains, Serum; Future  -     Immunofixation serum profile; Future  -     Hepatitis C Antibody; Future  -     Hepatitis B Surface Antigen; Future  -     Cyclic Citrul Peptide Antibody, IgG; Future  -     Rheumatoid Factor; Future  -     Hepatitis B Core Antibody, IgM; Future  -     Comprehensive Metabolic Panel; Future  -     CBC with Auto Differential; Future    Anxiety  -     TSH with Reflex to FT4; Future    Encounter for screening for other viral diseases   -     Hepatitis B Surface Antigen; Future    Vitamin D deficiency  -     Vitamin D 25 Hydroxy; Future    Dizziness       Chronic intractable headache-with lightheadedness and dizziness  No focal symptoms. No stiffness around shoulders or hips. No vision changes, jaw pain or extremity weakness.   No significant improvement on prednisone  Persistently elevated ESR less likely due to temporal arteritis   Unlikely temporal arteritis  Needs further evaluation to rule out other etiologies  I will obtain MRI of the brain and cervical spine and refer to neurology for further evaluation  I will taper off prednisone  Advised to go to the ER if there is any worsening headache, vision changes, jaw pain, extremity weakness or focal symptoms  Continue follow-up with ENT for lightheadedness/dizziness-scheduled for MRI of the posterior fossa    Elevated ESR-no fever, weight loss, swollen glands. No history of malignancy. No other signs and symptoms concerning for systemic rheumatic disease or connective tissue disease or infection  Up-to-date with Pap smear, mammogram and colonoscopy  Possibly headache related-we will do MRI brain to completely rule out intra-articular pathology  I will do additional work-up to completely rule out systemic rheumatic disease        The patient indicates understanding of these issues and agrees with the plan. Return in about 6 weeks (around 6/10/2022). The risks and benefits of my recommendations, as well as other treatment options, benefits and side effects werediscussed with the patient. All questions were answered. I reviewed patient's history, referral documents and electronic medical records  Copy of consult note is being routedelectronically/faxed to referring physician         MEDICATIONS  Current Outpatient Medications   Medication Sig Dispense Refill    predniSONE (DELTASONE) 20 MG tablet Take 3 tablets by mouth daily 90 tablet 3    butalbital-acetaminophen-caffeine (FIORICET, ESGIC) -40 MG per tablet Take 1 tablet by mouth 3 times daily as needed for Headaches 90 tablet 3    SUMAtriptan (IMITREX) 50 MG tablet Take 1 tablet as needed for headache. May take a second dose if needed after 2 hours. No more than 4 tablets per day.  12 tablet 0    losartan (COZAAR) 100 MG tablet TAKE 1 TABLET BY MOUTH DAILY 90 tablet 3    atorvastatin (LIPITOR) 20 MG tablet Take 1 tablet by mouth daily 90 tablet 3    NIFEdipine (PROCARDIA XL) 90 MG extended release tablet Take 1 tablet by mouth daily 90 tablet 1    labetalol (NORMODYNE) 300 MG tablet Take 1 tablet by mouth 2 times daily 180 tablet 3    hydrALAZINE (APRESOLINE) 50 MG tablet Take 1 tablet by mouth in the morning and at bedtime 60 tablet 3    LORazepam (ATIVAN) 0.5 MG tablet Take 1 tablet by mouth every evening for 30 days. 30 tablet 0    NEXIUM 40 MG delayed release capsule TAKE 1 CAPSULE BY MOUTH DAILY 90 capsule 1    solifenacin (VESICARE) 10 MG tablet Take 1 tablet by mouth daily 90 tablet 1    fluticasone (FLOVENT HFA) 220 MCG/ACT inhaler Inhale 2 puffs into the lungs 2 times daily 12 g 5    albuterol sulfate  (90 Base) MCG/ACT inhaler Inhale 2 puffs into the lungs every 6 hours as needed for Wheezing 18 g 5    vitamin D (ERGOCALCIFEROL) 1.25 MG (50399 UT) CAPS capsule Take 50,000 Units by mouth once a week      ferrous sulfate (IRON 325) 325 (65 Fe) MG tablet Take 1 tablet by mouth daily (with breakfast) 90 tablet 3    fluticasone (FLONASE) 50 MCG/ACT nasal spray SHAKE LIQUID AND USE 2 SPRAYS IN EACH NOSTRIL DAILY 48 g 3     No current facility-administered medications for this visit.        ALLERGIES  Allergies   Allergen Reactions    Dye [Barium-Containing Compounds]     Furosemide     Gabapentin Other (See Comments)     Memory Loss    Protonix [Pantoprazole Sodium] Shortness Of Breath and Rash    Tramadol Itching    Protonix [Pantoprazole]      Other reaction(s): Unknown    Tegaderm Alginate Ag Rope     Amoxicillin     Aspirin     Augmentin [Amoxicillin-Pot Clavulanate]     Betadine [Povidone Iodine] Other (See Comments)     welts    Chlorhexidine Gluconate Other (See Comments)     Welts from ChloraPrep    Morphine Itching    Other      chlorahexidine - hives    Pancrelipase (Lip-Prot-Amyl)      Other reaction(s): Unknown    Toradol [Ketorolac Tromethamine]     Iodides Rash    Ranitidine Nausea And Vomiting    Tape [Adhesive Tape] Rash     teradon tape also  Paper tape ok          Comments  No specialty comments available. Penny [place] PHYSICIAN:Joey Claros MD    HISTORY OF PRESENT ILLNESS  Cayla Montiel is a 61 y.o. female with past medical history of hypertension, anxiety, degenerative disc disease in the cervical spine and lumbar spine, osteoarthritis status post bilateral knee replacements who is being seen for follow up evaluation of  headache and elevated ESR. Patient is a vague historian. She has chronic headache for several years which has gotten worse over the past 2 months. She has headache on daily basis located on the right side of the head. She describes her pain as excruciating. She denies sudden loss of vision, jaw pain, extremity weakness, stiffness around shoulders or hips. She denies diplopia, dysarthria, focal weakness, seizures. She denies photophobia, phonophobia. Headache is associated with dizziness/lightheadedness and vertigo. She denies fever, weight loss or swollen glands. She went to see her PCP. She was found to have elevated ESR of 83. She was put on prednisone for possible temporal arteritis. She is currently on prednisone 60 mg daily for past 2 weeks without any improvement in headaches. Repeat ESR is 85. She denies joint pain, swelling or stiffness. She has a history of bilateral knee replacement secondary to osteoarthritis. She also had 2 neck surgeries for cervical stenosis and right shoulder arthroscopic surgery for rotator cuff tear. She denies malar rash, photosensitivity, oral/nasal ulcers, dry eyes, dry mouth, history of miscarriages, Raynaud's, pregnancy complications, blood clots, pleurisy or pericarditis, sclerodactyly. She is up-to-date with Pap smear, mammogram and colonoscopy. HPI  Review of Systems    REVIEW OF SYSTEMS: Tingling and numbness in the right upper extremity, headaches, dizziness, GERD  Constitutional: No unanticipated weight loss or fevers.    Integumentary: No rash, photosensitivity, malar rash, livedo reticularis, alopecia and Raynaud's symptoms, sclerodactyly, skin tightening  Eyes: negative for visual disturbance and persistent redness, discharge from eyes   ENT: - No tinnitus, loss of hearing, vertigo, or recurrent ear infections.  - No history of nasal/oral ulcers. - No history of dry eyes/dry mouth  Cardiovascular: No history of pericarditis, chest pain or murmur or palpitations  Respiratory: No shortness of breath, cough or history of interstitial lung disease. No history of pleurisy. No history of tuberculosis or atypical infections. Gastrointestinal: No history of dysphagia or esophageal dysmotility. No change in bowel habits or any inflammatory bowel disease. Genitourinary: No history of renal disease, miscarriages. Hematologic/Lymphatic: No abnormal bruising or bleeding, blood clots or swollen lymph nodes. Neurological: No history of seizure or focal weakness. No history of neuropathies,hyperesthesias, facial droop, diplopia  Psychiatric: No history of bipolar disease  Endocrine: Denies any polyuria, polydipsia and osteoporosis  Allergic/Immunologic: No nasal congestion or hives. I have reviewed patients Past medical History, Social History and Family History as mentioned in her chart and this remains unchanged fromprevious.     Past Medical History:   Diagnosis Date    Anxiety state     Asthma     Back ache     chronic    Environmental allergies     GERD (gastroesophageal reflux disease)     Hypertension     MVP (mitral valve prolapse)     Neck pain, chronic     Reflux     Sleep apnea      Past Surgical History:   Procedure Laterality Date    BACK SURGERY      BLADDER SUSPENSION      CHOLECYSTECTOMY      FOOT SURGERY  11-9-12    endoscopic plantar fasciotomy right foot    GASTRIC BYPASS SURGERY      HERNIA REPAIR      HIATAL HERNIA REPAIR      HYSTERECTOMY      JOINT REPLACEMENT Bilateral     KNEE    KNEE ARTHROPLASTY  2/28/13    L  KNEE ARTHROSCOPY Right 3/7/2014    LAMINECTOMY  11    bilateral L5-S1 laminotomy, nerve root exploration, foraminotomy    NASAL SINUS SURGERY Right 2018    Excision of rt nasal mass    NE COLONOSCOPY FLX DX W/COLLJ SPEC WHEN PFRMD N/A 2018    COLONOSCOPY DIAGNOSTIC OR SCREENING performed by Candi Ingram MD at [address] [place] [address] DX/THER AGNT PARAVERT FACET JOINT, LUMBAR/SAC, 1ST LEVEL Right 2018    RIGHT L4,L5 S1 MEDIAL BRANCH BLOCK WITH FLUOROSCOPY performed by Crystal Zambrano MD at [address] [address], PARTIAL      TONSILLECTOMY      UPPER GASTROINTESTINAL ENDOSCOPY      with dilatation    UPPER GASTROINTESTINAL ENDOSCOPY N/A 2019    EGD ESOPHAGOGASTRODUODENOSCOPY performed by Candi Ingram MD at [address] 2021    EGD [facility] Marital status:       Spouse name: Not on file    Number of children: Not on file    Years of education: Not on file    Highest education level: Not on file   Occupational History    Not on file   Tobacco Use    Smoking status: Former Smoker     Packs/day: 0.25     Years: 2.00     Pack years: 0.50     Quit date: 2004     Years since quittin.3    Smokeless tobacco: Never Used    Tobacco comment: [id number]   Vaping Use    Vaping Use: Never used   Substance and Sexual Activity    Alcohol use: No    Drug use: No    Sexual activity: Yes     Partners: Male   Other Topics Concern    Not on file   Social History Narrative    Not on file     Social Determinants of Health     Financial Resource Strain: Unknown    Difficulty of Paying Living Expenses: Patient refused   Food Insecurity: No Food Insecurity    Worried About Running Out of Food in the Last Year: Never true    Larry of Food in the Last Year: Never true   Transportation Needs:     Lack of Transportation (Medical): Not on file    Lack of Transportation (Non-Medical): Not on file   Physical Activity:     Days of Exercise per Week: Not on file    Minutes of Exercise per Session: Not on file   Stress:     Feeling of Stress : Not on file   Social Connections:     Frequency of Communication with Friends and Family: Not on file    Frequency of Social Gatherings with Friends and Family: Not on file    Attends Restoration Services: Not on file    Active Member of [address] [place] [address] [employer] or Organizations: Not on file    Attends Club or Organization Meetings: Not on file    Marital Status: Not on file   Intimate Partner Violence:     Fear of Current or Ex-Partner: Not on file    Emotionally Abused: Not on file    Physically Abused: Not on file    Sexually Abused: Not on file   Housing Stability:     Unable to Pay for Housing in the Last Year: Not on file    Number of [place] in the Last Year: Not on file    Unstable Housing in the Last Year: Not on file     Family History   Problem Relation Age of Onset    High Blood Pressure Mother     Heart Disease Mother     Other Mother         glaucoma    High Blood Pressure Father     Heart Disease Father     Other Father         intestional problems    Cancer Sister         cancer    Kidney Disease Sister     Kidney Disease Brother     High Blood Pressure Brother     Other Sister         lupus,fibromyalgia  thyroid surgery    High Blood Pressure Sister     Kidney Disease Sister     Heart Disease Sister     Kidney Disease Brother     High Blood Pressure Brother     Anesth Problems Neg Hx     Malig Hyperten Neg Hx     Hypotension Neg Hx     Malig Hypertherm Neg Hx     Pseudochol.  Deficiency Neg Hx          PHYSICAL EXAM   Vitals:    04/29/22 1032   BP: 120/80   Site: Left Upper Arm   Position: Sitting   Cuff Size: Large Adult   Temp: 98.4 °F (36.9 °C)   TempSrc: Infrared   Weight: 245 lb (111.1 kg)     Physical Exam  Constitutional:  Well developed, well nourished, no acute distress, non-toxic appearance   Musculoskeletal:    Ambulates without assistance, normal gait  Neck: Full ROM, no tenderness,supple   Upper Extremities        Shoulder: Full ROM, no crepitus        Elbow:  Full ROM, no synovitis, no deformity        Wrist: Full ROM, no synovitis, no deformity, no ulnar deviation        Fingers: No sclerodactly, no active raynaud's, no ulcers. MCPs: No synovitis, no deformity             PIPs:  No synovitis, no deformity             DIPs: No synovitis, no deformity             Nails: No pitting, no telangiectasias. Lower Extremities        Hip: Full ROM, no tenderness to palpation        Knee: Full ROM, no erythema/swelling/laxity/crepitus. Patella not ballotable. Ankle: Full ROM, no swelling or erythema        MTPs: No swelling or erythema  Back- no tenderness. Eyes:  PERRL, extra ocular movements intact, conjunctiva normal   HEENT:  Atraumatic, normocephalic, external ears normal, oropharynx moist, no pharyngeal exudates. Respiratory:  No respiratory distress  GI:  Soft, nondistended, normal bowel sounds, nontender, noorganomegaly, no mass, no rebound, no guarding   :  No costovertebral angle tenderness   Integument:  Well hydrated, no rash or telangiectasias  Lymphatic:  No lymphadenopathy noted   Neurologic:   Alert & oriented x 3, CN 2-12 normal, no focal deficits noted. Sensations Intact. Muscle strength 5/5 proximally and distally in upper and lower extremities.    Psychiatric:  Speech and behavior appropriate           LABS AND IMAGING  Outside data reviewed and in HPI    Lab Results   Component Value Date    WBC 3.4 03/28/2022    RBC 4.18 03/28/2022    HGB 12.0 03/28/2022    HCT 37.1 03/28/2022     03/28/2022    MCV 88.8 03/28/2022    MCH 28.6 03/28/2022    MCHC 32.2 03/28/2022    RDW 14.3 03/28/2022    SEGSPCT 56.0 06/15/2013    BANDSPCT 5 10/09/2014    LYMPHOPCT 32.8 03/28/2022    MONOPCT 7.3 03/28/2022    EOSPCT 5.0 12/24/2011    BASOPCT 1.1 03/28/2022    MONOSABS 0.2 03/28/2022    LYMPHSABS 1.1 03/28/2022    EOSABS 0.4 03/28/2022    BASOSABS 0.0 03/28/2022    DIFFTYPE Manual-K 06/15/2013       Chemistry        Component Value Date/Time     03/28/2022 1002    K 4.2 03/28/2022 1002    K 4.1 05/25/2021 0357     03/28/2022 1002    CO2 22 03/28/2022 1002    BUN 14 03/28/2022 1002    CREATININE 0.9 03/28/2022 1002        Component Value Date/Time    CALCIUM 9.9 03/28/2022 1002    ALKPHOS 143 (H) 03/28/2022 1002    AST 18 03/28/2022 1002    ALT 16 03/28/2022 1002    BILITOT <0.2 03/28/2022 1002          Lab Results   Component Value Date    SEDRATE 85 (H) 04/28/2022     Lab Results   Component Value Date    CRP 0.5 09/22/2014     No results found for: LISA, GUY, SSA, SSB, C3, C4  No results found for: RF, CCPABIGG  No results found for: LISA, ANATITER, ANAINT, PATH  No results found for: DSDNAG, DSDNAIGGIFA  No results found for: SSAROAB, SSALAAB  No results found for: SMAB, RNPAB  No results found for: CENTABIGG  No results found for: C3, C4, ACE  Lab Results   Component Value Date    VITD25 33.1 03/28/2022     No results found for: Joseph Borges  No results found for: Luis Fernando Kat  Lab Results   Component Value Date    TSH 3.35 03/28/2022     Lab Results   Component Value Date    VITD25 33.1 03/28/2022       Radiology:        Time Spent With Patient:  Time spent with patient: 60 minutes  Time spent discussing diagnosis, management, and treatment plan: > 30 minutes      ######################################################################    I thank you for giving me theopportunity to participate in [address] [place] [address],[address] [facility]. If you have any questions or concerns please feel free to contact me. I look forward to following  [place] along with you. Electronically signed by: Meeta Merida MD, MD, 4/29/2022 11:33 AM    Documentation was done using voice recognition dragon software.   Every effort was made to ensure accuracy;however, inadvertent unintentional computerized transcription errors may be present.

## 2022-05-05 DIAGNOSIS — E55.9 VITAMIN D DEFICIENCY: ICD-10-CM

## 2022-05-05 DIAGNOSIS — G89.29 CHRONIC INTRACTABLE HEADACHE, UNSPECIFIED HEADACHE TYPE: ICD-10-CM

## 2022-05-05 DIAGNOSIS — Z11.59 ENCOUNTER FOR SCREENING FOR OTHER VIRAL DISEASES: ICD-10-CM

## 2022-05-05 DIAGNOSIS — R51.9 CHRONIC INTRACTABLE HEADACHE, UNSPECIFIED HEADACHE TYPE: ICD-10-CM

## 2022-05-05 DIAGNOSIS — F41.9 ANXIETY: ICD-10-CM

## 2022-05-05 DIAGNOSIS — R70.0 ELEVATED SED RATE: ICD-10-CM

## 2022-05-05 LAB
A/G RATIO: 1.6 (ref 1.1–2.2)
ALBUMIN SERPL-MCNC: 4.3 G/DL (ref 3.4–5)
ALP BLD-CCNC: 152 U/L (ref 40–129)
ALT SERPL-CCNC: 29 U/L (ref 10–40)
ANION GAP SERPL CALCULATED.3IONS-SCNC: 16 MMOL/L (ref 3–16)
AST SERPL-CCNC: 18 U/L (ref 15–37)
BASOPHILS ABSOLUTE: 0 K/UL (ref 0–0.2)
BASOPHILS RELATIVE PERCENT: 0.5 %
BILIRUB SERPL-MCNC: 0.3 MG/DL (ref 0–1)
BUN BLDV-MCNC: 11 MG/DL (ref 7–20)
C-REACTIVE PROTEIN: 8.7 MG/L (ref 0–5.1)
CALCIUM SERPL-MCNC: 9.9 MG/DL (ref 8.3–10.6)
CHLORIDE BLD-SCNC: 105 MMOL/L (ref 99–110)
CO2: 23 MMOL/L (ref 21–32)
CREAT SERPL-MCNC: 1.1 MG/DL (ref 0.6–1.2)
EOSINOPHILS ABSOLUTE: 0.2 K/UL (ref 0–0.6)
EOSINOPHILS RELATIVE PERCENT: 5.2 %
GFR AFRICAN AMERICAN: >60
GFR NON-AFRICAN AMERICAN: 50
GLUCOSE BLD-MCNC: 85 MG/DL (ref 70–99)
HCT VFR BLD CALC: 34.6 % (ref 36–48)
HEMOGLOBIN: 11.1 G/DL (ref 12–16)
HEPATITIS B CORE IGM ANTIBODY: NORMAL
HEPATITIS B SURFACE ANTIGEN INTERPRETATION: NORMAL
HEPATITIS C ANTIBODY INTERPRETATION: NORMAL
LYMPHOCYTES ABSOLUTE: 1.1 K/UL (ref 1–5.1)
LYMPHOCYTES RELATIVE PERCENT: 26.6 %
MCH RBC QN AUTO: 28.7 PG (ref 26–34)
MCHC RBC AUTO-ENTMCNC: 32 G/DL (ref 31–36)
MCV RBC AUTO: 89.7 FL (ref 80–100)
MONOCYTES ABSOLUTE: 0.4 K/UL (ref 0–1.3)
MONOCYTES RELATIVE PERCENT: 8.8 %
NEUTROPHILS ABSOLUTE: 2.4 K/UL (ref 1.7–7.7)
NEUTROPHILS RELATIVE PERCENT: 58.9 %
PDW BLD-RTO: 14.6 % (ref 12.4–15.4)
PLATELET # BLD: 279 K/UL (ref 135–450)
PMV BLD AUTO: 8.3 FL (ref 5–10.5)
POTASSIUM SERPL-SCNC: 4.5 MMOL/L (ref 3.5–5.1)
RBC # BLD: 3.85 M/UL (ref 4–5.2)
RHEUMATOID FACTOR: 12 IU/ML
SEDIMENTATION RATE, ERYTHROCYTE: 87 MM/HR (ref 0–30)
SODIUM BLD-SCNC: 144 MMOL/L (ref 136–145)
TOTAL PROTEIN: 7 G/DL (ref 6.4–8.2)
TSH REFLEX FT4: 1 UIU/ML (ref 0.27–4.2)
VITAMIN B-12: 235 PG/ML (ref 211–911)
VITAMIN D 25-HYDROXY: 40 NG/ML
WBC # BLD: 4.1 K/UL (ref 4–11)

## 2022-05-05 NOTE — PROGRESS NOTES
Patient continues to notice facial pain primarily on the right side along with difficulty breathing through her nose. She has noted an odor in the nose as well. There has been no obvious fever although she is felt that way. She has tried Flonase nasal spray with no particular improvement. Symptoms have become somewhat worse over the past 1 week. Currently she appears to be in no obvious acute distress. Ear examination remains unremarkable. The oral examination is also unremarkable. The neck is free of any adenopathy, mass, thyroid enlargement. Examination of the nose on the left side reveals entirely normal picture. The septum appears to be in the midline and no mass lesion is noted. There is no obvious periodic drainage. On the right side, however, there does appear to be a mass located somewhat anterior to where one would expect to see the inferior turbinate. This is obstructing. It is not particularly painful and does not have any induration or obvious bleeding site. At this point, I am not sure at all what this represents. We're going to get a CT scan of the facial bones and facial area. I have discussed with her that it is quite possible she will need some surgical intervention to correct the problem dependent upon what we see. No change in therapy will be made at this time.
No

## 2022-05-06 LAB
ALBUMIN SERPL-MCNC: 3.1 G/DL (ref 3.1–4.9)
ALPHA-1-GLOBULIN: 0.3 G/DL (ref 0.2–0.4)
ALPHA-2-GLOBULIN: 1 G/DL (ref 0.4–1.1)
ANTI-SS-A IGG: >8 AI (ref 0–0.9)
ANTI-SS-B IGG: 7.9 AI (ref 0–0.9)
BETA GLOBULIN: 1.1 G/DL (ref 0.9–1.6)
CYCLIC CITRULLINATED PEPTIDE ANTIBODY IGG: <0.5 U/ML (ref 0–2.9)
GAMMA GLOBULIN: 1.5 G/DL (ref 0.6–1.8)
KAPPA, FREE LIGHT CHAINS, SERUM: 29.69 MG/L (ref 3.3–19.4)
KAPPA/LAMBDA RATIO: 1.18 (ref 0.26–1.65)
KAPPA/LAMBDA TEST COMMENT: ABNORMAL
LAMBDA, FREE LIGHT CHAINS, SERUM: 25.1 MG/L (ref 5.71–26.3)
SPE/IFE INTERPRETATION: NORMAL

## 2022-05-11 ENCOUNTER — TELEPHONE (OUTPATIENT)
Dept: PRIMARY CARE CLINIC | Age: 67
End: 2022-05-11

## 2022-05-11 LAB
ANA PATTERN: ABNORMAL
ANA TITER: ABNORMAL
ANTINUCLEAR AB INTERPRETIVE COMMENT: ABNORMAL
ANTINUCLEAR ANTIBODY, HEP-2, IGG: DETECTED
CYTOPLASM PATTERN: ABNORMAL
CYTOPLASMIC PATTERN TITER: ABNORMAL

## 2022-05-11 NOTE — TELEPHONE ENCOUNTER
Pt states her headaches are getting worse, the medication given has not been helping. Pt has an MRI Monday.   She has not been getting any sleep and has no energy would like to know if she can have sleep medication and also an alternative headache medicine

## 2022-05-11 NOTE — TELEPHONE ENCOUNTER
----- Message from Chepe Camargo sent at 5/11/2022  3:01 PM EDT -----  Subject: Message to Provider    QUESTIONS  Information for Provider? Patient request a call back from the staff name   Lulú) as soon as possioble about her health.  ---------------------------------------------------------------------------  --------------  [address]  What is the best way for the office to contact you? OK to leave message on   voicemail  Preferred Call Back Phone Number?  [phone]  ---------------------------------------------------------------------------  --------------  SCRIPT ANSWERS  undefined

## 2022-05-12 DIAGNOSIS — G47.09 OTHER INSOMNIA: Primary | ICD-10-CM

## 2022-05-12 RX ORDER — SUMATRIPTAN 50 MG/1
50 TABLET, FILM COATED ORAL
Qty: 9 TABLET | Refills: 3 | Status: SHIPPED | OUTPATIENT
Start: 2022-05-12 | End: 2022-06-10 | Stop reason: ALTCHOICE

## 2022-05-12 RX ORDER — ZOLPIDEM TARTRATE 5 MG/1
5 TABLET ORAL NIGHTLY PRN
Qty: 30 TABLET | Refills: 0 | Status: SHIPPED | OUTPATIENT
Start: 2022-05-12 | End: 2022-10-05 | Stop reason: SDUPTHER

## 2022-05-16 ENCOUNTER — HOSPITAL ENCOUNTER (OUTPATIENT)
Dept: MRI IMAGING | Age: 67
Discharge: HOME | End: 2022-05-16
Payer: MEDICARE

## 2022-05-16 DIAGNOSIS — R51.9 CHRONIC INTRACTABLE HEADACHE, UNSPECIFIED HEADACHE TYPE: ICD-10-CM

## 2022-05-16 DIAGNOSIS — G89.29 CHRONIC INTRACTABLE HEADACHE, UNSPECIFIED HEADACHE TYPE: ICD-10-CM

## 2022-05-16 DIAGNOSIS — R42 DIZZINESS: ICD-10-CM

## 2022-05-16 DIAGNOSIS — R94.113 ABNORMAL ENG (ELECTRONYSTAGMOGRAM): ICD-10-CM

## 2022-05-16 PROCEDURE — A9577 INJ MULTIHANCE: HCPCS | Performed by: OTOLARYNGOLOGY

## 2022-05-16 PROCEDURE — 2580000003 HC RX 258: Performed by: OTOLARYNGOLOGY

## 2022-05-16 PROCEDURE — 6360000004 HC RX CONTRAST MEDICATION: Performed by: OTOLARYNGOLOGY

## 2022-05-16 PROCEDURE — 72141 MRI NECK SPINE W/O DYE: CPT

## 2022-05-16 PROCEDURE — 70553 MRI BRAIN STEM W/O & W/DYE: CPT

## 2022-05-16 RX ORDER — SODIUM CHLORIDE 0.9 % (FLUSH) 0.9 %
10 SYRINGE (ML) INJECTION ONCE
Status: COMPLETED | OUTPATIENT
Start: 2022-05-16 | End: 2022-05-16

## 2022-05-16 RX ADMIN — Medication 10 ML: at 08:52

## 2022-05-16 RX ADMIN — GADOBENATE DIMEGLUMINE 18 ML: 529 INJECTION, SOLUTION INTRAVENOUS at 08:48

## 2022-05-24 PROBLEM — N18.30 CHRONIC RENAL DISEASE, STAGE III (HCC): Status: ACTIVE | Noted: 2022-05-24

## 2022-05-27 NOTE — RESULT ENCOUNTER NOTE
Please call and let her know that we will discuss in detail at her next follow-up appointment.   She is scheduled to see us on Alisha 10

## 2022-06-02 NOTE — TELEPHONE ENCOUNTER
Why does he not prescribe [Normal Appearance] : normal appearance [General Appearance - In No Acute Distress] : no acute distress [Oriented To Time, Place, And Person] : oriented to person, place, and time [Normal Station and Gait] : the gait and station were normal for the patient's age [FreeTextEntry1] : normal peripheral circulation

## 2022-06-10 ENCOUNTER — OFFICE VISIT (OUTPATIENT)
Dept: RHEUMATOLOGY | Age: 67
End: 2022-06-10
Payer: MEDICARE

## 2022-06-10 VITALS
WEIGHT: 240.2 LBS | DIASTOLIC BLOOD PRESSURE: 82 MMHG | TEMPERATURE: 97.2 F | SYSTOLIC BLOOD PRESSURE: 120 MMHG | BODY MASS INDEX: 37.62 KG/M2

## 2022-06-10 DIAGNOSIS — G89.29 CHRONIC INTRACTABLE HEADACHE, UNSPECIFIED HEADACHE TYPE: ICD-10-CM

## 2022-06-10 DIAGNOSIS — R70.0 ELEVATED SED RATE: ICD-10-CM

## 2022-06-10 DIAGNOSIS — R51.9 CHRONIC INTRACTABLE HEADACHE, UNSPECIFIED HEADACHE TYPE: ICD-10-CM

## 2022-06-10 DIAGNOSIS — J30.9 ALLERGIC SINUSITIS: ICD-10-CM

## 2022-06-10 DIAGNOSIS — M35.01 SJOGREN'S SYNDROME WITH KERATOCONJUNCTIVITIS SICCA (HCC): Primary | ICD-10-CM

## 2022-06-10 PROCEDURE — G8427 DOCREV CUR MEDS BY ELIG CLIN: HCPCS | Performed by: INTERNAL MEDICINE

## 2022-06-10 PROCEDURE — 99214 OFFICE O/P EST MOD 30 MIN: CPT | Performed by: INTERNAL MEDICINE

## 2022-06-10 PROCEDURE — G9899 SCRN MAM PERF RSLTS DOC: HCPCS | Performed by: INTERNAL MEDICINE

## 2022-06-10 PROCEDURE — 1036F TOBACCO NON-USER: CPT | Performed by: INTERNAL MEDICINE

## 2022-06-10 PROCEDURE — G8417 CALC BMI ABV UP PARAM F/U: HCPCS | Performed by: INTERNAL MEDICINE

## 2022-06-10 PROCEDURE — 3017F COLORECTAL CA SCREEN DOC REV: CPT | Performed by: INTERNAL MEDICINE

## 2022-06-10 PROCEDURE — 1090F PRES/ABSN URINE INCON ASSESS: CPT | Performed by: INTERNAL MEDICINE

## 2022-06-10 PROCEDURE — 1123F ACP DISCUSS/DSCN MKR DOCD: CPT | Performed by: INTERNAL MEDICINE

## 2022-06-10 PROCEDURE — G8399 PT W/DXA RESULTS DOCUMENT: HCPCS | Performed by: INTERNAL MEDICINE

## 2022-06-10 RX ORDER — NIFEDIPINE 60 MG/1
TABLET, EXTENDED RELEASE ORAL
COMMUNITY
Start: 2022-05-22 | End: 2022-08-03

## 2022-06-10 RX ORDER — FLUTICASONE PROPIONATE 50 MCG
SPRAY, SUSPENSION (ML) NASAL
Qty: 48 G | Refills: 3 | Status: SHIPPED | OUTPATIENT
Start: 2022-06-10 | End: 2022-10-10

## 2022-06-10 RX ORDER — MIRABEGRON 25 MG/1
TABLET, FILM COATED, EXTENDED RELEASE ORAL
COMMUNITY
Start: 2022-06-08

## 2022-06-10 NOTE — PROGRESS NOTES
6/10/2022  Patient Name: Kristen Lu  : 1955  Medical Record: [id number]      ASSESSMENT AND PLAN    Assessment/Plan:      ASSESSMENT:    1. Sjogren's syndrome with keratoconjunctivitis sicca ([facility]    2. Chronic intractable headache, unspecified headache type    3. Elevated sed rate        PLAN:     Glenarm was seen today for follow-up. Diagnoses and all orders for this visit:    Sjogren's syndrome with keratoconjunctivitis sicca ([facility]  -     Anti-DNA Antibody, Double-Stranded; Future  -     Beta-2 Glycoprotein Antibodies; Future  -     C3 Complement; Future  -     C4 Complement; Future  -     Cardiolipin Antibodies IgG & IgM; Future  -     Cardiolipin Antibody, IgA; Future  -     Lupus Anticoagulant; Future  -     Anti- Smith; Future  -     Ribonucleic Protein Antibody; Future  -     Anti-Scleroderma Antibody; Future  -     Anti-Centromere B; Future  -     C-Reactive Protein; Future  -     Sedimentation Rate; Future  -     Immunofixation serum profile; Future  -     IgG, IgA, IgM; Future  -     Comprehensive Metabolic Panel; Future    Chronic intractable headache, unspecified headache type    Elevated sed rate  -     C-Reactive Protein; Future  -     Sedimentation Rate; Future    Sjogren's syndrome-positive LISA 1: 1280 nuclear dot pattern, positive SSA and SSB antibody. SPEP no evidence of monoclonal gammopathy. RF, CCP is negative. Elevated ESR. Dry mouth and intermittent dry eyes  Most likely Sjogren's  I would recommend Biotene mouthwash, frequent sips of water and sugar-free gum for dry mouth  Artificial teardrops for dry eyes  /Increased risk of lymphoma was explained-  I will also check additional serologies to complete the work-up    Chronic intractable headache-with lightheadedness and dizziness  No focal symptoms. No stiffness around shoulders or hips. No vision changes, jaw pain or extremity weakness.   No significant improvement on prednisone  Persistently elevated ESR less likely due to temporal arteritis   Unlikely temporal arteritis  MRI of the brain no acute changes or intracranial pathology  Less likely related to connective tissue disease or systemic rheumatic disease  Advised to continue follow-up with neurology for further management      Elevated ESR-no fever, weight loss, swollen glands. No history of malignancy. Less likely temporal arteritis  SPEP no evidence of monoclonal gammopathy  Possibly due to Sjogren's, I will continue to monitor periodically  She is up-to-date with Pap smear, mammogram and colonoscopy          The patient indicates understanding of these issues and agrees with the plan. Return in about 3 months (around 9/10/2022). The risks and benefits of my recommendations, as well as other treatment options, benefits and side effects werediscussed with the patient. All questions were answered. I reviewed patient's history, referral documents and electronic medical records  Copy of consult note is being routedelectronically/faxed to referring physician         MEDICATIONS  Current Outpatient Medications   Medication Sig Dispense Refill    NIFEdipine (PROCARDIA XL) 60 MG extended release tablet TAKE 1 TABLET BY MOUTH DAILY      MYRBETRIQ 25 MG TB24       zolpidem (AMBIEN) 5 MG tablet Take 1 tablet by mouth nightly as needed for Sleep for up to 30 days.  30 tablet 0    butalbital-acetaminophen-caffeine (FIORICET, ESGIC) -40 MG per tablet Take 1 tablet by mouth 3 times daily as needed for Headaches 90 tablet 3    losartan (COZAAR) 100 MG tablet TAKE 1 TABLET BY MOUTH DAILY 90 tablet 3    atorvastatin (LIPITOR) 20 MG tablet Take 1 tablet by mouth daily 90 tablet 3    NIFEdipine (PROCARDIA XL) 90 MG extended release tablet Take 1 tablet by mouth daily 90 tablet 1    labetalol (NORMODYNE) 300 MG tablet Take 1 tablet by mouth 2 times daily 180 tablet 3    hydrALAZINE (APRESOLINE) 50 MG tablet Take 1 tablet by mouth in the morning and at bedtime 60 tablet 3    NEXIUM 40 MG delayed release capsule TAKE 1 CAPSULE BY MOUTH DAILY 90 capsule 1    solifenacin (VESICARE) 10 MG tablet Take 1 tablet by mouth daily 90 tablet 1    fluticasone (FLOVENT HFA) 220 MCG/ACT inhaler Inhale 2 puffs into the lungs 2 times daily 12 g 5    albuterol sulfate  (90 Base) MCG/ACT inhaler Inhale 2 puffs into the lungs every 6 hours as needed for Wheezing 18 g 5    vitamin D (ERGOCALCIFEROL) 1.25 MG (48474 UT) CAPS capsule Take 50,000 Units by mouth once a week      ferrous sulfate (IRON 325) 325 (65 Fe) MG tablet Take 1 tablet by mouth daily (with breakfast) 90 tablet 3    fluticasone (FLONASE) 50 MCG/ACT nasal spray SHAKE LIQUID AND USE 2 SPRAYS IN EACH NOSTRIL DAILY 48 g 3     No current facility-administered medications for this visit. ALLERGIES  Allergies   Allergen Reactions    Dye [Barium-Containing Compounds]     Furosemide     Gabapentin Other (See Comments)     Memory Loss    Protonix [Pantoprazole Sodium] Shortness Of Breath and Rash    Tramadol Itching    Protonix [Pantoprazole]      Other reaction(s): Unknown    Tegaderm Alginate Ag Rope     Amoxicillin     Aspirin     Augmentin [Amoxicillin-Pot Clavulanate]     Betadine [Povidone Iodine] Other (See Comments)     welts    Chlorhexidine Gluconate Other (See Comments)     Welts from ChloraPrep    Morphine Itching    Other      chlorahexidine - hives    Pancrelipase (Lip-Prot-Amyl)      Other reaction(s): Unknown    Toradol [Ketorolac Tromethamine]     Iodides Rash    Ranitidine Nausea And Vomiting    Tape [Adhesive Tape] Rash     teradon tape also  Paper tape ok          Comments  No specialty comments available.       Background history:  Alejandrina Tucker is a 74 y.o. female with past medical history of hypertension, anxiety, degenerative disc disease in the cervical spine and lumbar spine, osteoarthritis status post bilateral knee replacements who is being seen for follow up evaluation of headache and elevated ESR. Patient is a vague historian. She has chronic headache for several years which has gotten worse over the past 2 months. She has headache on daily basis located on the right side of the head. She describes her pain as excruciating. She denies sudden loss of vision, jaw pain, extremity weakness, stiffness around shoulders or hips. She denies diplopia, dysarthria, focal weakness, seizures. She denies photophobia, phonophobia. Headache is associated with dizziness/lightheadedness and vertigo. She denies fever, weight loss or swollen glands. She went to see her PCP. She was found to have elevated ESR of 83. She was put on prednisone for possible temporal arteritis. She is currently on prednisone 60 mg daily for past 2 weeks without any improvement in headaches. Repeat ESR is 85. She denies joint pain, swelling or stiffness. She has a history of bilateral knee replacement secondary to osteoarthritis. She also had 2 neck surgeries for cervical stenosis and right shoulder arthroscopic surgery for rotator cuff tear. She denies malar rash, photosensitivity, oral/nasal ulcers, dry eyes, dry mouth, history of miscarriages, Raynaud's, pregnancy complications, blood clots, pleurisy or pericarditis, sclerodactyly. She is up-to-date with Pap smear, mammogram and colonoscopy. Interim history: She presents for follow-up of chronic headaches, elevated ESR. She continues to have chronic headache. It has been ongoing for past several years. Pain is mainly located on the right side of the head. She denies any loss of vision, jaw pain, extremity weakness, stiffness around shoulders and hips. She was placed on prednisone for possible temporal arteritis without any improvement. she had MRI of the brain which was normal without any intra cranial pathology or acute changes. She follows neurology and ENT. She has dry mouth and intermittent dry eyes.   Blood work showed positive LISA with elevated SSA and SSB antibody. SPEP no evidence of monoclonal gammopathy. RF, CCP, hepatitis panel is negative. She denies alopecia, malar rash, photosensitivity, oral/nasal ulcers, kidney disease, blood clots, pleurisy or pericarditis, sclerodactyly or skin thickening, Raynaud's, history of miscarriages or pregnancy complications, sclerodactyly or skin thickening. She denies fever, weight loss, night sweats or swollen glands. She also has elevated ESR. SPEP did not show any evidence of monoclonal gammopathy. She is up-to-date with Pap smear, mammogram and cancer screening. ESR remains elevated even on high-dose prednisone. HPI  Review of Systems    REVIEW OF SYSTEMS: Tingling and numbness in the right upper extremity, headaches, dizziness, GERD  Constitutional: No unanticipated weight loss or fevers. Integumentary: No rash, photosensitivity, malar rash, livedo reticularis, alopecia and Raynaud's symptoms, sclerodactyly, skin tightening  Eyes: negative for visual disturbance and persistent redness, discharge from eyes   ENT: - No tinnitus, loss of hearing, vertigo, or recurrent ear infections.  - No history of nasal/oral ulcers. - No history of dry eyes/dry mouth  Cardiovascular: No history of pericarditis, chest pain or murmur or palpitations  Respiratory: No shortness of breath, cough or history of interstitial lung disease. No history of pleurisy. No history of tuberculosis or atypical infections. Gastrointestinal: No history of dysphagia or esophageal dysmotility. No change in bowel habits or any inflammatory bowel disease. Genitourinary: No history of renal disease, miscarriages. Hematologic/Lymphatic: No abnormal bruising or bleeding, blood clots or swollen lymph nodes. Neurological: No history of seizure or focal weakness.  No history of neuropathies,hyperesthesias, facial droop, diplopia  Psychiatric: No history of bipolar disease  Endocrine: Denies any polyuria, polydipsia and osteoporosis  Allergic/Immunologic: No nasal congestion or hives. I have reviewed patients Past medical History, Social History and Family History as mentioned in her chart and this remains unchanged fromprevious. Past Medical History:   Diagnosis Date    Anxiety state     Asthma     Back ache     chronic    Environmental allergies     GERD (gastroesophageal reflux disease)     Hypertension     MVP (mitral valve prolapse)     Neck pain, chronic     Reflux     Sleep apnea      Past Surgical History:   Procedure Laterality Date    BACK SURGERY      BLADDER SUSPENSION      CHOLECYSTECTOMY      FOOT SURGERY  11-9-12    endoscopic plantar fasciotomy right foot    GASTRIC BYPASS SURGERY      HERNIA REPAIR      HIATAL HERNIA REPAIR      HYSTERECTOMY (CERVIX STATUS UNKNOWN)      JOINT REPLACEMENT Bilateral     KNEE    KNEE ARTHROPLASTY  2/28/13    L    KNEE ARTHROSCOPY Right 3/7/2014    LAMINECTOMY  7/1/11    bilateral L5-S1 laminotomy, nerve root exploration, foraminotomy    NASAL SINUS SURGERY Right 02/05/2018    Excision of rt nasal mass    NV COLONOSCOPY FLX DX W/COLLJ SPEC WHEN PFRMD N/A 9/28/2018    COLONOSCOPY DIAGNOSTIC OR SCREENING performed by Gulile Howard MD at [address] [place] [address] DX/THER AGNT PARAVERT FACET JOINT, LUMBAR/SAC, 1ST LEVEL Right 11/6/2018    RIGHT L4,L5 S1 MEDIAL BRANCH BLOCK WITH FLUOROSCOPY performed by Sandy Arrieta MD at [address] [address], PARTIAL      TONSILLECTOMY      UPPER GASTROINTESTINAL ENDOSCOPY      with dilatation    UPPER GASTROINTESTINAL ENDOSCOPY N/A 8/14/2019    EGD ESOPHAGOGASTRODUODENOSCOPY performed by Guille Howard MD at [address] [facility] 5/25/2021    [facility] [address] [facility] Marital status:       Spouse name: Not on file    Number of children: Not on file    Years of education: Not on file    Highest education level: Not on file   Occupational History    Not on file   Tobacco Use    Smoking status: Former Smoker     Packs/day: 0.25     Years: 2.00     Pack years: 0.50     Quit date: 2004     Years since quittin.4    Smokeless tobacco: Never Used    Tobacco comment: [id number]   Vaping Use    Vaping Use: Never used   Substance and Sexual Activity    Alcohol use: No    Drug use: No    Sexual activity: Yes     Partners: Male   Other Topics Concern    Not on file   Social History Narrative    Not on file     Social Determinants of Health     Financial Resource Strain: Unknown    Difficulty of Paying Living Expenses: Patient refused   Food Insecurity: No Food Insecurity    Worried About Running Out of Food in the Last Year: Never true    Larry of Food in the Last Year: Never true   Transportation Needs:     Lack of Transportation (Medical): Not on file    Lack of Transportation (Non-Medical):  Not on file   Physical Activity:     Days of Exercise per Week: Not on file    Minutes of Exercise per Session: Not on file   Stress:     Feeling of Stress : Not on file   Social Connections:     Frequency of Communication with Friends and Family: Not on file    Frequency of Social Gatherings with Friends and Family: Not on file    Attends Shinto Services: Not on file    Active Member of [address] [place] [address] [employer] or Organizations: Not on file    Attends Club or Organization Meetings: Not on file    Marital Status: Not on file   Intimate Partner Violence:     Fear of Current or Ex-Partner: Not on file    Emotionally Abused: Not on file    Physically Abused: Not on file    Sexually Abused: Not on file   Housing Stability:     Unable to Pay for Housing in the Last Year: Not on file    Number of [place] in the Last Year: Not on file    Unstable Housing in the Last Year: Not on file     Family History   Problem Relation Age of Onset    High Blood Pressure Mother     Heart Disease Mother     Other Mother         glaucoma    High Blood Pressure Father     Heart Disease Father     Other Father         intestional problems    Cancer Sister         cancer    Kidney Disease Sister     Kidney Disease Brother     High Blood Pressure Brother     Other Sister         lupus,fibromyalgia  thyroid surgery    High Blood Pressure Sister     Kidney Disease Sister     Heart Disease Sister     Kidney Disease Brother     High Blood Pressure Brother     Anesth Problems Neg Hx     Malig Hyperten Neg Hx     Hypotension Neg Hx     Malig Hypertherm Neg Hx     Pseudochol. Deficiency Neg Hx          PHYSICAL EXAM   Vitals:    06/10/22 0918   BP: 120/82   Site: Right Upper Arm   Position: Sitting   Cuff Size: Large Adult   Temp: 97.2 °F (36.2 °C)   TempSrc: Infrared   Weight: 240 lb 3.2 oz (109 kg)     Physical Exam  Constitutional:  Well developed, well nourished, no acute distress, non-toxic appearance   Musculoskeletal:    Ambulates without assistance, normal gait  Neck: Full ROM, no tenderness,supple   Upper Extremities        Shoulder: Full ROM, no crepitus        Elbow:  Full ROM, no synovitis, no deformity        Wrist: Full ROM, no synovitis, no deformity, no ulnar deviation        Fingers: No sclerodactly, no active raynaud's, no ulcers. MCPs: No synovitis, no deformity             PIPs:  No synovitis, no deformity             DIPs: No synovitis, no deformity             Nails: No pitting, no telangiectasias. Lower Extremities        Hip: Full ROM, no tenderness to palpation        Knee: Bilateral knee replacement        Ankle: Full ROM, no swelling or erythema        MTPs: No swelling or erythema  Back- no tenderness. Eyes:  PERRL, extra ocular movements intact, conjunctiva normal   HEENT:  Atraumatic, normocephalic, external ears normal, oropharynx dry, no pharyngeal exudates. No parotid gland enlargement or lymphadenopathy. Bilateral temporal area tenderness.   Temporal artery is not palpable  Respiratory:  No respiratory distress  GI:  Soft, nondistended, normal bowel sounds, nontender, noorganomegaly, no mass, no rebound, no guarding   :  No costovertebral angle tenderness   Integument:  Well hydrated, no rash or telangiectasias  Lymphatic:  No lymphadenopathy noted   Neurologic:   Alert & oriented x 3, CN 2-12 normal, no focal deficits noted. Sensations Intact. Muscle strength 5/5 proximally and distally in upper and lower extremities.    Psychiatric:  Speech and behavior appropriate           LABS AND IMAGING  Outside data reviewed and in HPI    Lab Results   Component Value Date    WBC 4.1 05/05/2022    RBC 3.85 05/05/2022    HGB 11.1 05/05/2022    HCT 34.6 05/05/2022     05/05/2022    MCV 89.7 05/05/2022    MCH 28.7 05/05/2022    MCHC 32.0 05/05/2022    RDW 14.6 05/05/2022    SEGSPCT 56.0 06/15/2013    BANDSPCT 5 10/09/2014    LYMPHOPCT 26.6 05/05/2022    MONOPCT 8.8 05/05/2022    EOSPCT 5.0 12/24/2011    BASOPCT 0.5 05/05/2022    MONOSABS 0.4 05/05/2022    LYMPHSABS 1.1 05/05/2022    EOSABS 0.2 05/05/2022    BASOSABS 0.0 05/05/2022    DIFFTYPE Manual-K 06/15/2013       Chemistry        Component Value Date/Time     05/05/2022 1324    K 4.5 05/05/2022 1324    K 4.1 05/25/2021 0357     05/05/2022 1324    CO2 23 05/05/2022 1324    BUN 11 05/05/2022 1324    CREATININE 1.1 05/05/2022 1324        Component Value Date/Time    CALCIUM 9.9 05/05/2022 1324    ALKPHOS 152 (H) 05/05/2022 1324    AST 18 05/05/2022 1324    ALT 29 05/05/2022 1324    BILITOT 0.3 05/05/2022 1324          Lab Results   Component Value Date    SEDRATE 87 (H) 05/05/2022     Lab Results   Component Value Date    CRP 8.7 (H) 05/05/2022     No results found for: LISA, GUY, SSA, SSB, C3, C4  Lab Results   Component Value Date    RF 12.0 05/05/2022     Lab Results   Component Value Date    ANATITER >1:2560 05/05/2022     No results found for: DSDNAG, DSDNAIGGIFA  No results found for: SSAROAB, SSALAAB  No results found for: SMAB, RNPAB  No results found for: CENTABIGG  No results found for: C3, C4, ACE  Lab Results   Component Value Date    VITD25 40.0 05/05/2022     No results found for: Melissa Gottron  Lab Results   Component Value Date    SHTPSQVT36 235 05/05/2022     Lab Results   Component Value Date    TSHFT4 1.00 05/05/2022    TSH 3.35 03/28/2022     Lab Results   Component Value Date    VITD25 40.0 05/05/2022       Radiology: MRI posterior fossa 5/16/2022  1. No acute intracranial abnormality.  No acute infarct. 2. No abnormality seen within the cerebellopontine angle cisterns or internal   auditory canals. 3. Minimal chronic microvascular ischemic changes.                 ######################################################################    I thank you for giving me theopportunity to participate in [address] [place] [address],[address], & [address] [facility]. If you have any questions or concerns please feel free to contact me. I look forward to following  [place] along with you. Electronically signed by: Jenny Judd MD, MD, 6/10/2022 9:41 AM    Documentation was done using voice recognition dragon software. Every effort was made to ensure accuracy;however, inadvertent unintentional computerized transcription errors may be present.

## 2022-06-14 DIAGNOSIS — I10 ESSENTIAL HYPERTENSION: ICD-10-CM

## 2022-06-14 DIAGNOSIS — M35.01 SJOGREN'S SYNDROME WITH KERATOCONJUNCTIVITIS SICCA (HCC): ICD-10-CM

## 2022-06-14 DIAGNOSIS — R70.0 ELEVATED SED RATE: ICD-10-CM

## 2022-06-14 LAB
A/G RATIO: 1.4 (ref 1.1–2.2)
ALBUMIN SERPL-MCNC: 4.6 G/DL (ref 3.4–5)
ALP BLD-CCNC: 150 U/L (ref 40–129)
ALT SERPL-CCNC: 11 U/L (ref 10–40)
ANION GAP SERPL CALCULATED.3IONS-SCNC: 17 MMOL/L (ref 3–16)
AST SERPL-CCNC: 16 U/L (ref 15–37)
BILIRUB SERPL-MCNC: 0.3 MG/DL (ref 0–1)
BUN BLDV-MCNC: 16 MG/DL (ref 7–20)
C-REACTIVE PROTEIN: <3 MG/L (ref 0–5.1)
C3 COMPLEMENT: 151.2 MG/DL (ref 90–180)
C4 COMPLEMENT: 39.4 MG/DL (ref 10–40)
CALCIUM SERPL-MCNC: 10.3 MG/DL (ref 8.3–10.6)
CHLORIDE BLD-SCNC: 106 MMOL/L (ref 99–110)
CO2: 19 MMOL/L (ref 21–32)
CREAT SERPL-MCNC: 1.1 MG/DL (ref 0.6–1.2)
GFR AFRICAN AMERICAN: >60
GFR NON-AFRICAN AMERICAN: 50
GLUCOSE BLD-MCNC: 90 MG/DL (ref 70–99)
POTASSIUM SERPL-SCNC: 4.6 MMOL/L (ref 3.5–5.1)
SEDIMENTATION RATE, ERYTHROCYTE: 75 MM/HR (ref 0–30)
SODIUM BLD-SCNC: 142 MMOL/L (ref 136–145)
TOTAL PROTEIN: 7.9 G/DL (ref 6.4–8.2)

## 2022-06-15 LAB
ALBUMIN SERPL-MCNC: 3.3 G/DL (ref 3.1–4.9)
ALPHA-1-GLOBULIN: 0.3 G/DL (ref 0.2–0.4)
ALPHA-2-GLOBULIN: 1.2 G/DL (ref 0.4–1.1)
ANTI-CENTROMERE B IGG: <0.2 AI (ref 0–0.9)
ANTI-DSDNA IGG: 1 IU/ML (ref 0–9)
ANTI-RNP IGG: 0.2 AI (ref 0–0.9)
ANTI-SCL70 IGG: <0.2 AI (ref 0–0.9)
ANTI-SMITH IGG: <0.2 AI (ref 0–0.9)
BETA GLOBULIN: 1.2 G/DL (ref 0.9–1.6)
GAMMA GLOBULIN: 2 G/DL (ref 0.6–1.8)
IGA: 208 MG/DL (ref 70–400)
IGG: 1668 MG/DL (ref 700–1600)
IGM: 171 MG/DL (ref 40–230)

## 2022-06-16 LAB
ANTICARDIOLIPIN IGA ANTIBODY: <10 APL
ANTICARDIOLIPIN IGG ANTIBODY: <10 GPL
BETA-2 GLYCOPROTEIN 1 IGG ANTIBODY: <10 SGU
BETA-2 GLYCOPROTEIN 1 IGM ANTIBODY: <10 SMU
CARDIOLIPIN AB IGM: 15 MPL

## 2022-06-17 LAB — SPE/IFE INTERPRETATION: NORMAL

## 2022-06-19 LAB
DRVVT CONFIRMATION TEST: ABNORMAL RATIO
DRVVT SCREEN: 28 SEC (ref 33–44)
DRVVT,DIL: ABNORMAL SEC (ref 33–44)
HEXAGONAL PHOSPHOLIPID NEUTRALIZAT TEST: ABNORMAL
LUPUS ANTICOAG INTERP: ABNORMAL
PLT NEUTA: ABNORMAL
PT D: 12.4 SEC (ref 12–15.5)
PTT D: 36 SEC (ref 32–48)
PTT-D CORR REFLEX: ABNORMAL SEC (ref 32–48)
PTT-HEPARIN NEUTRALIZED: ABNORMAL SEC (ref 32–48)
REPTILASE TIME: ABNORMAL SEC
THROMBIN TIME: ABNORMAL SEC (ref 14.7–19.5)

## 2022-06-22 ENCOUNTER — TELEPHONE (OUTPATIENT)
Dept: RHEUMATOLOGY | Age: 67
End: 2022-06-22

## 2022-06-22 NOTE — TELEPHONE ENCOUNTER
Patient has follow up appt scheduled 7/14, but is having increased symptoms and is requesting a call back for a sooner appt.

## 2022-06-23 NOTE — TELEPHONE ENCOUNTER
Please call and find out what increased symptoms does she have?   If she is having worsening headache she has to see a neurologist.

## 2022-06-24 NOTE — TELEPHONE ENCOUNTER
States that her hair is shedding, skin is itching, bad cramps in fingers and dry mouth and fatigued. She states that she would like her results on labs from last week. Please advise.

## 2022-06-24 NOTE — TELEPHONE ENCOUNTER
Please call and let her know that additional labs are ordered are normal.  She is scheduled for July 14.   We will discuss treatment options at her next follow-up appointment

## 2022-06-30 ENCOUNTER — NURSE TRIAGE (OUTPATIENT)
Dept: OTHER | Facility: CLINIC | Age: 67
End: 2022-06-30

## 2022-06-30 ENCOUNTER — TELEPHONE (OUTPATIENT)
Dept: PRIMARY CARE CLINIC | Age: 67
End: 2022-06-30

## 2022-06-30 NOTE — TELEPHONE ENCOUNTER
----- Message from Fran Goldmann sent at 6/30/2022  2:57 PM EDT -----  Subject: Message to Provider    QUESTIONS  Information for Provider? patient had an evaluation by our NT [facility] for   cough , patient did not want to do her recommendation and be seen ,   patient wants some cough medicine called in for cough  ---------------------------------------------------------------------------  --------------  CALL BACK INFO  What is the best way for the office to contact you? Do not leave any   message, patient will call back for answer  Preferred Call Back Phone Number? [phone]  ---------------------------------------------------------------------------  --------------  SCRIPT ANSWERS  Relationship to Patient? Third Party  Third Party Type? Other  Other Third Party Type? [facility] NT  Representative Name?  [facility]

## 2022-07-01 ENCOUNTER — HOSPITAL ENCOUNTER (EMERGENCY)
Age: 67
Discharge: HOME | End: 2022-07-01
Attending: EMERGENCY MEDICINE
Payer: MEDICARE

## 2022-07-01 ENCOUNTER — APPOINTMENT (OUTPATIENT)
Dept: CT IMAGING | Age: 67
End: 2022-07-01
Payer: MEDICARE

## 2022-07-01 VITALS
BODY MASS INDEX: 37.39 KG/M2 | SYSTOLIC BLOOD PRESSURE: 110 MMHG | HEART RATE: 56 BPM | TEMPERATURE: 98 F | RESPIRATION RATE: 19 BRPM | WEIGHT: 238.76 LBS | OXYGEN SATURATION: 100 % | DIASTOLIC BLOOD PRESSURE: 76 MMHG

## 2022-07-01 DIAGNOSIS — R10.9 RIGHT SIDED ABDOMINAL PAIN: Primary | ICD-10-CM

## 2022-07-01 LAB
A/G RATIO: 1.1 (ref 1.1–2.2)
ALBUMIN SERPL-MCNC: 4 G/DL (ref 3.4–5)
ALP BLD-CCNC: 126 U/L (ref 40–129)
ALT SERPL-CCNC: 17 U/L (ref 10–40)
ANION GAP SERPL CALCULATED.3IONS-SCNC: 16 MMOL/L (ref 3–16)
AST SERPL-CCNC: 24 U/L (ref 15–37)
BASOPHILS ABSOLUTE: 0 K/UL (ref 0–0.2)
BASOPHILS RELATIVE PERCENT: 1.2 %
BILIRUB SERPL-MCNC: 0.3 MG/DL (ref 0–1)
BILIRUBIN URINE: NEGATIVE
BLOOD, URINE: NEGATIVE
BUN BLDV-MCNC: 13 MG/DL (ref 7–20)
CALCIUM SERPL-MCNC: 9.7 MG/DL (ref 8.3–10.6)
CHLORIDE BLD-SCNC: 110 MMOL/L (ref 99–110)
CLARITY: CLEAR
CO2: 15 MMOL/L (ref 21–32)
COLOR: YELLOW
CREAT SERPL-MCNC: 1 MG/DL (ref 0.6–1.2)
EOSINOPHILS ABSOLUTE: 0.4 K/UL (ref 0–0.6)
EOSINOPHILS RELATIVE PERCENT: 10.3 %
GFR AFRICAN AMERICAN: >60
GFR NON-AFRICAN AMERICAN: 56
GLUCOSE BLD-MCNC: 89 MG/DL (ref 70–99)
GLUCOSE URINE: NEGATIVE MG/DL
HCT VFR BLD CALC: 33.1 % (ref 36–48)
HEMOGLOBIN: 11 G/DL (ref 12–16)
KETONES, URINE: NEGATIVE MG/DL
LEUKOCYTE ESTERASE, URINE: NEGATIVE
LIPASE: 11 U/L (ref 13–60)
LYMPHOCYTES ABSOLUTE: 1.1 K/UL (ref 1–5.1)
LYMPHOCYTES RELATIVE PERCENT: 31.7 %
MCH RBC QN AUTO: 29.3 PG (ref 26–34)
MCHC RBC AUTO-ENTMCNC: 33.4 G/DL (ref 31–36)
MCV RBC AUTO: 87.8 FL (ref 80–100)
MICROSCOPIC EXAMINATION: NORMAL
MONOCYTES ABSOLUTE: 0.3 K/UL (ref 0–1.3)
MONOCYTES RELATIVE PERCENT: 7.8 %
NEUTROPHILS ABSOLUTE: 1.7 K/UL (ref 1.7–7.7)
NEUTROPHILS RELATIVE PERCENT: 49 %
NITRITE, URINE: NEGATIVE
PDW BLD-RTO: 13.7 % (ref 12.4–15.4)
PH UA: 5.5 (ref 5–8)
PLATELET # BLD: 232 K/UL (ref 135–450)
PLATELET SLIDE REVIEW: ADEQUATE
PMV BLD AUTO: 8.7 FL (ref 5–10.5)
POTASSIUM REFLEX MAGNESIUM: 4.1 MMOL/L (ref 3.5–5.1)
PROTEIN UA: NEGATIVE MG/DL
RBC # BLD: 3.77 M/UL (ref 4–5.2)
RBC # BLD: NORMAL 10*6/UL
SLIDE REVIEW: ABNORMAL
SODIUM BLD-SCNC: 141 MMOL/L (ref 136–145)
SPECIFIC GRAVITY UA: 1.02 (ref 1–1.03)
TOTAL PROTEIN: 7.6 G/DL (ref 6.4–8.2)
URINE REFLEX TO CULTURE: NORMAL
URINE TYPE: NORMAL
UROBILINOGEN, URINE: 0.2 E.U./DL
WBC # BLD: 3.5 K/UL (ref 4–11)

## 2022-07-01 PROCEDURE — 99284 EMERGENCY DEPT VISIT MOD MDM: CPT

## 2022-07-01 PROCEDURE — 80053 COMPREHEN METABOLIC PANEL: CPT

## 2022-07-01 PROCEDURE — 36415 COLL VENOUS BLD VENIPUNCTURE: CPT

## 2022-07-01 PROCEDURE — 85025 COMPLETE CBC W/AUTO DIFF WBC: CPT

## 2022-07-01 PROCEDURE — 81003 URINALYSIS AUTO W/O SCOPE: CPT

## 2022-07-01 PROCEDURE — 6370000000 HC RX 637 (ALT 250 FOR IP): Performed by: EMERGENCY MEDICINE

## 2022-07-01 PROCEDURE — 83690 ASSAY OF LIPASE: CPT

## 2022-07-01 PROCEDURE — 74176 CT ABD & PELVIS W/O CONTRAST: CPT

## 2022-07-01 RX ORDER — DICYCLOMINE HYDROCHLORIDE 10 MG/1
10 CAPSULE ORAL EVERY 6 HOURS PRN
Qty: 20 CAPSULE | Refills: 0 | Status: SHIPPED | OUTPATIENT
Start: 2022-07-01 | End: 2022-08-29 | Stop reason: ALTCHOICE

## 2022-07-01 RX ORDER — ONDANSETRON 4 MG/1
4 TABLET, ORALLY DISINTEGRATING ORAL ONCE
Status: COMPLETED | OUTPATIENT
Start: 2022-07-01 | End: 2022-07-01

## 2022-07-01 RX ORDER — DICYCLOMINE HYDROCHLORIDE 10 MG/1
10 CAPSULE ORAL
Status: DISCONTINUED | OUTPATIENT
Start: 2022-07-01 | End: 2022-07-01 | Stop reason: HOSPADM

## 2022-07-01 RX ADMIN — ONDANSETRON 4 MG: 4 TABLET, ORALLY DISINTEGRATING ORAL at 14:32

## 2022-07-01 RX ADMIN — DICYCLOMINE HYDROCHLORIDE 10 MG: 10 CAPSULE ORAL at 14:31

## 2022-07-01 ASSESSMENT — PAIN SCALES - GENERAL
PAINLEVEL_OUTOF10: 6
PAINLEVEL_OUTOF10: 8

## 2022-07-01 ASSESSMENT — PAIN DESCRIPTION - LOCATION: LOCATION: ABDOMEN

## 2022-07-01 ASSESSMENT — PAIN - FUNCTIONAL ASSESSMENT
PAIN_FUNCTIONAL_ASSESSMENT: 0-10
PAIN_FUNCTIONAL_ASSESSMENT: 0-10

## 2022-07-01 NOTE — ED PROVIDER NOTES
MD at [address] [place] [address] DX/THER AGNT PARAVERT FACET JOINT, LUMBAR/SAC, 1ST LEVEL Right 2018    RIGHT L4,L5 S1 MEDIAL BRANCH BLOCK WITH FLUOROSCOPY performed by Noreen Thomas MD at [address] [address], PARTIAL      TONSILLECTOMY      UPPER GASTROINTESTINAL ENDOSCOPY      with dilatation    UPPER GASTROINTESTINAL ENDOSCOPY N/A 2019    EGD ESOPHAGOGASTRODUODENOSCOPY performed by Montana Seaman MD at [facility] N/A 2021    EGD BIOPSY performed by Guillermo Agudelo MD at [facility]     Family History   Problem Relation Age of Onset    High Blood Pressure Mother     Heart Disease Mother     Other Mother         glaucoma    High Blood Pressure Father     Heart Disease Father     Other Father         intestional problems    Cancer Sister         cancer    Kidney Disease Sister     Kidney Disease Brother     High Blood Pressure Brother     Other Sister         lupus,fibromyalgia  thyroid surgery    High Blood Pressure Sister     Kidney Disease Sister     Heart Disease Sister     Kidney Disease Brother     High Blood Pressure Brother     Anesth Problems Neg Hx     Malig Hyperten Neg Hx     Hypotension Neg Hx     Malig Hypertherm Neg Hx     Pseudochol. Deficiency Neg Hx      Social History     Socioeconomic History    Marital status:       Spouse name: Not on file    Number of children: Not on file    Years of education: Not on file    Highest education level: Not on file   Occupational History    Not on file   Tobacco Use    Smoking status: Former Smoker     Packs/day: 0.25     Years: 2.00     Pack years: 0.50     Quit date: 2004     Years since quittin.5    Smokeless tobacco: Never Used    Tobacco comment: [id number]   Vaping Use    Vaping Use: Never used   Substance and Sexual Activity    Alcohol use: No    Drug use: No    Sexual activity: Yes     Partners: Male   Other Topics Concern    Not on file   Social History Narrative    Not on file     Social Determinants of Health     Financial Resource Strain: Unknown    Difficulty of Paying Living Expenses: Patient refused   Food Insecurity: No Food Insecurity    Worried About Running Out of Food in the Last Year: Never true    Larry of Food in the Last Year: Never true   Transportation Needs:     Lack of Transportation (Medical): Not on file    Lack of Transportation (Non-Medical):  Not on file   Physical Activity:     Days of Exercise per Week: Not on file    Minutes of Exercise per Session: Not on file   Stress:     Feeling of Stress : Not on file   Social Connections:     Frequency of Communication with Friends and Family: Not on file    Frequency of Social Gatherings with Friends and Family: Not on file    Attends Islam Services: Not on file    Active Member of [employer] Group or Organizations: Not on file    Attends Club or Organization Meetings: Not on file    Marital Status: Not on file   Intimate Partner Violence:     Fear of Current or Ex-Partner: Not on file    Emotionally Abused: Not on file    Physically Abused: Not on file    Sexually Abused: Not on file   Housing Stability:     Unable to Pay for Housing in the Last Year: Not on file    Number of [place] in the Last Year: Not on file    Unstable Housing in the Last Year: Not on file     Current Facility-Administered Medications   Medication Dose Route Frequency Provider Last Rate Last Admin    dicyclomine (BENTYL) capsule 10 mg  10 mg Oral TID [facility] Carmelo Sacks, MD   10 mg at 07/01/22 1431     Current Outpatient Medications   Medication Sig Dispense Refill    dicyclomine (BENTYL) 10 MG capsule Take 1 capsule by mouth every 6 hours as needed (cramps) 20 capsule 0    NIFEdipine (PROCARDIA XL) 60 MG extended release tablet TAKE 1 TABLET BY MOUTH DAILY      MYRBETRIQ 25 MG TB24       fluticasone (FLONASE) 50 MCG/ACT nasal spray SHAKE LIQUID AND USE 2 SPRAYS IN [facility] NOSTRIL DAILY 48 g 3    butalbital-acetaminophen-caffeine (FIORICET, ESGIC) -40 MG per tablet Take 1 tablet by mouth 3 times daily as needed for Headaches 90 tablet 3    losartan (COZAAR) 100 MG tablet TAKE 1 TABLET BY MOUTH DAILY 90 tablet 3    atorvastatin (LIPITOR) 20 MG tablet Take 1 tablet by mouth daily 90 tablet 3    NIFEdipine (PROCARDIA XL) 90 MG extended release tablet Take 1 tablet by mouth daily 90 tablet 1    labetalol (NORMODYNE) 300 MG tablet Take 1 tablet by mouth 2 times daily 180 tablet 3    hydrALAZINE (APRESOLINE) 50 MG tablet Take 1 tablet by mouth in the morning and at bedtime 60 tablet 3    NEXIUM 40 MG delayed release capsule TAKE 1 CAPSULE BY MOUTH DAILY 90 capsule 1    solifenacin (VESICARE) 10 MG tablet Take 1 tablet by mouth daily 90 tablet 1    fluticasone (FLOVENT HFA) 220 MCG/ACT inhaler Inhale 2 puffs into the lungs 2 times daily 12 g 5    albuterol sulfate  (90 Base) MCG/ACT inhaler Inhale 2 puffs into the lungs every 6 hours as needed for Wheezing 18 g 5    vitamin D (ERGOCALCIFEROL) 1.25 MG (52119 UT) CAPS capsule Take 50,000 Units by mouth once a week      ferrous sulfate (IRON 325) 325 (65 Fe) MG tablet Take 1 tablet by mouth daily (with breakfast) 90 tablet 3     Allergies   Allergen Reactions    Dye [Barium-Containing Compounds]     Furosemide     Gabapentin Other (See Comments)     Memory Loss    Protonix [Pantoprazole Sodium] Shortness Of Breath and Rash    Tramadol Itching    Protonix [Pantoprazole]      Other reaction(s): Unknown    Tegaderm Alginate Ag Rope     Amoxicillin     Aspirin     Augmentin [Amoxicillin-Pot Clavulanate]     Betadine [Povidone Iodine] Other (See Comments)     welts    Chlorhexidine Gluconate Other (See Comments)     Welts from ChloraPrep    Morphine Itching    Other      chlorahexidine - hives    Pancrelipase (Lip-Prot-Amyl)      Other reaction(s): Unknown    Toradol [Ketorolac Tromethamine]     Iodides Rash REVIEW Adequate     SLIDE REVIEW see below     Neutrophils % 49.0 %    Lymphocytes % 31.7 %    Monocytes % 7.8 %    Eosinophils % 10.3 %    Basophils % 1.2 %    Neutrophils Absolute 1.7 1.7 - 7.7 K/uL    Lymphocytes Absolute 1.1 1.0 - 5.1 K/uL    Monocytes Absolute 0.3 0.0 - 1.3 K/uL    Eosinophils Absolute 0.4 0.0 - 0.6 K/uL    Basophils Absolute 0.0 0.0 - 0.2 K/uL    RBC Morphology Normal    Comprehensive Metabolic Panel w/ Reflex to MG   Result Value Ref Range    Sodium 141 136 - 145 mmol/L    Potassium reflex Magnesium 4.1 3.5 - 5.1 mmol/L    Chloride 110 99 - 110 mmol/L    CO2 15 (L) 21 - 32 mmol/L    Anion Gap 16 3 - 16    Glucose 89 70 - 99 mg/dL    BUN 13 7 - 20 mg/dL    CREATININE 1.0 0.6 - 1.2 mg/dL    GFR Non- 56 (A) >60    GFR African American >60 >60    Calcium 9.7 8.3 - 10.6 mg/dL    Total Protein 7.6 6.4 - 8.2 g/dL    Albumin 4.0 3.4 - 5.0 g/dL    Albumin/Globulin Ratio 1.1 1.1 - 2.2    Total Bilirubin 0.3 0.0 - 1.0 mg/dL    Alkaline Phosphatase 126 40 - 129 U/L    ALT 17 10 - 40 U/L    AST 24 15 - 37 U/L   Lipase   Result Value Ref Range    Lipase 11.0 (L) 13.0 - 60.0 U/L   Urinalysis with Reflex to Culture    Specimen: Urine, clean catch   Result Value Ref Range    Color, UA Yellow Straw/Yellow    Clarity, UA Clear Clear    Glucose, Ur Negative Negative mg/dL    Bilirubin Urine Negative Negative    Ketones, Urine Negative Negative mg/dL    Specific Gravity, UA 1.016 1.005 - 1.030    Blood, Urine Negative Negative    pH, UA 5.5 5.0 - 8.0    Protein, UA Negative Negative mg/dL    Urobilinogen, Urine 0.2 <2.0 E.U./dL    Nitrite, Urine Negative Negative    Leukocyte Esterase, Urine Negative Negative    Microscopic Examination Not Indicated     Urine Type notgiven     Urine Reflex to Culture Not Indicated        RADIOLOGY  I have reviewed all radiographic studies for this visit.    CT ABDOMEN PELVIS WO CONTRAST Additional Contrast? None   Final Result   No CT evidence of acute intra-abdominal process. ED COURSE/MDM  Patient seen and evaluated. Old records reviewed. Labs and imaging reviewed and results discussed with patient/family to extent possible. 72-year-old female presents with right-sided abdominal pain, nausea, vomiting, and diarrhea. The patient is afebrile and nontoxic in appearance. Slightly hypertensive with otherwise reassuring vital signs. Abdominal exam is notable for mild diffuse right-sided abdominal pain. No rebound or guarding. Renal panel with bicarb 15. No anion gap elevation. This has been downtrending. CBC no leukocytosis or anemia. Urinalysis unremarkable. CT abdomen and pelvis nothing acute. Patient is status post cholecystectomy. Presentation is not consistent with bowel obstruction, bowel perforation, appendicitis, pyelonephritis, pancreatitis, aortic pathology, or ovarian pathology. Serial abdominal exams remain benign. We will discharged home with usual strict return precautions for abdominal pain. Close follow-up with PCP. Patient was informed of the bicarb results and trend and advised to discuss this with her PCP. She will benefit from trending of her renal panel. Usual strict return precautions for new or worsening symptoms communicated. Is this patient to be included in the SEP-1 Core Measure? No   Exclusion criteria - the patient is NOT to be included for SEP-1 Core Measure due to:  2+ SIRS criteria are not met    Mariann VITALE MD, am the primary clinician of record. During the patient's ED course, the patient was given:  Medications   dicyclomine (BENTYL) capsule 10 mg (10 mg Oral Given 7/1/22 1431)   ondansetron (ZOFRAN-ODT) disintegrating tablet 4 mg (4 mg Oral Given 7/1/22 1432)        All questions were answered and the patient/family expressed understanding and agreement with the plan. PROCEDURES  None    CRITICAL CARE  N/A    CLINICAL IMPRESSION  1.  Right sided abdominal pain DISPOSITION   discharge     Junito Sommers MD    Note: This chart was created using voice recognition dictation software. Efforts were made by me to ensure accuracy, however some errors may be present due to limitations of this technology and occasionally words are not transcribed correctly.         Junito Sommers MD  07/01/22 2067

## 2022-07-05 ENCOUNTER — CARE COORDINATION (OUTPATIENT)
Dept: CARE COORDINATION | Age: 67
End: 2022-07-05

## 2022-07-05 NOTE — CARE COORDINATION
Attempted initial Ed/Covid monitoring call. hipaa compliant message left on vm will attempt again at later date

## 2022-07-06 ENCOUNTER — TELEPHONE (OUTPATIENT)
Dept: ENT CLINIC | Age: 67
End: 2022-07-06

## 2022-07-06 ENCOUNTER — CARE COORDINATION (OUTPATIENT)
Dept: CARE COORDINATION | Age: 67
End: 2022-07-06

## 2022-07-06 NOTE — CARE COORDINATION
Second attempt to reach pt following recent ED visit. No answer . hipaa compliant message left with request for return call.  No further attempts to be made

## 2022-07-06 NOTE — TELEPHONE ENCOUNTER
Pt.is asking is there any way she can come in sooner she state her ears are getting worse she is the pian and whistling in her ear it has been going on for a week. I informed the patient there is no sooner apt. I can send a message to the team to see what they say.   Next visit 7/28/22

## 2022-07-07 NOTE — TELEPHONE ENCOUNTER
Patient is calling in again asking for a sooner appointment. Patient is scheduled on 7/27/2022. Patient is still reiterating what was said yesterday. The whistling in her ear and it is painful.

## 2022-07-07 NOTE — TELEPHONE ENCOUNTER
Called patient and LVM offering her an appt scheduled 07/12/22 @ 1:00pm. She should call back if this is something that she can take.

## 2022-07-12 ENCOUNTER — OFFICE VISIT (OUTPATIENT)
Dept: ENT CLINIC | Age: 67
End: 2022-07-12
Payer: MEDICARE

## 2022-07-12 VITALS
TEMPERATURE: 96.9 F | SYSTOLIC BLOOD PRESSURE: 128 MMHG | DIASTOLIC BLOOD PRESSURE: 68 MMHG | OXYGEN SATURATION: 97 % | HEART RATE: 66 BPM

## 2022-07-12 DIAGNOSIS — H60.8X3 CHRONIC ECZEMATOID OTITIS EXTERNA OF BOTH EARS: Chronic | ICD-10-CM

## 2022-07-12 DIAGNOSIS — R42 DIZZINESS: Primary | Chronic | ICD-10-CM

## 2022-07-12 PROCEDURE — G8427 DOCREV CUR MEDS BY ELIG CLIN: HCPCS | Performed by: OTOLARYNGOLOGY

## 2022-07-12 PROCEDURE — G8417 CALC BMI ABV UP PARAM F/U: HCPCS | Performed by: OTOLARYNGOLOGY

## 2022-07-12 PROCEDURE — 1090F PRES/ABSN URINE INCON ASSESS: CPT | Performed by: OTOLARYNGOLOGY

## 2022-07-12 PROCEDURE — 3017F COLORECTAL CA SCREEN DOC REV: CPT | Performed by: OTOLARYNGOLOGY

## 2022-07-12 PROCEDURE — 99214 OFFICE O/P EST MOD 30 MIN: CPT | Performed by: OTOLARYNGOLOGY

## 2022-07-12 PROCEDURE — 1123F ACP DISCUSS/DSCN MKR DOCD: CPT | Performed by: OTOLARYNGOLOGY

## 2022-07-12 PROCEDURE — G9899 SCRN MAM PERF RSLTS DOC: HCPCS | Performed by: OTOLARYNGOLOGY

## 2022-07-12 PROCEDURE — G8399 PT W/DXA RESULTS DOCUMENT: HCPCS | Performed by: OTOLARYNGOLOGY

## 2022-07-12 PROCEDURE — 1036F TOBACCO NON-USER: CPT | Performed by: OTOLARYNGOLOGY

## 2022-07-12 RX ORDER — HYDROCORTISONE AND ACETIC ACID 20.75; 10.375 MG/ML; MG/ML
SOLUTION AURICULAR (OTIC)
Qty: 10 ML | Refills: 0 | Status: SHIPPED | OUTPATIENT
Start: 2022-07-12

## 2022-07-12 ASSESSMENT — ENCOUNTER SYMPTOMS
RHINORRHEA: 0
SINUS PAIN: 0
SORE THROAT: 0

## 2022-07-12 NOTE — PROGRESS NOTES
[facility]       PCP:  Tila Flores MD      CHIEF COMPLAINT  Chief Complaint   Patient presents with    Dizziness       HISTORY OF PRESENT ILLNESS       Mathews Little Clutter is a 74 y.o. female here for recheck and follow up of dizziness and pain in the ear. Dizziness \"is only happening mostly at night when I get up. Only a little during the day. \"  Only twice during the day and lasts for about 5 minutes. Left started hurting end of last month. Pain in both ears, started two weeks ago in the right ear. And left ear hearing echo and wind like sound. Nerve wracking. REVIEW OF SYSTEMS   Review of Systems   Constitutional: Negative for chills and fever. HENT: Positive for ear pain. Negative for ear discharge, rhinorrhea, sinus pain and sore throat.           PAST MEDICAL HISTORY    Past Medical History:   Diagnosis Date    Anxiety state     Asthma     Back ache     chronic    Environmental allergies     GERD (gastroesophageal reflux disease)     Hypertension     MVP (mitral valve prolapse)     Neck pain, chronic     Reflux     Sleep apnea        Past Surgical History:   Procedure Laterality Date    BACK SURGERY      BLADDER SUSPENSION      CHOLECYSTECTOMY      FOOT SURGERY  11-9-12    endoscopic plantar fasciotomy right foot    GASTRIC BYPASS SURGERY      HERNIA REPAIR      HIATAL HERNIA REPAIR      HYSTERECTOMY (CERVIX STATUS UNKNOWN)      JOINT REPLACEMENT Bilateral     KNEE    KNEE ARTHROPLASTY  2/28/13    L    KNEE ARTHROSCOPY Right 3/7/2014    LAMINECTOMY  7/1/11    bilateral L5-S1 laminotomy, nerve root exploration, foraminotomy    NASAL SINUS SURGERY Right 02/05/2018    Excision of rt nasal mass    SD COLONOSCOPY FLX DX W/COLLJ SPEC WHEN PFRMD N/A 9/28/2018    COLONOSCOPY DIAGNOSTIC OR SCREENING performed by Sonya Whitman MD at [address] [place] [address] DX/THER AGNT PARAVERT FACET JOINT, LUMBAR/SAC, 1ST LEVEL Right 11/6/2018    RIGHT L4,L5 S1 MEDIAL BRANCH BLOCK WITH FLUOROSCOPY performed by Delfina Kayser, MD at [address] [address], PARTIAL      TONSILLECTOMY      UPPER GASTROINTESTINAL ENDOSCOPY      with dilatation    UPPER GASTROINTESTINAL ENDOSCOPY N/A 8/14/2019    EGD ESOPHAGOGASTRODUODENOSCOPY performed by Millie Ojeda MD at [facility] 5/25/2021    EGD BIOPSY performed by Rina Aldridge MD at [address] [place] [address] Street:    07/12/22 1305   BP: 128/68   Site: Left Upper Arm   Position: Sitting   Cuff Size: Large Adult   Pulse: 66   Temp: 96.9 °F (36.1 °C)   TempSrc: Temporal   SpO2: 97%     General:  WDWN, NAD, alert and oriented  Face: There was no swelling or lesions detected. Voice: Normal with no hoarseness or hot potato voice. (+)  Ears:  Cerumen impaction both EACs. Nose: The nasal septum, turbinates, secretions, and mucosa appeared to be normal.   Sinuses:  Maxillary and frontal sinuses were nontender to palpation and percussion. Oral cavity:  Mucosa, secretions, tongue, and gingiva appeared to be normal.   Oropharynx:  Post tonsillectomy. The hard and soft palates, uvula, tongue, posterior oropharyngeal wall, mucosa and secretions appeared to be normal.     Salivary Glands:  Normal bilateral parotid and bilateral submandibular salivary glands. Neck:  No masses or tenderness. Trachea midline. Laryngeal cartilages and hyoid bone normal.  Normal laryngeal creptus. Thyroid:  Normal, nontender, no goiter or nodules palpable. Lymph nodes:  No cervical lymphadenopathy.            PROCEDURE - REMOVAL OF CERUMEN IMPACTION (23073):  Obstructing cerumen impaction and skin debris occluding both of the EACs  and obscuring visualization of both tympanic membranes, was removed under otomicroscopic visualization, with instrumentation, using a Billeau wire loop  Under otomicroscopic examination, the bilateral EACs and TMs appeared to be normal, including normal pneumatic mobility. [place] reported improved hearing, back to usual level, after cerumen removal.          IMPRESSION / Cristian Thompson / Lois Henley was seen today for dizziness. Diagnoses and all orders for this visit:    Dizziness    Chronic eczematoid otitis externa of both ears  -     acetic acid-hydrocortisone (VOSOL-HC) 1-2 % otic solution; Use 4 drops in the affected ear three times a day as needed for itching. RECOMMENDATIONS/PLAN      1. Acetaminophen (eg. Tylenol) (over the counter medications) as needed for fever or pain. 2. Return if symptoms worsen or, for any further ENT or sinus problems or symptoms.

## 2022-07-14 ENCOUNTER — OFFICE VISIT (OUTPATIENT)
Dept: RHEUMATOLOGY | Age: 67
End: 2022-07-14
Payer: MEDICARE

## 2022-07-14 VITALS
BODY MASS INDEX: 37.68 KG/M2 | WEIGHT: 240.6 LBS | SYSTOLIC BLOOD PRESSURE: 120 MMHG | DIASTOLIC BLOOD PRESSURE: 80 MMHG | TEMPERATURE: 97.5 F

## 2022-07-14 DIAGNOSIS — R53.83 OTHER FATIGUE: ICD-10-CM

## 2022-07-14 DIAGNOSIS — R06.02 SOB (SHORTNESS OF BREATH): ICD-10-CM

## 2022-07-14 DIAGNOSIS — G89.29 CHRONIC INTRACTABLE HEADACHE, UNSPECIFIED HEADACHE TYPE: ICD-10-CM

## 2022-07-14 DIAGNOSIS — M35.01 SJOGREN'S SYNDROME WITH KERATOCONJUNCTIVITIS SICCA (HCC): Primary | ICD-10-CM

## 2022-07-14 DIAGNOSIS — M35.01 SJOGREN'S SYNDROME WITH KERATOCONJUNCTIVITIS SICCA (HCC): ICD-10-CM

## 2022-07-14 DIAGNOSIS — R51.9 CHRONIC INTRACTABLE HEADACHE, UNSPECIFIED HEADACHE TYPE: ICD-10-CM

## 2022-07-14 PROCEDURE — 1123F ACP DISCUSS/DSCN MKR DOCD: CPT | Performed by: INTERNAL MEDICINE

## 2022-07-14 PROCEDURE — 3017F COLORECTAL CA SCREEN DOC REV: CPT | Performed by: INTERNAL MEDICINE

## 2022-07-14 PROCEDURE — G8427 DOCREV CUR MEDS BY ELIG CLIN: HCPCS | Performed by: INTERNAL MEDICINE

## 2022-07-14 PROCEDURE — 1036F TOBACCO NON-USER: CPT | Performed by: INTERNAL MEDICINE

## 2022-07-14 PROCEDURE — 99214 OFFICE O/P EST MOD 30 MIN: CPT | Performed by: INTERNAL MEDICINE

## 2022-07-14 PROCEDURE — 1090F PRES/ABSN URINE INCON ASSESS: CPT | Performed by: INTERNAL MEDICINE

## 2022-07-14 PROCEDURE — G9899 SCRN MAM PERF RSLTS DOC: HCPCS | Performed by: INTERNAL MEDICINE

## 2022-07-14 PROCEDURE — G8399 PT W/DXA RESULTS DOCUMENT: HCPCS | Performed by: INTERNAL MEDICINE

## 2022-07-14 PROCEDURE — G8417 CALC BMI ABV UP PARAM F/U: HCPCS | Performed by: INTERNAL MEDICINE

## 2022-07-14 RX ORDER — PILOCARPINE HYDROCHLORIDE 5 MG/1
TABLET, FILM COATED ORAL
Qty: 270 TABLET | Refills: 1 | Status: SHIPPED | OUTPATIENT
Start: 2022-07-14 | End: 2022-10-07

## 2022-07-14 RX ORDER — PILOCARPINE HYDROCHLORIDE 5 MG/1
5 TABLET, FILM COATED ORAL 3 TIMES DAILY
Qty: 90 TABLET | Refills: 2 | Status: SHIPPED | OUTPATIENT
Start: 2022-07-14 | End: 2022-07-14

## 2022-07-14 NOTE — PROGRESS NOTES
2022  Patient Name: Edi Szymanski  : 1955  Medical Record: [id number]      ASSESSMENT AND PLAN    Assessment/Plan:      ASSESSMENT:    1. Sjogren's syndrome with keratoconjunctivitis sicca ([facility] [address]    2. Chronic intractable headache, unspecified headache type    3. SOB (shortness of breath)    4. Other fatigue        PLAN:     [place] was seen today for follow-up. Diagnoses and all orders for this visit:    Sjogren's syndrome with keratoconjunctivitis sicca (HCC)  -     pilocarpine (SALAGEN) 5 MG tablet; Take 1 tablet by mouth 3 times daily 30 minutes before meals    Chronic intractable headache, unspecified headache type    SOB (shortness of breath)  -     Echocardiogram complete; Future  -     Full PFT Study With Bronchodilator; Future  -     XR CHEST (2 VW); Future    Other fatigue  -     Vitamin B12; Future  -     METHYLMALONIC ACID, SERUM; Future    Sjogren's syndrome-positive LISA 1: 1280 nuclear dot pattern, positive SSA and SSB antibody. SPEP no evidence of monoclonal gammopathy. RF, CCP is negative. Elevated ESR. Hypergammaglobulinemia. dsDNA, anti-Bazan, RNP, APL panel, C3-C4 is negative  Persistent dry mouth. Start Salagen 5 mg 3 times a day 30 minutes before the meals  Continue Biotene mouthwash, frequent sips of water and sugar-free gum for dry mouth  Artificial teardrops for dry eyes  /Increased risk of lymphoma was explained-      Chronic intractable headache-with lightheadedness and dizziness  No focal symptoms. No stiffness around shoulders or hips. No vision changes, jaw pain or extremity weakness.   No significant improvement on prednisone  Persistently elevated ESR less likely due to temporal arteritis   Unlikely temporal arteritis  MRI of the brain no acute changes or intracranial pathology  Less likely related to connective tissue disease or systemic rheumatic disease  Advised to continue follow-up with neurology        Shortness of breath-exertional shortness of breath with fatigue. No chest pain, dysphagia, edema. Most likely due to deconditioning  I will do PFT, echo and chest x-ray to completely rule out pulmonary and cardiac etiology  I will also check vitamin B12 and methylmalonic acid due to persistent fatigue  She also snores at night. She is awaiting sleep study results. The patient indicates understanding of these issues and agrees with the plan. Return in about 3 months (around 10/14/2022). The risks and benefits of my recommendations, as well as other treatment options, benefits and side effects werediscussed with the patient. All questions were answered. MEDICATIONS  Current Outpatient Medications   Medication Sig Dispense Refill    pilocarpine (SALAGEN) 5 MG tablet Take 1 tablet by mouth 3 times daily 30 minutes before meals 90 tablet 2    acetic acid-hydrocortisone (VOSOL-HC) 1-2 % otic solution Use 4 drops in the affected ear three times a day as needed for itching.  10 mL 0    dicyclomine (BENTYL) 10 MG capsule Take 1 capsule by mouth every 6 hours as needed (cramps) 20 capsule 0    NIFEdipine (PROCARDIA XL) 60 MG extended release tablet TAKE 1 TABLET BY MOUTH DAILY      MYRBETRIQ 25 MG TB24       fluticasone (FLONASE) 50 MCG/ACT nasal spray SHAKE LIQUID AND USE 2 SPRAYS IN EACH NOSTRIL DAILY 48 g 3    butalbital-acetaminophen-caffeine (FIORICET, ESGIC) -40 MG per tablet Take 1 tablet by mouth 3 times daily as needed for Headaches 90 tablet 3    losartan (COZAAR) 100 MG tablet TAKE 1 TABLET BY MOUTH DAILY 90 tablet 3    atorvastatin (LIPITOR) 20 MG tablet Take 1 tablet by mouth daily 90 tablet 3    NIFEdipine (PROCARDIA XL) 90 MG extended release tablet Take 1 tablet by mouth daily 90 tablet 1    labetalol (NORMODYNE) 300 MG tablet Take 1 tablet by mouth 2 times daily 180 tablet 3    hydrALAZINE (APRESOLINE) 50 MG tablet Take 1 tablet by mouth in the morning and at bedtime 60 tablet 3    NEXIUM 40 MG delayed release capsule TAKE 1 CAPSULE BY MOUTH DAILY 90 capsule 1    solifenacin (VESICARE) 10 MG tablet Take 1 tablet by mouth daily 90 tablet 1    fluticasone (FLOVENT HFA) 220 MCG/ACT inhaler Inhale 2 puffs into the lungs 2 times daily 12 g 5    albuterol sulfate  (90 Base) MCG/ACT inhaler Inhale 2 puffs into the lungs every 6 hours as needed for Wheezing 18 g 5    vitamin D (ERGOCALCIFEROL) 1.25 MG (59117 UT) CAPS capsule Take 50,000 Units by mouth once a week      ferrous sulfate (IRON 325) 325 (65 Fe) MG tablet Take 1 tablet by mouth daily (with breakfast) 90 tablet 3     No current facility-administered medications for this visit. ALLERGIES  Allergies   Allergen Reactions    Dye [Barium-Containing Compounds]     Furosemide     Gabapentin Other (See Comments)     Memory Loss    Protonix [Pantoprazole Sodium] Shortness Of Breath and Rash    Tramadol Itching    Protonix [Pantoprazole]      Other reaction(s): Unknown    Tegaderm Alginate Ag Rope     Amoxicillin     Aspirin     Augmentin [Amoxicillin-Pot Clavulanate]     Betadine [Povidone Iodine] Other (See Comments)     welts    Chlorhexidine Gluconate Other (See Comments)     Welts from ChloraPrep    Morphine Itching    Other      chlorahexidine - hives    Pancrelipase (Lip-Prot-Amyl)      Other reaction(s): Unknown    Toradol [Ketorolac Tromethamine]     Iodides Rash    Ranitidine Nausea And Vomiting    Tape [Adhesive Tape] Rash     teradon tape also  Paper tape ok          Comments  No specialty comments available. Background history:  Stephany Lane is a 74 y.o. female with past medical history of hypertension, anxiety, degenerative disc disease in the cervical spine and lumbar spine, osteoarthritis status post bilateral knee replacements who is being seen for follow up evaluation of  headache and elevated ESR. Patient is a vague historian. She has chronic headache for several years which has gotten worse over the past 2 months.   She has headache on daily basis located on the right side of the head. She describes her pain as excruciating. She denies sudden loss of vision, jaw pain, extremity weakness, stiffness around shoulders or hips. She denies diplopia, dysarthria, focal weakness, seizures. She denies photophobia, phonophobia. Headache is associated with dizziness/lightheadedness and vertigo. She denies fever, weight loss or swollen glands. She went to see her PCP. She was found to have elevated ESR of 83. She was put on prednisone for possible temporal arteritis. She is currently on prednisone 60 mg daily for past 2 weeks without any improvement in headaches. Repeat ESR is 85. She denies joint pain, swelling or stiffness. She has a history of bilateral knee replacement secondary to osteoarthritis. She also had 2 neck surgeries for cervical stenosis and right shoulder arthroscopic surgery for rotator cuff tear. She denies malar rash, photosensitivity, oral/nasal ulcers, dry eyes, dry mouth, history of miscarriages, Raynaud's, pregnancy complications, blood clots, pleurisy or pericarditis, sclerodactyly. She is up-to-date with Pap smear, mammogram and colonoscopy. Interim history: She presents for follow-up of Sjogren's, chronic headaches, elevated ESR. She follows neurology for chronic headaches. She reports improvement. She had MRI of the brain which was normal without any intra cranial pathology or acute changes. She also has Sjogren's. She uses Biotene mouthwash, frequent sips of water, sugar-free gum. She still has persistent dry mouth. Dry eyes have improved with artificial teardrops. Blood work showed positive LISA with elevated SSA and SSB antibody, hypergammaglobulinemia. SPEP no evidence of monoclonal gammopathy. RF, CCP, hepatitis panel is negative. dsDNA, anti-Bazan, RNP, APL panel, C3-C4 is negative.   She denies alopecia, malar rash, photosensitivity, oral/nasal ulcers, kidney disease, blood clots, pleurisy or pericarditis, sclerodactyly or skin thickening, Raynaud's, history of miscarriages or pregnancy complications, sclerodactyly or skin thickening. She denies fever, weight loss, night sweats or swollen glands. She also has elevated ESR. SPEP did not show any evidence of monoclonal gammopathy. She is up-to-date with Pap smear, mammogram and cancer screening. She also has shortness of breath with exertion. She denies chest pain, dysphagia, peripheral edema. She has severe fatigue. She snores at night. She is awaiting sleep study results. She has borderline vitamin B12. TSH, vitamin D is normal.  HPI  Review of Systems    REVIEW OF SYSTEMS: Tingling and numbness in the right upper extremity, headaches, dizziness, GERD  Constitutional: No unanticipated weight loss or fevers. Integumentary: No rash, photosensitivity, malar rash, livedo reticularis, alopecia and Raynaud's symptoms, sclerodactyly, skin tightening  Eyes: negative for visual disturbance and persistent redness, discharge from eyes   ENT: - No tinnitus, loss of hearing, vertigo, or recurrent ear infections.  - No history of nasal/oral ulcers. - No history of dry eyes/dry mouth  Cardiovascular: No history of pericarditis, chest pain or murmur or palpitations  Respiratory: No shortness of breath, cough or history of interstitial lung disease. No history of pleurisy. No history of tuberculosis or atypical infections. Gastrointestinal: No history of dysphagia or esophageal dysmotility. No change in bowel habits or any inflammatory bowel disease. Genitourinary: No history of renal disease, miscarriages. Hematologic/Lymphatic: No abnormal bruising or bleeding, blood clots or swollen lymph nodes. Neurological: No history of seizure or focal weakness.  No history of neuropathies,hyperesthesias, facial droop, diplopia  Psychiatric: No history of bipolar disease  Endocrine: Denies any polyuria, polydipsia and osteoporosis  Allergic/Immunologic: No nasal congestion or hives. I have reviewed patients Past medical History, Social History and Family History as mentioned in her chart and this remains unchanged fromprevious. Past Medical History:   Diagnosis Date    Anxiety state     Asthma     Back ache     chronic    Environmental allergies     GERD (gastroesophageal reflux disease)     Hypertension     MVP (mitral valve prolapse)     Neck pain, chronic     Reflux     Sleep apnea      Past Surgical History:   Procedure Laterality Date    BACK SURGERY      BLADDER SUSPENSION      CHOLECYSTECTOMY      FOOT SURGERY  11-9-12    endoscopic plantar fasciotomy right foot    GASTRIC BYPASS SURGERY      HERNIA REPAIR      HIATAL HERNIA REPAIR      HYSTERECTOMY (CERVIX STATUS UNKNOWN)      JOINT REPLACEMENT Bilateral     KNEE    KNEE ARTHROPLASTY  2/28/13    L    KNEE ARTHROSCOPY Right 3/7/2014    LAMINECTOMY  7/1/11    bilateral L5-S1 laminotomy, nerve root exploration, foraminotomy    NASAL SINUS SURGERY Right 02/05/2018    Excision of rt nasal mass    NM COLONOSCOPY FLX DX W/COLLJ SPEC WHEN PFRMD N/A 9/28/2018    COLONOSCOPY DIAGNOSTIC OR SCREENING performed by Germain Boateng MD at [address] [place] [address] DX/THER AGNT PARAVERT FACET JOINT, LUMBAR/SAC, 1ST LEVEL Right 11/6/2018    RIGHT L4,L5 S1 MEDIAL BRANCH BLOCK WITH FLUOROSCOPY performed by Shireen Chavez MD at [address] [place] [address],[address] [id number], PARTIAL      TONSILLECTOMY      UPPER GASTROINTESTINAL ENDOSCOPY      with dilatation    UPPER GASTROINTESTINAL ENDOSCOPY N/A 8/14/2019    EGD ESOPHAGOGASTRODUODENOSCOPY performed by Germain Boateng MD at [address] [place] [address] 5/25/2021    [facility] [address] [facility]:       Spouse name: Not on file    Number of children: Not on file    Years of education: Not on file    Highest education level: Not on file   Occupational History    Not on file   Tobacco Use    Smoking status: Former Smoker     Packs/day: 0.25     Years: 2.00     Pack years: 0.50     Quit date: 2004     Years since quittin.5    Smokeless tobacco: Never Used    Tobacco comment: [id number]   Vaping Use    Vaping Use: Never used   Substance and Sexual Activity    Alcohol use: No    Drug use: No    Sexual activity: Yes     Partners: Male   Other Topics Concern    Not on file   Social History Narrative    Not on file     Social Determinants of Health     Financial Resource Strain: Unknown    Difficulty of Paying Living Expenses: Patient refused   Food Insecurity: No Food Insecurity    Worried About Running Out of Food in the Last Year: Never true    [address] of Food in the Last Year: Never true   Transportation Needs:     Lack of Transportation (Medical): Not on file    Lack of Transportation (Non-Medical):  Not on file   Physical Activity:     Days of Exercise per Week: Not on file    Minutes of Exercise per Session: Not on file   Stress:     Feeling of Stress : Not on file   Social Connections:     Frequency of Communication with Friends and Family: Not on file    Frequency of Social Gatherings with Friends and Family: Not on file    Attends Bahai Services: Not on file    Active Member of [address] [place] [address] [address] or Organizations: Not on file    Attends Club or Organization Meetings: Not on file    Marital Status: Not on file   Intimate Partner Violence:     Fear of Current or Ex-Partner: Not on file    Emotionally Abused: Not on file    Physically Abused: Not on file    Sexually Abused: Not on file   Housing Stability:     Unable to Pay for Housing in the Last Year: Not on file    Number of [place] in the Last Year: Not on file    Unstable Housing in the Last Year: Not on file     Family History   Problem Relation Age of Onset    High Blood Pressure Mother     Heart Disease Mother     Other Mother         glaucoma    High Blood Pressure Father     Heart Disease Father     Other Father         intestional problems    Cancer Sister         cancer    Kidney Disease Sister     Kidney Disease Brother     High Blood Pressure Brother     Other Sister         lupus,fibromyalgia  thyroid surgery    High Blood Pressure Sister     Kidney Disease Sister     Heart Disease Sister     Kidney Disease Brother     High Blood Pressure Brother     Anesth Problems Neg Hx     Malig Hyperten Neg Hx     Hypotension Neg Hx     Malig Hypertherm Neg Hx     Pseudochol. Deficiency Neg Hx          PHYSICAL EXAM   Vitals:    07/14/22 0930   BP: 120/80   Site: Right Upper Arm   Position: Sitting   Cuff Size: Large Adult   Temp: 97.5 °F (36.4 °C)   TempSrc: Infrared   Weight: 240 lb 9.6 oz (109.1 kg)     Physical Exam  Constitutional:  Well developed, well nourished, no acute distress, non-toxic appearance   Musculoskeletal:    Ambulates without assistance, normal gait  Neck: Full ROM, no tenderness,supple   Upper Extremities        Shoulder: Full ROM, no crepitus        Elbow:  Full ROM, no synovitis, no deformity        Wrist: Full ROM, no synovitis, no deformity, no ulnar deviation        Fingers: No sclerodactly, no active raynaud's, no ulcers. MCPs: No synovitis, no deformity             PIPs:  No synovitis, no deformity             DIPs: No synovitis, no deformity             Nails: No pitting, no telangiectasias. Lower Extremities        Hip: Full ROM, no tenderness to palpation        Knee: Bilateral knee replacement        Ankle: Full ROM, no swelling or erythema        MTPs: No swelling or erythema  Back- no tenderness. Eyes:  PERRL, extra ocular movements intact, conjunctiva normal   HEENT:  Atraumatic, normocephalic, external ears normal, oropharynx dry, no pharyngeal exudates. No parotid gland enlargement or lymphadenopathy. Bilateral temporal area tenderness.   Temporal artery is not palpable  Respiratory:  No respiratory distress  GI:  Soft, nondistended, normal bowel sounds, nontender, noorganomegaly, no mass, no rebound, no guarding   :  No costovertebral angle tenderness   Integument:  Well hydrated, no rash or telangiectasias  Lymphatic:  No lymphadenopathy noted   Neurologic:   Alert & oriented x 3, CN 2-12 normal, no focal deficits noted. Sensations Intact. Muscle strength 5/5 proximally and distally in upper and lower extremities.    Psychiatric:  Speech and behavior appropriate           LABS AND IMAGING  Outside data reviewed and in HPI    Lab Results   Component Value Date/Time    WBC 3.5 07/01/2022 02:05 PM    RBC 3.77 07/01/2022 02:05 PM    HGB 11.0 07/01/2022 02:05 PM    HCT 33.1 07/01/2022 02:05 PM     07/01/2022 02:05 PM    MCV 87.8 07/01/2022 02:05 PM    MCH 29.3 07/01/2022 02:05 PM    MCHC 33.4 07/01/2022 02:05 PM    RDW 13.7 07/01/2022 02:05 PM    SEGSPCT 56.0 06/15/2013 02:00 AM    BANDSPCT 5 10/09/2014 09:49 PM    LYMPHOPCT 31.7 07/01/2022 02:05 PM    MONOPCT 7.8 07/01/2022 02:05 PM    EOSPCT 5.0 12/24/2011 07:50 PM    BASOPCT 1.2 07/01/2022 02:05 PM    MONOSABS 0.3 07/01/2022 02:05 PM    LYMPHSABS 1.1 07/01/2022 02:05 PM    EOSABS 0.4 07/01/2022 02:05 PM    BASOSABS 0.0 07/01/2022 02:05 PM    DIFFTYPE Manual-K 06/15/2013 02:00 AM       Chemistry        Component Value Date/Time     07/01/2022 1405    K 4.1 07/01/2022 1405     07/01/2022 1405    CO2 15 (L) 07/01/2022 1405    BUN 13 07/01/2022 1405    CREATININE 1.0 07/01/2022 1405        Component Value Date/Time    CALCIUM 9.7 07/01/2022 1405    ALKPHOS 126 07/01/2022 1405    AST 24 07/01/2022 1405    ALT 17 07/01/2022 1405    BILITOT 0.3 07/01/2022 1405          Lab Results   Component Value Date    SEDRATE 75 (H) 06/14/2022     Lab Results   Component Value Date    CRP <3.0 06/14/2022     Lab Results   Component Value Date/Time    C3 151.2 06/14/2022 10:59 AM    C4 39.4 06/14/2022 10:59

## 2022-07-18 ENCOUNTER — HOSPITAL ENCOUNTER (OUTPATIENT)
Age: 67
Discharge: HOME | End: 2022-07-18
Payer: MEDICARE

## 2022-07-18 ENCOUNTER — HOSPITAL ENCOUNTER (OUTPATIENT)
Dept: GENERAL RADIOLOGY | Age: 67
Discharge: HOME | End: 2022-07-18
Payer: MEDICARE

## 2022-07-18 DIAGNOSIS — R53.83 OTHER FATIGUE: ICD-10-CM

## 2022-07-18 DIAGNOSIS — R06.02 SOB (SHORTNESS OF BREATH): ICD-10-CM

## 2022-07-18 LAB — VITAMIN B-12: 214 PG/ML (ref 211–911)

## 2022-07-18 PROCEDURE — 36415 COLL VENOUS BLD VENIPUNCTURE: CPT

## 2022-07-18 PROCEDURE — 71046 X-RAY EXAM CHEST 2 VIEWS: CPT

## 2022-07-18 PROCEDURE — 83921 ORGANIC ACID SINGLE QUANT: CPT

## 2022-07-18 PROCEDURE — 82607 VITAMIN B-12: CPT

## 2022-07-21 LAB — METHYLMALONIC ACID: 0.38 UMOL/L (ref 0–0.4)

## 2022-08-03 ENCOUNTER — OFFICE VISIT (OUTPATIENT)
Dept: PRIMARY CARE CLINIC | Age: 67
End: 2022-08-03
Payer: MEDICARE

## 2022-08-03 VITALS
BODY MASS INDEX: 38.2 KG/M2 | SYSTOLIC BLOOD PRESSURE: 130 MMHG | OXYGEN SATURATION: 99 % | HEART RATE: 77 BPM | WEIGHT: 243.4 LBS | DIASTOLIC BLOOD PRESSURE: 78 MMHG | HEIGHT: 67 IN | TEMPERATURE: 98.2 F

## 2022-08-03 DIAGNOSIS — D50.8 OTHER IRON DEFICIENCY ANEMIA: ICD-10-CM

## 2022-08-03 DIAGNOSIS — E53.8 VITAMIN B 12 DEFICIENCY: ICD-10-CM

## 2022-08-03 DIAGNOSIS — Z00.00 MEDICARE ANNUAL WELLNESS VISIT, SUBSEQUENT: Primary | ICD-10-CM

## 2022-08-03 DIAGNOSIS — Z23 NEED FOR SHINGLES VACCINE: ICD-10-CM

## 2022-08-03 DIAGNOSIS — M81.0 AGE-RELATED OSTEOPOROSIS WITHOUT CURRENT PATHOLOGICAL FRACTURE: ICD-10-CM

## 2022-08-03 DIAGNOSIS — G47.09 OTHER INSOMNIA: ICD-10-CM

## 2022-08-03 DIAGNOSIS — I10 ESSENTIAL HYPERTENSION: ICD-10-CM

## 2022-08-03 DIAGNOSIS — Z23 HIGH PRIORITY FOR COVID-19 VACCINATION: ICD-10-CM

## 2022-08-03 PROCEDURE — 1036F TOBACCO NON-USER: CPT | Performed by: INTERNAL MEDICINE

## 2022-08-03 PROCEDURE — 1123F ACP DISCUSS/DSCN MKR DOCD: CPT | Performed by: INTERNAL MEDICINE

## 2022-08-03 PROCEDURE — G9899 SCRN MAM PERF RSLTS DOC: HCPCS | Performed by: INTERNAL MEDICINE

## 2022-08-03 PROCEDURE — 96372 THER/PROPH/DIAG INJ SC/IM: CPT | Performed by: INTERNAL MEDICINE

## 2022-08-03 PROCEDURE — G8417 CALC BMI ABV UP PARAM F/U: HCPCS | Performed by: INTERNAL MEDICINE

## 2022-08-03 PROCEDURE — G0439 PPPS, SUBSEQ VISIT: HCPCS | Performed by: INTERNAL MEDICINE

## 2022-08-03 PROCEDURE — 3017F COLORECTAL CA SCREEN DOC REV: CPT | Performed by: INTERNAL MEDICINE

## 2022-08-03 PROCEDURE — G8427 DOCREV CUR MEDS BY ELIG CLIN: HCPCS | Performed by: INTERNAL MEDICINE

## 2022-08-03 PROCEDURE — G8399 PT W/DXA RESULTS DOCUMENT: HCPCS | Performed by: INTERNAL MEDICINE

## 2022-08-03 PROCEDURE — 99213 OFFICE O/P EST LOW 20 MIN: CPT | Performed by: INTERNAL MEDICINE

## 2022-08-03 PROCEDURE — 1090F PRES/ABSN URINE INCON ASSESS: CPT | Performed by: INTERNAL MEDICINE

## 2022-08-03 RX ORDER — ZOLPIDEM TARTRATE 5 MG/1
5 TABLET ORAL NIGHTLY PRN
Qty: 30 TABLET | Refills: 0 | Status: SHIPPED | OUTPATIENT
Start: 2022-08-03 | End: 2022-09-02

## 2022-08-03 RX ORDER — CYANOCOBALAMIN 1000 UG/ML
1000 INJECTION INTRAMUSCULAR; SUBCUTANEOUS ONCE
Status: COMPLETED | OUTPATIENT
Start: 2022-08-03 | End: 2022-08-03

## 2022-08-03 RX ORDER — LABETALOL 300 MG/1
300 TABLET, FILM COATED ORAL 2 TIMES DAILY
Qty: 180 TABLET | Refills: 3 | Status: SHIPPED | OUTPATIENT
Start: 2022-08-03

## 2022-08-03 RX ORDER — HYDRALAZINE HYDROCHLORIDE 50 MG/1
50 TABLET, FILM COATED ORAL 2 TIMES DAILY
Qty: 60 TABLET | Refills: 3 | Status: SHIPPED | OUTPATIENT
Start: 2022-08-03 | End: 2022-10-27

## 2022-08-03 RX ORDER — LOSARTAN POTASSIUM 100 MG/1
100 TABLET ORAL DAILY
Qty: 90 TABLET | Refills: 3 | Status: SHIPPED | OUTPATIENT
Start: 2022-08-03 | End: 2022-10-10

## 2022-08-03 RX ORDER — NIFEDIPINE 90 MG/1
90 TABLET, EXTENDED RELEASE ORAL DAILY
Qty: 90 TABLET | Refills: 1 | Status: SHIPPED | OUTPATIENT
Start: 2022-08-03

## 2022-08-03 RX ADMIN — CYANOCOBALAMIN 1000 MCG: 1000 INJECTION INTRAMUSCULAR; SUBCUTANEOUS at 10:40

## 2022-08-03 ASSESSMENT — ENCOUNTER SYMPTOMS
CHOKING: 0
ABDOMINAL DISTENTION: 0
CONSTIPATION: 0
VOICE CHANGE: 0
SORE THROAT: 0
BLOOD IN STOOL: 0
FACIAL SWELLING: 0
CHEST TIGHTNESS: 0
SHORTNESS OF BREATH: 0
PHOTOPHOBIA: 0
DIARRHEA: 0

## 2022-08-03 ASSESSMENT — PATIENT HEALTH QUESTIONNAIRE - PHQ9
SUM OF ALL RESPONSES TO PHQ QUESTIONS 1-9: 0
SUM OF ALL RESPONSES TO PHQ9 QUESTIONS 1 & 2: 0
1. LITTLE INTEREST OR PLEASURE IN DOING THINGS: 0
2. FEELING DOWN, DEPRESSED OR HOPELESS: 0

## 2022-08-03 ASSESSMENT — LIFESTYLE VARIABLES
HOW MANY STANDARD DRINKS CONTAINING ALCOHOL DO YOU HAVE ON A TYPICAL DAY: PATIENT DOES NOT DRINK
HOW OFTEN DO YOU HAVE A DRINK CONTAINING ALCOHOL: NEVER

## 2022-08-03 NOTE — PROGRESS NOTES
Subjective:      Patient ID: Shaheed Nicholson is a 74 y.o. female. 8/3/2022 Patient presents with:  Medicare AWV  Hypertension  Hyperlipidemia                Last seen  4/28/2022 Patient presents with:  Hypertension: 2 week follow up              4/15/2022 Patient presents with:  Hypertension  Headache  Insomnia: pt states she is allergic to Ambien and cant take                VV 1/22 11/16/2021 Patient presents with:  Hypertension  Cough: 3 wks     Has had extensive w/u with ENT including Barium swallow Study and CT soft tissue neck               8/31/2021 Patient presents with:  Blood Pressure Check                  8/12/2021 Patient presents with:  Blood Pressure Check  Headache  Sinus Problem                  8/3/2021 Patient presents with:  Hypertension: pt BP has been elevated causing excessive sleepiness                6/3/2021 Patient presents with: Follow-Up from Hospital: [facility] 5/25-5/27/21 abdominal pain        Previous history of gastric bypass, hiatal & ventral hernia repair cholecystectomy    CT abdomen and pelvis showed pneumobilia and bilateral renal cyst without hydronephrosis  LFTs, amylase & lipase within normal limits. She  underwent EGD on 5/25/2021 which was normal    Small bowel follow-through showed showed intermittent mild to moderate increased tertiary contraction in the esophagus with delayed esophageal emptying otherwise unremarkable. Patient was recommended to continue Pepcid ( allergic to Protonix) follow-up with gastroenterology     Pneumobilia noted on imaging likely 2/2 prior ERCP  Afebrile, WBCs and LFTs WNL. Ultrasound negative                4/30/2021   Medicare AW                11/10/2020 Patient presents with:  Abdominal Pain . Never got labs as ordered last visit ? ??    States she did See Dr Dee Mclain who has told her there was nothing he could do ? ??                  . S/P Surgery dissectomy 8/26/20 at [facility]       Was told she has Stage 3 Renal failiure ? Labs last checked here 11/19 show Nl renal function                  2/27/18 total thyroidectomy      Hypertension  Associated symptoms include headaches (since Nov 2021). Pertinent negatives include no chest pain or shortness of breath. Headache   Pertinent negatives include no ear pain, fever, photophobia or sore throat. Hyperlipidemia  Pertinent negatives include no chest pain or shortness of breath. Past Medical History:   Diagnosis Date    Anxiety state     Asthma     Back ache     chronic    Environmental allergies     GERD (gastroesophageal reflux disease)     Hypertension     MVP (mitral valve prolapse)     Neck pain, chronic     Reflux     Sleep apnea        Review of Systems   Constitutional:  Negative for activity change, appetite change, chills, diaphoresis, fatigue, fever and unexpected weight change. All vaccines up to date    Flu vac 1/21    Tdap 5/17     Pneumonia vac 2012 ;  5/17     Covid Vac 12 /21  #3    Shingrex    HENT:  Negative for ear pain, facial swelling, postnasal drip, sore throat and voice change. Dentures fit well ; new   Eyes:  Negative for photophobia and visual disturbance. Eye exam 1/22   Respiratory:  Negative for choking, chest tightness and shortness of breath. Sleep Study 7/22  ; report ? Does not smoke ; No Etoh   H/o Asthma    Cardiovascular:  Negative for chest pain and leg swelling. HTN > 5 yrs on meds . No known CAD . Father dies of MI in his 46s   Sister with CAD ? MVP  H/o palpitations   Gastrointestinal:  Negative for abdominal distention, blood in stool, constipation and diarrhea. Repeat Upper endoscopy 5/21  Nl    Upper Endoscopy  1/21  H/O Ulers ? Cannot take Nsaids! Post gastric bypass 2005      GERD    Repeat Colonscopy 1/21  ; Polyps; O Marilee    Endocrine: Negative for cold intolerance and heat intolerance.         Followed by Dr. Grace Harrison  for thyroid   Genitourinary:         Mammogram 2/22    Scheduled Pap 0n 9/127/18    Musculoskeletal:         DEXA       Sees Ortho neck / [facility]   1/21       S/P Surgery  DDD 8/26/20      DJD . Skin: Negative. Neurological:  Positive for headaches (since Nov 2021). Psychiatric/Behavioral:  Negative for self-injury, sleep disturbance and suicidal ideas. Anxiety: followed by counselor     Objective:   Physical Exam  Constitutional:       General: She is not in acute distress. Appearance: She is obese. She is not ill-appearing. Neck:      Comments:     Cardiovascular:      Rate and Rhythm: Regular rhythm. Pulmonary:      Effort: Pulmonary effort is normal.      Breath sounds: Normal breath sounds. Musculoskeletal:         General: Normal range of motion. Skin:     General: Skin is warm. Neurological:      General: No focal deficit present. Mental Status: She is oriented to person, place, and time. Psychiatric:         Mood and Affect: Mood normal.       Assessment:     [place] was seen today for medicare awv, hypertension and hyperlipidemia. Diagnoses and all orders for this visit:    Medicare annual wellness visit, subsequent    High priority for COVID-19 vaccination  Needs #4    Need for shingles vaccine  -     zoster recombinant adjuvanted vaccine [facility]) 50 MCG/0.5ML SUSR injection; Inject 0.5 mLs into the muscle once for 1 dose    Vitamin B 12 deficiency  low Nl ; will try monthly shots ( h/o gastric bypass )  -     cyanocobalamin injection 1,000 mcg    Age-related osteoporosis without current pathological fracture  -     DEXA BONE DENSITY 2 SITES; Future    Other iron deficiency anemia    Essential hypertension  Controlled   -     losartan (COZAAR) 100 MG tablet; Take 1 tablet by mouth in the morning.  -     NIFEdipine (PROCARDIA XL) 90 MG extended release tablet; Take 1 tablet by mouth in the morning.  -     hydrALAZINE (APRESOLINE) 50 MG tablet;  Take 1 tablet by mouth in the morning and at bedtime  -     labetalol (NORMODYNE) 300 MG tablet; Take 1 tablet by mouth in the morning and 1 tablet before bedtime. Other insomnia  -     zolpidem (AMBIEN) 5 MG tablet; Take 1 tablet by mouth nightly as needed for Sleep for up to 30 days. Mixed hyperlipidemia  LDL > 200 . Restarted  Lipitor   -     atorvastatin (LIPITOR) 20 MG tablet; Take 1 tablet by mouth daily              Incontinence overflow, urine  On Myrbetric now        Acquired hypothyroidism  C/O  Endocrine / [facility]   . On synthroid 0.25   -     -      I    Plan:        Medicare Annual Wellness Visit    Shaheed Nicholson is here for Medicare AWV, Hypertension, and Hyperlipidemia    Assessment & Plan    Recommendations for Preventive Services Due: see orders and patient instructions/AVS.  Recommended screening schedule for the next 5-10 years is provided to the patient in written form: see Patient Instructions/AVS.     Return in about 6 months (around 2/3/2023), or if symptoms worsen or fail to improve. Subjective       Patient's complete Health Risk Assessment and screening values have been reviewed and are found in Flowsheets. The following problems were reviewed today and where indicated follow up appointments were made and/or referrals ordered. Positive Risk Factor Screenings with Interventions:             General Health and ACP:  General  In general, how would you say your health is?: Good  In the past 7 days, have you experienced any of the following: New or Increased Pain, New or Increased Fatigue, Loneliness, Social Isolation, Stress or Anger?: (!) Yes  Select all that apply: (!) New or Increased Fatigue  Do you get the social and emotional support that you need?: Yes  Do you have a Living Will?: (!) No    Advance Directives       Power of  Living Will ACP-Advance Directive ACP-Power of     Not on File Not on File Not on File Not on File        General Health Risk Interventions:  Fatigue: medication prescribed- *Vit B 12 shots ;  Ambien to help with Insomnia  No Living Will: Advance Care Planning addressed with patient today    Health Habits/Nutrition:  Physical Activity: Inactive    Days of Exercise per Week: 0 days    Minutes of Exercise per Session: 0 min     Have you lost any weight without trying in the past 3 months?: No  Body mass index: (!) 38.12  Have you seen the dentist within the past year?: Yes  Health Habits/Nutrition Interventions:  Nutritional issues:  patient agrees to work toward a weight loss goal of 4 pounds over the next 4 week(s) using the following plan: low carbohydrate diet    Hearing/Vision:  Do you or your family notice any trouble with your hearing that hasn't been managed with hearing aids?: (!) Yes (right ear loss of hearing- per Dr Jennifer Severino)  Do you have difficulty driving, watching TV, or doing any of your daily activities because of your eyesight?: No  Have you had an eye exam within the past year?: Yes  No results found. Hearing/Vision Interventions:  Hearing concerns:  patient declines any further evaluation/treatment for hearing issues            Objective   Vitals:    08/03/22 1000   BP: 130/78   Pulse: 77   Temp: 98.2 °F (36.8 °C)   TempSrc: Temporal   SpO2: 99%   Weight: 243 lb 6.4 oz (110.4 kg)   Height: 5' 7\" (1.702 m)      Body mass index is 38.12 kg/m².              Allergies   Allergen Reactions    Dye [Barium-Containing Compounds]     Furosemide     Gabapentin Other (See Comments)     Memory Loss    Protonix [Pantoprazole Sodium] Shortness Of Breath and Rash    Tramadol Itching    Protonix [Pantoprazole]      Other reaction(s): Unknown    Tegaderm Alginate Ag Rope     Amoxicillin     Aspirin     Augmentin [Amoxicillin-Pot Clavulanate]     Betadine [Povidone Iodine] Other (See Comments)     welts    Chlorhexidine Gluconate Other (See Comments)     Welts from ChloraPrep    Morphine Itching    Other      chlorahexidine - hives    Pancrelipase (Lip-Prot-Amyl)      Other reaction(s): Unknown    Toradol [Ketorolac Tromethamine]     Iodides Rash    Ranitidine Nausea And Vomiting    Tape Percell Ai Tape] Rash     teradon tape also  Paper tape ok      Prior to Visit Medications    Medication Sig Taking? Authorizing Provider   zoster recombinant adjuvanted vaccine (SHINGRIX) 50 MCG/0.5ML SUSR injection Inject 0.5 mLs into the muscle once for 1 dose Yes Becka Church MD   losartan (COZAAR) 100 MG tablet Take 1 tablet by mouth in the morning. Yes Becka Church MD   NIFEdipine (PROCARDIA XL) 90 MG extended release tablet Take 1 tablet by mouth in the morning. Yes Becka Church MD   hydrALAZINE (APRESOLINE) 50 MG tablet Take 1 tablet by mouth in the morning and at bedtime Yes Becka Church MD   labetalol (NORMODYNE) 300 MG tablet Take 1 tablet by mouth in the morning and 1 tablet before bedtime. Yes Becka Church MD   zolpidem (AMBIEN) 5 MG tablet Take 1 tablet by mouth nightly as needed for Sleep for up to 30 days. Yes Becka Church MD   pilocarpine (SALAGEN) 5 MG tablet TAKE 1 TABLET BY MOUTH THREE TIMES DAILY 30 MINUTES BEFORE MEALS Yes Ash Maguire MD   acetic acid-hydrocortisone (VOSOL-HC) 1-2 % otic solution Use 4 drops in the affected ear three times a day as needed for itching.  Yes Sherrill Mg MD   dicyclomine (BENTYL) 10 MG capsule Take 1 capsule by mouth every 6 hours as needed (cramps) Yes Beligca Carrizales MD   MYRBETRIQ 25 MG TB24  Yes Historical Provider, MD   fluticasone (FLONASE) 50 MCG/ACT nasal spray SHAKE LIQUID AND USE 2 SPRAYS IN EACH NOSTRIL DAILY Yes Becka Church MD   butalbital-acetaminophen-caffeine (FIORICET, ESGIC) -40 MG per tablet Take 1 tablet by mouth 3 times daily as needed for Headaches Yes Becka Church MD   atorvastatin (LIPITOR) 20 MG tablet Take 1 tablet by mouth daily Yes Becka Church MD   NEXIUM 40 MG delayed release capsule TAKE 1 CAPSULE BY MOUTH DAILY Yes Becka Church MD   solifenacin (VESICARE) 10 MG tablet Take 1 tablet by mouth daily Yes Constantine Brito MD   fluticasone (FLOVENT HFA) 220 MCG/ACT inhaler Inhale 2 puffs into the lungs 2 times daily Yes Constantine Brito MD   albuterol sulfate  (90 Base) MCG/ACT inhaler Inhale 2 puffs into the lungs every 6 hours as needed for Wheezing Yes Constantine Brito MD   vitamin D (ERGOCALCIFEROL) 1.25 MG (85043 UT) CAPS capsule Take 50,000 Units by mouth once a week Yes Historical Provider, MD   ferrous sulfate (IRON 325) 325 (65 Fe) MG tablet Take 1 tablet by mouth daily (with breakfast) Yes Constantine Brito MD       CareTeam (Including outside providers/suppliers regularly involved in providing care):   Patient Care Team:  Constantine Brito MD as PCP - Pankaj Palencia MD as PCP - [facility] Empaneled Provider  Ade Oconnor (Gastroenterology)  Madina Montano MD as Consulting Physician (Otolaryngology)  Mindi Chang MD as Surgeon (General Surgery)     Reviewed and updated this visit:  Tobacco  Allergies  Meds  Med Hx  Surg Hx  Soc Hx  Fam Hx

## 2022-08-03 NOTE — PATIENT INSTRUCTIONS
Personalized Preventive Plan for Maria Esther Stanton - 8/3/2022  Medicare offers a range of preventive health benefits. Some of the tests and screenings are paid in full while other may be subject to a deductible, co-insurance, and/or copay. Some of these benefits include a comprehensive review of your medical history including lifestyle, illnesses that may run in your family, and various assessments and screenings as appropriate. After reviewing your medical record and screening and assessments performed today your provider may have ordered immunizations, labs, imaging, and/or referrals for you. A list of these orders (if applicable) as well as your Preventive Care list are included within your After Visit Summary for your review. Other Preventive Recommendations:    A preventive eye exam performed by an eye specialist is recommended every 1-2 years to screen for glaucoma; cataracts, macular degeneration, and other eye disorders. A preventive dental visit is recommended every 6 months. Try to get at least 150 minutes of exercise per week or 10,000 steps per day on a pedometer . Order or download the FREE \"Exercise & Physical Activity: Your Everyday Guide\" from [employer]. Call 0-802.773.3575 or search [employer] online. You need 7121-1121 mg of calcium and 0625-1524 IU of vitamin D per day. It is possible to meet your calcium requirement with diet alone, but a vitamin D supplement is usually necessary to meet this goal.  When exposed to the sun, use a sunscreen that protects against both UVA and UVB radiation with an SPF of 30 or greater. Reapply every 2 to 3 hours or after sweating, drying off with a towel, or swimming. Always wear a seat belt when traveling in a car. Always wear a helmet when riding a bicycle or motorcycle.

## 2022-08-12 ENCOUNTER — HOSPITAL ENCOUNTER (OUTPATIENT)
Dept: GENERAL RADIOLOGY | Age: 67
Discharge: HOME | End: 2022-08-12
Payer: MEDICARE

## 2022-08-12 DIAGNOSIS — M81.0 AGE-RELATED OSTEOPOROSIS WITHOUT CURRENT PATHOLOGICAL FRACTURE: ICD-10-CM

## 2022-08-12 PROCEDURE — 77080 DXA BONE DENSITY AXIAL: CPT

## 2022-08-15 ENCOUNTER — HOSPITAL ENCOUNTER (OUTPATIENT)
Dept: NON INVASIVE DIAGNOSTICS | Age: 67
Discharge: HOME | End: 2022-08-15
Payer: MEDICARE

## 2022-08-15 ENCOUNTER — HOSPITAL ENCOUNTER (OUTPATIENT)
Dept: PULMONOLOGY | Age: 67
Discharge: HOME | End: 2022-08-15
Payer: MEDICARE

## 2022-08-15 VITALS — RESPIRATION RATE: 18 BRPM | HEART RATE: 67 BPM | OXYGEN SATURATION: 100 %

## 2022-08-15 DIAGNOSIS — R06.02 SOB (SHORTNESS OF BREATH): ICD-10-CM

## 2022-08-15 LAB
DLCO %PRED: 52 %
DLCO PRED: NORMAL
DLCO/VA %PRED: NORMAL
DLCO/VA PRED: NORMAL
DLCO/VA: NORMAL
DLCO: NORMAL
EXPIRATORY TIME-POST: NORMAL
EXPIRATORY TIME: NORMAL
FEF 25-75% %CHNG: NORMAL
FEF 25-75% %PRED-POST: NORMAL
FEF 25-75% %PRED-PRE: NORMAL
FEF 25-75% PRED: NORMAL
FEF 25-75%-POST: NORMAL
FEF 25-75%-PRE: NORMAL
FEV1 %PRED-POST: NORMAL %
FEV1 %PRED-PRE: 95 %
FEV1 PRED: NORMAL
FEV1-POST: NORMAL
FEV1-PRE: NORMAL
FEV1/FVC %PRED-POST: NORMAL
FEV1/FVC %PRED-PRE: NORMAL
FEV1/FVC PRED: NORMAL
FEV1/FVC-POST: NORMAL %
FEV1/FVC-PRE: 80 %
FVC %PRED-POST: NORMAL
FVC %PRED-PRE: NORMAL
FVC PRED: NORMAL
FVC-POST: NORMAL
FVC-PRE: NORMAL
GAW %PRED: NORMAL
GAW PRED: NORMAL
GAW: NORMAL
IC %PRED: NORMAL
IC PRED: NORMAL
IC: NORMAL
LV EF: 65 %
LVEF MODALITY: NORMAL
MEP: NORMAL
MIP: NORMAL
MVV %PRED-PRE: NORMAL
MVV PRED: NORMAL
MVV-PRE: NORMAL
PEF %PRED-POST: NORMAL
PEF %PRED-PRE: NORMAL
PEF PRED: NORMAL
PEF%CHNG: NORMAL
PEF-POST: NORMAL
PEF-PRE: NORMAL
RAW %PRED: NORMAL
RAW PRED: NORMAL
RAW: NORMAL
RV %PRED: NORMAL
RV PRED: NORMAL
RV: NORMAL
SVC %PRED: NORMAL
SVC PRED: NORMAL
SVC: NORMAL
TLC %PRED: 83 %
TLC PRED: NORMAL
TLC: NORMAL
VA %PRED: NORMAL
VA PRED: NORMAL
VA: NORMAL
VTG %PRED: NORMAL
VTG PRED: NORMAL
VTG: NORMAL

## 2022-08-15 PROCEDURE — 94726 PLETHYSMOGRAPHY LUNG VOLUMES: CPT

## 2022-08-15 PROCEDURE — 93306 TTE W/DOPPLER COMPLETE: CPT

## 2022-08-15 PROCEDURE — 94760 N-INVAS EAR/PLS OXIMETRY 1: CPT

## 2022-08-15 PROCEDURE — 94200 LUNG FUNCTION TEST (MBC/MVV): CPT

## 2022-08-15 PROCEDURE — 94729 DIFFUSING CAPACITY: CPT

## 2022-08-15 PROCEDURE — 94010 BREATHING CAPACITY TEST: CPT

## 2022-08-15 RX ORDER — ALBUTEROL SULFATE 90 UG/1
4 AEROSOL, METERED RESPIRATORY (INHALATION) ONCE
Status: CANCELLED | OUTPATIENT
Start: 2022-08-15

## 2022-08-15 RX ORDER — CAL/D3/MAG11/ZINC/COP/MANG/BOR 600 MG-800
1 TABLET ORAL 2 TIMES DAILY
Qty: 180 TABLET | Refills: 5 | Status: SHIPPED | OUTPATIENT
Start: 2022-08-15

## 2022-08-15 RX ORDER — ERGOCALCIFEROL 1.25 MG/1
50000 CAPSULE ORAL WEEKLY
Qty: 12 CAPSULE | Refills: 1 | Status: SHIPPED | OUTPATIENT
Start: 2022-08-15

## 2022-08-15 ASSESSMENT — PULMONARY FUNCTION TESTS
FEV1_PERCENT_PREDICTED_PRE: 95
FEV1/FVC_PRE: 80

## 2022-08-16 DIAGNOSIS — R94.2 DECREASED DIFFUSION CAPACITY: Primary | ICD-10-CM

## 2022-08-16 DIAGNOSIS — R06.02 SOB (SHORTNESS OF BREATH): ICD-10-CM

## 2022-08-16 NOTE — PROCEDURES
Pulmonary Function Testing      Patient name:  Rosario Hill     [facility] Unit #:   [id number]   Date of test: 8/15/2022  Date of interpretation:   8/16/2022    Ms. Rosario Hill is a 74y.o. year-old current smoker. The spirometry data were acceptable and reproducible. Spirometry:  Flow volume loops were restricted. The FEV-1/FVC ratio was normal. The      FEV-1 was 2.13 liters (95% of predicted), which was normal. The FVC was 2.65 liters (93% of predicted), which was normal. Response to inhaled bronchodilators (albuterol) was not performed. Lung volumes:  Lung volumes were tested by plethysmography. The total lung capacity was 4.51 liters (83% of predicted), which was normal. The residual volume was 1.86 liters (87% of predicted), which was normal. The ratio of residual volume to total lung capacity (RV/TLC) was 106%, which was normal. Specific airway resistance was increased. Diffusion capacity was found to be decreased. Interpretation:  Isolated reduction in diffusion capacity which could be a sign of early emphysema versus pulmonary vascular disease. Please correlate clinically.

## 2022-08-26 ENCOUNTER — HOSPITAL ENCOUNTER (OUTPATIENT)
Dept: MRI IMAGING | Age: 67
Discharge: HOME | End: 2022-08-26
Payer: MEDICARE

## 2022-08-26 DIAGNOSIS — R11.2 NAUSEA AND VOMITING, INTRACTABILITY OF VOMITING NOT SPECIFIED, UNSPECIFIED VOMITING TYPE: ICD-10-CM

## 2022-08-26 PROCEDURE — 74181 MRI ABDOMEN W/O CONTRAST: CPT

## 2022-08-29 ENCOUNTER — OFFICE VISIT (OUTPATIENT)
Dept: PULMONOLOGY | Age: 67
End: 2022-08-29
Payer: MEDICARE

## 2022-08-29 VITALS
RESPIRATION RATE: 21 BRPM | HEART RATE: 81 BPM | TEMPERATURE: 97.5 F | SYSTOLIC BLOOD PRESSURE: 106 MMHG | WEIGHT: 236 LBS | BODY MASS INDEX: 37.04 KG/M2 | HEIGHT: 67 IN | OXYGEN SATURATION: 99 % | DIASTOLIC BLOOD PRESSURE: 70 MMHG

## 2022-08-29 DIAGNOSIS — E66.01 MORBIDLY OBESE (HCC): ICD-10-CM

## 2022-08-29 DIAGNOSIS — M79.605 PAIN IN BOTH LOWER EXTREMITIES: ICD-10-CM

## 2022-08-29 DIAGNOSIS — M79.604 PAIN IN BOTH LOWER EXTREMITIES: ICD-10-CM

## 2022-08-29 DIAGNOSIS — R06.09 DOE (DYSPNEA ON EXERTION): Primary | ICD-10-CM

## 2022-08-29 DIAGNOSIS — R07.9 CHEST PAIN, UNSPECIFIED TYPE: ICD-10-CM

## 2022-08-29 PROCEDURE — 1090F PRES/ABSN URINE INCON ASSESS: CPT | Performed by: INTERNAL MEDICINE

## 2022-08-29 PROCEDURE — G9899 SCRN MAM PERF RSLTS DOC: HCPCS | Performed by: INTERNAL MEDICINE

## 2022-08-29 PROCEDURE — 1036F TOBACCO NON-USER: CPT | Performed by: INTERNAL MEDICINE

## 2022-08-29 PROCEDURE — 99204 OFFICE O/P NEW MOD 45 MIN: CPT | Performed by: INTERNAL MEDICINE

## 2022-08-29 PROCEDURE — 3017F COLORECTAL CA SCREEN DOC REV: CPT | Performed by: INTERNAL MEDICINE

## 2022-08-29 PROCEDURE — 1123F ACP DISCUSS/DSCN MKR DOCD: CPT | Performed by: INTERNAL MEDICINE

## 2022-08-29 PROCEDURE — G8417 CALC BMI ABV UP PARAM F/U: HCPCS | Performed by: INTERNAL MEDICINE

## 2022-08-29 PROCEDURE — G8399 PT W/DXA RESULTS DOCUMENT: HCPCS | Performed by: INTERNAL MEDICINE

## 2022-08-29 PROCEDURE — G8427 DOCREV CUR MEDS BY ELIG CLIN: HCPCS | Performed by: INTERNAL MEDICINE

## 2022-08-29 NOTE — ASSESSMENT & PLAN NOTE
PFT completed without obstruction. bronchodilator response not completed to assess for asthma. Diffusion capacity is reduced but with IVC significantly work on Diffusion capacity assessment than spirometry, this is likely effort. After correcting for effort, Diffusion capacity improves to 111%. She has been on albuterol and Flovent for sometime. Unfortunately, her technique was so poor she has never received these medications. She was not breathing with using this medication. Education give about proper use of medication. .  She had issues with time after education. Therefore, spacer was given to improve drug delivery. Further, Methacholine Challenge Test has been ordered to assess for asthma. Lastly, she was advised to follow up cardiology. She has symptoms consistent with PAD and CAD more than asthma. Attempting to directly communicate to cardiology via email. Not able to find contact information.

## 2022-08-29 NOTE — ASSESSMENT & PLAN NOTE
Work up for PAD warranted as well. Defer to cardiology for this as well with cramping with exertion and laying down sometimes sleeping with legs over side of bed. lower extremities warm therefore does not appear to have critical leg ischemia.

## 2022-08-29 NOTE — ASSESSMENT & PLAN NOTE
She continues to have dyspnea on exertion associated with chest pain with radiation to the left arm. Better with rest.  Strong family hx. She is being seen by cardiology at [facility]. She has had negative stress test in the past.  I will defer to cardiology but may be worth repeat stress vs [facility]. Echo recently was normal.      Work up for PAD warranted as well. Defer to cardiology for this as well with cramping with exertion and laying down sometimes sleeping with legs over side of bed. lower extremities warm therefore does not appear to have critical leg ischemia.

## 2022-08-29 NOTE — PROGRESS NOTES
REASON FOR CONSULTATION/CC:    Chief Complaint   Patient presents with    \[facility] [place] [facility]\"" Care    Shortness of Breath        Consult at request of   Ti Montez MD for  dyspnea     PCP: Ti Montez MD    HISTORY OF PRESENT ILLNESS: Stephany Lane is a 74y.o. year old female with a history of   who presents      dyspnea   She complains of fatigue and dyspnea on exertion that is worse with shower. Improves with rest.   This occurs    She will also have cramps in legs and chest pain with exertion / chest pressure under left breast.  No radiation. No change in pain with pressure. This improves with rest and worse with exertion      Leg cramps worse with exertion and better with rest.  Will have leg cramps with sleeping and better with putting legs over the bed. REVIEW OF SYSTEMS:  Constitutional: Negative for fever    HENT: Negative for sore throat  Eyes: Negative for redness   Respiratory: +  for dyspnea, - cough  Cardiovascular: Negative for chest pain  Gastrointestinal: Negative for vomiting, diarrhea   Genitourinary: Negative for hematuria   Musculoskeletal: Negative for arthralgias   Skin: Negative for rash  Neurological: Negative for syncope  Hematological: Negative for adenopathy  Psychiatric/Behavorial: Negative for anxiety      SOCIAL HISTORY:   reports that she quit smoking about 18 years ago. Her smoking use included cigarettes. She has a 0.50 pack-year smoking history. She has been exposed to tobacco smoke.  She has never used smokeless tobacco.    PAST MEDICAL HISTORY:  Past Medical History:   Diagnosis Date    Anxiety state     Asthma     Back ache     chronic    Environmental allergies     GERD (gastroesophageal reflux disease)     Hypertension     MVP (mitral valve prolapse)     Neck pain, chronic     Reflux     Sleep apnea        PAST SURGICAL HISTORY:  Past Surgical History:   Procedure Laterality Date    BACK SURGERY      BLADDER SUSPENSION      CHOLECYSTECTOMY      FOOT SURGERY 11-9-12    endoscopic plantar fasciotomy right foot    GASTRIC BYPASS SURGERY      HERNIA REPAIR      HIATAL HERNIA REPAIR      HYSTERECTOMY (CERVIX STATUS UNKNOWN)      JOINT REPLACEMENT Bilateral     KNEE    KNEE ARTHROPLASTY  2/28/13    L    KNEE ARTHROSCOPY Right 3/7/2014    LAMINECTOMY  7/1/11    bilateral L5-S1 laminotomy, nerve root exploration, foraminotomy    NASAL SINUS SURGERY Right 02/05/2018    Excision of rt nasal mass    SD COLONOSCOPY FLX DX W/COLLJ SPEC WHEN PFRMD N/A 9/28/2018    COLONOSCOPY DIAGNOSTIC OR SCREENING performed by Yamini Jay MD at [address] DX/THER AGNT PARAVERT FACET JOINT, LUMBAR/SAC, 1ST LEVEL Right 11/6/2018    RIGHT L4,L5 S1 MEDIAL BRANCH BLOCK WITH FLUOROSCOPY performed by Randy Fothergill, MD at [address] [facility], [address] [facility]      with dilatation    UPPER GASTROINTESTINAL ENDOSCOPY N/A 8/14/2019    EGD ESOPHAGOGASTRODUODENOSCOPY performed by Yamini Jay MD at [address] [facility] N/A 5/25/2021    EGD BIOPSY performed by Kiara Delacruz MD at [facility]:  family history includes Cancer in her sister; Heart Disease in her father, mother, and sister; High Blood Pressure in her brother, brother, father, mother, and sister; Kidney Disease in her brother, brother, sister, and sister; Other in her father, mother, and sister. Objective:   PHYSICAL EXAM:  Blood pressure 106/70, pulse 81, temperature 97.5 °F (36.4 °C), resp. rate 21, height 5' 7\" (1.702 m), weight 236 lb (107 kg), SpO2 99 %, not currently breastfeeding.'  Gen: No distress. Eyes: PERRL. No sclera icterus. No conjunctival injection. ENT: No discharge. Pharynx clear. External appearance of ears and nose normal.  Neck: Trachea midline. No obvious mass. Resp: No accessory muscle use. No crackles. No wheezes. No rhonchi. CV: Regular rate. Regular rhythm.  No murmur or rub. No edema. GI:    Skin: Warm, dry, normal texture and turgor. No nodule on exposed extremities. Lymph: No cervical LAD. No supraclavicular LAD. M/S: No cyanosis. No clubbing. No joint deformity. Neuro: Moves all four extremities. Psych: Oriented x 3. No anxiety. Awake. Alert. Intact judgement and insight. Current Outpatient Medications   Medication Sig Dispense Refill    Spacer/Aero-Holding Chambers RAMANA 1 Device by Does not apply route daily 1 each 0    Caltrate 600+D Plus Minerals (CALTRATE) 600-800 MG-UNIT TABS tablet Take 1 tablet by mouth in the morning and 1 tablet before bedtime. 180 tablet 5    vitamin D (ERGOCALCIFEROL) 1.25 MG (07853 UT) CAPS capsule Take 1 capsule by mouth once a week 12 capsule 1    losartan (COZAAR) 100 MG tablet Take 1 tablet by mouth in the morning. 90 tablet 3    NIFEdipine (PROCARDIA XL) 90 MG extended release tablet Take 1 tablet by mouth in the morning. 90 tablet 1    hydrALAZINE (APRESOLINE) 50 MG tablet Take 1 tablet by mouth in the morning and at bedtime 60 tablet 3    labetalol (NORMODYNE) 300 MG tablet Take 1 tablet by mouth in the morning and 1 tablet before bedtime. 180 tablet 3    pilocarpine (SALAGEN) 5 MG tablet TAKE 1 TABLET BY MOUTH THREE TIMES DAILY 30 MINUTES BEFORE MEALS 270 tablet 1    acetic acid-hydrocortisone (VOSOL-HC) 1-2 % otic solution Use 4 drops in the affected ear three times a day as needed for itching.  10 mL 0    MYRBETRIQ 25 MG TB24       fluticasone (FLONASE) 50 MCG/ACT nasal spray SHAKE LIQUID AND USE 2 SPRAYS IN EACH NOSTRIL DAILY 48 g 3    butalbital-acetaminophen-caffeine (FIORICET, ESGIC) -40 MG per tablet Take 1 tablet by mouth 3 times daily as needed for Headaches 90 tablet 3    atorvastatin (LIPITOR) 20 MG tablet Take 1 tablet by mouth daily 90 tablet 3    NEXIUM 40 MG delayed release capsule TAKE 1 CAPSULE BY MOUTH DAILY 90 capsule 1    solifenacin (VESICARE) 10 MG tablet Take 1 tablet by mouth daily 90 tablet 1 fluticasone (FLOVENT HFA) 220 MCG/ACT inhaler Inhale 2 puffs into the lungs 2 times daily 12 g 5    albuterol sulfate  (90 Base) MCG/ACT inhaler Inhale 2 puffs into the lungs every 6 hours as needed for Wheezing 18 g 5    vitamin D (ERGOCALCIFEROL) 1.25 MG (63602 UT) CAPS capsule Take 50,000 Units by mouth once a week      ferrous sulfate (IRON 325) 325 (65 Fe) MG tablet Take 1 tablet by mouth daily (with breakfast) 90 tablet 3    zolpidem (AMBIEN) 5 MG tablet Take 1 tablet by mouth nightly as needed for Sleep for up to 30 days. 30 tablet 0     No current facility-administered medications for this visit. Data Reviewed:     Category 1 Data points:      Last CBC  Lab Results   Component Value Date/Time    WBC 3.5 07/01/2022 02:05 PM    RBC 3.77 07/01/2022 02:05 PM    HGB 11.0 07/01/2022 02:05 PM    MCV 87.8 07/01/2022 02:05 PM     07/01/2022 02:05 PM     Last Renal  Lab Results   Component Value Date/Time     07/01/2022 02:05 PM    K 4.1 07/01/2022 02:05 PM     07/01/2022 02:05 PM    CO2 15 07/01/2022 02:05 PM    CO2 19 06/14/2022 10:59 AM    CO2 23 05/05/2022 01:24 PM    BUN 13 07/01/2022 02:05 PM    CREATININE 1.0 07/01/2022 02:05 PM    GLUCOSE 89 07/01/2022 02:05 PM    CALCIUM 9.7 07/01/2022 02:05 PM       Last ABG  POC Blood Gas: No results found for: POCPH, POCPCO2, POCPO2, POCHCO3, NBEA, FBOL5WME  No results for input(s): PH, PCO2, PO2, HCO3, BE, O2SAT in the last 72 hours. Notes Reviewed:  dr. Dennis Hilliard     Radiology Review:  Pertinent images / reports were reviewed as a part of this visit. CT Chest w/ contrast: No results found for this or any previous visit. CT Chest w/o contrast: No results found for this or any previous visit. CTPA: No results found for this or any previous visit.       CXR PA/LAT: Results for orders placed during the hospital encounter of 07/18/22    XR CHEST (2 VW)    Narrative  EXAMINATION:  TWO XRAY VIEWS OF THE CHEST    7/18/2022 1:09 pm    COMPARISON:  Chest x-ray dated 04/25/2022. HISTORY:  ORDERING SYSTEM PROVIDED HISTORY: SOB (shortness of breath)  TECHNOLOGIST PROVIDED HISTORY:  Reason for Exam: sob    FINDINGS:  HEART/MEDIASTINUM: The cardiomediastinal silhouette is within normal limits. PLEURA/LUNGS: There are no focal consolidations or pleural effusions. There  is no appreciable pneumothorax. BONES/SOFT TISSUE: No acute abnormality. Cardiac loop recorder is noted. Cervical spine hardware is again noted. Impression  No radiographic evidence of acute pulmonary disease. CXR portable: Results for orders placed during the hospital encounter of 04/25/22    XR CHEST PORTABLE    Narrative  EXAMINATION:  ONE XRAY VIEW OF THE CHEST    4/25/2022 1:43 am    COMPARISON:  04/09/2015    HISTORY:  ORDERING SYSTEM PROVIDED HISTORY: lightheaded  TECHNOLOGIST PROVIDED HISTORY:  Reason for exam:->lightheaded  Reason for Exam: lightheaded    FINDINGS:  The lungs are without acute focal process. There is no effusion or  pneumothorax. The cardiomediastinal silhouette is without acute process. The  osseous structures are without acute process. Impression  No acute process. Total labs reviewed 3+    Category 2 Data points:    Radiology Review:  Independent interpretation of   Radiology Review:  Pertinent images / reports were reviewed as a part of this visit. CT Chest w/ contrast: No results found for this or any previous visit. CT Chest w/o contrast: No results found for this or any previous visit. CTPA: No results found for this or any previous visit. CXR PA/LAT: Results for orders placed during the hospital encounter of 07/18/22    XR CHEST (2 VW)    Narrative  EXAMINATION:  TWO XRAY VIEWS OF THE CHEST    7/18/2022 1:09 pm    COMPARISON:  Chest x-ray dated 04/25/2022.     HISTORY:  ORDERING SYSTEM PROVIDED HISTORY: SOB (shortness of breath)  TECHNOLOGIST PROVIDED HISTORY:  Reason for Exam: sob    FINDINGS:  HEART/MEDIASTINUM: The cardiomediastinal silhouette is within normal limits. PLEURA/LUNGS: There are no focal consolidations or pleural effusions. There  is no appreciable pneumothorax. BONES/SOFT TISSUE: No acute abnormality. Cardiac loop recorder is noted. Cervical spine hardware is again noted. Impression  No radiographic evidence of acute pulmonary disease. CXR portable: Results for orders placed during the hospital encounter of 04/25/22    XR CHEST PORTABLE    Narrative  EXAMINATION:  ONE XRAY VIEW OF THE CHEST    4/25/2022 1:43 am    COMPARISON:  04/09/2015    HISTORY:  ORDERING SYSTEM PROVIDED HISTORY: lightheaded  TECHNOLOGIST PROVIDED HISTORY:  Reason for exam:->lightheaded  Reason for Exam: lightheaded    FINDINGS:  The lungs are without acute focal process. There is no effusion or  pneumothorax. The cardiomediastinal silhouette is without acute process. The  osseous structures are without acute process. Impression  No acute process. Category 3 Data points:    Discussed management or interpretation of test with external provider:              Assessment:        dyspnea  Sjogren's   Sleep apnea per chart  Chest pain       Plan:      Problem List Items Addressed This Visit       Pain in both lower extremities      Work up for PAD warranted as well. Defer to cardiology for this as well with cramping with exertion and laying down sometimes sleeping with legs over side of bed. lower extremities warm therefore does not appear to have critical leg ischemia. WILSON (dyspnea on exertion) - Primary     PFT completed without obstruction. bronchodilator response not completed to assess for asthma. Diffusion capacity is reduced but with IVC significantly work on Diffusion capacity assessment than spirometry, this is likely effort. After correcting for effort, Diffusion capacity improves to 111%. She has been on albuterol and Flovent for sometime.

## 2022-08-29 NOTE — LETTER
[facility]   [address] [facility] [facility]  Phone: [phone]  Fax: [phone]     8/29/2022    Dr. Tobi Loera MD and  [facility]    Today had the pleasure to see our mutual patient, Siddhartha Seaman. My office note is attached. Please let me know if you have any questions.         Sincerely,    Dax  Pulmonary, Sleep and Critical Care  [phone]

## 2022-08-30 ENCOUNTER — TELEPHONE (OUTPATIENT)
Dept: PRIMARY CARE CLINIC | Age: 67
End: 2022-08-30

## 2022-08-30 NOTE — TELEPHONE ENCOUNTER
----- Message from Monica Medina sent at 8/25/2022  4:12 PM EDT -----  Subject: Message to Provider    QUESTIONS  Information for Provider? Patient would like a call back from nurse real   in regards to test results. ---------------------------------------------------------------------------  --------------  Ashley Kendall [facility]  [phone]; OK to leave message on voicemail  ---------------------------------------------------------------------------  --------------  SCRIPT ANSWERS  Relationship to Patient?  Self

## 2022-09-06 ENCOUNTER — NURSE ONLY (OUTPATIENT)
Dept: PRIMARY CARE CLINIC | Age: 67
End: 2022-09-06
Payer: MEDICARE

## 2022-09-06 VITALS — TEMPERATURE: 96.6 F

## 2022-09-06 DIAGNOSIS — E53.8 VITAMIN B 12 DEFICIENCY: Primary | ICD-10-CM

## 2022-09-06 PROCEDURE — 90471 IMMUNIZATION ADMIN: CPT | Performed by: INTERNAL MEDICINE

## 2022-09-06 PROCEDURE — 96372 THER/PROPH/DIAG INJ SC/IM: CPT | Performed by: INTERNAL MEDICINE

## 2022-09-06 RX ORDER — CYANOCOBALAMIN 1000 UG/ML
1000 INJECTION INTRAMUSCULAR; SUBCUTANEOUS ONCE
Status: COMPLETED | OUTPATIENT
Start: 2022-09-06 | End: 2022-09-06

## 2022-09-06 RX ADMIN — CYANOCOBALAMIN 1000 MCG: 1000 INJECTION INTRAMUSCULAR; SUBCUTANEOUS at 10:05

## 2022-09-15 DIAGNOSIS — J45.991 ASTHMA, COUGH VARIANT: ICD-10-CM

## 2022-09-16 ENCOUNTER — HOSPITAL ENCOUNTER (OUTPATIENT)
Dept: PULMONOLOGY | Age: 67
Discharge: HOME | End: 2022-09-16
Payer: MEDICARE

## 2022-09-16 VITALS — RESPIRATION RATE: 18 BRPM | HEART RATE: 78 BPM | OXYGEN SATURATION: 99 %

## 2022-09-16 DIAGNOSIS — R06.09 DOE (DYSPNEA ON EXERTION): ICD-10-CM

## 2022-09-16 LAB
EXPIRATORY TIME-POST: NORMAL
EXPIRATORY TIME: NORMAL
FEF 25-75% %CHNG: NORMAL
FEF 25-75% %PRED-POST: NORMAL
FEF 25-75% %PRED-PRE: NORMAL
FEF 25-75% PRED: NORMAL
FEF 25-75%-POST: NORMAL
FEF 25-75%-PRE: NORMAL
FEV1 %PRED-POST: 93 %
FEV1 %PRED-PRE: 92 %
FEV1 PRED: NORMAL
FEV1-POST: NORMAL
FEV1-PRE: NORMAL
FEV1/FVC %PRED-POST: NORMAL
FEV1/FVC %PRED-PRE: NORMAL
FEV1/FVC PRED: NORMAL
FEV1/FVC-POST: 82 %
FEV1/FVC-PRE: 81 %
FVC %PRED-POST: NORMAL
FVC %PRED-PRE: NORMAL
FVC PRED: NORMAL
FVC-POST: NORMAL
FVC-PRE: NORMAL
PEF %PRED-POST: NORMAL
PEF %PRED-PRE: NORMAL
PEF PRED: NORMAL
PEF%CHNG: NORMAL
PEF-POST: NORMAL
PEF-PRE: NORMAL

## 2022-09-16 PROCEDURE — 6360000002 HC RX W HCPCS: Performed by: INTERNAL MEDICINE

## 2022-09-16 PROCEDURE — 94010 BREATHING CAPACITY TEST: CPT

## 2022-09-16 PROCEDURE — 6370000000 HC RX 637 (ALT 250 FOR IP): Performed by: INTERNAL MEDICINE

## 2022-09-16 PROCEDURE — 94760 N-INVAS EAR/PLS OXIMETRY 1: CPT

## 2022-09-16 PROCEDURE — 94070 EVALUATION OF WHEEZING: CPT

## 2022-09-16 RX ORDER — SODIUM CHLORIDE FOR INHALATION 0.9 %
3 VIAL, NEBULIZER (ML) INHALATION ONCE
Status: COMPLETED | OUTPATIENT
Start: 2022-09-16 | End: 2022-09-16

## 2022-09-16 RX ORDER — ALBUTEROL SULFATE 90 UG/1
2 AEROSOL, METERED RESPIRATORY (INHALATION) ONCE
Status: COMPLETED | OUTPATIENT
Start: 2022-09-16 | End: 2022-09-16

## 2022-09-16 RX ORDER — ALBUTEROL SULFATE 2.5 MG/3ML
2.5 SOLUTION RESPIRATORY (INHALATION) ONCE
Status: COMPLETED | OUTPATIENT
Start: 2022-09-16 | End: 2022-09-16

## 2022-09-16 RX ADMIN — METHACHOLINE CHLORIDE 1 MG: 100 POWDER, FOR SOLUTION RESPIRATORY (INHALATION) at 10:44

## 2022-09-16 RX ADMIN — METHACHOLINE CHLORIDE 0.25 MG: 100 POWDER, FOR SOLUTION RESPIRATORY (INHALATION) at 10:40

## 2022-09-16 RX ADMIN — Medication 2 PUFF: at 11:20

## 2022-09-16 RX ADMIN — Medication 3 ML: at 10:24

## 2022-09-16 RX ADMIN — METHACHOLINE CHLORIDE 0.06 MG: 100 POWDER, FOR SOLUTION RESPIRATORY (INHALATION) at 10:34

## 2022-09-16 RX ADMIN — ALBUTEROL SULFATE 2.5 MG: 2.5 SOLUTION RESPIRATORY (INHALATION) at 10:50

## 2022-09-16 ASSESSMENT — PULMONARY FUNCTION TESTS
FEV1_PERCENT_PREDICTED_POST: 93
FEV1/FVC_PRE: 81
FEV1/FVC_POST: 82
FEV1_PERCENT_PREDICTED_PRE: 92

## 2022-09-16 NOTE — PROGRESS NOTES
Methacholine Challenge completed. The patient's FEV1 decreased 36% after the third dose (1 mg/ml) of Methacholine. Challenge stopped and Albuterol . 083% HHN given for reversal of Methacholine. Patient FEV1 -24% from baseline. Albuterol 2 puffs MDI given with spacer. Patient near baseline. Patient tolerated well.

## 2022-09-19 NOTE — PROCEDURES
Pulmonary Function Testing      Patient name:  Rickard Fabry     [facility] Unit #:   [id number]   Date of test: 9/16/2022  Date of interpretation:   9/19/2022    Ms. Rickard Fabry is a 74y.o. year-old non smoker. The spirometry data were acceptable and reproducible. Spirometry:  Flow volume loops were normal. The FEV-1/FVC ratio was normal. The  post-bronchodilator FEV-1 was 2.08 liters (93% of predicted), which was normal. The FVC was 2.53 liters (88% of predicted), which was normal. Response to inhaled bronchodilators (albuterol) was not significant. Lung volumes:  Lung volumes were not tested by plethysmography. Diffusion capacity was found to be not tested. Bronchoprovocation with methacholine showed a PC 20 dosage of 1 mg/mL. Interpretation:  Spirometry within normal limits. Mild bronchial hyperresponsiveness seen.

## 2022-09-26 ENCOUNTER — TELEPHONE (OUTPATIENT)
Dept: PRIMARY CARE CLINIC | Age: 67
End: 2022-09-26

## 2022-09-26 NOTE — TELEPHONE ENCOUNTER
PT called in stating that she was at her neurologists today and due to a medication that they had prescribed PT has thrush in her mouth and they recommended that PT call our office to have Dr. Jun Lane call in something to treat the thrush. Pt would like for this to be done today if possible. She says its pretty bad and it's making her throat sore. Please send to the [place] [facility].      Best call back number: [phone]

## 2022-09-27 RX ORDER — CLOTRIMAZOLE 10 MG/1
10 LOZENGE ORAL; TOPICAL
Qty: 50 TABLET | Refills: 0 | Status: SHIPPED | OUTPATIENT
Start: 2022-09-27 | End: 2022-10-07

## 2022-10-03 ENCOUNTER — TELEPHONE (OUTPATIENT)
Dept: PRIMARY CARE CLINIC | Age: 67
End: 2022-10-03

## 2022-10-03 RX ORDER — CHOLECALCIFEROL (VITAMIN D3) 125 MCG
5 CAPSULE ORAL NIGHTLY PRN
Qty: 30 TABLET | Refills: 0 | Status: SHIPPED | OUTPATIENT
Start: 2022-10-03 | End: 2022-10-05 | Stop reason: SDUPTHER

## 2022-10-05 ENCOUNTER — NURSE ONLY (OUTPATIENT)
Dept: PRIMARY CARE CLINIC | Age: 67
End: 2022-10-05
Payer: MEDICARE

## 2022-10-05 DIAGNOSIS — E53.8 VITAMIN B 12 DEFICIENCY: Primary | ICD-10-CM

## 2022-10-05 DIAGNOSIS — G47.09 OTHER INSOMNIA: ICD-10-CM

## 2022-10-05 PROCEDURE — 96372 THER/PROPH/DIAG INJ SC/IM: CPT | Performed by: INTERNAL MEDICINE

## 2022-10-05 PROCEDURE — 90471 IMMUNIZATION ADMIN: CPT | Performed by: INTERNAL MEDICINE

## 2022-10-05 RX ORDER — ZOLPIDEM TARTRATE 5 MG/1
5 TABLET ORAL NIGHTLY PRN
Qty: 30 TABLET | Refills: 3 | Status: SHIPPED | OUTPATIENT
Start: 2022-10-05 | End: 2022-11-04

## 2022-10-05 RX ORDER — ZOLPIDEM TARTRATE 5 MG/1
5 TABLET ORAL NIGHTLY PRN
Qty: 30 TABLET | Refills: 0 | OUTPATIENT
Start: 2022-10-05 | End: 2022-11-04

## 2022-10-05 RX ORDER — CYANOCOBALAMIN 1000 UG/ML
1000 INJECTION INTRAMUSCULAR; SUBCUTANEOUS ONCE
Status: COMPLETED | OUTPATIENT
Start: 2022-10-05 | End: 2022-10-05

## 2022-10-05 RX ORDER — CHOLECALCIFEROL (VITAMIN D3) 125 MCG
5 CAPSULE ORAL NIGHTLY PRN
Qty: 30 TABLET | Refills: 3 | Status: SHIPPED | OUTPATIENT
Start: 2022-10-05

## 2022-10-05 RX ADMIN — CYANOCOBALAMIN 1000 MCG: 1000 INJECTION INTRAMUSCULAR; SUBCUTANEOUS at 14:40

## 2022-10-05 NOTE — PROGRESS NOTES
After obtaining consent, and per orders of Dr. Stepan Andrade, injection of Vit B12 given in Right arm by Devika Adair MA. Patient instructed to remain in clinic for 20 minutes afterwards, and to report any adverse reaction to me immediately.

## 2022-10-06 DIAGNOSIS — M35.01 SJOGREN'S SYNDROME WITH KERATOCONJUNCTIVITIS SICCA (HCC): ICD-10-CM

## 2022-10-07 RX ORDER — PILOCARPINE HYDROCHLORIDE 5 MG/1
TABLET, FILM COATED ORAL
Qty: 270 TABLET | Refills: 1 | Status: SHIPPED | OUTPATIENT
Start: 2022-10-07 | End: 2022-10-17 | Stop reason: SDUPTHER

## 2022-10-10 ENCOUNTER — OFFICE VISIT (OUTPATIENT)
Dept: PULMONOLOGY | Age: 67
End: 2022-10-10
Payer: MEDICARE

## 2022-10-10 VITALS
SYSTOLIC BLOOD PRESSURE: 125 MMHG | RESPIRATION RATE: 21 BRPM | OXYGEN SATURATION: 98 % | BODY MASS INDEX: 37.61 KG/M2 | HEART RATE: 67 BPM | HEIGHT: 67 IN | TEMPERATURE: 96.9 F | DIASTOLIC BLOOD PRESSURE: 75 MMHG | WEIGHT: 239.6 LBS

## 2022-10-10 DIAGNOSIS — J45.30 MILD PERSISTENT ASTHMA WITHOUT COMPLICATION: ICD-10-CM

## 2022-10-10 DIAGNOSIS — I10 ESSENTIAL HYPERTENSION: ICD-10-CM

## 2022-10-10 DIAGNOSIS — J30.9 ALLERGIC SINUSITIS: ICD-10-CM

## 2022-10-10 DIAGNOSIS — E66.9 OBESITY (BMI 30-39.9): ICD-10-CM

## 2022-10-10 PROCEDURE — 99214 OFFICE O/P EST MOD 30 MIN: CPT | Performed by: INTERNAL MEDICINE

## 2022-10-10 PROCEDURE — 1123F ACP DISCUSS/DSCN MKR DOCD: CPT | Performed by: INTERNAL MEDICINE

## 2022-10-10 PROCEDURE — 1036F TOBACCO NON-USER: CPT | Performed by: INTERNAL MEDICINE

## 2022-10-10 PROCEDURE — G8417 CALC BMI ABV UP PARAM F/U: HCPCS | Performed by: INTERNAL MEDICINE

## 2022-10-10 PROCEDURE — 3017F COLORECTAL CA SCREEN DOC REV: CPT | Performed by: INTERNAL MEDICINE

## 2022-10-10 PROCEDURE — G8427 DOCREV CUR MEDS BY ELIG CLIN: HCPCS | Performed by: INTERNAL MEDICINE

## 2022-10-10 PROCEDURE — G9899 SCRN MAM PERF RSLTS DOC: HCPCS | Performed by: INTERNAL MEDICINE

## 2022-10-10 PROCEDURE — G8484 FLU IMMUNIZE NO ADMIN: HCPCS | Performed by: INTERNAL MEDICINE

## 2022-10-10 PROCEDURE — G8399 PT W/DXA RESULTS DOCUMENT: HCPCS | Performed by: INTERNAL MEDICINE

## 2022-10-10 PROCEDURE — 1090F PRES/ABSN URINE INCON ASSESS: CPT | Performed by: INTERNAL MEDICINE

## 2022-10-10 RX ORDER — LOSARTAN POTASSIUM 100 MG/1
TABLET ORAL
Qty: 90 TABLET | Refills: 3 | Status: SHIPPED | OUTPATIENT
Start: 2022-10-10

## 2022-10-10 RX ORDER — FLUTICASONE PROPIONATE 50 MCG
SPRAY, SUSPENSION (ML) NASAL
Qty: 48 G | Refills: 3
Start: 2022-10-10

## 2022-10-10 NOTE — PROGRESS NOTES
REASON FOR CONSULTATION/CC:    Chief Complaint   Patient presents with    Follow-up     Breathing issues         Consult at request of   Heena Hunter MD for  dyspnea     PCP: Heena Hunter MD    HISTORY OF PRESENT ILLNESS: Antione Cid is a 74y.o. year old female with a history of   who presents        Pulmonary history  Originally seen in August 2022 secondary to dyspnea with multifactorial concern with leg cramps. Completed pulmonary function test and methacholine challenge. Pulmonary function test without obstruction or bronchodilator response with a normal diffusion capacity and lung capacity. Methacholine challenge demonstrated PC of 0.3 decreasing to 60% of normal at dose of 1. Treatment history  Flovent 220 -August 2022, educated on proper use. Had suboptimal technique  Samples of Breztri and Trelegy 200 -October 2022. dyspnea   She complains of fatigue and dyspnea on exertion that is worse with shower. Improves with rest.   This occurs    She will also have cramps in legs and chest pain with exertion / chest pressure under left breast.  No radiation. No change in pain with pressure. This improves with rest and worse with exertion      Leg cramps worse with exertion and better with rest.  Will have leg cramps with sleeping and better with putting legs over the bed. Current History     Asthma  Patient given diagnosis of asthma that secondary to shortness of breath and positive methacholine challenge. She has not responded to appropriate use of Flovent 220 twice daily. Continues to have other etiologies of shortness of breath including leg cramps        allergic rhinitis   Using Flonase  and wokring well       Objective:   PHYSICAL EXAM:  Blood pressure 125/75, pulse 67, temperature 96.9 °F (36.1 °C), resp. rate 21, height 5' 7\" (1.702 m), weight 239 lb 9.6 oz (108.7 kg), SpO2 98 %, not currently breastfeeding.'  Gen: No distress. Eyes: PERRL. No sclera icterus.  No conjunctival injection. ENT: No discharge. Pharynx clear. External appearance of ears and nose normal.  Neck: Trachea midline. No obvious mass. Resp: No accessory muscle use. No crackles. No wheezes. No rhonchi. CV: Regular rate. Regular rhythm. No murmur or rub. No edema. GI:    Skin: Warm, dry, normal texture and turgor. No nodule on exposed extremities. Lymph: No cervical LAD. No supraclavicular LAD. M/S: No cyanosis. No clubbing. No joint deformity. Neuro: Moves all four extremities. Psych: Oriented x 3. No anxiety. Awake. Alert. Intact judgement and insight. Data Reviewed:           Assessment:        Asthma, normal pulmonary function test with a positive methacholine challenge PC 0.3  Sjogren's   Sleep apnea per chart  Chest pain   Grade 1 diastolic dysfunction, mild left atrium dilatation normal stress October 2022    Plan:      Problem List Items Addressed This Visit       Obesity (BMI 30-39. 9)      Uncontrolled         Mild persistent asthma without complication     Given normal pulmonary function test with only a methacholine challenge being positive, its surprising she did not respond to medium dose fluticasone. This was discussed. Therefore, other etiologies of dyspnea is strongly being considered. However, we will try to treat with Trelegy or Breztri. She was given a sample of both to use Breztri first for 2 weeks then Trelegy for 2 weeks. She will send a [employer] message as to if either one of them help and which 1 was better. If neither medication improves her symptoms of dyspnea, other etiologies of shortness of breath will be considered. However, echocardiogram 2022 demonstrates only grade 1 diastolic dysfunction with mild left atrial enlargement. PASP estimated at 25 therefore pulmonary hypertension is unlikely. Stress test October 2022 normal.    Focus on weight loss  strongly recommended.          Allergic sinusitis     We discussed the connection between sinus congestion and headaches. She expressed understanding for this. I have advised to use Flonase 2 sprays daily considering increasing to twice daily. Technique of Flonase was poor. Educated on proper use of nasal sprays. She expressed understanding. Discussed side effects of Flonase. Relevant Medications    fluticasone (FLONASE) 50 MCG/ACT nasal spray               This note was transcribed using [id number]. Please disregard any translational errors.     Dax Ferguson Pulmonary, Sleep and [facility] [phone]

## 2022-10-10 NOTE — PATIENT INSTRUCTIONS
Trelegy 200 sample and Breztri sample      Trelegy 1 puff daily  Breztri 2 puff twice daily - use spacer

## 2022-10-10 NOTE — TELEPHONE ENCOUNTER
Medication:   Requested Prescriptions     Pending Prescriptions Disp Refills    losartan (COZAAR) 100 MG tablet [Pharmacy Med Name: LOSARTAN 100MG TABLETS] 90 tablet 3     Sig: TAKE 1 TABLET BY MOUTH DAILY        Last Filled:      Patient Phone Number: [phone] (home) [phone] (work)    Last appt: 8/3/2022   Next appt: 11/2/2022    Last OARRS:   RX Monitoring 1/9/2018   Attestation The Prescription Monitoring Report for this patient was reviewed today.

## 2022-10-11 PROBLEM — E66.9 OBESITY (BMI 30-39.9): Status: ACTIVE | Noted: 2022-10-11

## 2022-10-11 NOTE — ASSESSMENT & PLAN NOTE
We discussed the connection between sinus congestion and headaches. She expressed understanding for this. I have advised to use Flonase 2 sprays daily considering increasing to twice daily. Technique of Flonase was poor. Educated on proper use of nasal sprays. She expressed understanding. Discussed side effects of Flonase.

## 2022-10-11 NOTE — ASSESSMENT & PLAN NOTE
Given normal pulmonary function test with only a methacholine challenge being positive, its surprising she did not respond to medium dose fluticasone. This was discussed. Therefore, other etiologies of dyspnea is strongly being considered. However, we will try to treat with Trelegy or Breztri. She was given a sample of both to use Breztri first for 2 weeks then Trelegy for 2 weeks. She will send a [employer] message as to if either one of them help and which 1 was better. If neither medication improves her symptoms of dyspnea, other etiologies of shortness of breath will be considered. However, echocardiogram 2022 demonstrates only grade 1 diastolic dysfunction with mild left atrial enlargement. PASP estimated at 25 therefore pulmonary hypertension is unlikely. Stress test October 2022 normal.    Focus on weight loss  strongly recommended.

## 2022-10-17 ENCOUNTER — OFFICE VISIT (OUTPATIENT)
Dept: RHEUMATOLOGY | Age: 67
End: 2022-10-17
Payer: MEDICARE

## 2022-10-17 VITALS — BODY MASS INDEX: 37.43 KG/M2 | WEIGHT: 239 LBS | DIASTOLIC BLOOD PRESSURE: 70 MMHG | SYSTOLIC BLOOD PRESSURE: 100 MMHG

## 2022-10-17 DIAGNOSIS — G89.29 CHRONIC INTRACTABLE HEADACHE, UNSPECIFIED HEADACHE TYPE: ICD-10-CM

## 2022-10-17 DIAGNOSIS — R53.83 OTHER FATIGUE: ICD-10-CM

## 2022-10-17 DIAGNOSIS — R51.9 CHRONIC INTRACTABLE HEADACHE, UNSPECIFIED HEADACHE TYPE: ICD-10-CM

## 2022-10-17 DIAGNOSIS — F51.01 PRIMARY INSOMNIA: ICD-10-CM

## 2022-10-17 DIAGNOSIS — M35.01 SJOGREN'S SYNDROME WITH KERATOCONJUNCTIVITIS SICCA (HCC): Primary | ICD-10-CM

## 2022-10-17 DIAGNOSIS — M35.01 SJOGREN'S SYNDROME WITH KERATOCONJUNCTIVITIS SICCA (HCC): ICD-10-CM

## 2022-10-17 PROCEDURE — 1036F TOBACCO NON-USER: CPT | Performed by: INTERNAL MEDICINE

## 2022-10-17 PROCEDURE — G8417 CALC BMI ABV UP PARAM F/U: HCPCS | Performed by: INTERNAL MEDICINE

## 2022-10-17 PROCEDURE — 3017F COLORECTAL CA SCREEN DOC REV: CPT | Performed by: INTERNAL MEDICINE

## 2022-10-17 PROCEDURE — G8428 CUR MEDS NOT DOCUMENT: HCPCS | Performed by: INTERNAL MEDICINE

## 2022-10-17 PROCEDURE — 1090F PRES/ABSN URINE INCON ASSESS: CPT | Performed by: INTERNAL MEDICINE

## 2022-10-17 PROCEDURE — G8399 PT W/DXA RESULTS DOCUMENT: HCPCS | Performed by: INTERNAL MEDICINE

## 2022-10-17 PROCEDURE — 99214 OFFICE O/P EST MOD 30 MIN: CPT | Performed by: INTERNAL MEDICINE

## 2022-10-17 PROCEDURE — G9899 SCRN MAM PERF RSLTS DOC: HCPCS | Performed by: INTERNAL MEDICINE

## 2022-10-17 PROCEDURE — G8484 FLU IMMUNIZE NO ADMIN: HCPCS | Performed by: INTERNAL MEDICINE

## 2022-10-17 PROCEDURE — 1123F ACP DISCUSS/DSCN MKR DOCD: CPT | Performed by: INTERNAL MEDICINE

## 2022-10-17 RX ORDER — PILOCARPINE HYDROCHLORIDE 7.5 MG/1
TABLET, FILM COATED ORAL
Qty: 360 TABLET | OUTPATIENT
Start: 2022-10-17

## 2022-10-17 RX ORDER — CYCLOSPORINE 0.5 MG/ML
1 EMULSION OPHTHALMIC 2 TIMES DAILY
Qty: 1 EACH | Refills: 3 | Status: SHIPPED | OUTPATIENT
Start: 2022-10-17

## 2022-10-17 RX ORDER — PILOCARPINE HYDROCHLORIDE 7.5 MG/1
TABLET, FILM COATED ORAL
Qty: 120 TABLET | Refills: 2 | Status: SHIPPED | OUTPATIENT
Start: 2022-10-17

## 2022-10-17 NOTE — PATIENT INSTRUCTIONS
Restasis eye drops   Increase salagen to 7.5 mg three times a day and 1 at night   Biotene mouth wash

## 2022-10-17 NOTE — PROGRESS NOTES
10/17/2022  Patient Name: Sienna Bullock  : 1955  Medical Record: [id number]      ASSESSMENT AND PLAN    Assessment/Plan:      ASSESSMENT:    1. Sjogren's syndrome with keratoconjunctivitis sicca (HCC)    2. Other fatigue    3. Primary insomnia        PLAN:     Addison was seen today for follow-up. Diagnoses and all orders for this visit:    Sjogren's syndrome with keratoconjunctivitis sicca (HCC)  -     cycloSPORINE (RESTASIS) 0.05 % ophthalmic emulsion; Place 1 drop into both eyes 2 times daily  -     pilocarpine (SALAGEN) 7.5 MG tablet; Take 1 tab 3 times a day before meals 1 tab at night    Other fatigue    Primary insomnia  -     Darline Lambert MD, Sleep Medicine, [facility]  Sjogren's syndrome-positive LISA 1: 1280 nuclear dot pattern, positive SSA and SSB antibody. SPEP no evidence of monoclonal gammopathy. RF, CCP is negative. Elevated ESR. Hypergammaglobulinemia. dsDNA, anti-Bazan, RNP, APL panel, C3-C4 is negative  Persistent dry mouth and dry eyes. I will increase Salagen to 7.5 mg 3 times a day before meals and 7.5 mg at bedtime  She will continue Biotene mouthwash, frequent sips of water, sugar-free gum  I will also start Restasis eyedrops for dry eyes. She will continue artificial teardrops as needed.    /Increased risk of lymphoma was explained-      Chronic intractable headache-with lightheadedness and dizziness  No focal symptoms. No stiffness around shoulders or hips. No vision changes, jaw pain or extremity weakness. No significant improvement on prednisone  Persistently elevated ESR less likely due to temporal arteritis   Unlikely temporal arteritis  MRI of the brain no acute changes or intracranial pathology  Less likely related to connective tissue disease or systemic rheumatic disease  Advised to continue follow-up with neurology     Insomnia/fatigue-fatigue most likely due to insomnia. I will refer to sleep specialist for evaluation and management.        The patient indicates understanding of these issues and agrees with the plan. Return in about 3 months (around 1/17/2023). The risks and benefits of my recommendations, as well as other treatment options, benefits and side effects werediscussed with the patient. All questions were answered. MEDICATIONS  Current Outpatient Medications   Medication Sig Dispense Refill    cycloSPORINE (RESTASIS) 0.05 % ophthalmic emulsion Place 1 drop into both eyes 2 times daily 1 each 3    pilocarpine (SALAGEN) 7.5 MG tablet Take 1 tab 3 times a day before meals 1 tab at night 120 tablet 2    fluticasone (FLONASE) 50 MCG/ACT nasal spray SHAKE LIQUID AND USE 2 SPRAYS IN EACH NOSTRIL DAILY 48 g 3    losartan (COZAAR) 100 MG tablet TAKE 1 TABLET BY MOUTH DAILY 90 tablet 3    melatonin 5 MG TABS tablet Take 1 tablet by mouth nightly as needed (insomnia) 30 tablet 3    zolpidem (AMBIEN) 5 MG tablet Take 1 tablet by mouth nightly as needed for Sleep for up to 30 days. 30 tablet 3    NEXIUM 40 MG delayed release capsule TAKE 1 CAPSULE BY MOUTH DAILY 90 capsule 1    Spacer/Aero-Holding Chambers RAMANA 1 Device by Does not apply route daily 1 each 0    Caltrate 600+D Plus Minerals (CALTRATE) 600-800 MG-UNIT TABS tablet Take 1 tablet by mouth in the morning and 1 tablet before bedtime. 180 tablet 5    vitamin D (ERGOCALCIFEROL) 1.25 MG (59235 UT) CAPS capsule Take 1 capsule by mouth once a week 12 capsule 1    NIFEdipine (PROCARDIA XL) 90 MG extended release tablet Take 1 tablet by mouth in the morning. 90 tablet 1    hydrALAZINE (APRESOLINE) 50 MG tablet Take 1 tablet by mouth in the morning and at bedtime 60 tablet 3    labetalol (NORMODYNE) 300 MG tablet Take 1 tablet by mouth in the morning and 1 tablet before bedtime. 180 tablet 3    acetic acid-hydrocortisone (VOSOL-HC) 1-2 % otic solution Use 4 drops in the affected ear three times a day as needed for itching.  10 mL 0    MYRBETRIQ 25 MG TB24 butalbital-acetaminophen-caffeine (FIORICET, ESGIC) -40 MG per tablet Take 1 tablet by mouth 3 times daily as needed for Headaches 90 tablet 3    atorvastatin (LIPITOR) 20 MG tablet Take 1 tablet by mouth daily 90 tablet 3    solifenacin (VESICARE) 10 MG tablet Take 1 tablet by mouth daily 90 tablet 1    albuterol sulfate  (90 Base) MCG/ACT inhaler Inhale 2 puffs into the lungs every 6 hours as needed for Wheezing 18 g 5    vitamin D (ERGOCALCIFEROL) 1.25 MG (01614 UT) CAPS capsule Take 50,000 Units by mouth once a week      ferrous sulfate (IRON 325) 325 (65 Fe) MG tablet Take 1 tablet by mouth daily (with breakfast) 90 tablet 3     No current facility-administered medications for this visit. ALLERGIES  Allergies   Allergen Reactions    Dye [Barium-Containing Compounds]     Furosemide     Gabapentin Other (See Comments)     Memory Loss    Protonix [Pantoprazole Sodium] Shortness Of Breath and Rash    Tramadol Itching    Protonix [Pantoprazole]      Other reaction(s): Unknown    Tegaderm Alginate Ag Rope     Amoxicillin     Aspirin     Augmentin [Amoxicillin-Pot Clavulanate]     Betadine [Povidone Iodine] Other (See Comments)     welts    Chlorhexidine Gluconate Other (See Comments)     Welts from ChloraPrep    Morphine Itching    Other      chlorahexidine - hives    Pancrelipase (Lip-Prot-Amyl)      Other reaction(s): Unknown    Toradol [Ketorolac Tromethamine]     Iodides Rash    Ranitidine Nausea And Vomiting    Tape [Adhesive Tape] Rash     teradon tape also  Paper tape ok          Comments  No specialty comments available. Background history:  Bertha Goldsmith is a 74 y.o. female with past medical history of hypertension, anxiety, degenerative disc disease in the cervical spine and lumbar spine, osteoarthritis status post bilateral knee replacements who is being seen for follow up evaluation of  headache and elevated ESR. Patient is a vague historian.   She has chronic headache for several years which has gotten worse over the past 2 months. She has headache on daily basis located on the right side of the head. She describes her pain as excruciating. She denies sudden loss of vision, jaw pain, extremity weakness, stiffness around shoulders or hips. She denies diplopia, dysarthria, focal weakness, seizures. She denies photophobia, phonophobia. Headache is associated with dizziness/lightheadedness and vertigo. She denies fever, weight loss or swollen glands. She went to see her PCP. She was found to have elevated ESR of 83. She was put on prednisone for possible temporal arteritis. She is currently on prednisone 60 mg daily for past 2 weeks without any improvement in headaches. Repeat ESR is 85. She denies joint pain, swelling or stiffness. She has a history of bilateral knee replacement secondary to osteoarthritis. She also had 2 neck surgeries for cervical stenosis and right shoulder arthroscopic surgery for rotator cuff tear. She denies malar rash, photosensitivity, oral/nasal ulcers, dry eyes, dry mouth, history of miscarriages, Raynaud's, pregnancy complications, blood clots, pleurisy or pericarditis, sclerodactyly. She is up-to-date with Pap smear, mammogram and colonoscopy. Interim history: She presents for follow-up of Sjogren's, chronic headaches, elevated ESR. She follows neurology for chronic headaches. She reports improvement. She had MRI of the brain which was normal without any intra cranial pathology or acute changes. She also has Sjogren's. She is on Salagen 5 mg 3 times a day. She also uses Biotene mouthwash, frequent sips of water, sugar-free gum. She still has persistent dry mouth. She continues to have dry eyes. She is using artificial teardrops as needed. Blood work showed positive LISA with elevated SSA and SSB antibody, hypergammaglobulinemia. SPEP no evidence of monoclonal gammopathy. RF, CCP, hepatitis panel is negative.   dsDNA, anti-Smith, RNP, APL panel, C3-C4 is negative. She denies alopecia, malar rash, photosensitivity, oral/nasal ulcers, kidney disease, blood clots, pleurisy or pericarditis, sclerodactyly or skin thickening, Raynaud's, history of miscarriages or pregnancy complications, sclerodactyly or skin thickening. She denies fever, weight loss, night sweats or swollen glands. She also has elevated ESR. SPEP did not show any evidence of monoclonal gammopathy. She is up-to-date with Pap smear, mammogram and cancer screening. She has severe fatigue and insomnia. She has borderline vitamin B12. She is on vitamin B12 replacement. TSH, vitamin D is normal.  HPI  Review of Systems    REVIEW OF SYSTEMS: Tingling and numbness in the right upper extremity, headaches, dizziness, GERD  Constitutional: No unanticipated weight loss or fevers. Integumentary: No rash, photosensitivity, malar rash, livedo reticularis, alopecia and Raynaud's symptoms, sclerodactyly, skin tightening  Eyes: negative for visual disturbance and persistent redness, discharge from eyes   ENT: - No tinnitus, loss of hearing, vertigo, or recurrent ear infections.  - No history of nasal/oral ulcers. - No history of dry eyes/dry mouth  Cardiovascular: No history of pericarditis, chest pain or murmur or palpitations  Respiratory: No shortness of breath, cough or history of interstitial lung disease. No history of pleurisy. No history of tuberculosis or atypical infections. Gastrointestinal: No history of dysphagia or esophageal dysmotility. No change in bowel habits or any inflammatory bowel disease. Genitourinary: No history of renal disease, miscarriages. Hematologic/Lymphatic: No abnormal bruising or bleeding, blood clots or swollen lymph nodes. Neurological: No history of seizure or focal weakness.  No history of neuropathies,hyperesthesias, facial droop, diplopia  Psychiatric: No history of bipolar disease  Endocrine: Denies any polyuria, polydipsia and osteoporosis  Allergic/Immunologic: No nasal congestion or hives. I have reviewed patients Past medical History, Social History and Family History as mentioned in her chart and this remains unchanged fromprevious. Past Medical History:   Diagnosis Date    Anxiety state     Asthma     Back ache     chronic    Environmental allergies     GERD (gastroesophageal reflux disease)     Hypertension     MVP (mitral valve prolapse)     Neck pain, chronic     Reflux     Sleep apnea      Past Surgical History:   Procedure Laterality Date    BACK SURGERY      BLADDER SUSPENSION      CHOLECYSTECTOMY      FOOT SURGERY  11-9-12    endoscopic plantar fasciotomy right foot    GASTRIC BYPASS SURGERY      HERNIA REPAIR      HIATAL HERNIA REPAIR      HYSTERECTOMY (CERVIX STATUS UNKNOWN)      JOINT REPLACEMENT Bilateral     KNEE    KNEE ARTHROPLASTY  2/28/13    L    KNEE ARTHROSCOPY Right 3/7/2014    LAMINECTOMY  7/1/11    bilateral L5-S1 laminotomy, nerve root exploration, foraminotomy    NASAL SINUS SURGERY Right 02/05/2018    Excision of rt nasal mass    [place] COLONOSCOPY FLX DX W/COLLJ SPEC WHEN PFRMD N/A 9/28/2018    COLONOSCOPY DIAGNOSTIC OR SCREENING performed by Claudy Oh MD at [address] DX/THER AGNT PARAVERT FACET JOINT, LUMBAR/SAC, 1ST LEVEL Right 11/6/2018    RIGHT L4,L5 S1 MEDIAL BRANCH BLOCK WITH FLUOROSCOPY performed by John Alford MD at [address] [facility], [address]      with dilatation    UPPER GASTROINTESTINAL ENDOSCOPY N/A 8/14/2019    EGD ESOPHAGOGASTRODUODENOSCOPY performed by Claudy Oh MD at [address] [facility] 5/25/2021    EGD BIOPSY performed by Fernandez Palma MD at [address] [facility] History     Socioeconomic History    Marital status:       Spouse name: Not on file    Number of children: Not on file    Years of education: Not on file    Highest education level: Not on file   Occupational History    Not on file   Tobacco Use    Smoking status: Former     Packs/day: 0.25     Years: 2.00     Pack years: 0.50     Types: Cigarettes     Quit date: 2004     Years since quittin.8     Passive exposure: Past    Smokeless tobacco: Never    Tobacco comments:     [id number]   Vaping Use    Vaping Use: Never used   Substance and Sexual Activity    Alcohol use: No    Drug use: No    Sexual activity: Yes     Partners: Male   Other Topics Concern    Not on file   Social History Narrative    Not on file     Social Determinants of Health     Financial Resource Strain: Unknown    Difficulty of Paying Living Expenses: Patient refused   Food Insecurity: No Food Insecurity    Worried About Running Out of Food in the Last Year: Never true    Ran Out of Food in the Last Year: Never true   Transportation Needs: Not on file   Physical Activity: Inactive    Days of Exercise per Week: 0 days    Minutes of Exercise per Session: 0 min   Stress: Not on file   Social Connections: Not on file   Intimate Partner Violence: Not on file   Housing Stability: Not on file     Family History   Problem Relation Age of Onset    High Blood Pressure Mother     Heart Disease Mother     Other Mother         glaucoma    High Blood Pressure Father     Heart Disease Father     Other Father         intestional problems    Cancer Sister         cancer    Kidney Disease Sister     Kidney Disease Brother     High Blood Pressure Brother     Other Sister         lupus,fibromyalgia  thyroid surgery    High Blood Pressure Sister     Kidney Disease Sister     Heart Disease Sister     Kidney Disease Brother     High Blood Pressure Brother     Anesth Problems Neg Hx     Malig Hyperten Neg Hx     Hypotension Neg Hx     Malig Hypertherm Neg Hx     Pseudochol.  Deficiency Neg Hx          PHYSICAL EXAM   Vitals:    10/17/22 0921   BP: 100/70   Site: Right Upper Arm   Position: Sitting   Cuff Size: Large Adult   Weight: 239 lb (108.4 kg)     Physical Exam  Constitutional:  Well developed, well nourished, no acute distress, non-toxic appearance   Musculoskeletal:    Ambulates without assistance, normal gait  Neck: Full ROM, no tenderness,supple   Upper Extremities        Shoulder: Full ROM, no crepitus        Elbow:  Full ROM, no synovitis, no deformity        Wrist: Full ROM, no synovitis, no deformity, no ulnar deviation        Fingers: No sclerodactly, no active raynaud's, no ulcers. MCPs: No synovitis, no deformity             PIPs:  No synovitis, no deformity             DIPs: No synovitis, no deformity             Nails: No pitting, no telangiectasias. Lower Extremities        Hip: Full ROM, no tenderness to palpation        Knee: Bilateral knee replacement        Ankle: Full ROM, no swelling or erythema        MTPs: No swelling or erythema  Back- no tenderness. Eyes:  PERRL, extra ocular movements intact, conjunctiva normal   HEENT:  Atraumatic, normocephalic, external ears normal, oropharynx dry, no pharyngeal exudates. No parotid gland enlargement or lymphadenopathy. Bilateral temporal area tenderness. Temporal artery is not palpable  Respiratory:  No respiratory distress  GI:  Soft, nondistended, normal bowel sounds, nontender, noorganomegaly, no mass, no rebound, no guarding   :  No costovertebral angle tenderness   Integument:  Well hydrated, no rash or telangiectasias  Lymphatic:  No lymphadenopathy noted   Neurologic:   Alert & oriented x 3, CN 2-12 normal, no focal deficits noted. Sensations Intact. Muscle strength 5/5 proximally and distally in upper and lower extremities.    Psychiatric:  Speech and behavior appropriate           LABS AND IMAGING  Outside data reviewed and in HPI    Lab Results   Component Value Date/Time    WBC 3.5 07/01/2022 02:05 PM    RBC 3.77 07/01/2022 02:05 PM    HGB 11.0 07/01/2022 02:05 PM    HCT 33.1 07/01/2022 02:05 PM     07/01/2022 02:05 PM    MCV 87.8 07/01/2022 02:05 PM    MCH 29.3 07/01/2022 02:05 PM    MCHC 33.4 07/01/2022 02:05 PM    RDW 13.7 07/01/2022 02:05 PM    SEGSPCT 56.0 06/15/2013 02:00 AM    BANDSPCT 5 10/09/2014 09:49 PM    LYMPHOPCT 31.7 07/01/2022 02:05 PM    MONOPCT 7.8 07/01/2022 02:05 PM    EOSPCT 5.0 12/24/2011 07:50 PM    BASOPCT 1.2 07/01/2022 02:05 PM    MONOSABS 0.3 07/01/2022 02:05 PM    LYMPHSABS 1.1 07/01/2022 02:05 PM    EOSABS 0.4 07/01/2022 02:05 PM    BASOSABS 0.0 07/01/2022 02:05 PM    DIFFTYPE Manual-K 06/15/2013 02:00 AM       Chemistry        Component Value Date/Time     07/01/2022 1405    K 4.1 07/01/2022 1405     07/01/2022 1405    CO2 15 (L) 07/01/2022 1405    BUN 13 07/01/2022 1405    CREATININE 1.0 07/01/2022 1405        Component Value Date/Time    CALCIUM 9.7 07/01/2022 1405    ALKPHOS 126 07/01/2022 1405    AST 24 07/01/2022 1405    ALT 17 07/01/2022 1405    BILITOT 0.3 07/01/2022 1405          Lab Results   Component Value Date    SEDRATE 75 (H) 06/14/2022     Lab Results   Component Value Date    CRP <3.0 06/14/2022     Lab Results   Component Value Date/Time    C3 151.2 06/14/2022 10:59 AM    C4 39.4 06/14/2022 10:59 AM     Lab Results   Component Value Date/Time    RF 12.0 05/05/2022 01:24 PM     Lab Results   Component Value Date/Time    ANATITER >1:2560 05/05/2022 01:24 PM     No results found for: DSDNAG, DSDNAIGGIFA  No results found for: SSAROAB, SSALAAB  No results found for: SMAB, RNPAB  No results found for: CENTABIGG  Lab Results   Component Value Date/Time    C3 151.2 06/14/2022 10:59 AM    C4 39.4 06/14/2022 10:59 AM     Lab Results   Component Value Date/Time    VITD25 40.0 05/05/2022 01:24 PM     No results found for: Melanie Danielito  Lab Results   Component Value Date    EJOOKXPI59 214 07/18/2022     Lab Results   Component Value Date    TSHFT4 1.00 05/05/2022    TSH 3.35 03/28/2022     Lab Results   Component Value Date    VITD25 40.0 05/05/2022       Radiology: MRI posterior fossa 5/16/2022  1.  No acute intracranial abnormality. No acute infarct. 2. No abnormality seen within the cerebellopontine angle cisterns or internal   auditory canals. 3. Minimal chronic microvascular ischemic changes. ######################################################################    I thank you for giving me theopportunity to participate in [address] [place] [address],[address], & [address] [facility]. If you have any questions or concerns please feel free to contact me. I look forward to following  Marshall along with you. Electronically signed by: Marjorie Barthel, MD, MD, 10/17/2022 10:03 AM    Documentation was done using voice recognition dragon software. Every effort was made to ensure accuracy;however, inadvertent unintentional computerized transcription errors may be present.

## 2022-10-25 ENCOUNTER — TELEPHONE (OUTPATIENT)
Dept: ENT CLINIC | Age: 67
End: 2022-10-25

## 2022-10-25 NOTE — TELEPHONE ENCOUNTER
Pt.states she has a piece of cotton stuck in her ear (left) with pain she would like to know if she can be fit in. I informed the patient that will be up to Dr. Sydni Rayo I informed her I will send the message.

## 2022-10-26 ENCOUNTER — OFFICE VISIT (OUTPATIENT)
Dept: ENT CLINIC | Age: 67
End: 2022-10-26
Payer: MEDICARE

## 2022-10-26 VITALS — HEART RATE: 67 BPM | DIASTOLIC BLOOD PRESSURE: 60 MMHG | SYSTOLIC BLOOD PRESSURE: 127 MMHG | TEMPERATURE: 96.9 F

## 2022-10-26 DIAGNOSIS — H61.21 IMPACTED CERUMEN OF RIGHT EAR: ICD-10-CM

## 2022-10-26 DIAGNOSIS — H90.0 CONDUCTIVE HEARING LOSS OF BOTH EARS: ICD-10-CM

## 2022-10-26 DIAGNOSIS — T16.2XXA ACUTE FOREIGN BODY OF LEFT EAR CANAL, INITIAL ENCOUNTER: Primary | ICD-10-CM

## 2022-10-26 PROCEDURE — 69210 REMOVE IMPACTED EAR WAX UNI: CPT | Performed by: OTOLARYNGOLOGY

## 2022-10-26 PROCEDURE — 69200 CLEAR OUTER EAR CANAL: CPT | Performed by: OTOLARYNGOLOGY

## 2022-10-26 NOTE — PROGRESS NOTES
OFFICE PROCEDURE   Removal of foreign body from left external auditory canal.      ANESTHESIA  None       DESCRIPTION OF PROCEDURE    Under the ear operating microscope, the foreign body was visualized in the left external auditory canal.  It was consistent with a cotton tip from a Q-tip. Using an alligator forceps, the foreign body was grasped and removed atraumatically. There is no evidence of ear canal abrasion or tympanic membrane trauma. Tympanic membrane was mobile to pneumatic massage after removal of foreign body. PROCEDURE - REMOVAL OF CERUMEN IMPACTION (43586):  Obstructing cerumen impaction occluding the right EAC  and obscuring visualization of the right tympanic membrane, was removed under otomicroscopic visualization, with instrumentation, using a Billeau wire loop  Under otomicroscopic examination, the right EAC and TM appeared to be normal, including normal pneumatic mobility. HEARING ASSESSMENT (after ear cleaning):  Finger rub:  Able to hear finger rub bilaterally. Tuning fork tests, [address] tuning fork:  Plascencia test:  midline   Rinne test: right ear air greater than bone and left ear  air greater than bone        IMPRESSION / DIAGNOSES / Angie Funk:   Cayla was seen today for ear problem. Diagnoses and all orders for this visit:    Acute foreign body of left ear canal, initial encounter    Impacted cerumen of right ear    Conductive hearing loss of both ears       RECOMMENDATIONS / PLAN:     Return if symptoms worsen or, for any further ENT or sinus problems or symptoms.

## 2022-10-27 DIAGNOSIS — I10 ESSENTIAL HYPERTENSION: ICD-10-CM

## 2022-10-27 RX ORDER — HYDRALAZINE HYDROCHLORIDE 50 MG/1
50 TABLET, FILM COATED ORAL 2 TIMES DAILY
Qty: 180 TABLET | Refills: 1 | Status: SHIPPED | OUTPATIENT
Start: 2022-10-27

## 2022-10-27 NOTE — TELEPHONE ENCOUNTER
Medication:   Requested Prescriptions     Pending Prescriptions Disp Refills    hydrALAZINE (APRESOLINE) 50 MG tablet [Pharmacy Med Name: HYDRALAZINE  50MG TABLETS(ORANGE)] 180 tablet 1     Sig: TAKE 1 TABLET BY MOUTH IN THE MORNING AND AT BEDTIME        Last Filled:      Patient Phone Number: [phone] (home) [phone] (work)    Last appt: 8/3/2022   Next appt: 11/2/2022    Last OARRS:   RX Monitoring 1/9/2018   Attestation The Prescription Monitoring Report for this patient was reviewed today.

## 2022-11-02 ENCOUNTER — NURSE ONLY (OUTPATIENT)
Dept: PRIMARY CARE CLINIC | Age: 67
End: 2022-11-02
Payer: MEDICARE

## 2022-11-02 DIAGNOSIS — E53.8 VITAMIN B 12 DEFICIENCY: Primary | ICD-10-CM

## 2022-11-02 PROCEDURE — 96372 THER/PROPH/DIAG INJ SC/IM: CPT | Performed by: INTERNAL MEDICINE

## 2022-11-02 RX ORDER — CYANOCOBALAMIN 1000 UG/ML
1000 INJECTION, SOLUTION INTRAMUSCULAR; SUBCUTANEOUS ONCE
Status: COMPLETED | OUTPATIENT
Start: 2022-11-02 | End: 2022-11-02

## 2022-11-02 RX ADMIN — CYANOCOBALAMIN 1000 MCG: 1000 INJECTION, SOLUTION INTRAMUSCULAR; SUBCUTANEOUS at 10:12

## 2022-12-09 ENCOUNTER — TELEMEDICINE (OUTPATIENT)
Dept: PRIMARY CARE CLINIC | Age: 67
End: 2022-12-09

## 2022-12-09 DIAGNOSIS — E78.2 MIXED HYPERLIPIDEMIA: ICD-10-CM

## 2022-12-09 DIAGNOSIS — Z23 HIGH PRIORITY FOR COVID-19 VACCINATION: Primary | ICD-10-CM

## 2022-12-09 DIAGNOSIS — I10 ESSENTIAL HYPERTENSION: ICD-10-CM

## 2022-12-09 DIAGNOSIS — Z23 NEED FOR SHINGLES VACCINE: ICD-10-CM

## 2022-12-09 DIAGNOSIS — Z23 FLU VACCINE NEED: ICD-10-CM

## 2022-12-09 DIAGNOSIS — D50.8 OTHER IRON DEFICIENCY ANEMIA: ICD-10-CM

## 2022-12-09 RX ORDER — NIFEDIPINE 90 MG/1
90 TABLET, EXTENDED RELEASE ORAL DAILY
Qty: 90 TABLET | Refills: 1 | Status: SHIPPED | OUTPATIENT
Start: 2022-12-09

## 2022-12-09 RX ORDER — LOSARTAN POTASSIUM 100 MG/1
100 TABLET ORAL DAILY
Qty: 90 TABLET | Refills: 3 | Status: SHIPPED | OUTPATIENT
Start: 2022-12-09

## 2022-12-09 RX ORDER — LABETALOL 300 MG/1
300 TABLET, FILM COATED ORAL 2 TIMES DAILY
Qty: 180 TABLET | Refills: 3 | Status: SHIPPED | OUTPATIENT
Start: 2022-12-09

## 2022-12-09 RX ORDER — HYDRALAZINE HYDROCHLORIDE 50 MG/1
50 TABLET, FILM COATED ORAL 2 TIMES DAILY
Qty: 180 TABLET | Refills: 1 | Status: SHIPPED | OUTPATIENT
Start: 2022-12-09

## 2022-12-09 ASSESSMENT — ENCOUNTER SYMPTOMS
CONSTIPATION: 0
BLOOD IN STOOL: 0
DIARRHEA: 0
FACIAL SWELLING: 0
CHEST TIGHTNESS: 0
ABDOMINAL DISTENTION: 0
CHOKING: 0
VOICE CHANGE: 0

## 2022-12-09 NOTE — PROGRESS NOTES
Subjective:      Patient ID: Sia Santana is a 74 y.o. female. VV 12/9/2022 Patient presents with: Follow-up                Last seen   8/3/2022 Patient presents with:  Medicare AWV  Hypertension  Hyperlipidemia               4/28/2022 Patient presents with:  Hypertension: 2 week follow up              4/15/2022 Patient presents with:  Hypertension  Headache  Insomnia: pt states she is allergic to Ambien and cant take                VV 1/22 11/16/2021 Patient presents with:  Hypertension  Cough: 3 wks     Has had extensive w/u with ENT including Barium swallow Study and CT soft tissue neck               8/31/2021 Patient presents with:  Blood Pressure Check                  8/12/2021 Patient presents with:  Blood Pressure Check  Headache  Sinus Problem                  8/3/2021 Patient presents with:  Hypertension: pt BP has been elevated causing excessive sleepiness                6/3/2021 Patient presents with: Follow-Up from Hospital: [facility] 5/25-5/27/21 abdominal pain        Previous history of gastric bypass, hiatal & ventral hernia repair cholecystectomy    CT abdomen and pelvis showed pneumobilia and bilateral renal cyst without hydronephrosis  LFTs, amylase & lipase within normal limits. She  underwent EGD on 5/25/2021 which was normal    Small bowel follow-through showed showed intermittent mild to moderate increased tertiary contraction in the esophagus with delayed esophageal emptying otherwise unremarkable. Patient was recommended to continue Pepcid ( allergic to Protonix) follow-up with gastroenterology     Pneumobilia noted on imaging likely 2/2 prior ERCP  Afebrile, WBCs and LFTs WNL. Ultrasound negative                4/30/2021   Medicare AW                11/10/2020 Patient presents with:  Abdominal Pain . Never got labs as ordered last visit ? ??    States she did See Dr Latia Nava who has told her there was nothing he could do ? ??                  .  S/P Surgery dissectomy 20 at [facility]       Was told she has Stage 3 Renal failiure ? Labs last checked here  show Nl renal function                  18 total thyroidectomy           Past Medical History:   Diagnosis Date    Anxiety state     Asthma     Back ache     chronic    Environmental allergies     GERD (gastroesophageal reflux disease)     Hypertension     MVP (mitral valve prolapse)     Neck pain, chronic     Reflux     Sleep apnea        Review of Systems   Constitutional:  Negative for activity change, appetite change, chills, diaphoresis, fatigue and unexpected weight change. Flu vac     Tdap      Pneumonia vac  ;       Covid Vac   #3    Shingrex    HENT:  Negative for facial swelling, postnasal drip and voice change. Dentures fit well    Eyes:  Negative for visual disturbance. Eye exam    Respiratory:  Negative for choking and chest tightness. Sleep Study   ; report ? Does not smoke ; No Etoh   H/o Asthma    Cardiovascular:         HTN > 5 yrs on meds . No known CAD . Father  of MI in his 46s   Sister with CAD ? MVP  H/o palpitations   Gastrointestinal:  Negative for abdominal distention, blood in stool, constipation and diarrhea. Repeat Upper endoscopy   Nl    Upper Endoscopy    H/O Ulers ? Cannot take Nsaids! Post gastric bypass       GERD    Repeat Colonscopy   ; Polyps; O Marilee    Endocrine: Negative for cold intolerance and heat intolerance. Followed by Dr. Evelia Dave  for thyroid   Genitourinary:         Mammogram      Scheduled Pap 0n     Musculoskeletal:         DEXA   osteopenia       [facility]es Ortho neck / [facility]          S/P Surgery  DDD 20      DJD . Skin: Negative. Psychiatric/Behavioral:  Negative for self-injury, sleep disturbance and suicidal ideas.          Anxiety: followed by counselor     Objective:   Physical Exam  Constitutional:       General: She is not in acute distress. Appearance: She is obese. She is not ill-appearing. Comments: Vitals as noted 10/26/22   Neck:      Comments:     Pulmonary:      Effort: Pulmonary effort is normal.   Neurological:      Mental Status: She is oriented to person, place, and time. Psychiatric:         Mood and Affect: Mood normal.         Behavior: Behavior normal.       Assessment:     Mick Harris is a 74 y.o. female evaluated via t VIDEO  on 12/9/2022 for Follow-up  . Documentation:  I communicated with the patient and/or health care decision maker about this   Details of this discussion including any medical advice provided: as noted     Total Time: minutes: 11-20 minutes    Mick Harris was evaluated through a synchronous (real-time) audio encounter. Patient identification was verified at the start of the visit. She (or guardian if applicable) is aware that this is a billable service, which includes applicable co-pays. This visit was conducted with the patient's (and/or legal guardian's) verbal consent. She has not had a related appointment within my department in the past 7 days or scheduled within the next 24 hours. The patient was located at Home: [address] [place] [address]. The provider was located at [facility] (Appt Dept): [address],  [address]. Note: not billable if this call serves to triage the patient into an appointment for the relevant concern    Richar Brito MD    [place] was seen today for follow-up. Diagnoses and all orders for this visit:    High priority for COVID-19 vaccination  advised to get New Bival vac at pharmacy     Flu vaccine need  also Flu vac     Need for shingles vaccine  -     zoster recombinant adjuvanted vaccine [facility]) 50 MCG/0.5ML SUSR injection; Inject 0.5 mLs into the muscle once for 1 dose    Essential hypertension  Controlled   10/22   -     labetalol (NORMODYNE) 300 MG tablet;  Take 1 tablet by mouth 2 times daily  -     NIFEdipine (PROCARDIA XL) 90 MG extended release tablet; Take 1 tablet by mouth daily  -     losartan (COZAAR) 100 MG tablet; Take 1 tablet by mouth daily  -     hydrALAZINE (APRESOLINE) 50 MG tablet; Take 1 tablet by mouth in the morning and at bedtime    Other iron deficiency anemia  -     CBC with Auto Differential; Future  -     TSH; Future  -     Iron and TIBC; Future  -     Vitamin B12 & Folate; Future        Vitamin B 12 deficiency  low Nl ; on monthly shots ( h/o gastric bypass )         Mixed hyperlipidemia  LDL > 200 . Restarted  Lipitor   check again   -     atorvastatin (LIPITOR) 20 MG tablet; Take 1 tablet by mouth daily            Acquired hypothyroidism  C/O  Endocrine / [facility]   .  On synthroid 0.25 ???  -     -      I    Plan:

## 2022-12-12 ENCOUNTER — NURSE ONLY (OUTPATIENT)
Dept: PRIMARY CARE CLINIC | Age: 67
End: 2022-12-12
Payer: MEDICARE

## 2022-12-12 DIAGNOSIS — Z23 NEED FOR INFLUENZA VACCINATION: ICD-10-CM

## 2022-12-12 DIAGNOSIS — E53.8 VITAMIN B 12 DEFICIENCY: Primary | ICD-10-CM

## 2022-12-12 PROCEDURE — 90471 IMMUNIZATION ADMIN: CPT | Performed by: INTERNAL MEDICINE

## 2022-12-12 PROCEDURE — 90694 VACC AIIV4 NO PRSRV 0.5ML IM: CPT | Performed by: INTERNAL MEDICINE

## 2022-12-12 PROCEDURE — 96372 THER/PROPH/DIAG INJ SC/IM: CPT | Performed by: INTERNAL MEDICINE

## 2022-12-12 PROCEDURE — G0008 ADMIN INFLUENZA VIRUS VAC: HCPCS | Performed by: INTERNAL MEDICINE

## 2022-12-12 RX ORDER — CYANOCOBALAMIN 1000 UG/ML
1000 INJECTION, SOLUTION INTRAMUSCULAR; SUBCUTANEOUS ONCE
Status: COMPLETED | OUTPATIENT
Start: 2022-12-12 | End: 2022-12-12

## 2022-12-12 RX ADMIN — CYANOCOBALAMIN 1000 MCG: 1000 INJECTION, SOLUTION INTRAMUSCULAR; SUBCUTANEOUS at 11:19

## 2022-12-13 ENCOUNTER — HOSPITAL ENCOUNTER (OUTPATIENT)
Age: 67
Discharge: HOME | End: 2022-12-13
Payer: MEDICARE

## 2022-12-13 DIAGNOSIS — E78.2 MIXED HYPERLIPIDEMIA: ICD-10-CM

## 2022-12-13 DIAGNOSIS — D50.8 OTHER IRON DEFICIENCY ANEMIA: ICD-10-CM

## 2022-12-13 LAB
BASOPHILS ABSOLUTE: 0.1 K/UL (ref 0–0.2)
BASOPHILS RELATIVE PERCENT: 1.5 %
CHOLESTEROL, TOTAL: 224 MG/DL (ref 0–199)
EOSINOPHILS ABSOLUTE: 0.2 K/UL (ref 0–0.6)
EOSINOPHILS RELATIVE PERCENT: 4.2 %
FOLATE: 5.73 NG/ML (ref 4.78–24.2)
HCT VFR BLD CALC: 35.5 % (ref 36–48)
HDLC SERPL-MCNC: 65 MG/DL (ref 40–60)
HEMOGLOBIN: 11 G/DL (ref 12–16)
IRON SATURATION: 32 % (ref 15–50)
IRON: 89 UG/DL (ref 37–145)
LDL CHOLESTEROL CALCULATED: 141 MG/DL
LYMPHOCYTES ABSOLUTE: 0.8 K/UL (ref 1–5.1)
LYMPHOCYTES RELATIVE PERCENT: 20.5 %
MCH RBC QN AUTO: 27.7 PG (ref 26–34)
MCHC RBC AUTO-ENTMCNC: 31.1 G/DL (ref 31–36)
MCV RBC AUTO: 89.1 FL (ref 80–100)
MONOCYTES ABSOLUTE: 0.4 K/UL (ref 0–1.3)
MONOCYTES RELATIVE PERCENT: 9.6 %
NEUTROPHILS ABSOLUTE: 2.7 K/UL (ref 1.7–7.7)
NEUTROPHILS RELATIVE PERCENT: 64.2 %
PDW BLD-RTO: 14 % (ref 12.4–15.4)
PLATELET # BLD: 266 K/UL (ref 135–450)
PMV BLD AUTO: 8.8 FL (ref 5–10.5)
RBC # BLD: 3.99 M/UL (ref 4–5.2)
TOTAL IRON BINDING CAPACITY: 276 UG/DL (ref 260–445)
TRIGL SERPL-MCNC: 88 MG/DL (ref 0–150)
TSH SERPL DL<=0.05 MIU/L-ACNC: 1.61 UIU/ML (ref 0.27–4.2)
VITAMIN B-12: >2000 PG/ML (ref 211–911)
VLDLC SERPL CALC-MCNC: 18 MG/DL
WBC # BLD: 4.1 K/UL (ref 4–11)

## 2022-12-13 PROCEDURE — 82746 ASSAY OF FOLIC ACID SERUM: CPT

## 2022-12-13 PROCEDURE — 85025 COMPLETE CBC W/AUTO DIFF WBC: CPT

## 2022-12-13 PROCEDURE — 36415 COLL VENOUS BLD VENIPUNCTURE: CPT

## 2022-12-13 PROCEDURE — 80061 LIPID PANEL: CPT

## 2022-12-13 PROCEDURE — 84443 ASSAY THYROID STIM HORMONE: CPT

## 2022-12-13 PROCEDURE — 83550 IRON BINDING TEST: CPT

## 2022-12-13 PROCEDURE — 83540 ASSAY OF IRON: CPT

## 2022-12-13 PROCEDURE — 82607 VITAMIN B-12: CPT

## 2022-12-15 ENCOUNTER — TELEPHONE (OUTPATIENT)
Dept: PRIMARY CARE CLINIC | Age: 67
End: 2022-12-15

## 2022-12-16 ENCOUNTER — TELEPHONE (OUTPATIENT)
Dept: PULMONOLOGY | Age: 67
End: 2022-12-16

## 2022-12-16 DIAGNOSIS — J45.30 MILD PERSISTENT ASTHMA WITHOUT COMPLICATION: Primary | ICD-10-CM

## 2022-12-16 RX ORDER — BUDESONIDE, GLYCOPYRROLATE, AND FORMOTEROL FUMARATE 160; 9; 4.8 UG/1; UG/1; UG/1
2 AEROSOL, METERED RESPIRATORY (INHALATION) 2 TIMES DAILY
Qty: 1 EACH | Refills: 11 | Status: SHIPPED | OUTPATIENT
Start: 2022-12-16

## 2022-12-16 NOTE — TELEPHONE ENCOUNTER
Pt calls in stating that she would like a rx for   ScalIT. Had been given sample and would like called in to [facility].     Route to Justin Cruz

## 2022-12-20 ENCOUNTER — PATIENT MESSAGE (OUTPATIENT)
Dept: PRIMARY CARE CLINIC | Age: 67
End: 2022-12-20

## 2023-01-04 ENCOUNTER — OFFICE VISIT (OUTPATIENT)
Dept: PRIMARY CARE CLINIC | Age: 68
End: 2023-01-04

## 2023-01-04 VITALS
WEIGHT: 244.6 LBS | BODY MASS INDEX: 38.39 KG/M2 | HEART RATE: 52 BPM | TEMPERATURE: 97.7 F | HEIGHT: 67 IN | DIASTOLIC BLOOD PRESSURE: 60 MMHG | OXYGEN SATURATION: 100 % | SYSTOLIC BLOOD PRESSURE: 90 MMHG

## 2023-01-04 DIAGNOSIS — Z23 HIGH PRIORITY FOR COVID-19 VACCINATION: Primary | ICD-10-CM

## 2023-01-04 DIAGNOSIS — R10.84 GENERALIZED ABDOMINAL PAIN: ICD-10-CM

## 2023-01-04 DIAGNOSIS — Z23 NEED FOR SHINGLES VACCINE: ICD-10-CM

## 2023-01-04 DIAGNOSIS — E66.01 SEVERE OBESITY (BMI 35.0-39.9) WITH COMORBIDITY (HCC): ICD-10-CM

## 2023-01-04 DIAGNOSIS — I95.2 HYPOTENSION DUE TO DRUGS: ICD-10-CM

## 2023-01-04 DIAGNOSIS — R53.83 OTHER FATIGUE: ICD-10-CM

## 2023-01-04 DIAGNOSIS — D63.8 ANEMIA, CHRONIC DISEASE: ICD-10-CM

## 2023-01-04 PROBLEM — N18.30 CHRONIC RENAL DISEASE, STAGE III (HCC): Status: RESOLVED | Noted: 2022-05-24 | Resolved: 2023-01-04

## 2023-01-04 LAB
BACTERIA: ABNORMAL /HPF
BANDED NEUTROPHILS RELATIVE PERCENT: 1 % (ref 0–7)
BASOPHILS ABSOLUTE: 0 K/UL (ref 0–0.2)
BASOPHILS RELATIVE PERCENT: 0 %
BILIRUBIN URINE: NEGATIVE
BLOOD, URINE: NEGATIVE
CLARITY: CLEAR
COLOR: ABNORMAL
EOSINOPHILS ABSOLUTE: 0.2 K/UL (ref 0–0.6)
EOSINOPHILS RELATIVE PERCENT: 3 %
EPITHELIAL CELLS, UA: 12 /HPF (ref 0–5)
GLUCOSE URINE: NEGATIVE MG/DL
HCT VFR BLD CALC: 36.6 % (ref 36–48)
HEMOGLOBIN: 11.3 G/DL (ref 12–16)
HYALINE CASTS: 18 /LPF (ref 0–8)
HYPOCHROMIA: ABNORMAL
KETONES, URINE: ABNORMAL MG/DL
LEUKOCYTE ESTERASE, URINE: NEGATIVE
LIPASE: 14 U/L (ref 13–60)
LYMPHOCYTES ABSOLUTE: 1.2 K/UL (ref 1–5.1)
LYMPHOCYTES RELATIVE PERCENT: 23 %
MCH RBC QN AUTO: 27.5 PG (ref 26–34)
MCHC RBC AUTO-ENTMCNC: 30.9 G/DL (ref 31–36)
MCV RBC AUTO: 89.1 FL (ref 80–100)
MICROSCOPIC EXAMINATION: YES
MONOCYTES ABSOLUTE: 0.2 K/UL (ref 0–1.3)
MONOCYTES RELATIVE PERCENT: 4 %
MUCUS: PRESENT
NEUTROPHILS ABSOLUTE: 3.7 K/UL (ref 1.7–7.7)
NEUTROPHILS RELATIVE PERCENT: 69 %
NITRITE, URINE: NEGATIVE
NUCLEATED RED BLOOD CELLS: 1 /100 WBC
OVALOCYTES: ABNORMAL
PDW BLD-RTO: 14.4 % (ref 12.4–15.4)
PH UA: 5.5 (ref 5–8)
PLATELET # BLD: 279 K/UL (ref 135–450)
PLATELET SLIDE REVIEW: ADEQUATE
PMV BLD AUTO: 9.2 FL (ref 5–10.5)
PROTEIN UA: ABNORMAL MG/DL
RBC # BLD: 4.11 M/UL (ref 4–5.2)
RBC UA: 2 /HPF (ref 0–4)
SCHISTOCYTES: ABNORMAL
SLIDE REVIEW: ABNORMAL
SPECIFIC GRAVITY UA: 1.02 (ref 1–1.03)
TOXIC GRANULATION: PRESENT
URINE TYPE: ABNORMAL
UROBILINOGEN, URINE: 1 E.U./DL
VACUOLATED NEUTROPHILS: PRESENT
WBC # BLD: 5.3 K/UL (ref 4–11)
WBC UA: 1 /HPF (ref 0–5)

## 2023-01-04 ASSESSMENT — PATIENT HEALTH QUESTIONNAIRE - PHQ9
SUM OF ALL RESPONSES TO PHQ QUESTIONS 1-9: 0
2. FEELING DOWN, DEPRESSED OR HOPELESS: 0
SUM OF ALL RESPONSES TO PHQ9 QUESTIONS 1 & 2: 0
1. LITTLE INTEREST OR PLEASURE IN DOING THINGS: 0
SUM OF ALL RESPONSES TO PHQ QUESTIONS 1-9: 0

## 2023-01-04 ASSESSMENT — ENCOUNTER SYMPTOMS
DIARRHEA: 0
VOICE CHANGE: 0
CHEST TIGHTNESS: 0
ABDOMINAL DISTENTION: 0
BLOOD IN STOOL: 0
CONSTIPATION: 0
CHOKING: 0
FACIAL SWELLING: 0

## 2023-01-04 NOTE — PROGRESS NOTES
Subjective:      Patient ID: Mario Elizabeth is a 74 y.o. female. 1/4/2023 Patient presents with:  Discuss Labs: Discuss labs from 12/13/2022  Abdominal Pain: Since last week, hurts every time eating  Fatigue  Cold Extremity:                      Last seen   VV 12/9/2022 Patient presents with: Follow-up                  8/3/2022 Patient presents with:  Medicare AWV  Hypertension  Hyperlipidemia               4/28/2022 Patient presents with:  Hypertension: 2 week follow up              4/15/2022 Patient presents with:  Hypertension  Headache  Insomnia: pt states she is allergic to Ambien and cant take                VV 1/22 11/16/2021 Patient presents with:  Hypertension  Cough: 3 wks     Has had extensive w/u with ENT including Barium swallow Study and CT soft tissue neck               8/31/2021 Patient presents with:  Blood Pressure Check                  8/12/2021 Patient presents with:  Blood Pressure Check  Headache  Sinus Problem                  8/3/2021 Patient presents with:  Hypertension: pt BP has been elevated causing excessive sleepiness                6/3/2021 Patient presents with: Follow-Up from Hospital: [facility] 5/25-5/27/21 abdominal pain        Previous history of gastric bypass, hiatal & ventral hernia repair cholecystectomy    CT abdomen and pelvis showed pneumobilia and bilateral renal cyst without hydronephrosis  LFTs, amylase & lipase within normal limits. She  underwent EGD on 5/25/2021 which was normal    Small bowel follow-through showed showed intermittent mild to moderate increased tertiary contraction in the esophagus with delayed esophageal emptying otherwise unremarkable. Patient was recommended to continue Pepcid ( allergic to Protonix) follow-up with gastroenterology     Pneumobilia noted on imaging likely 2/2 prior ERCP  Afebrile, WBCs and LFTs WNL.   Ultrasound negative                4/30/2021   Medicare AW                11/10/2020 Patient presents with:  Abdominal Pain . Never got labs as ordered last visit ? ??    States she did See Dr Ubaldo Wells who has told her there was nothing he could do ? ??                  . S/P Surgery dissectomy 20 at [facility]       Was told she has Stage 3 Renal failiure ? Labs last checked here  show Nl renal function                  18 total thyroidectomy           Past Medical History:   Diagnosis Date    Anxiety state     Asthma     Back ache     chronic    Environmental allergies     GERD (gastroesophageal reflux disease)     Hypertension     MVP (mitral valve prolapse)     Neck pain, chronic     Reflux     Sleep apnea        Review of Systems   Constitutional:  Positive for chills (cold hands!!) and fatigue. Negative for activity change, appetite change, diaphoresis and unexpected weight change. Flu vac     Tdap      Pneumonia vac  ;       Covid Vac   #3    Shingrex    HENT:  Negative for facial swelling, postnasal drip and voice change. Dentures fit well    Eyes:  Negative for visual disturbance. Eye exam    Respiratory:  Negative for choking and chest tightness. Sleep Study   ; report ? Does not smoke ; No Etoh   H/o Asthma    Cardiovascular:         HTN > 5 yrs on meds . No known CAD . Father  of MI in his 46s   Sister with CAD ? MVP  H/o palpitations   Gastrointestinal:  Negative for abdominal distention, blood in stool, constipation and diarrhea. Repeat Upper endoscopy   Nl    Upper Endoscopy    H/O Ulers ? Cannot take Nsaids! Post gastric bypass       GERD    Repeat Colonscopy   ; Polyps; O [place]    Endocrine: Negative for cold intolerance and heat intolerance. Followed by Dr. Ilana Godwin  for thyroid   Genitourinary:         Mammogram      Pap 0n     Musculoskeletal:         DEXA   osteopenia       Sees Ortho neck / [facility]          S/P Surgery  DDD 20      DJD . Skin: Negative. Neurological:  Positive for dizziness. Psychiatric/Behavioral:  Negative for self-injury, sleep disturbance and suicidal ideas. Anxiety: followed by counselor     Objective:   Physical Exam  Constitutional:       Appearance: She is obese. Neck:      Comments:     Cardiovascular:      Rate and Rhythm: Normal rate and regular rhythm. Pulmonary:      Effort: Pulmonary effort is normal.      Breath sounds: Normal breath sounds. Abdominal:      General: There is distension. Tenderness: There is abdominal tenderness (diffuse). There is no guarding. Musculoskeletal:         General: Normal range of motion. Skin:     General: Skin is warm. Neurological:      Mental Status: She is oriented to person, place, and time. Psychiatric:         Mood and Affect: Mood normal.         Behavior: Behavior normal.       Assessment:         [place] was seen today for discuss labs, abdominal pain, fatigue and cold extremity. Diagnoses and all orders for this visit:    Severe obesity (BMI 35.0-39. 9) with comorbidity ([address]    Hypotension due to drugs  DC hydralazine . Cont labetalol + Nifedipine + Losartan     Other fatigue  low BP / Anemia ? Generalized abdominal pain  ? Etiology . Denies Etoh . Cont Nexium   -     CBC with Auto Differential; Future  -     Urinalysis; Future  -     Lipase; Future    Anemia, chronic disease  scheduled for repeat Upper endoscopy     High priority for COVID-19 vaccination  advised to get New Bival vac at pharmacy         Need for shingles vaccine  -     zoster recombinant adjuvanted vaccine [facility]) 50 MCG/0.5ML SUSR injection; Inject 0.5 mLs into the muscle once for 1 dose        Vitamin B 12 deficiency  low Nl ; on monthly shots ( h/o gastric bypass )         Mixed hyperlipidemia     ; was > 200  . Restarted  Lipitor   check again   -         Acquired hypothyroidism  C/O  Endocrine / [facility]   .  On synthroid 0.25 ???  -     -      I    Plan:

## 2023-01-06 ENCOUNTER — TELEPHONE (OUTPATIENT)
Dept: PRIMARY CARE CLINIC | Age: 68
End: 2023-01-06

## 2023-01-06 RX ORDER — FAMOTIDINE 40 MG/1
40 TABLET, FILM COATED ORAL EVERY EVENING
Qty: 30 TABLET | Refills: 3 | Status: SHIPPED | OUTPATIENT
Start: 2023-01-06

## 2023-01-06 RX ORDER — FAMOTIDINE 40 MG/1
40 TABLET, FILM COATED ORAL EVERY EVENING
Qty: 90 TABLET | OUTPATIENT
Start: 2023-01-06

## 2023-01-06 NOTE — TELEPHONE ENCOUNTER
----- Message from Karla Cheryle sent at 1/5/2023  1:45 PM EST -----  Subject: Message to Provider    QUESTIONS  Information for Provider? Would like medication prescribed for abdominal   pain, cannot get through to the office. Would like to speak to Odilia Johnston or   Joel Franco.   ---------------------------------------------------------------------------  --------------  [facility] INFO  [phone]; OK to leave message on voicemail  ---------------------------------------------------------------------------  --------------  SCRIPT ANSWERS  Relationship to Patient?  Self

## 2023-01-06 NOTE — TELEPHONE ENCOUNTER
Patient is stating she takes Nexium now and no help please advise what she needs to do .  She feels like it might be a hernia

## 2023-01-12 ENCOUNTER — OFFICE VISIT (OUTPATIENT)
Dept: PRIMARY CARE CLINIC | Age: 68
End: 2023-01-12
Payer: MEDICARE

## 2023-01-12 VITALS
HEART RATE: 63 BPM | BODY MASS INDEX: 38.3 KG/M2 | OXYGEN SATURATION: 100 % | HEIGHT: 67 IN | WEIGHT: 244 LBS | TEMPERATURE: 97.1 F | DIASTOLIC BLOOD PRESSURE: 70 MMHG | SYSTOLIC BLOOD PRESSURE: 110 MMHG

## 2023-01-12 DIAGNOSIS — R10.13 EPIGASTRIC PAIN: ICD-10-CM

## 2023-01-12 DIAGNOSIS — I10 ESSENTIAL HYPERTENSION: Primary | ICD-10-CM

## 2023-01-12 DIAGNOSIS — E53.8 VITAMIN B12 DEFICIENCY: ICD-10-CM

## 2023-01-12 DIAGNOSIS — Z23 HIGH PRIORITY FOR COVID-19 VACCINATION: ICD-10-CM

## 2023-01-12 PROCEDURE — 96372 THER/PROPH/DIAG INJ SC/IM: CPT | Performed by: INTERNAL MEDICINE

## 2023-01-12 PROCEDURE — G9899 SCRN MAM PERF RSLTS DOC: HCPCS | Performed by: INTERNAL MEDICINE

## 2023-01-12 PROCEDURE — G8399 PT W/DXA RESULTS DOCUMENT: HCPCS | Performed by: INTERNAL MEDICINE

## 2023-01-12 PROCEDURE — G8427 DOCREV CUR MEDS BY ELIG CLIN: HCPCS | Performed by: INTERNAL MEDICINE

## 2023-01-12 PROCEDURE — 1090F PRES/ABSN URINE INCON ASSESS: CPT | Performed by: INTERNAL MEDICINE

## 2023-01-12 PROCEDURE — G8484 FLU IMMUNIZE NO ADMIN: HCPCS | Performed by: INTERNAL MEDICINE

## 2023-01-12 PROCEDURE — 1036F TOBACCO NON-USER: CPT | Performed by: INTERNAL MEDICINE

## 2023-01-12 PROCEDURE — 3074F SYST BP LT 130 MM HG: CPT | Performed by: INTERNAL MEDICINE

## 2023-01-12 PROCEDURE — 1123F ACP DISCUSS/DSCN MKR DOCD: CPT | Performed by: INTERNAL MEDICINE

## 2023-01-12 PROCEDURE — 3017F COLORECTAL CA SCREEN DOC REV: CPT | Performed by: INTERNAL MEDICINE

## 2023-01-12 PROCEDURE — G8417 CALC BMI ABV UP PARAM F/U: HCPCS | Performed by: INTERNAL MEDICINE

## 2023-01-12 PROCEDURE — 3078F DIAST BP <80 MM HG: CPT | Performed by: INTERNAL MEDICINE

## 2023-01-12 PROCEDURE — 99214 OFFICE O/P EST MOD 30 MIN: CPT | Performed by: INTERNAL MEDICINE

## 2023-01-12 RX ORDER — CYANOCOBALAMIN 1000 UG/ML
1000 INJECTION, SOLUTION INTRAMUSCULAR; SUBCUTANEOUS ONCE
Status: COMPLETED | OUTPATIENT
Start: 2023-01-12 | End: 2023-01-12

## 2023-01-12 RX ADMIN — CYANOCOBALAMIN 1000 MCG: 1000 INJECTION, SOLUTION INTRAMUSCULAR; SUBCUTANEOUS at 12:35

## 2023-01-12 ASSESSMENT — ENCOUNTER SYMPTOMS
ABDOMINAL DISTENTION: 0
VOICE CHANGE: 0
DIARRHEA: 0
CHEST TIGHTNESS: 0
CONSTIPATION: 0
BLOOD IN STOOL: 0
CHOKING: 0
FACIAL SWELLING: 0

## 2023-01-12 NOTE — PROGRESS NOTES
Subjective:      Patient ID: Shad Gonzalez is a 74 y.o. female. 1/12/2023 Patient presents with:  Hypertension: 1 week f/u                Last seen   1/4/2023 Patient presents with:  Discuss Labs: Discuss labs from 12/13/2022  Abdominal Pain: Since last week, hurts every time eating  Fatigue  Cold Extremity:                      Last seen   VV 12/9/2022 Patient presents with: Follow-up                  8/3/2022 Patient presents with:  Medicare AWV  Hypertension  Hyperlipidemia               4/28/2022 Patient presents with:  Hypertension: 2 week follow up              4/15/2022 Patient presents with:  Hypertension  Headache  Insomnia: pt states she is allergic to Ambien and cant take                VV 1/22 11/16/2021 Patient presents with:  Hypertension  Cough: 3 wks     Has had extensive w/u with ENT including Barium swallow Study and CT soft tissue neck               8/31/2021 Patient presents with:  Blood Pressure Check                  8/12/2021 Patient presents with:  Blood Pressure Check  Headache  Sinus Problem                  8/3/2021 Patient presents with:  Hypertension: pt BP has been elevated causing excessive sleepiness                6/3/2021 Patient presents with: Follow-Up from Hospital: [facility] 5/25-5/27/21 abdominal pain        Previous history of gastric bypass, hiatal & ventral hernia repair cholecystectomy    CT abdomen and pelvis showed pneumobilia and bilateral renal cyst without hydronephrosis  LFTs, amylase & lipase within normal limits. She  underwent EGD on 5/25/2021 which was normal    Small bowel follow-through showed showed intermittent mild to moderate increased tertiary contraction in the esophagus with delayed esophageal emptying otherwise unremarkable. Patient was recommended to continue Pepcid ( allergic to Protonix) follow-up with gastroenterology     Pneumobilia noted on imaging likely 2/2 prior ERCP  Afebrile, WBCs and LFTs WNL.   Ultrasound negative                2021   Medicare AWV                11/10/2020 Patient presents with:  Abdominal Pain . Never got labs as ordered last visit ? ??    States she did See Dr Hanna Barrett who has told her there was nothing he could do ? ??                  . S/P Surgery dissectomy 20 at [facility]       Was told she has Stage 3 Renal failiure ? Labs last checked here  show Nl renal function                  18 total thyroidectomy           Past Medical History:   Diagnosis Date    Anxiety state     Asthma     Back ache     chronic    Environmental allergies     GERD (gastroesophageal reflux disease)     Hypertension     MVP (mitral valve prolapse)     Neck pain, chronic     Reflux     Sleep apnea        Review of Systems   Constitutional:  Positive for chills (cold hands!!) and fatigue. Negative for activity change, appetite change, diaphoresis and unexpected weight change. Flu vac     Tdap      Pneumonia vac  ;       Covid Vac   #3    Shingrex    HENT:  Negative for facial swelling, postnasal drip and voice change. Dentures fit well    Eyes:  Negative for visual disturbance. Eye exam    Respiratory:  Negative for choking and chest tightness. Sleep Study   ; report ? Does not smoke ; No Etoh   H/o Asthma    Cardiovascular:         HTN > 5 yrs on meds . No known CAD . Father  of MI in his 46s   Sister with CAD ? MVP  H/o palpitations   Gastrointestinal:  Negative for abdominal distention, blood in stool, constipation and diarrhea. Repeat Upper endoscopy   Nl    Upper Endoscopy    H/O Ulers ? Cannot take Nsaids! Post gastric bypass       GERD    Repeat Colonscopy   ; Polyps; O Marilee    Endocrine: Negative for cold intolerance and heat intolerance.         Followed by Dr. Toya Santiago  for thyroid   Genitourinary:         Mammogram      Pap 0n     Musculoskeletal:         DEXA   osteopenia       Sees Ortho neck / [facility]   1/21       S/P Surgery  DDD 8/26/20      DJD . Skin: Negative. Neurological:  Positive for dizziness. Psychiatric/Behavioral:  Negative for self-injury, sleep disturbance and suicidal ideas. Anxiety: followed by counselor     Objective:   Physical Exam  Constitutional:       Appearance: She is obese. Neck:      Comments:     Cardiovascular:      Rate and Rhythm: Normal rate and regular rhythm. Pulmonary:      Effort: Pulmonary effort is normal.      Breath sounds: Normal breath sounds. Abdominal:      General: There is distension. Tenderness: There is abdominal tenderness (diffuse). There is no guarding. Musculoskeletal:         General: Normal range of motion. Skin:     General: Skin is warm. Neurological:      Mental Status: She is oriented to person, place, and time. Psychiatric:         Mood and Affect: Mood normal.         Behavior: Behavior normal.       Assessment:     [place] was seen today for hypertension. Diagnoses and all orders for this visit:      Epigastric pain Nl CBC / Lipase . Scheduled to see GI for [facility] 1/16/23  -     NEXIUM 40 MG delayed release capsule; Take 1 capsule by mouth every morning (before breakfast)    Vitamin B12 deficiency  -     cyanocobalamin injection 1,000 mcg    High priority for COVID-19 vaccination        Severe obesity (BMI 35.0-39. 9) with comorbidity ([address]    Hypertension . BP better since off Hydralazine   Cont labetalol + Nifedipine + Losartan     Other fatigue  low BP / Anemia ? High priority for COVID-19 vaccination  advised to get New Bival vac at pharmacy         Need for shingles vaccine  -     zoster recombinant adjuvanted vaccine [facility]) 50 MCG/0.5ML SUSR injection; Inject 0.5 mLs into the muscle once for 1 dose        Vitamin B 12 deficiency  low Nl ; on monthly shots ( h/o gastric bypass )         Mixed hyperlipidemia     ; was > 200  .  Restarted  Lipitor   check again   - Acquired hypothyroidism  C/O  Endocrine / [facility]   .  On synthroid 0.25 ???  -     -      I    Plan:

## 2023-01-19 ENCOUNTER — OFFICE VISIT (OUTPATIENT)
Dept: RHEUMATOLOGY | Age: 68
End: 2023-01-19
Payer: MEDICARE

## 2023-01-19 VITALS — WEIGHT: 242 LBS | SYSTOLIC BLOOD PRESSURE: 122 MMHG | BODY MASS INDEX: 37.9 KG/M2 | DIASTOLIC BLOOD PRESSURE: 84 MMHG

## 2023-01-19 DIAGNOSIS — R51.9 CHRONIC INTRACTABLE HEADACHE, UNSPECIFIED HEADACHE TYPE: ICD-10-CM

## 2023-01-19 DIAGNOSIS — E55.9 VITAMIN D DEFICIENCY: ICD-10-CM

## 2023-01-19 DIAGNOSIS — M35.01 SJOGREN'S SYNDROME WITH KERATOCONJUNCTIVITIS SICCA (HCC): Primary | ICD-10-CM

## 2023-01-19 DIAGNOSIS — M35.01 SJOGREN'S SYNDROME WITH KERATOCONJUNCTIVITIS SICCA (HCC): ICD-10-CM

## 2023-01-19 DIAGNOSIS — G89.29 CHRONIC INTRACTABLE HEADACHE, UNSPECIFIED HEADACHE TYPE: ICD-10-CM

## 2023-01-19 DIAGNOSIS — R25.2 MUSCLE CRAMPS: ICD-10-CM

## 2023-01-19 PROCEDURE — G8484 FLU IMMUNIZE NO ADMIN: HCPCS | Performed by: INTERNAL MEDICINE

## 2023-01-19 PROCEDURE — 1036F TOBACCO NON-USER: CPT | Performed by: INTERNAL MEDICINE

## 2023-01-19 PROCEDURE — 3017F COLORECTAL CA SCREEN DOC REV: CPT | Performed by: INTERNAL MEDICINE

## 2023-01-19 PROCEDURE — 1090F PRES/ABSN URINE INCON ASSESS: CPT | Performed by: INTERNAL MEDICINE

## 2023-01-19 PROCEDURE — 1123F ACP DISCUSS/DSCN MKR DOCD: CPT | Performed by: INTERNAL MEDICINE

## 2023-01-19 PROCEDURE — 3079F DIAST BP 80-89 MM HG: CPT | Performed by: INTERNAL MEDICINE

## 2023-01-19 PROCEDURE — G8399 PT W/DXA RESULTS DOCUMENT: HCPCS | Performed by: INTERNAL MEDICINE

## 2023-01-19 PROCEDURE — G8417 CALC BMI ABV UP PARAM F/U: HCPCS | Performed by: INTERNAL MEDICINE

## 2023-01-19 PROCEDURE — 3074F SYST BP LT 130 MM HG: CPT | Performed by: INTERNAL MEDICINE

## 2023-01-19 PROCEDURE — G9899 SCRN MAM PERF RSLTS DOC: HCPCS | Performed by: INTERNAL MEDICINE

## 2023-01-19 PROCEDURE — 99214 OFFICE O/P EST MOD 30 MIN: CPT | Performed by: INTERNAL MEDICINE

## 2023-01-19 PROCEDURE — G8427 DOCREV CUR MEDS BY ELIG CLIN: HCPCS | Performed by: INTERNAL MEDICINE

## 2023-01-19 RX ORDER — PILOCARPINE HYDROCHLORIDE 7.5 MG/1
TABLET, FILM COATED ORAL
Qty: 90 TABLET | Refills: 5 | Status: SHIPPED | OUTPATIENT
Start: 2023-01-19 | End: 2023-01-20 | Stop reason: SDUPTHER

## 2023-01-19 RX ORDER — CYCLOSPORINE 0.5 MG/ML
1 EMULSION OPHTHALMIC 2 TIMES DAILY
Qty: 1 EACH | Refills: 3 | Status: SHIPPED | OUTPATIENT
Start: 2023-01-19

## 2023-01-19 NOTE — PROGRESS NOTES
2023  Patient Name: Shayy Mcgee  : 1955  Medical Record: [id number]      ASSESSMENT AND PLAN    Assessment/Plan:      ASSESSMENT:    1. Sjogren's syndrome with keratoconjunctivitis sicca ([facility] [address]    2. Chronic intractable headache, unspecified headache type    3. Muscle cramps    4. Vitamin D deficiency        PLAN:     [place] was seen today for follow-up. Diagnoses and all orders for this visit:    Sjogren's syndrome with keratoconjunctivitis sicca (HCC)  -     cycloSPORINE (RESTASIS) 0.05 % ophthalmic emulsion; Place 1 drop into both eyes 2 times daily  -     pilocarpine (SALAGEN) 7.5 MG tablet; Take 1 tab three times a day 30 minutes before meals  -     C-Reactive Protein; Future  -     Sedimentation Rate; Future  -     Electrophoresis Protein, Serum; Future  -     C4 Complement; Future  -     Rheumatoid Factor; Future  -     Urinalysis with Reflex to Culture; Future    Chronic intractable headache, unspecified headache type    Muscle cramps  -     Comprehensive Metabolic Panel; Future  -     CK; Future  -     Magnesium; Future  -     Phosphorus; Future  -     Vitamin D 25 Hydroxy; Future    Vitamin D deficiency  -     Vitamin D 25 Hydroxy; Future      Sjogren's syndrome-positive LISA 1: 1280 nuclear dot pattern, positive SSA and SSB antibody. SPEP no evidence of monoclonal gammopathy. RF, CCP is negative. Elevated ESR. Hypergammaglobulinemia. dsDNA, anti-Bazan, RNP, APL panel, C3-C4 is negative  Persistent dry mouth and dry eyes. She has stopped Salagen 2 weeks ago, she ran out of refills  I will restart Salagen 7.5 mg 3 times a day  I will also start Restasis eyedrops due to persistent dry eyes  She will continue artificial teardrops, Biotene mouthwash, sugar-free gum and frequent sips of water.  /Increased risk of lymphoma was explained-  I will check UA, SPEP, C4 and RF    Chronic intractable headache-with lightheadedness and dizziness  No focal symptoms.   No stiffness around shoulders or hips.  No vision changes, jaw pain or extremity weakness. No significant improvement on prednisone  Persistently elevated ESR less likely due to temporal arteritis   Unlikely temporal arteritis  MRI of the brain no acute changes or intracranial pathology  Less likely related to connective tissue disease or systemic rheumatic disease  Advised to continue follow-up with neurology     Muscle cramps-she denies any muscle weakness. I will check electrolytes, CK, vitamin D, phosphorus and magnesium to rule out metabolic or myopathy etiology. The patient indicates understanding of these issues and agrees with the plan. Return in about 6 months (around 7/19/2023). The risks and benefits of my recommendations, as well as other treatment options, benefits and side effects werediscussed with the patient. All questions were answered.        MEDICATIONS  Current Outpatient Medications   Medication Sig Dispense Refill    cycloSPORINE (RESTASIS) 0.05 % ophthalmic emulsion Place 1 drop into both eyes 2 times daily 1 each 3    pilocarpine (SALAGEN) 7.5 MG tablet Take 1 tab three times a day 30 minutes before meals 90 tablet 5    NEXIUM 40 MG delayed release capsule Take 1 capsule by mouth every morning (before breakfast) 90 capsule 1    famotidine (PEPCID) 40 MG tablet Take 1 tablet by mouth every evening 30 tablet 3    Budeson-Glycopyrrol-Formoterol (BREZTRI AEROSPHERE) 160-9-4.8 MCG/ACT AERO Inhale 2 puffs into the lungs in the morning and at bedtime 1 each 11    labetalol (NORMODYNE) 300 MG tablet Take 1 tablet by mouth 2 times daily 180 tablet 3    NIFEdipine (PROCARDIA XL) 90 MG extended release tablet Take 1 tablet by mouth daily 90 tablet 1    losartan (COZAAR) 100 MG tablet Take 1 tablet by mouth daily 90 tablet 3    fluticasone (FLONASE) 50 MCG/ACT nasal spray SHAKE LIQUID AND USE 2 SPRAYS IN EACH NOSTRIL DAILY 48 g 3    Spacer/Aero-Holding Chambers RAMANA 1 Device by Does not apply route daily 1 each 0 Caltrate 600+D Plus Minerals (CALTRATE) 600-800 MG-UNIT TABS tablet Take 1 tablet by mouth in the morning and 1 tablet before bedtime. 180 tablet 5    atorvastatin (LIPITOR) 20 MG tablet Take 1 tablet by mouth daily 90 tablet 3    albuterol sulfate  (90 Base) MCG/ACT inhaler Inhale 2 puffs into the lungs every 6 hours as needed for Wheezing 18 g 5    vitamin D (ERGOCALCIFEROL) 1.25 MG (69196 UT) CAPS capsule Take 50,000 Units by mouth once a week       No current facility-administered medications for this visit. ALLERGIES  Allergies   Allergen Reactions    Dye [Barium-Containing Compounds]     Furosemide     Gabapentin Other (See Comments)     Memory Loss    Protonix [Pantoprazole Sodium] Shortness Of Breath and Rash    Tramadol Itching    Protonix [Pantoprazole]      Other reaction(s): Unknown    Tegaderm Alginate Ag Rope     Amoxicillin     Aspirin     Augmentin [Amoxicillin-Pot Clavulanate]     Betadine [Povidone Iodine] Other (See Comments)     welts    Chlorhexidine Gluconate Other (See Comments)     Welts from ChloraPrep    Morphine Itching    Other      chlorahexidine - hives    Pancrelipase (Lip-Prot-Amyl)      Other reaction(s): Unknown    Toradol [Ketorolac Tromethamine]     Iodides Rash    Ranitidine Nausea And Vomiting    Tape [Adhesive Tape] Rash     teradon tape also  Paper tape ok          Comments  No specialty comments available. Background history:  Kaye Stanley is a 74 y.o. female with past medical history of hypertension, anxiety, degenerative disc disease in the cervical spine and lumbar spine, osteoarthritis status post bilateral knee replacements who is being seen for follow up evaluation of  headache and elevated ESR. Patient is a vague historian. She has chronic headache for several years which has gotten worse over the past 2 months. She has headache on daily basis located on the right side of the head. She describes her pain as excruciating.   She denies sudden loss of vision, jaw pain, extremity weakness, stiffness around shoulders or hips. She denies diplopia, dysarthria, focal weakness, seizures. She denies photophobia, phonophobia. Headache is associated with dizziness/lightheadedness and vertigo. She denies fever, weight loss or swollen glands. She went to see her PCP. She was found to have elevated ESR of 83. She was put on prednisone for possible temporal arteritis. She is currently on prednisone 60 mg daily for past 2 weeks without any improvement in headaches. Repeat ESR is 85. She denies joint pain, swelling or stiffness. She has a history of bilateral knee replacement secondary to osteoarthritis. She also had 2 neck surgeries for cervical stenosis and right shoulder arthroscopic surgery for rotator cuff tear. She denies malar rash, photosensitivity, oral/nasal ulcers, dry eyes, dry mouth, history of miscarriages, Raynaud's, pregnancy complications, blood clots, pleurisy or pericarditis, sclerodactyly. She is up-to-date with Pap smear, mammogram and colonoscopy. Interim history: She presents for follow-up of Sjogren's, chronic headaches, elevated ESR. She follows neurology for chronic headaches. She reports improvement. She had MRI of the brain which was normal without any intra cranial pathology or acute changes. She also has Sjogren's. She stopped Salagen 2 weeks ago, she ran out of refills. She uses Biotene mouthwash, frequent sips of water, sugar-free gum. She continues to have dry eyes. She is using artificial teardrops as needed which helps. Blood work showed positive LISA with elevated SSA and SSB antibody, hypergammaglobulinemia. SPEP no evidence of monoclonal gammopathy. RF, CCP, hepatitis panel is negative. dsDNA, anti-Bazan, RNP, APL panel, C3-C4 is negative.   She denies alopecia, malar rash, photosensitivity, oral/nasal ulcers, kidney disease, blood clots, pleurisy or pericarditis, sclerodactyly or skin thickening, Raynaud's, history of miscarriages or pregnancy complications, sclerodactyly or skin thickening. She denies fever, weight loss, night sweats or swollen glands. She also has elevated ESR. SPEP did not show any evidence of monoclonal gammopathy. She is up-to-date with Pap smear, mammogram and cancer screening. She also reports severe muscle cramps in her hands and feet. She denies any muscle weakness. TSH, B12, iron and TIBC is normal   HPI  Review of Systems    REVIEW OF SYSTEMS: Tingling and numbness in the right upper extremity, headaches, dizziness, GERD  Constitutional: No unanticipated weight loss or fevers. Integumentary: No rash, photosensitivity, malar rash, livedo reticularis, alopecia and Raynaud's symptoms, sclerodactyly, skin tightening  Eyes: negative for visual disturbance and persistent redness, discharge from eyes   ENT: - No tinnitus, loss of hearing, vertigo, or recurrent ear infections.  - No history of nasal/oral ulcers. - No history of dry eyes/dry mouth  Cardiovascular: No history of pericarditis, chest pain or murmur or palpitations  Respiratory: No shortness of breath, cough or history of interstitial lung disease. No history of pleurisy. No history of tuberculosis or atypical infections. Gastrointestinal: No history of dysphagia or esophageal dysmotility. No change in bowel habits or any inflammatory bowel disease. Genitourinary: No history of renal disease, miscarriages. Hematologic/Lymphatic: No abnormal bruising or bleeding, blood clots or swollen lymph nodes. Neurological: No history of seizure or focal weakness. No history of neuropathies,hyperesthesias, facial droop, diplopia  Psychiatric: No history of bipolar disease  Endocrine: Denies any polyuria, polydipsia and osteoporosis  Allergic/Immunologic: No nasal congestion or hives.         I have reviewed patients Past medical History, Social History and Family History as mentioned in her chart and this remains unchanged fromprevious. Past Medical History:   Diagnosis Date    Anxiety state     Asthma     Back ache     chronic    Environmental allergies     GERD (gastroesophageal reflux disease)     Hypertension     MVP (mitral valve prolapse)     Neck pain, chronic     Reflux     Sleep apnea      Past Surgical History:   Procedure Laterality Date    BACK SURGERY      BLADDER SUSPENSION      CHOLECYSTECTOMY      FOOT SURGERY  11-9-12    endoscopic plantar fasciotomy right foot    GASTRIC BYPASS SURGERY      HERNIA REPAIR      HIATAL HERNIA REPAIR      HYSTERECTOMY (CERVIX STATUS UNKNOWN)      JOINT REPLACEMENT Bilateral     KNEE    KNEE ARTHROPLASTY  2/28/13    L    KNEE ARTHROSCOPY Right 3/7/2014    LAMINECTOMY  7/1/11    bilateral L5-S1 laminotomy, nerve root exploration, foraminotomy    NASAL SINUS SURGERY Right 02/05/2018    Excision of rt nasal mass    VT COLONOSCOPY FLX DX W/COLLJ SPEC WHEN PFRMD N/A 9/28/2018    COLONOSCOPY DIAGNOSTIC OR SCREENING performed by Gareth Sampson MD at [address] DX/THER AGT PVRT FACET JT LMBR/SAC 1 LEVEL Right 11/6/2018    RIGHT L4,L5 S1 MEDIAL BRANCH BLOCK WITH FLUOROSCOPY performed by Fatoumata Diggs MD at [address] [facility], [address] [facility] [address]      with dilatation    UPPER GASTROINTESTINAL ENDOSCOPY N/A 8/14/2019    EGD ESOPHAGOGASTRODUODENOSCOPY performed by Gareth Sampson MD at [address] [facility] 5/25/2021    EGD BIOPSY performed by Delmer Millan MD at [address] History     Socioeconomic History    Marital status:       Spouse name: Not on file    Number of children: Not on file    Years of education: Not on file    Highest education level: Not on file   Occupational History    Not on file   Tobacco Use    Smoking status: Former     Packs/day: 0.25     Years: 2.00     Pack years: 0.50     Types: Cigarettes     Quit date: 1/1/2004     Years since quittin.0     Passive exposure: Past    Smokeless tobacco: Never    Tobacco comments:     [id number]   Vaping Use    Vaping Use: Never used   Substance and Sexual Activity    Alcohol use: No    Drug use: No    Sexual activity: Yes     Partners: Male   Other Topics Concern    Not on file   Social History Narrative    Not on file     Social Determinants of Health     Financial Resource Strain: Unknown    Difficulty of Paying Living Expenses: Patient refused   Food Insecurity: No Food Insecurity    Worried About Running Out of Food in the Last Year: Never true    Ran Out of Food in the Last Year: Never true   Transportation Needs: Not on file   Physical Activity: Inactive    Days of Exercise per Week: 0 days    Minutes of Exercise per Session: 0 min   Stress: Not on file   Social Connections: Not on file   Intimate Partner Violence: Not on file   Housing Stability: Not on file     Family History   Problem Relation Age of Onset    High Blood Pressure Mother     Heart Disease Mother     Other Mother         glaucoma    High Blood Pressure Father     Heart Disease Father     Other Father         intestional problems    Cancer Sister         cancer    Kidney Disease Sister     Kidney Disease Brother     High Blood Pressure Brother     Other Sister         lupus,fibromyalgia  thyroid surgery    High Blood Pressure Sister     Kidney Disease Sister     Heart Disease Sister     Kidney Disease Brother     High Blood Pressure Brother     Anesth Problems Neg Hx     Malig Hyperten Neg Hx     Hypotension Neg Hx     Malig Hypertherm Neg Hx     Pseudochol.  Deficiency Neg Hx          PHYSICAL EXAM   Vitals:    23 1506   BP: 122/84   Site: Left Upper Arm   Position: Sitting   Cuff Size: Large Adult   Weight: 242 lb (109.8 kg)     Physical Exam  Constitutional:  Well developed, well nourished, no acute distress, non-toxic appearance   Musculoskeletal:    Ambulates without assistance, normal gait  Neck: Full ROM, no tenderness,supple   Upper Extremities        Shoulder: Full ROM, no crepitus        Elbow:  Full ROM, no synovitis, no deformity        Wrist: Full ROM, no synovitis, no deformity, no ulnar deviation        Fingers: No sclerodactly, no active raynaud's, no ulcers. MCPs: No synovitis, no deformity             PIPs:  No synovitis, no deformity             DIPs: No synovitis, no deformity             Nails: No pitting, no telangiectasias. Lower Extremities        Hip: Full ROM, no tenderness to palpation        Knee: Bilateral knee replacement        Ankle: Full ROM, no swelling or erythema        MTPs: No swelling or erythema  Back- no tenderness. Eyes:  PERRL, extra ocular movements intact, conjunctiva normal   HEENT:  Atraumatic, normocephalic, external ears normal, oropharynx dry, no pharyngeal exudates. No parotid gland enlargement or lymphadenopathy. Bilateral temporal area tenderness. Temporal artery is not palpable  Respiratory:  No respiratory distress  GI:  Soft, nondistended, normal bowel sounds, nontender, noorganomegaly, no mass, no rebound, no guarding   :  No costovertebral angle tenderness   Integument:  Well hydrated, no rash or telangiectasias  Lymphatic:  No lymphadenopathy noted   Neurologic:   Alert & oriented x 3, CN 2-12 normal, no focal deficits noted. Sensations Intact. Muscle strength 5/5 proximally and distally in upper and lower extremities.    Psychiatric:  Speech and behavior appropriate           LABS AND IMAGING  Outside data reviewed and in HPI    Lab Results   Component Value Date/Time    WBC 5.3 01/04/2023 01:40 PM    RBC 4.11 01/04/2023 01:40 PM    HGB 11.3 01/04/2023 01:40 PM    HCT 36.6 01/04/2023 01:40 PM     01/04/2023 01:40 PM    MCV 89.1 01/04/2023 01:40 PM    MCH 27.5 01/04/2023 01:40 PM    MCHC 30.9 01/04/2023 01:40 PM    RDW 14.4 01/04/2023 01:40 PM    NRBC 1 01/04/2023 01:40 PM    SEGSPCT 56.0 06/15/2013 02:00 AM    BANDSPCT 1 01/04/2023 01:40 PM LYMPHOPCT 23.0 01/04/2023 01:40 PM    MONOPCT 4.0 01/04/2023 01:40 PM    EOSPCT 5.0 12/24/2011 07:50 PM    BASOPCT 0.0 01/04/2023 01:40 PM    MONOSABS 0.2 01/04/2023 01:40 PM    LYMPHSABS 1.2 01/04/2023 01:40 PM    EOSABS 0.2 01/04/2023 01:40 PM    BASOSABS 0.0 01/04/2023 01:40 PM    DIFFTYPE Manual-K 06/15/2013 02:00 AM       Chemistry        Component Value Date/Time     07/01/2022 1405    K 4.1 07/01/2022 1405     07/01/2022 1405    CO2 15 (L) 07/01/2022 1405    BUN 13 07/01/2022 1405    CREATININE 1.0 07/01/2022 1405        Component Value Date/Time    CALCIUM 9.7 07/01/2022 1405    ALKPHOS 126 07/01/2022 1405    AST 24 07/01/2022 1405    ALT 17 07/01/2022 1405    BILITOT 0.3 07/01/2022 1405          Lab Results   Component Value Date    SEDRATE 75 (H) 06/14/2022     Lab Results   Component Value Date    CRP <3.0 06/14/2022     Lab Results   Component Value Date/Time    C3 151.2 06/14/2022 10:59 AM    C4 39.4 06/14/2022 10:59 AM     Lab Results   Component Value Date/Time    RF 12.0 05/05/2022 01:24 PM     Lab Results   Component Value Date/Time    ANATITER >1:2560 05/05/2022 01:24 PM     No results found for: DSDNAG, DSDNAIGGIFA  No results found for: Ridge Bong  No results found for: SMAB, RNPAB  No results found for: CENTABIGG  Lab Results   Component Value Date/Time    C3 151.2 06/14/2022 10:59 AM    C4 39.4 06/14/2022 10:59 AM     Lab Results   Component Value Date/Time    VITD25 40.0 05/05/2022 01:24 PM     No results found for: Joseph Borges  Lab Results   Component Value Date    BUZUXVDV59 >2000 (H) 12/13/2022     Lab Results   Component Value Date    TSHFT4 1.00 05/05/2022    TSH 1.61 12/13/2022     Lab Results   Component Value Date    VITD25 40.0 05/05/2022       Radiology: MRI posterior fossa 5/16/2022  1. No acute intracranial abnormality. No acute infarct. 2. No abnormality seen within the cerebellopontine angle cisterns or internal   auditory canals.    3. Minimal chronic microvascular ischemic changes. ######################################################################    I thank you for giving me theopportunity to participate in [facility]. If you have any questions or concerns please feel free to contact me. I look forward to following  Norris along with you. Electronically signed by: Cristina Rojas MD, MD, 1/19/2023 3:37 PM    Documentation was done using voice recognition dragon software. Every effort was made to ensure accuracy;however, inadvertent unintentional computerized transcription errors may be present.

## 2023-01-20 ENCOUNTER — OFFICE VISIT (OUTPATIENT)
Dept: PULMONOLOGY | Age: 68
End: 2023-01-20
Payer: MEDICARE

## 2023-01-20 VITALS
WEIGHT: 240 LBS | OXYGEN SATURATION: 98 % | HEIGHT: 67 IN | BODY MASS INDEX: 37.67 KG/M2 | DIASTOLIC BLOOD PRESSURE: 60 MMHG | RESPIRATION RATE: 21 BRPM | HEART RATE: 70 BPM | TEMPERATURE: 97.3 F | SYSTOLIC BLOOD PRESSURE: 100 MMHG

## 2023-01-20 DIAGNOSIS — M35.01 SJOGREN'S SYNDROME WITH KERATOCONJUNCTIVITIS SICCA (HCC): ICD-10-CM

## 2023-01-20 DIAGNOSIS — E55.9 VITAMIN D DEFICIENCY: ICD-10-CM

## 2023-01-20 DIAGNOSIS — R25.2 MUSCLE CRAMPS: ICD-10-CM

## 2023-01-20 DIAGNOSIS — E66.01 MORBIDLY OBESE (HCC): ICD-10-CM

## 2023-01-20 DIAGNOSIS — J45.30 MILD PERSISTENT ASTHMA WITHOUT COMPLICATION: Primary | ICD-10-CM

## 2023-01-20 LAB
A/G RATIO: 1.2 (ref 1.1–2.2)
ALBUMIN SERPL-MCNC: 4.1 G/DL (ref 3.4–5)
ALP BLD-CCNC: 104 U/L (ref 40–129)
ALT SERPL-CCNC: 12 U/L (ref 10–40)
ANION GAP SERPL CALCULATED.3IONS-SCNC: 13 MMOL/L (ref 3–16)
AST SERPL-CCNC: 14 U/L (ref 15–37)
BILIRUB SERPL-MCNC: 0.3 MG/DL (ref 0–1)
BUN BLDV-MCNC: 20 MG/DL (ref 7–20)
C-REACTIVE PROTEIN: <3 MG/L (ref 0–5.1)
C4 COMPLEMENT: 35.8 MG/DL (ref 10–40)
CALCIUM SERPL-MCNC: 10.2 MG/DL (ref 8.3–10.6)
CHLORIDE BLD-SCNC: 108 MMOL/L (ref 99–110)
CO2: 22 MMOL/L (ref 21–32)
CREAT SERPL-MCNC: 1.2 MG/DL (ref 0.6–1.2)
GFR SERPL CREATININE-BSD FRML MDRD: 50 ML/MIN/{1.73_M2}
GLUCOSE BLD-MCNC: 85 MG/DL (ref 70–99)
MAGNESIUM: 1.7 MG/DL (ref 1.8–2.4)
PHOSPHORUS: 3.7 MG/DL (ref 2.5–4.9)
POTASSIUM SERPL-SCNC: 4.5 MMOL/L (ref 3.5–5.1)
RHEUMATOID FACTOR: <10 IU/ML
SEDIMENTATION RATE, ERYTHROCYTE: 72 MM/HR (ref 0–30)
SODIUM BLD-SCNC: 143 MMOL/L (ref 136–145)
TOTAL CK: 104 U/L (ref 26–192)
TOTAL PROTEIN: 7.4 G/DL (ref 6.4–8.2)
VITAMIN D 25-HYDROXY: 58.2 NG/ML

## 2023-01-20 PROCEDURE — G8484 FLU IMMUNIZE NO ADMIN: HCPCS | Performed by: INTERNAL MEDICINE

## 2023-01-20 PROCEDURE — 1090F PRES/ABSN URINE INCON ASSESS: CPT | Performed by: INTERNAL MEDICINE

## 2023-01-20 PROCEDURE — 99214 OFFICE O/P EST MOD 30 MIN: CPT | Performed by: INTERNAL MEDICINE

## 2023-01-20 PROCEDURE — G8417 CALC BMI ABV UP PARAM F/U: HCPCS | Performed by: INTERNAL MEDICINE

## 2023-01-20 PROCEDURE — G8427 DOCREV CUR MEDS BY ELIG CLIN: HCPCS | Performed by: INTERNAL MEDICINE

## 2023-01-20 PROCEDURE — 3078F DIAST BP <80 MM HG: CPT | Performed by: INTERNAL MEDICINE

## 2023-01-20 PROCEDURE — G8399 PT W/DXA RESULTS DOCUMENT: HCPCS | Performed by: INTERNAL MEDICINE

## 2023-01-20 PROCEDURE — 3017F COLORECTAL CA SCREEN DOC REV: CPT | Performed by: INTERNAL MEDICINE

## 2023-01-20 PROCEDURE — 3074F SYST BP LT 130 MM HG: CPT | Performed by: INTERNAL MEDICINE

## 2023-01-20 PROCEDURE — 1036F TOBACCO NON-USER: CPT | Performed by: INTERNAL MEDICINE

## 2023-01-20 PROCEDURE — 1123F ACP DISCUSS/DSCN MKR DOCD: CPT | Performed by: INTERNAL MEDICINE

## 2023-01-20 PROCEDURE — G9899 SCRN MAM PERF RSLTS DOC: HCPCS | Performed by: INTERNAL MEDICINE

## 2023-01-20 RX ORDER — PILOCARPINE HYDROCHLORIDE 7.5 MG/1
TABLET, FILM COATED ORAL
Qty: 270 TABLET | Refills: 1 | Status: SHIPPED | OUTPATIENT
Start: 2023-01-20

## 2023-01-20 RX ORDER — ALBUTEROL SULFATE 90 UG/1
2 AEROSOL, METERED RESPIRATORY (INHALATION) EVERY 6 HOURS PRN
Qty: 18 G | Refills: 11 | Status: SHIPPED | OUTPATIENT
Start: 2023-01-20

## 2023-01-20 ASSESSMENT — ASTHMA QUESTIONNAIRES
QUESTION_3 LAST FOUR WEEKS HOW OFTEN DID YOUR ASTHMA SYMPTOMS (WHEEZING, COUGHING, SHORTNESS OF BREATH, CHEST TIGHTNESS OR PAIN) WAKE YOU UP AT NIGHT OR EARLIER THAN USUAL IN THE MORNING: 2
ACT_TOTALSCORE: 13
QUESTION_2 LAST FOUR WEEKS HOW OFTEN HAVE YOU HAD SHORTNESS OF BREATH: 3
QUESTION_5 LAST FOUR WEEKS HOW WOULD YOU RATE YOUR ASTHMA CONTROL: 3
QUESTION_4 LAST FOUR WEEKS HOW OFTEN HAVE YOU USED YOUR RESCUE INHALER OR NEBULIZER MEDICATION (SUCH AS ALBUTEROL): 2
QUESTION_1 LAST FOUR WEEKS HOW MUCH OF THE TIME DID YOUR ASTHMA KEEP YOU FROM GETTING AS MUCH DONE AT WORK, SCHOOL OR AT HOME: 3

## 2023-01-20 NOTE — PROGRESS NOTES
REASON FOR CONSULTATION/CC:    Chief Complaint   Patient presents with    Follow-up    Asthma        Consult at request of   Hang Elizondo MD for  dyspnea     PCP: Hang Elizondo MD    HISTORY OF PRESENT ILLNESS: Bobo Beatty is a 74y.o. year old female with a history of   who presents        Pulmonary history  Originally seen in August 2022 secondary to dyspnea with multifactorial concern with leg cramps. Completed pulmonary function test and methacholine challenge. Pulmonary function test without obstruction or bronchodilator response with a normal diffusion capacity and lung capacity. Methacholine challenge demonstrated PC of 0.3 decreasing to 60% of normal at dose of 1.  2022 PFT FEV1 95%     Treatment history  Flovent 220 -August 2022, educated on proper use. Had suboptimal technique  Samples of Breztri and Trelegy 200 -October 2022. Trelegy not helpful. CV   2022. Echo diastolic dysfunction grade PASP 25.    2022. Normal SPECT stress and rest myocardial perfusion scan with no evidence of exercise    stress-induced reversible myocardial ischemia                 Current History     Asthma  Patient given diagnosis of asthma that secondary to shortness of breath and positive methacholine challenge. Changed to [facility] and using 2 puff bid. She is not clear this is effective. She states she needs albuterol as well. Out.           allergic rhinitis   Using Flonase  Controlled     Obesity  Uncontrolled. Objective:   PHYSICAL EXAM:  Blood pressure 100/60, pulse 70, temperature 97.3 °F (36.3 °C), resp. rate 21, height 5' 7\" (1.702 m), weight 240 lb (108.9 kg), SpO2 98 %, not currently breastfeeding.'  Gen: No distress. Eyes: PERRL. No sclera icterus. No conjunctival injection. ENT: No discharge. Pharynx clear. External appearance of ears and nose normal.  Neck: Trachea midline. No obvious mass. Resp: No accessory muscle use. No crackles. No wheezes. No rhonchi.     CV: Regular rate. Regular rhythm. No murmur or rub. No edema. GI:    Skin: Warm, dry, normal texture and turgor. No nodule on exposed extremities. Lymph: No cervical LAD. No supraclavicular LAD. M/S: No cyanosis. No clubbing. No joint deformity. Neuro: Moves all four extremities. Psych: Oriented x 3. No anxiety. Awake. Alert. Intact judgement and insight. Data Reviewed:           Assessment:        Asthma, normal pulmonary function test with a positive methacholine challenge PC 0.3  Sjogren's   Sleep apnea per chart  Chest pain   Grade 1 diastolic dysfunction, mild left atrium dilatation normal stress October 2022    Plan:      Problem List Items Addressed This Visit       Morbidly obese ([address]     Uncontrolled. Mild persistent asthma without complication - Primary      dyspnea continues to be out of proportion to finding on pft. Continue Breztri and albuterol as needed. No sign of CV disease. PASP low on echo. Relevant Medications    albuterol sulfate HFA (PROAIR HFA) 108 (90 Base) MCG/ACT inhaler                 This note was transcribed using [id number] [address] [employer]. Please disregard any translational errors.     D[facility] [phone]

## 2023-01-20 NOTE — ASSESSMENT & PLAN NOTE
dyspnea continues to be out of proportion to finding on pft. Continue Breztri and albuterol as needed. No sign of CV disease. PASP low on echo.

## 2023-01-20 NOTE — LETTER
[facility] Pulmonology Sleep and Critical Care  [facility] [address] [facility] [address] [facility]  Phone: [phone]  Fax: [phone]    Cori Haji MD         January 20, 2023     Patient: Jeffrey Mitchell   YOB: 1955   Date of Visit: 1/20/2023       To Whom It May Concern: It is my medical opinion that Jeffrey Mitchell requires a disability parking placard for the following reasons:  She cannot walk 200 feet without stopping to rest.  Duration of need: 5 years    If you have any questions or concerns, please don't hesitate to call.     Sincerely,        Cori Haji MD

## 2023-01-20 NOTE — Clinical Note
[facility] Pulmonology Sleep and Critical Care  [facility] [address] [facility] [facility]  Phone: [phone]  Fax: [phone]    Herman Austin MD        January 20, 2023     Patient: Imelda Zhang   YOB: 1955   Date of Visit: 1/20/2023       To Whom It May Concern: It is my medical opinion that Imelda Zhang {Work release (duty restriction):82921}. If you have any questions or concerns, please don't hesitate to call.     Sincerely,        Herman Austin MD

## 2023-01-23 ENCOUNTER — HOSPITAL ENCOUNTER (OUTPATIENT)
Age: 68
Discharge: HOME | End: 2023-01-23
Payer: MEDICARE

## 2023-01-23 DIAGNOSIS — I10 ESSENTIAL HYPERTENSION: ICD-10-CM

## 2023-01-23 DIAGNOSIS — N18.31 STAGE 3A CHRONIC KIDNEY DISEASE (HCC): ICD-10-CM

## 2023-01-23 LAB
ALBUMIN SERPL-MCNC: 3.5 G/DL (ref 3.1–4.9)
ALPHA-1-GLOBULIN: 0.3 G/DL (ref 0.2–0.4)
ALPHA-2-GLOBULIN: 0.9 G/DL (ref 0.4–1.1)
ANION GAP SERPL CALCULATED.3IONS-SCNC: 12 MMOL/L (ref 3–16)
BETA GLOBULIN: 1.1 G/DL (ref 0.9–1.6)
BUN BLDV-MCNC: 11 MG/DL (ref 7–20)
CALCIUM SERPL-MCNC: 10 MG/DL (ref 8.3–10.6)
CHLORIDE BLD-SCNC: 109 MMOL/L (ref 99–110)
CO2: 22 MMOL/L (ref 21–32)
CREAT SERPL-MCNC: 1 MG/DL (ref 0.6–1.2)
GAMMA GLOBULIN: 1.6 G/DL (ref 0.6–1.8)
GFR SERPL CREATININE-BSD FRML MDRD: >60 ML/MIN/{1.73_M2}
GLUCOSE BLD-MCNC: 83 MG/DL (ref 70–99)
POTASSIUM SERPL-SCNC: 4 MMOL/L (ref 3.5–5.1)
SODIUM BLD-SCNC: 143 MMOL/L (ref 136–145)
SPE/IFE INTERPRETATION: NORMAL

## 2023-01-23 PROCEDURE — 36415 COLL VENOUS BLD VENIPUNCTURE: CPT

## 2023-01-23 PROCEDURE — 80048 BASIC METABOLIC PNL TOTAL CA: CPT

## 2023-01-27 RX ORDER — LANOLIN ALCOHOL/MO/W.PET/CERES
400 CREAM (GRAM) TOPICAL DAILY
Qty: 30 TABLET | Refills: 5 | Status: SHIPPED | OUTPATIENT
Start: 2023-01-27

## 2023-01-30 DIAGNOSIS — I10 ESSENTIAL HYPERTENSION: ICD-10-CM

## 2023-01-30 RX ORDER — NIFEDIPINE 90 MG/1
TABLET, EXTENDED RELEASE ORAL
Qty: 90 TABLET | Refills: 1 | Status: SHIPPED | OUTPATIENT
Start: 2023-01-30

## 2023-01-30 RX ORDER — NIFEDIPINE 60 MG/1
60 TABLET, EXTENDED RELEASE ORAL DAILY
Qty: 90 TABLET | Refills: 3 | Status: SHIPPED | OUTPATIENT
Start: 2023-01-30

## 2023-01-30 NOTE — TELEPHONE ENCOUNTER
Medication:   Requested Prescriptions     Pending Prescriptions Disp Refills    NIFEdipine (PROCARDIA XL) 60 MG extended release tablet [Pharmacy Med Name: NIFEDIPINE 60MG ER (XL/OS) TABLETS] 90 tablet 3     Sig: TAKE 1 TABLET BY MOUTH DAILY    NIFEdipine (PROCARDIA XL) 90 MG extended release tablet [Pharmacy Med Name: NIFEDIPINE 90MG ER (XL/OSM) TABLET] 90 tablet 1     Sig: TAKE 1 TABLET BY MOUTH IN THE MORNING        Last Filled:      Patient Phone Number: [phone] (home) [phone] (work)    Last appt: 1/12/2023   Next appt: 2/14/2023    Last OARRS:   RX Monitoring 1/9/2018   Attestation The Prescription Monitoring Report for this patient was reviewed today.

## 2023-02-20 ENCOUNTER — TELEPHONE (OUTPATIENT)
Dept: PRIMARY CARE CLINIC | Age: 68
End: 2023-02-20

## 2023-02-20 DIAGNOSIS — G47.09 OTHER INSOMNIA: ICD-10-CM

## 2023-02-20 RX ORDER — ZOLPIDEM TARTRATE 5 MG/1
5 TABLET ORAL NIGHTLY PRN
Qty: 30 TABLET | Refills: 3 | Status: SHIPPED | OUTPATIENT
Start: 2023-02-20 | End: 2023-03-22

## 2023-02-21 ENCOUNTER — NURSE ONLY (OUTPATIENT)
Dept: PRIMARY CARE CLINIC | Age: 68
End: 2023-02-21
Payer: MEDICARE

## 2023-02-21 DIAGNOSIS — E53.8 VITAMIN B12 DEFICIENCY: Primary | ICD-10-CM

## 2023-02-21 PROCEDURE — 96372 THER/PROPH/DIAG INJ SC/IM: CPT | Performed by: INTERNAL MEDICINE

## 2023-02-21 RX ORDER — CYANOCOBALAMIN 1000 UG/ML
1000 INJECTION, SOLUTION INTRAMUSCULAR; SUBCUTANEOUS ONCE
Status: COMPLETED | OUTPATIENT
Start: 2023-02-21 | End: 2023-02-21

## 2023-02-21 RX ADMIN — CYANOCOBALAMIN 1000 MCG: 1000 INJECTION, SOLUTION INTRAMUSCULAR; SUBCUTANEOUS at 11:09

## 2023-02-28 ENCOUNTER — OFFICE VISIT (OUTPATIENT)
Dept: PRIMARY CARE CLINIC | Age: 68
End: 2023-02-28
Payer: MEDICARE

## 2023-02-28 VITALS
BODY MASS INDEX: 38.01 KG/M2 | SYSTOLIC BLOOD PRESSURE: 100 MMHG | HEIGHT: 67 IN | HEART RATE: 53 BPM | TEMPERATURE: 97 F | OXYGEN SATURATION: 100 % | DIASTOLIC BLOOD PRESSURE: 60 MMHG | WEIGHT: 242.2 LBS

## 2023-02-28 DIAGNOSIS — Z23 HIGH PRIORITY FOR COVID-19 VACCINATION: Primary | ICD-10-CM

## 2023-02-28 DIAGNOSIS — D50.8 OTHER IRON DEFICIENCY ANEMIA: ICD-10-CM

## 2023-02-28 DIAGNOSIS — H61.23 BILATERAL IMPACTED CERUMEN: ICD-10-CM

## 2023-02-28 DIAGNOSIS — I10 ESSENTIAL HYPERTENSION: ICD-10-CM

## 2023-02-28 PROCEDURE — G9899 SCRN MAM PERF RSLTS DOC: HCPCS | Performed by: INTERNAL MEDICINE

## 2023-02-28 PROCEDURE — G8427 DOCREV CUR MEDS BY ELIG CLIN: HCPCS | Performed by: INTERNAL MEDICINE

## 2023-02-28 PROCEDURE — 3017F COLORECTAL CA SCREEN DOC REV: CPT | Performed by: INTERNAL MEDICINE

## 2023-02-28 PROCEDURE — 3078F DIAST BP <80 MM HG: CPT | Performed by: INTERNAL MEDICINE

## 2023-02-28 PROCEDURE — 1036F TOBACCO NON-USER: CPT | Performed by: INTERNAL MEDICINE

## 2023-02-28 PROCEDURE — G8399 PT W/DXA RESULTS DOCUMENT: HCPCS | Performed by: INTERNAL MEDICINE

## 2023-02-28 PROCEDURE — G8484 FLU IMMUNIZE NO ADMIN: HCPCS | Performed by: INTERNAL MEDICINE

## 2023-02-28 PROCEDURE — 1123F ACP DISCUSS/DSCN MKR DOCD: CPT | Performed by: INTERNAL MEDICINE

## 2023-02-28 PROCEDURE — 1090F PRES/ABSN URINE INCON ASSESS: CPT | Performed by: INTERNAL MEDICINE

## 2023-02-28 PROCEDURE — 99214 OFFICE O/P EST MOD 30 MIN: CPT | Performed by: INTERNAL MEDICINE

## 2023-02-28 PROCEDURE — 3074F SYST BP LT 130 MM HG: CPT | Performed by: INTERNAL MEDICINE

## 2023-02-28 PROCEDURE — G8417 CALC BMI ABV UP PARAM F/U: HCPCS | Performed by: INTERNAL MEDICINE

## 2023-02-28 RX ORDER — NIFEDIPINE 60 MG/1
60 TABLET, EXTENDED RELEASE ORAL DAILY
Qty: 90 TABLET | Refills: 3 | Status: SHIPPED | OUTPATIENT
Start: 2023-02-28

## 2023-02-28 RX ORDER — LABETALOL 300 MG/1
300 TABLET, FILM COATED ORAL 2 TIMES DAILY
Qty: 180 TABLET | Refills: 3 | Status: SHIPPED | OUTPATIENT
Start: 2023-02-28

## 2023-02-28 RX ORDER — FERROUS SULFATE 325(65) MG
325 TABLET ORAL
Qty: 90 TABLET | Refills: 1 | Status: SHIPPED | OUTPATIENT
Start: 2023-02-28

## 2023-02-28 SDOH — ECONOMIC STABILITY: INCOME INSECURITY: HOW HARD IS IT FOR YOU TO PAY FOR THE VERY BASICS LIKE FOOD, HOUSING, MEDICAL CARE, AND HEATING?: PATIENT DECLINED

## 2023-02-28 SDOH — ECONOMIC STABILITY: FOOD INSECURITY: WITHIN THE PAST 12 MONTHS, YOU WORRIED THAT YOUR FOOD WOULD RUN OUT BEFORE YOU GOT MONEY TO BUY MORE.: NEVER TRUE

## 2023-02-28 SDOH — ECONOMIC STABILITY: HOUSING INSECURITY
IN THE LAST 12 MONTHS, WAS THERE A TIME WHEN YOU DID NOT HAVE A STEADY PLACE TO SLEEP OR SLEPT IN A SHELTER (INCLUDING NOW)?: NO

## 2023-02-28 SDOH — ECONOMIC STABILITY: FOOD INSECURITY: WITHIN THE PAST 12 MONTHS, THE FOOD YOU BOUGHT JUST DIDN'T LAST AND YOU DIDN'T HAVE MONEY TO GET MORE.: NEVER TRUE

## 2023-02-28 ASSESSMENT — ENCOUNTER SYMPTOMS
CHEST TIGHTNESS: 0
CONSTIPATION: 0
CHOKING: 0
ABDOMINAL DISTENTION: 0
VOICE CHANGE: 0
BLOOD IN STOOL: 0
FACIAL SWELLING: 0
DIARRHEA: 0

## 2023-02-28 NOTE — PROGRESS NOTES
Subjective:      Patient ID: Gracy Baugh is a 74 y.o. female. 2/28/2023 Patient presents with:  Otalgia: Right ear pain for 1 week. No sre throat . No fever   Hypertension  taking all meds . Get light headed sometimes   Other feel cold ! ! Last seen  1/12/2023 Patient presents with:  Hypertension: 1 week f/u              1/4/2023 Patient presents with:  Discuss Labs: Discuss labs from 12/13/2022  Abdominal Pain: Since last week, hurts every time eating  Fatigue  Cold Extremity:                   VV 12/9/2022 Patient presents with: Follow-up                  8/3/2022 Patient presents with:  Medicare AWV  Hypertension  Hyperlipidemia               4/28/2022 Patient presents with:  Hypertension: 2 week follow up              4/15/2022 Patient presents with:  Hypertension  Headache  Insomnia: pt states she is allergic to Ambien and cant take                VV 1/22 11/16/2021 Patient presents with:  Hypertension  Cough: 3 wks     Has had extensive w/u with ENT including Barium swallow Study and CT soft tissue neck               8/31/2021 Patient presents with:  Blood Pressure Check                  8/12/2021 Patient presents with:  Blood Pressure Check  Headache  Sinus Problem                  8/3/2021 Patient presents with:  Hypertension: pt BP has been elevated causing excessive sleepiness                6/3/2021 Patient presents with: Follow-Up from Hospital: [facility] 5/25-5/27/21 abdominal pain        Previous history of gastric bypass, hiatal & ventral hernia repair cholecystectomy    CT abdomen and pelvis showed pneumobilia and bilateral renal cyst without hydronephrosis  LFTs, amylase & lipase within normal limits. She  underwent EGD on 5/25/2021 which was normal    Small bowel follow-through showed showed intermittent mild to moderate increased tertiary contraction in the esophagus with delayed esophageal emptying otherwise unremarkable.   Patient was recommended to continue Pepcid ( allergic to Protonix) follow-up with gastroenterology     Pneumobilia noted on imaging likely 2/2 prior ERCP  Afebrile, WBCs and LFTs WNL. Ultrasound negative                2021   Medicare AW                11/10/2020 Patient presents with:  Abdominal Pain . Never got labs as ordered last visit ? ??    States she did See Dr Jocelyn Weaver who has told her there was nothing he could do ? ??                  . S/P Surgery dissectomy 20 at [facility]       Was told she has Stage 3 Renal failiure ? Labs last checked here  show Nl renal function                  18 total thyroidectomy           Past Medical History:   Diagnosis Date    Anxiety state     Asthma     Back ache     chronic    Environmental allergies     GERD (gastroesophageal reflux disease)     Hypertension     MVP (mitral valve prolapse)     Neck pain, chronic     Reflux     Sleep apnea        Review of Systems   Constitutional:  Positive for chills (cold hands! ! ). Negative for activity change, appetite change, diaphoresis and unexpected weight change. Flu vac     Tdap      Pneumonia vac  ;       Covid Vac   #3    Shingrex    HENT:  Negative for facial swelling, postnasal drip and voice change. Dentures fit well    Eyes:  Negative for visual disturbance. Eye exam    Respiratory:  Negative for choking and chest tightness. Sleep Study   ; report ? Does not smoke ; No Etoh   H/o Asthma    Cardiovascular:         HTN > 5 yrs on meds . No known CAD . Father  of MI in his 46s   Sister with CAD ? MVP  H/o palpitations   Gastrointestinal:  Negative for abdominal distention, blood in stool, constipation and diarrhea. Repeat Upper endoscopy   Nl    Upper Endoscopy    H/O Ulers ? Cannot take Nsaids! Post gastric bypass       GERD    Repeat Colonscopy   ; Polyps; O Marilee    Endocrine: Negative for cold intolerance and heat intolerance.         Followed by  Anthony Levy  for thyroid   Genitourinary:         Mammogram  2/22    Pap 0n 9/127/18    Musculoskeletal:         DEXA  8/22 osteopenia       Sees Ortho neck / [facility]   1/21       S/P Surgery  DDD 8/26/20      DJD . Skin: Negative. Neurological:  Positive for light-headedness. Psychiatric/Behavioral:  Negative for self-injury, sleep disturbance and suicidal ideas. Anxiety: followed by counselor     Objective:   Physical Exam  Constitutional:       Appearance: She is obese. HENT:      Head:      Comments: Bilat ear wax ! Neck:      Comments:     Cardiovascular:      Rate and Rhythm: Normal rate and regular rhythm. Pulmonary:      Effort: Pulmonary effort is normal.      Breath sounds: Normal breath sounds. Abdominal:      General: There is distension. Tenderness: There is no guarding. Musculoskeletal:         General: Normal range of motion. Skin:     General: Skin is warm. Neurological:      Mental Status: She is oriented to person, place, and time. Psychiatric:         Mood and Affect: Mood normal.         Behavior: Behavior normal.       Assessment:     [place] was seen today for otalgia, hypertension and other. Diagnoses and all orders for this visit:    High priority for COVID-19 vaccination  needs [place] vac    Essential hypertension  Low Nl . Ok to stop losartan 50  -     NIFEdipine (PROCARDIA XL) 60 MG extended release tablet; Take 1 tablet by mouth daily  -     labetalol (NORMODYNE) 300 MG tablet; Take 1 tablet by mouth 2 times daily    Bilateral impacted cerumen  -     carbamide peroxide (DEBROX) 6.5 % otic solution; Place 5 drops into both ears 3 times daily for 7 days    Other iron deficiency anemia    -     ferrous sulfate (IRON 325) 325 (65 Fe) MG tablet; Take 1 tablet by mouth daily (with breakfast)          Epigastric pain    Nl CBC / Lipase .  [facility] 1/16/23      Vitamin B12 deficiency  -     cyanocobalamin injection 1,000 mcg                High priority for COVID-19 vaccination  advised to get New B[place] vac at pharmacy         Need for shingles vaccine  -     zoster recombinant adjuvanted vaccine [facility]) 50 MCG/0.5ML SUSR injection; Inject 0.5 mLs into the muscle once for 1 dose        Vitamin B 12 deficiency  low Nl ; on monthly shots ( h/o gastric bypass )         Mixed hyperlipidemia     ; was > 200  . Restarted  Lipitor   check again   -         Acquired hypothyroidism  C/O  Endocrine / [facility]   .  On synthroid 0.25 ???  -     -      I    Plan:

## 2023-03-13 DIAGNOSIS — J30.9 ALLERGIC SINUSITIS: ICD-10-CM

## 2023-03-13 RX ORDER — FLUTICASONE PROPIONATE 50 MCG
SPRAY, SUSPENSION (ML) NASAL
Qty: 48 G | Refills: 3 | Status: SHIPPED | OUTPATIENT
Start: 2023-03-13

## 2023-03-13 NOTE — TELEPHONE ENCOUNTER
Medication:   Requested Prescriptions     Pending Prescriptions Disp Refills    fluticasone (FLONASE) 50 MCG/ACT nasal spray [Pharmacy Med Name: FLUTICASONE 50MCG NASAL SP (120) RX] 48 g 3     Sig: SHAKE LIQUID AND USE 2 SPRAYS IN EACH NOSTRIL DAILY        Last Filled:      Patient Phone Number: [phone] (home) [phone] (work)    Last appt: 2/28/2023   Next appt: 3/14/2023    Last OARRS:   RX Monitoring 1/9/2018   Attestation The Prescription Monitoring Report for this patient was reviewed today.

## 2023-03-17 ENCOUNTER — NURSE ONLY (OUTPATIENT)
Dept: PRIMARY CARE CLINIC | Age: 68
End: 2023-03-17

## 2023-03-17 DIAGNOSIS — E53.8 VITAMIN B12 DEFICIENCY: Primary | ICD-10-CM

## 2023-03-17 RX ORDER — CYANOCOBALAMIN 1000 UG/ML
1000 INJECTION, SOLUTION INTRAMUSCULAR; SUBCUTANEOUS ONCE
Status: COMPLETED | OUTPATIENT
Start: 2023-03-17 | End: 2023-03-17

## 2023-03-17 RX ADMIN — CYANOCOBALAMIN 1000 MCG: 1000 INJECTION, SOLUTION INTRAMUSCULAR; SUBCUTANEOUS at 16:47

## 2023-03-17 NOTE — PROGRESS NOTES
After obtaining consent, and per orders of Dr. Ana Springer, injection of vit b12 given in Right arm by Kenney Epps MA. Patient instructed to remain in clinic for 20 minutes afterwards, and to report any adverse reaction to me immediately.

## 2023-03-27 ENCOUNTER — OFFICE VISIT (OUTPATIENT)
Dept: ENT CLINIC | Age: 68
End: 2023-03-27
Payer: MEDICARE

## 2023-03-27 VITALS
WEIGHT: 235 LBS | DIASTOLIC BLOOD PRESSURE: 76 MMHG | BODY MASS INDEX: 36.88 KG/M2 | HEART RATE: 62 BPM | HEIGHT: 67 IN | TEMPERATURE: 98.6 F | OXYGEN SATURATION: 99 % | SYSTOLIC BLOOD PRESSURE: 122 MMHG

## 2023-03-27 DIAGNOSIS — J01.90 ACUTE RHINOSINUSITIS: Primary | ICD-10-CM

## 2023-03-27 DIAGNOSIS — J30.1 NON-SEASONAL ALLERGIC RHINITIS DUE TO POLLEN: ICD-10-CM

## 2023-03-27 DIAGNOSIS — H61.23 BILATERAL IMPACTED CERUMEN: ICD-10-CM

## 2023-03-27 DIAGNOSIS — H90.0 CONDUCTIVE HEARING LOSS OF BOTH EARS: ICD-10-CM

## 2023-03-27 PROCEDURE — G8399 PT W/DXA RESULTS DOCUMENT: HCPCS | Performed by: OTOLARYNGOLOGY

## 2023-03-27 PROCEDURE — 3078F DIAST BP <80 MM HG: CPT | Performed by: OTOLARYNGOLOGY

## 2023-03-27 PROCEDURE — G8484 FLU IMMUNIZE NO ADMIN: HCPCS | Performed by: OTOLARYNGOLOGY

## 2023-03-27 PROCEDURE — 3074F SYST BP LT 130 MM HG: CPT | Performed by: OTOLARYNGOLOGY

## 2023-03-27 PROCEDURE — 1090F PRES/ABSN URINE INCON ASSESS: CPT | Performed by: OTOLARYNGOLOGY

## 2023-03-27 PROCEDURE — 1123F ACP DISCUSS/DSCN MKR DOCD: CPT | Performed by: OTOLARYNGOLOGY

## 2023-03-27 PROCEDURE — 99213 OFFICE O/P EST LOW 20 MIN: CPT | Performed by: OTOLARYNGOLOGY

## 2023-03-27 PROCEDURE — 1036F TOBACCO NON-USER: CPT | Performed by: OTOLARYNGOLOGY

## 2023-03-27 PROCEDURE — G8427 DOCREV CUR MEDS BY ELIG CLIN: HCPCS | Performed by: OTOLARYNGOLOGY

## 2023-03-27 PROCEDURE — 3017F COLORECTAL CA SCREEN DOC REV: CPT | Performed by: OTOLARYNGOLOGY

## 2023-03-27 PROCEDURE — 69210 REMOVE IMPACTED EAR WAX UNI: CPT | Performed by: OTOLARYNGOLOGY

## 2023-03-27 PROCEDURE — G8417 CALC BMI ABV UP PARAM F/U: HCPCS | Performed by: OTOLARYNGOLOGY

## 2023-03-27 PROCEDURE — G9899 SCRN MAM PERF RSLTS DOC: HCPCS | Performed by: OTOLARYNGOLOGY

## 2023-03-27 RX ORDER — FEXOFENADINE HCL 180 MG/1
180 TABLET ORAL DAILY
Qty: 30 TABLET | Refills: 1 | Status: SHIPPED | OUTPATIENT
Start: 2023-03-27

## 2023-03-27 RX ORDER — AZITHROMYCIN 250 MG/1
TABLET, FILM COATED ORAL
Qty: 2 PACKET | Refills: 0 | Status: SHIPPED | OUTPATIENT
Start: 2023-03-27

## 2023-03-27 ASSESSMENT — ENCOUNTER SYMPTOMS
RHINORRHEA: 1
SORE THROAT: 0
SINUS PAIN: 1
SINUS PRESSURE: 1

## 2023-03-27 NOTE — PATIENT INSTRUCTIONS
effects, and complications of the following medications, including, but not limited to: allergic reaction, interactions with other medications, nausea, headache, diarrhea, persistent symptoms, failure to improve, and the following:     Zithromax (antibiotic):  diarrhea, colitis (severe infection and inflammation of the large intestine), pseudomembranous colitis (severe infection and inflammation of the colon, usually due to C. difficile bacteria)  yeast or fungal  infections, Mejia-Raphael syndrome (very rare necrotic skin reaction with peeling of skin, blisters and arthritis), persistent symptoms/infection, and failure to improve.      Fluticasone:  nosebleed, burning sensation, atrophy of nasal mucosa, septal ulceration or perforation, nasal irritation, nasal yeast infection,  drowsiness, bad taste.        ~~~>>> Also please read the medication information in this AVS and all information given to you by the pharmacist.

## 2023-03-27 NOTE — PROGRESS NOTES
[facility]             PCP: Dwayne Avila MD      CHIEF COMPLAINT  Chief Complaint   Patient presents with    Sinusitis        HISTORY OF PRESENT ILLNESS    Jeffery Solano is a 74 y.o. female who presented today for evaluation and management for sinusitis. She stated that she has been experiencing headache, pain and pressure between her eyes at the glabella area for two weeks. She has been coughing, and \"will get dizzy when I blow my nose, when there is pressure between my eyebrows. \"  Ears seem to be stopped up and hearing is decreased. She has been using Flonase for allergies and sinus. REVIEW OF SYSTEMS   Review of Systems   Constitutional:  Negative for chills and fever. HENT:  Positive for congestion, hearing loss, postnasal drip, rhinorrhea, sinus pressure, sinus pain (forehead, aroudn eyes, and cheeks) and tinnitus. Negative for ear discharge, ear pain and sore throat.           PAST MEDICAL HISTORY    Past Medical History:   Diagnosis Date    Anxiety state     Asthma     Back ache     chronic    Environmental allergies     GERD (gastroesophageal reflux disease)     Hypertension     MVP (mitral valve prolapse)     Neck pain, chronic     Reflux     Sleep apnea        Past Surgical History:   Procedure Laterality Date    BACK SURGERY      BLADDER SUSPENSION      CHOLECYSTECTOMY      FOOT SURGERY  11-9-12    endoscopic plantar fasciotomy right foot    GASTRIC BYPASS SURGERY      HERNIA REPAIR      HIATAL HERNIA REPAIR      HYSTERECTOMY (CERVIX STATUS UNKNOWN)      JOINT REPLACEMENT Bilateral     KNEE    KNEE ARTHROPLASTY  2/28/13    L    KNEE ARTHROSCOPY Right 3/7/2014    LAMINECTOMY  7/1/11    bilateral L5-S1 laminotomy, nerve root exploration, foraminotomy    NASAL SINUS SURGERY Right 02/05/2018    Excision of rt nasal mass    KS COLONOSCOPY FLX DX W/COLLJ SPEC WHEN PFRMD N/A 9/28/2018    COLONOSCOPY DIAGNOSTIC OR

## 2023-03-30 RX ORDER — FAMOTIDINE 40 MG/1
40 TABLET, FILM COATED ORAL EVERY EVENING
Qty: 90 TABLET | Refills: 0 | Status: SHIPPED | OUTPATIENT
Start: 2023-03-30

## 2023-03-30 NOTE — TELEPHONE ENCOUNTER
Medication:   Requested Prescriptions     Pending Prescriptions Disp Refills    famotidine (PEPCID) 40 MG tablet [Pharmacy Med Name: FAMOTIDINE 40MG TABLETS] 90 tablet      Sig: TAKE 1 TABLET BY MOUTH EVERY EVENING        Last Filled:      Patient Phone Number: [phone] (home) [phone] (work)    Last appt: 2/28/2023   Next appt: Visit date not found    Last OARRS:   RX Monitoring 1/9/2018   Attestation The Prescription Monitoring Report for this patient was reviewed today.

## 2023-04-30 ENCOUNTER — APPOINTMENT (OUTPATIENT)
Dept: CT IMAGING | Age: 68
End: 2023-04-30
Payer: MEDICARE

## 2023-04-30 ENCOUNTER — HOSPITAL ENCOUNTER (EMERGENCY)
Age: 68
Discharge: HOME | End: 2023-04-30
Attending: EMERGENCY MEDICINE
Payer: MEDICARE

## 2023-04-30 VITALS
BODY MASS INDEX: 37.83 KG/M2 | WEIGHT: 241 LBS | RESPIRATION RATE: 18 BRPM | HEART RATE: 84 BPM | DIASTOLIC BLOOD PRESSURE: 80 MMHG | SYSTOLIC BLOOD PRESSURE: 126 MMHG | TEMPERATURE: 97.7 F | OXYGEN SATURATION: 98 % | HEIGHT: 67 IN

## 2023-04-30 DIAGNOSIS — R10.13 ABDOMINAL PAIN, EPIGASTRIC: Primary | ICD-10-CM

## 2023-04-30 DIAGNOSIS — N28.1 BILATERAL RENAL CYSTS: ICD-10-CM

## 2023-04-30 DIAGNOSIS — R11.2 NAUSEA AND VOMITING, UNSPECIFIED VOMITING TYPE: ICD-10-CM

## 2023-04-30 DIAGNOSIS — R91.1 LUNG NODULE: ICD-10-CM

## 2023-04-30 DIAGNOSIS — Q78.2 BONY SCLEROSIS: ICD-10-CM

## 2023-04-30 LAB
ALBUMIN SERPL-MCNC: 4.2 G/DL (ref 3.4–5)
ALBUMIN/GLOB SERPL: 1.1 {RATIO} (ref 1.1–2.2)
ALP SERPL-CCNC: 122 U/L (ref 40–129)
ALT SERPL-CCNC: 16 U/L (ref 10–40)
ANION GAP SERPL CALCULATED.3IONS-SCNC: 11 MMOL/L (ref 3–16)
AST SERPL-CCNC: 21 U/L (ref 15–37)
BASOPHILS # BLD: 0 K/UL (ref 0–0.2)
BASOPHILS NFR BLD: 0.6 %
BILIRUB SERPL-MCNC: 0.3 MG/DL (ref 0–1)
BUN SERPL-MCNC: 15 MG/DL (ref 7–20)
CALCIUM SERPL-MCNC: 10.6 MG/DL (ref 8.3–10.6)
CHLORIDE SERPL-SCNC: 108 MMOL/L (ref 99–110)
CO2 SERPL-SCNC: 23 MMOL/L (ref 21–32)
CREAT SERPL-MCNC: 1.3 MG/DL (ref 0.6–1.2)
DEPRECATED RDW RBC AUTO: 13.9 % (ref 12.4–15.4)
EOSINOPHIL # BLD: 0.3 K/UL (ref 0–0.6)
EOSINOPHIL NFR BLD: 6.6 %
GFR SERPLBLD CREATININE-BSD FMLA CKD-EPI: 45 ML/MIN/{1.73_M2}
GLUCOSE SERPL-MCNC: 94 MG/DL (ref 70–99)
HCT VFR BLD AUTO: 36.7 % (ref 36–48)
HGB BLD-MCNC: 11.4 G/DL (ref 12–16)
LIPASE SERPL-CCNC: 15 U/L (ref 13–60)
LYMPHOCYTES # BLD: 1.4 K/UL (ref 1–5.1)
LYMPHOCYTES NFR BLD: 28.1 %
MCH RBC QN AUTO: 27.5 PG (ref 26–34)
MCHC RBC AUTO-ENTMCNC: 31.1 G/DL (ref 31–36)
MCV RBC AUTO: 88.6 FL (ref 80–100)
MONOCYTES # BLD: 0.4 K/UL (ref 0–1.3)
MONOCYTES NFR BLD: 7.2 %
NEUTROPHILS # BLD: 2.9 K/UL (ref 1.7–7.7)
NEUTROPHILS NFR BLD: 57.5 %
PLATELET # BLD AUTO: 291 K/UL (ref 135–450)
PMV BLD AUTO: 8 FL (ref 5–10.5)
POTASSIUM SERPL-SCNC: 4.1 MMOL/L (ref 3.5–5.1)
PROT SERPL-MCNC: 8.1 G/DL (ref 6.4–8.2)
RBC # BLD AUTO: 4.14 M/UL (ref 4–5.2)
SODIUM SERPL-SCNC: 142 MMOL/L (ref 136–145)
WBC # BLD AUTO: 5.1 K/UL (ref 4–11)

## 2023-04-30 PROCEDURE — 85025 COMPLETE CBC W/AUTO DIFF WBC: CPT

## 2023-04-30 PROCEDURE — 6370000000 HC RX 637 (ALT 250 FOR IP): Performed by: PHYSICIAN ASSISTANT

## 2023-04-30 PROCEDURE — 36415 COLL VENOUS BLD VENIPUNCTURE: CPT

## 2023-04-30 PROCEDURE — 99284 EMERGENCY DEPT VISIT MOD MDM: CPT

## 2023-04-30 PROCEDURE — 83690 ASSAY OF LIPASE: CPT

## 2023-04-30 PROCEDURE — 80053 COMPREHEN METABOLIC PANEL: CPT

## 2023-04-30 PROCEDURE — 74176 CT ABD & PELVIS W/O CONTRAST: CPT

## 2023-04-30 RX ORDER — DICYCLOMINE HCL 20 MG
20 TABLET ORAL EVERY 8 HOURS PRN
Qty: 30 TABLET | Refills: 0 | Status: SHIPPED | OUTPATIENT
Start: 2023-04-30 | End: 2023-05-30

## 2023-04-30 RX ORDER — SUCRALFATE 1 G/1
1 TABLET ORAL EVERY 6 HOURS PRN
Qty: 60 TABLET | Refills: 3 | Status: SHIPPED | OUTPATIENT
Start: 2023-04-30 | End: 2023-05-30

## 2023-04-30 RX ORDER — ONDANSETRON 4 MG/1
4 TABLET, ORALLY DISINTEGRATING ORAL 3 TIMES DAILY PRN
Qty: 21 TABLET | Refills: 0 | Status: SHIPPED | OUTPATIENT
Start: 2023-04-30

## 2023-04-30 RX ORDER — ONDANSETRON 4 MG/1
8 TABLET, ORALLY DISINTEGRATING ORAL ONCE
Status: COMPLETED | OUTPATIENT
Start: 2023-04-30 | End: 2023-04-30

## 2023-04-30 RX ADMIN — ONDANSETRON 8 MG: 4 TABLET, ORALLY DISINTEGRATING ORAL at 18:38

## 2023-04-30 RX ADMIN — ALUMINUM HYDROXIDE, MAGNESIUM HYDROXIDE, AND SIMETHICONE: 200; 200; 20 SUSPENSION ORAL at 18:38

## 2023-04-30 ASSESSMENT — ENCOUNTER SYMPTOMS
SHORTNESS OF BREATH: 0
ABDOMINAL PAIN: 1
NAUSEA: 1
BACK PAIN: 0
DIARRHEA: 0
CONSTIPATION: 0
COLOR CHANGE: 0
ABDOMINAL DISTENTION: 0
VOMITING: 1

## 2023-04-30 ASSESSMENT — LIFESTYLE VARIABLES
HOW OFTEN DO YOU HAVE A DRINK CONTAINING ALCOHOL: NEVER
HOW MANY STANDARD DRINKS CONTAINING ALCOHOL DO YOU HAVE ON A TYPICAL DAY: PATIENT DOES NOT DRINK

## 2023-05-01 ENCOUNTER — CARE COORDINATION (OUTPATIENT)
Dept: CARE COORDINATION | Age: 68
End: 2023-05-01

## 2023-05-01 ENCOUNTER — TELEPHONE (OUTPATIENT)
Dept: PRIMARY CARE CLINIC | Age: 68
End: 2023-05-01

## 2023-05-01 NOTE — TELEPHONE ENCOUNTER
PT called in asking to speak with Deyanira. When asked what it was in reference to all she would say is that it's \"personal\".      Please call back: [phone]

## 2023-05-01 NOTE — ED PROVIDER NOTES
In addition to the advanced practice provider, I personally saw Werner Blizzard and performed a substantive portion of the visit including all aspects of the medical decision making. Briefly, this is a 74 y.o. female here for upper abdominal pain. Patient reports the pain has been ongoing off and on since gastric bypass surgery in 2005. Pain is seems significantly worse over the past week, also acutely worsened with eating. Associated with nausea and vomiting which also occurs after eating. She denies any fevers, chills, chest pain, shortness of breath or  symptoms. No bloody stools. .    On exam, patient afebrile and nontoxic. No distress. Heart RRR. Lungs CTAB. Abdomen soft, nondistended, tenderness palpation in midepigastrium and right upper quadrant. No significant lower abdominal tenderness. No rebound, no rigidity, no guarding. No CVA tenderness. Screenings   [place] Coma Scale  Eye Opening: Spontaneous  Best Verbal Response: Oriented  Best Motor Response: Obeys commands  [place] Coma Scale Score: 15      Is this patient to be included in the SEP-1 Core Measure due to severe sepsis or septic shock? No   Exclusion criteria - the patient is NOT to be included for SEP-1 Core Measure due to: Infection is not suspected      MDM    Patient afebrile and nontoxic. She is in no distress. No peritoneal signs on abdominal exam, no focal findings to suggest acute appendicitis. Patient with prior history of cholecystectomy. CT imaging was pursued which shows no evidence of bowel obstruction, perforation, ureteral stone or intra-abdominal abscess. Presentation is not suggestive of mesenteric ischemia or acute coronary syndrome. Abdominal pain seems largely chronic, unclear etiology. Patient has previously sought specialist evaluation with gastroenterology as well as at the [facility] clinic, reports no clear cause has been identified. No emergent etiology at this current time is evident.

## 2023-05-01 NOTE — CARE COORDINATION
Ed follow up call attempted. Pt vm picked up. Hipaa compliant message left with writer contact info.  Will make another attempt

## 2023-05-01 NOTE — CARE COORDINATION
Ambulatory Care Coordination Note  2023    Patient Current Location:  Home: [address]    ACM contacted the patient by telephone. Verified name and  with patient as identifiers. Provided introduction to self, and explanation of the ACM role. ACM: Jennifer Bell RN    Challenges to be reviewed by the provider   Additional needs identified to be addressed with provider: Yes   Pt would like to discuss with PCP finding of Lung Nodules               Method of communication with provider: phone. Received a return call from pt, introuduced self and reason for call. Pt did agree to future ACM calls  Pt reports she is \" okay\" but still having some pain and nausea. States she has already called pcp office regarding a follow up appointment and ti discuss finding of Nodules on her lungs, states she was told by office to follow up with her GI Dr but was \"not given an appointment as PCP had nothing available\" also states she did not get a response regarding lung nodules. States she will contact pulmonologist for a sooner appointment . States she will call acm to update once she make contact with that office. Plan:  Continue acm support for care coordination resource assistance, and assist with appointment scheduling as needed to follow up on lung nodules    Offered patient enrollment in the Remote Patient Monitoring (RPM) program for in-home monitoring: NA.     Ambulatory Care Coordination Assessment    Care Coordination Protocol  Referral from Primary Care Provider: No  Week 1 - Initial Assessment                             Suggested Interventions and Community Resources                  Future Appointments   Date Time Provider Dagoberto Garcia   2023 11:40 AM Gio Inman MD [facility]   2023  1:40 PM Gio Inman MD [facility]   2023  1:00 PM Sukhjinder Conway MD [facility]      and   General Assessment

## 2023-05-01 NOTE — TELEPHONE ENCOUNTER
Pt had a CT done yesterday and would like for you to review and let her know what you want her to do. Pt states she cant take the dicyclomine (BENTYL) 20 MG tablet   she says she has a bad reaction to it.   Pt would like something else called in for pain

## 2023-05-01 NOTE — TELEPHONE ENCOUNTER
Pt calling in again asking what are Dr. Alondra Lester thoughts on the lung nodules that were found on her CT scan.  Please advise

## 2023-05-01 NOTE — DISCHARGE INSTRUCTIONS
Lung Nodules    Your x-ray or CT scan shows a nodule (\"spot\") on your lung. This will require follow-up for further evaluation. Please call your Primary Care Physician (PCP) if you have already established one and you confirmed your PCP during your ER visit today. If you do not have a PCP, please call the [address] [place] [address] scheduling number to schedule your Emergency Department follow up visit. Please mention that you are scheduling an \"ER follow up visit\" for a lung nodule. Learning About Lung Nodules  What is a lung nodule? A lung nodule is a growth in the lung. A single nodule surrounded by lung tissue is called a solitary pulmonary nodule. A lung nodule might not cause any symptoms. Your doctor may have found one or more nodules on your lung when you were having a chest X-ray or CT scan. Or it may have been found during a lung cancer screening. A lung nodule may be caused by an old infection or cancer. It might also be a noncancerous growth. Lung nodules can cause a screening to give an abnormal result. Most nodules do not cause any harm. But without further tests, your doctor can't tell whether an abnormal finding is cancer, a harmless nodule, or something else. What can you expect when you have a lung nodule? Your doctor will look at several risk factors to see how likely it is that the nodule is cancer. He or she will look at: Whether you smoke or have ever smoked. Your age and your family's medical history. Whether you have ever had lung cancer. The size and shape of the nodule. Whether the nodule has changed in size. Your doctor may look at past chest X-rays or CT scans, if available, and compare them. Or you may have a series of CT scans to see if the nodule grows over time. What happens next depends on the risk of the nodule being cancer. If you have no risk factors and the nodule is small, your doctor may advise doing nothing.   If the risk is small, your doctor may schedule

## 2023-05-01 NOTE — TELEPHONE ENCOUNTER
----- Message from [facility] sent at 5/1/2023  1:16 PM EDT -----  Subject: Appointment Request    Reason for Call: Established Patient Appointment needed: Routine ED Follow   Up Visit    QUESTIONS    Reason for appointment request? No appointments available during search     Additional Information for Provider? PT needs to be as soon as possible   for ER f/u. Went to [facility] 4/30. Went in for stomach issues,   pain, and nausea. Had a CT Scan done and the ER doc told her she needed to   get in as soon as she could with PCP.  Can leave detailed VM with appt info  ---------------------------------------------------------------------------  --------------  Sunni Macdonald INFO  [phone]; OK to leave message on voicemail  ---------------------------------------------------------------------------  --------------  SCRIPT ANSWERS  COVID Screen: Kelvin Alonzo

## 2023-05-01 NOTE — TELEPHONE ENCOUNTER
LMOVM that Dr. Michelle Archibald has no available appointments but we can schedule with another provider and to call the office.

## 2023-05-08 ENCOUNTER — CARE COORDINATION (OUTPATIENT)
Dept: CARE COORDINATION | Age: 68
End: 2023-05-08

## 2023-05-08 ENCOUNTER — OFFICE VISIT (OUTPATIENT)
Dept: PULMONOLOGY | Age: 68
End: 2023-05-08
Payer: MEDICARE

## 2023-05-08 VITALS
HEIGHT: 67 IN | DIASTOLIC BLOOD PRESSURE: 68 MMHG | OXYGEN SATURATION: 97 % | RESPIRATION RATE: 18 BRPM | WEIGHT: 241.1 LBS | HEART RATE: 64 BPM | SYSTOLIC BLOOD PRESSURE: 110 MMHG | TEMPERATURE: 97.1 F | BODY MASS INDEX: 37.84 KG/M2

## 2023-05-08 DIAGNOSIS — R91.1 PULMONARY NODULE SEEN ON IMAGING STUDY: ICD-10-CM

## 2023-05-08 DIAGNOSIS — J45.30 MILD PERSISTENT ASTHMA WITHOUT COMPLICATION: ICD-10-CM

## 2023-05-08 DIAGNOSIS — J30.9 ALLERGIC SINUSITIS: ICD-10-CM

## 2023-05-08 PROCEDURE — G8427 DOCREV CUR MEDS BY ELIG CLIN: HCPCS | Performed by: INTERNAL MEDICINE

## 2023-05-08 PROCEDURE — 1090F PRES/ABSN URINE INCON ASSESS: CPT | Performed by: INTERNAL MEDICINE

## 2023-05-08 PROCEDURE — 3078F DIAST BP <80 MM HG: CPT | Performed by: INTERNAL MEDICINE

## 2023-05-08 PROCEDURE — 3017F COLORECTAL CA SCREEN DOC REV: CPT | Performed by: INTERNAL MEDICINE

## 2023-05-08 PROCEDURE — G8417 CALC BMI ABV UP PARAM F/U: HCPCS | Performed by: INTERNAL MEDICINE

## 2023-05-08 PROCEDURE — 1036F TOBACCO NON-USER: CPT | Performed by: INTERNAL MEDICINE

## 2023-05-08 PROCEDURE — 3074F SYST BP LT 130 MM HG: CPT | Performed by: INTERNAL MEDICINE

## 2023-05-08 PROCEDURE — 1123F ACP DISCUSS/DSCN MKR DOCD: CPT | Performed by: INTERNAL MEDICINE

## 2023-05-08 PROCEDURE — 99214 OFFICE O/P EST MOD 30 MIN: CPT | Performed by: INTERNAL MEDICINE

## 2023-05-08 PROCEDURE — G9899 SCRN MAM PERF RSLTS DOC: HCPCS | Performed by: INTERNAL MEDICINE

## 2023-05-08 PROCEDURE — G8399 PT W/DXA RESULTS DOCUMENT: HCPCS | Performed by: INTERNAL MEDICINE

## 2023-05-08 NOTE — PROGRESS NOTES
REASON FOR CONSULTATION/CC:    Chief Complaint   Patient presents with    Nodule        Consult at request of   Joyce Amaya MD for  dyspnea     PCP: Joyce Amaya MD    HISTORY OF PRESENT ILLNESS: Clyde Smith is a 74y.o. year old female with a history of   who presents        Pulmonary history  Originally seen in August 2022 secondary to dyspnea with multifactorial concern with leg cramps. Completed pulmonary function test and methacholine challenge. Pulmonary function test without obstruction or bronchodilator response with a normal diffusion capacity and lung capacity. Methacholine challenge demonstrated PC of 0.3 decreasing to 60% of normal at dose of 1.  2022 PFT FEV1 95%     Treatment history  Flovent 220 -August 2022, educated on proper use. Had suboptimal technique  Samples of Breztri and Trelegy 200 -October 2022. Trelegy not helpful. CV   2022. Echo diastolic dysfunction grade PASP 25.    2022. Normal SPECT stress and rest myocardial perfusion scan with no evidence of exercise    stress-induced reversible myocardial ischemia                 Current History          Asthma  Dayna Mering. Controlled. allergic rhinitis     Flonase                  Objective:   PHYSICAL EXAM:  Blood pressure 110/68, pulse 64, temperature 97.1 °F (36.2 °C), temperature source Infrared, resp. rate 18, height 5' 7\" (1.702 m), weight 241 lb 1.6 oz (109.4 kg), SpO2 97 %, not currently breastfeeding.'  Gen: No distress. Eyes: PERRL. No sclera icterus. No conjunctival injection. ENT: No discharge. Pharynx clear. External appearance of ears and nose normal.  Neck: Trachea midline. No obvious mass. Resp: No accessory muscle use. No crackles. No wheezes. No rhonchi. CV: Regular rate. Regular rhythm. No murmur or rub. No edema. GI:    Skin: Warm, dry, normal texture and turgor. No nodule on exposed extremities. Lymph: No cervical LAD. No supraclavicular LAD. M/S: No cyanosis.  No

## 2023-05-16 ENCOUNTER — CARE COORDINATION (OUTPATIENT)
Dept: CASE MANAGEMENT | Age: 68
End: 2023-05-16

## 2023-05-16 NOTE — CARE COORDINATION
Ambulatory Care Coordination Note  5/16/2023    ACM: Yvette Clark LPN    Spoke with [facility] attempted outreach for ACM follow up call. Left HIPPA compliant message and contact information for call back.           Lab Results       None            Care Coordination Interventions    Referral from Primary Care Provider: No  Suggested Interventions and Community Resources          Goals Addressed    None         Future Appointments   Date Time Provider Dagoberto Garcia   7/25/2023  1:00 PM Caitie Ribeiro MD [facility] Nephrolo   11/15/2023 10:40 AM Puneet Diallo MD [facility]

## 2023-05-24 ENCOUNTER — CARE COORDINATION (OUTPATIENT)
Dept: CARE COORDINATION | Age: 68
End: 2023-05-24

## 2023-05-24 ENCOUNTER — TELEPHONE (OUTPATIENT)
Dept: PRIMARY CARE CLINIC | Age: 68
End: 2023-05-24

## 2023-05-26 ENCOUNTER — NURSE ONLY (OUTPATIENT)
Dept: PRIMARY CARE CLINIC | Age: 68
End: 2023-05-26

## 2023-05-26 ENCOUNTER — OFFICE VISIT (OUTPATIENT)
Dept: PRIMARY CARE CLINIC | Age: 68
End: 2023-05-26
Payer: MEDICARE

## 2023-05-26 VITALS
BODY MASS INDEX: 38.22 KG/M2 | SYSTOLIC BLOOD PRESSURE: 112 MMHG | DIASTOLIC BLOOD PRESSURE: 67 MMHG | RESPIRATION RATE: 18 BRPM | HEART RATE: 59 BPM | WEIGHT: 244 LBS

## 2023-05-26 DIAGNOSIS — R35.0 URINARY FREQUENCY: ICD-10-CM

## 2023-05-26 DIAGNOSIS — G47.09 OTHER INSOMNIA: ICD-10-CM

## 2023-05-26 DIAGNOSIS — M54.50 ACUTE RIGHT-SIDED LOW BACK PAIN WITHOUT SCIATICA: Primary | ICD-10-CM

## 2023-05-26 DIAGNOSIS — E53.8 VITAMIN B12 DEFICIENCY: ICD-10-CM

## 2023-05-26 PROCEDURE — 99214 OFFICE O/P EST MOD 30 MIN: CPT | Performed by: FAMILY MEDICINE

## 2023-05-26 PROCEDURE — 96372 THER/PROPH/DIAG INJ SC/IM: CPT | Performed by: FAMILY MEDICINE

## 2023-05-26 PROCEDURE — G8427 DOCREV CUR MEDS BY ELIG CLIN: HCPCS | Performed by: FAMILY MEDICINE

## 2023-05-26 PROCEDURE — G8399 PT W/DXA RESULTS DOCUMENT: HCPCS | Performed by: FAMILY MEDICINE

## 2023-05-26 PROCEDURE — 1036F TOBACCO NON-USER: CPT | Performed by: FAMILY MEDICINE

## 2023-05-26 PROCEDURE — 3078F DIAST BP <80 MM HG: CPT | Performed by: FAMILY MEDICINE

## 2023-05-26 PROCEDURE — G9899 SCRN MAM PERF RSLTS DOC: HCPCS | Performed by: FAMILY MEDICINE

## 2023-05-26 PROCEDURE — 1090F PRES/ABSN URINE INCON ASSESS: CPT | Performed by: FAMILY MEDICINE

## 2023-05-26 PROCEDURE — 1123F ACP DISCUSS/DSCN MKR DOCD: CPT | Performed by: FAMILY MEDICINE

## 2023-05-26 PROCEDURE — 3017F COLORECTAL CA SCREEN DOC REV: CPT | Performed by: FAMILY MEDICINE

## 2023-05-26 PROCEDURE — G8417 CALC BMI ABV UP PARAM F/U: HCPCS | Performed by: FAMILY MEDICINE

## 2023-05-26 PROCEDURE — 3074F SYST BP LT 130 MM HG: CPT | Performed by: FAMILY MEDICINE

## 2023-05-26 RX ORDER — CYANOCOBALAMIN 1000 UG/ML
1000 INJECTION, SOLUTION INTRAMUSCULAR; SUBCUTANEOUS ONCE
Status: COMPLETED | OUTPATIENT
Start: 2023-05-26 | End: 2023-05-26

## 2023-05-26 RX ORDER — ZOLPIDEM TARTRATE 5 MG/1
5 TABLET ORAL NIGHTLY PRN
Qty: 30 TABLET | Refills: 3 | Status: SHIPPED | OUTPATIENT
Start: 2023-05-26 | End: 2023-06-25

## 2023-05-26 RX ORDER — ACETAMINOPHEN 500 MG
500 TABLET ORAL 4 TIMES DAILY PRN
Qty: 60 TABLET | Refills: 5 | Status: SHIPPED | OUTPATIENT
Start: 2023-05-26

## 2023-05-26 RX ORDER — TIZANIDINE 4 MG/1
4 TABLET ORAL 3 TIMES DAILY
Qty: 30 TABLET | Refills: 1 | Status: SHIPPED | OUTPATIENT
Start: 2023-05-26

## 2023-05-26 RX ADMIN — CYANOCOBALAMIN 1000 MCG: 1000 INJECTION, SOLUTION INTRAMUSCULAR; SUBCUTANEOUS at 15:29

## 2023-05-26 ASSESSMENT — ENCOUNTER SYMPTOMS
BLOOD IN STOOL: 0
BACK PAIN: 1
CONSTIPATION: 0
SHORTNESS OF BREATH: 0
DIARRHEA: 0
ABDOMINAL PAIN: 0

## 2023-05-26 NOTE — PROGRESS NOTES
2023     Alba Schwartz (:  1955) is a 74 y.o. female, here for evaluation of the following medical concerns:    Back Pain  Pertinent negatives include no abdominal pain, chest pain or fever. Urinary Frequency   Associated symptoms include frequency. Pertinent negatives include no chills, hematuria or urgency. Patient presented to the office due to low back pain more on the right side towards the flank, no radiation to the front or to the lower extremities. She denies passing blood in the urine she does have urinary frequency but not burning urination. Denies bowel disturbance. She denies recent injury or heavy lifting. Review of Systems   Constitutional:  Negative for activity change, appetite change, chills and fever. Eyes:  Negative for visual disturbance. Respiratory:  Negative for shortness of breath. Cardiovascular:  Negative for chest pain and leg swelling. Gastrointestinal:  Negative for abdominal pain, blood in stool, constipation and diarrhea. Genitourinary:  Positive for frequency. Negative for difficulty urinating, enuresis, hematuria, menstrual problem and urgency. Musculoskeletal:  Positive for back pain. Right flank pain   Neurological:  Negative for dizziness and syncope. Psychiatric/Behavioral:  Negative for behavioral problems. Prior to Visit Medications    Medication Sig Taking? Authorizing Provider   tiZANidine (ZANAFLEX) 4 MG tablet Take 1 tablet by mouth 3 times daily Yes Cheryle Lai, MD   acetaminophen (TYLENOL) 500 MG tablet Take 1 tablet by mouth 4 times daily as needed for Pain Yes Cheryle Lai, MD   zolpidem (AMBIEN) 5 MG tablet Take 1 tablet by mouth nightly as needed for Sleep for up to 30 days.   Alma Dallas MD   ondansetron (ZOFRAN-ODT) 4 MG disintegrating tablet Take 1 tablet by mouth 3 times daily as needed for Nausea or Vomiting  Aramis Sierra PA-C   dicyclomine (BENTYL) 20 MG tablet Take 1 tablet by mouth

## 2023-06-05 ENCOUNTER — CARE COORDINATION (OUTPATIENT)
Dept: CARE COORDINATION | Age: 68
End: 2023-06-05

## 2023-06-06 ENCOUNTER — CARE COORDINATION (OUTPATIENT)
Dept: CASE MANAGEMENT | Age: 68
End: 2023-06-06

## 2023-06-06 NOTE — CARE COORDINATION
LPN CC attempted to reach patient for care coordination outreach. Unable to reach patient. Left detailed VM with reason for call & contact information.    Jonathan Dumas  [facility]  Care Transitions  [phone]

## 2023-06-23 ENCOUNTER — TELEPHONE (OUTPATIENT)
Dept: PRIMARY CARE CLINIC | Age: 68
End: 2023-06-23

## 2023-06-26 ENCOUNTER — HOSPITAL ENCOUNTER (OUTPATIENT)
Age: 68
Discharge: HOME | End: 2023-06-26
Payer: MEDICARE

## 2023-06-26 DIAGNOSIS — I10 ESSENTIAL HYPERTENSION: ICD-10-CM

## 2023-06-26 DIAGNOSIS — N28.1 RENAL CYST: ICD-10-CM

## 2023-06-26 DIAGNOSIS — N18.2 CKD (CHRONIC KIDNEY DISEASE), STAGE II: ICD-10-CM

## 2023-06-26 LAB
ANION GAP SERPL CALCULATED.3IONS-SCNC: 11 MMOL/L (ref 3–16)
BUN SERPL-MCNC: 15 MG/DL (ref 7–20)
CALCIUM SERPL-MCNC: 10.3 MG/DL (ref 8.3–10.6)
CHLORIDE SERPL-SCNC: 108 MMOL/L (ref 99–110)
CO2 SERPL-SCNC: 23 MMOL/L (ref 21–32)
CREAT SERPL-MCNC: 1.3 MG/DL (ref 0.6–1.2)
GFR SERPLBLD CREATININE-BSD FMLA CKD-EPI: 45 ML/MIN/{1.73_M2}
GLUCOSE SERPL-MCNC: 84 MG/DL (ref 70–99)
POTASSIUM SERPL-SCNC: 4.4 MMOL/L (ref 3.5–5.1)
SODIUM SERPL-SCNC: 142 MMOL/L (ref 136–145)

## 2023-06-26 PROCEDURE — 80048 BASIC METABOLIC PNL TOTAL CA: CPT

## 2023-06-26 PROCEDURE — 36415 COLL VENOUS BLD VENIPUNCTURE: CPT

## 2023-06-29 ENCOUNTER — HOSPITAL ENCOUNTER (OUTPATIENT)
Age: 68
Discharge: HOME | End: 2023-06-29
Payer: MEDICARE

## 2023-06-29 LAB
BACTERIA URNS QL MICRO: ABNORMAL /HPF
BILIRUB UR QL STRIP.AUTO: ABNORMAL
CLARITY UR: ABNORMAL
COLOR UR: YELLOW
EPI CELLS #/AREA URNS HPF: ABNORMAL /HPF (ref 0–5)
GLUCOSE UR STRIP.AUTO-MCNC: NEGATIVE MG/DL
HGB UR QL STRIP.AUTO: NEGATIVE
KETONES UR STRIP.AUTO-MCNC: ABNORMAL MG/DL
LEUKOCYTE ESTERASE UR QL STRIP.AUTO: ABNORMAL
NITRITE UR QL STRIP.AUTO: NEGATIVE
PH UR STRIP.AUTO: 5.5 [PH] (ref 5–8)
PROT UR STRIP.AUTO-MCNC: 100 MG/DL
RBC #/AREA URNS HPF: ABNORMAL /HPF (ref 0–4)
SP GR UR STRIP.AUTO: >=1.03 (ref 1–1.03)
UA DIPSTICK W REFLEX MICRO PNL UR: YES
URN SPEC COLLECT METH UR: ABNORMAL
UROBILINOGEN UR STRIP-ACNC: 1 E.U./DL
WBC #/AREA URNS HPF: ABNORMAL /HPF (ref 0–5)
YEAST URNS QL MICRO: PRESENT /HPF

## 2023-06-29 PROCEDURE — 81001 URINALYSIS AUTO W/SCOPE: CPT

## 2023-06-30 ENCOUNTER — OFFICE VISIT (OUTPATIENT)
Dept: PRIMARY CARE CLINIC | Age: 68
End: 2023-06-30
Payer: MEDICARE

## 2023-06-30 VITALS
RESPIRATION RATE: 18 BRPM | HEART RATE: 71 BPM | WEIGHT: 223.8 LBS | DIASTOLIC BLOOD PRESSURE: 88 MMHG | SYSTOLIC BLOOD PRESSURE: 129 MMHG | BODY MASS INDEX: 35.05 KG/M2

## 2023-06-30 DIAGNOSIS — H93.11 RIGHT-SIDED TINNITUS: Primary | ICD-10-CM

## 2023-06-30 DIAGNOSIS — R10.9 RIGHT FLANK PAIN: ICD-10-CM

## 2023-06-30 DIAGNOSIS — B37.49 YEAST UTI: ICD-10-CM

## 2023-06-30 DIAGNOSIS — E53.8 B12 DEFICIENCY: ICD-10-CM

## 2023-06-30 DIAGNOSIS — R42 DIZZINESS: ICD-10-CM

## 2023-06-30 DIAGNOSIS — I10 PRIMARY HYPERTENSION: Chronic | ICD-10-CM

## 2023-06-30 PROCEDURE — 99214 OFFICE O/P EST MOD 30 MIN: CPT | Performed by: FAMILY MEDICINE

## 2023-06-30 PROCEDURE — G8399 PT W/DXA RESULTS DOCUMENT: HCPCS | Performed by: FAMILY MEDICINE

## 2023-06-30 PROCEDURE — G8417 CALC BMI ABV UP PARAM F/U: HCPCS | Performed by: FAMILY MEDICINE

## 2023-06-30 PROCEDURE — 96372 THER/PROPH/DIAG INJ SC/IM: CPT | Performed by: FAMILY MEDICINE

## 2023-06-30 PROCEDURE — 3074F SYST BP LT 130 MM HG: CPT | Performed by: FAMILY MEDICINE

## 2023-06-30 PROCEDURE — G9899 SCRN MAM PERF RSLTS DOC: HCPCS | Performed by: FAMILY MEDICINE

## 2023-06-30 PROCEDURE — 3079F DIAST BP 80-89 MM HG: CPT | Performed by: FAMILY MEDICINE

## 2023-06-30 PROCEDURE — G8427 DOCREV CUR MEDS BY ELIG CLIN: HCPCS | Performed by: FAMILY MEDICINE

## 2023-06-30 PROCEDURE — 3017F COLORECTAL CA SCREEN DOC REV: CPT | Performed by: FAMILY MEDICINE

## 2023-06-30 PROCEDURE — 1090F PRES/ABSN URINE INCON ASSESS: CPT | Performed by: FAMILY MEDICINE

## 2023-06-30 PROCEDURE — 1123F ACP DISCUSS/DSCN MKR DOCD: CPT | Performed by: FAMILY MEDICINE

## 2023-06-30 PROCEDURE — 1036F TOBACCO NON-USER: CPT | Performed by: FAMILY MEDICINE

## 2023-06-30 RX ORDER — FLUCONAZOLE 100 MG/1
100 TABLET ORAL DAILY
Qty: 7 TABLET | Refills: 0 | Status: SHIPPED | OUTPATIENT
Start: 2023-06-30 | End: 2023-07-07

## 2023-06-30 RX ORDER — LEVOTHYROXINE SODIUM 0.03 MG/1
1 TABLET ORAL DAILY
COMMUNITY
Start: 2023-05-10

## 2023-06-30 RX ORDER — MECLIZINE HYDROCHLORIDE 25 MG/1
25 TABLET ORAL 3 TIMES DAILY PRN
Qty: 30 TABLET | Refills: 0 | Status: SHIPPED | OUTPATIENT
Start: 2023-06-30 | End: 2023-07-10

## 2023-06-30 RX ORDER — CYANOCOBALAMIN 1000 UG/ML
1000 INJECTION, SOLUTION INTRAMUSCULAR; SUBCUTANEOUS ONCE
Status: COMPLETED | OUTPATIENT
Start: 2023-06-30 | End: 2023-06-30

## 2023-06-30 RX ADMIN — CYANOCOBALAMIN 1000 MCG: 1000 INJECTION, SOLUTION INTRAMUSCULAR; SUBCUTANEOUS at 14:40

## 2023-06-30 ASSESSMENT — ENCOUNTER SYMPTOMS
DIARRHEA: 0
CONSTIPATION: 0
SHORTNESS OF BREATH: 0
ABDOMINAL PAIN: 0
BLOOD IN STOOL: 0

## 2023-07-06 ENCOUNTER — HOSPITAL ENCOUNTER (OUTPATIENT)
Dept: ULTRASOUND IMAGING | Age: 68
Discharge: HOME | End: 2023-07-06
Payer: MEDICARE

## 2023-07-06 DIAGNOSIS — N17.9 AKI (ACUTE KIDNEY INJURY) (HCC): ICD-10-CM

## 2023-07-06 DIAGNOSIS — M54.9 ACUTE RIGHT-SIDED BACK PAIN, UNSPECIFIED BACK LOCATION: ICD-10-CM

## 2023-07-06 PROCEDURE — 76770 US EXAM ABDO BACK WALL COMP: CPT

## 2023-07-24 ENCOUNTER — OFFICE VISIT (OUTPATIENT)
Dept: PRIMARY CARE CLINIC | Age: 68
End: 2023-07-24
Payer: MEDICARE

## 2023-07-24 VITALS
WEIGHT: 236.7 LBS | TEMPERATURE: 98.2 F | HEART RATE: 68 BPM | HEIGHT: 67 IN | DIASTOLIC BLOOD PRESSURE: 70 MMHG | BODY MASS INDEX: 37.15 KG/M2 | SYSTOLIC BLOOD PRESSURE: 115 MMHG | OXYGEN SATURATION: 100 %

## 2023-07-24 DIAGNOSIS — M54.40 CHRONIC MIDLINE LOW BACK PAIN WITH SCIATICA, SCIATICA LATERALITY UNSPECIFIED: Primary | ICD-10-CM

## 2023-07-24 DIAGNOSIS — R60.0 LOCALIZED EDEMA: ICD-10-CM

## 2023-07-24 DIAGNOSIS — G89.29 CHRONIC MIDLINE LOW BACK PAIN WITH SCIATICA, SCIATICA LATERALITY UNSPECIFIED: Primary | ICD-10-CM

## 2023-07-24 DIAGNOSIS — I10 ESSENTIAL HYPERTENSION: ICD-10-CM

## 2023-07-24 PROCEDURE — G8399 PT W/DXA RESULTS DOCUMENT: HCPCS | Performed by: INTERNAL MEDICINE

## 2023-07-24 PROCEDURE — 1090F PRES/ABSN URINE INCON ASSESS: CPT | Performed by: INTERNAL MEDICINE

## 2023-07-24 PROCEDURE — 3074F SYST BP LT 130 MM HG: CPT | Performed by: INTERNAL MEDICINE

## 2023-07-24 PROCEDURE — 99214 OFFICE O/P EST MOD 30 MIN: CPT | Performed by: INTERNAL MEDICINE

## 2023-07-24 PROCEDURE — 3017F COLORECTAL CA SCREEN DOC REV: CPT | Performed by: INTERNAL MEDICINE

## 2023-07-24 PROCEDURE — 1123F ACP DISCUSS/DSCN MKR DOCD: CPT | Performed by: INTERNAL MEDICINE

## 2023-07-24 PROCEDURE — 1036F TOBACCO NON-USER: CPT | Performed by: INTERNAL MEDICINE

## 2023-07-24 PROCEDURE — G8427 DOCREV CUR MEDS BY ELIG CLIN: HCPCS | Performed by: INTERNAL MEDICINE

## 2023-07-24 PROCEDURE — G8417 CALC BMI ABV UP PARAM F/U: HCPCS | Performed by: INTERNAL MEDICINE

## 2023-07-24 PROCEDURE — 3078F DIAST BP <80 MM HG: CPT | Performed by: INTERNAL MEDICINE

## 2023-07-24 PROCEDURE — G9899 SCRN MAM PERF RSLTS DOC: HCPCS | Performed by: INTERNAL MEDICINE

## 2023-07-24 RX ORDER — NIFEDIPINE 60 MG/1
60 TABLET, EXTENDED RELEASE ORAL DAILY
Qty: 90 TABLET | Refills: 3 | Status: SHIPPED | OUTPATIENT
Start: 2023-07-24

## 2023-07-24 RX ORDER — LABETALOL 300 MG/1
300 TABLET, FILM COATED ORAL 2 TIMES DAILY
Qty: 180 TABLET | Refills: 3 | Status: SHIPPED | OUTPATIENT
Start: 2023-07-24

## 2023-07-24 RX ORDER — ACETAMINOPHEN 500 MG
500 TABLET ORAL EVERY 6 HOURS PRN
Qty: 120 TABLET | Refills: 1 | Status: SHIPPED | OUTPATIENT
Start: 2023-07-24 | End: 2023-07-25

## 2023-07-24 ASSESSMENT — ENCOUNTER SYMPTOMS
FACIAL SWELLING: 0
VOICE CHANGE: 0
BLOOD IN STOOL: 0
DIARRHEA: 0
CONSTIPATION: 0
CHOKING: 0
BACK PAIN: 1
CHEST TIGHTNESS: 0
ABDOMINAL DISTENTION: 0

## 2023-07-24 NOTE — PROGRESS NOTES
Subjective:      Patient ID: Sae Fu is a 79 y.o. female. 7/24/2023 Patient presents with:  Hypertension  Lower Back Pain: Chronic pain- would like medication   Edema                  Last seen  4/14/2023 Patient presents with:  Hypertension  Other: Discuss vit b12 injections  Abdominal Pain: Pain under rib cage, pain is worse when eating  Patient presents with:  Hypertension    C/o Dr Imani Veloz / [facility]– [place]   33-year-old female with episodic periumbilical abdominal pain nausea and vomiting of food from the most recent meal. This is distinctly different than her symptoms that brought her to our attention a few months ago. This occurs 3-4 times a week. We will obtain an upper GI series to rule out \"candy cane syndrome. \" In addition, we will obtain a stool sample for H. pylori antigen       2/28/2023 Patient presents with:  Otalgia: Right ear pain for 1 week. No sre throat . No fever   Hypertension  taking all meds . Get light headed sometimes   Other feel cold !!              1/12/2023 Patient presents with:  Hypertension: 1 week f/u              1/4/2023 Patient presents with:  Discuss Labs: Discuss labs from 12/13/2022  Abdominal Pain: Since last week, hurts every time eating  Fatigue  Cold Extremity:                   VV 12/9/2022 Patient presents with:   Follow-up                  8/3/2022 Patient presents with:  Medicare AWV  Hypertension  Hyperlipidemia               4/28/2022 Patient presents with:  Hypertension: 2 week follow up              4/15/2022 Patient presents with:  Hypertension  Headache  Insomnia: pt states she is allergic to Ambien and cant take                VV 1/22 11/16/2021 Patient presents with:  Hypertension  Cough: 3 wks     Has had extensive w/u with ENT including Barium swallow Study and CT soft tissue neck               8/31/2021 Patient presents with:  Blood Pressure Check                  8/12/2021 Patient presents with:  Blood Pressure Check  Headache  Sinus

## 2023-07-25 ENCOUNTER — APPOINTMENT (OUTPATIENT)
Dept: GENERAL RADIOLOGY | Age: 68
End: 2023-07-25
Payer: MEDICARE

## 2023-07-25 ENCOUNTER — HOSPITAL ENCOUNTER (EMERGENCY)
Age: 68
Discharge: HOME | End: 2023-07-25
Payer: MEDICARE

## 2023-07-25 VITALS
WEIGHT: 235.89 LBS | TEMPERATURE: 98.1 F | DIASTOLIC BLOOD PRESSURE: 68 MMHG | HEART RATE: 60 BPM | OXYGEN SATURATION: 100 % | RESPIRATION RATE: 17 BRPM | BODY MASS INDEX: 36.95 KG/M2 | SYSTOLIC BLOOD PRESSURE: 100 MMHG

## 2023-07-25 DIAGNOSIS — M43.6 TORTICOLLIS: Primary | ICD-10-CM

## 2023-07-25 PROCEDURE — 99283 EMERGENCY DEPT VISIT LOW MDM: CPT

## 2023-07-25 PROCEDURE — 6370000000 HC RX 637 (ALT 250 FOR IP): Performed by: NURSE PRACTITIONER

## 2023-07-25 PROCEDURE — 72040 X-RAY EXAM NECK SPINE 2-3 VW: CPT

## 2023-07-25 RX ORDER — LIDOCAINE 4 G/G
1 PATCH TOPICAL ONCE
Status: DISCONTINUED | OUTPATIENT
Start: 2023-07-25 | End: 2023-07-25 | Stop reason: HOSPADM

## 2023-07-25 RX ORDER — ACETAMINOPHEN 500 MG
500 TABLET ORAL 4 TIMES DAILY PRN
Qty: 28 TABLET | Refills: 0 | Status: SHIPPED | OUTPATIENT
Start: 2023-07-25 | End: 2023-08-01

## 2023-07-25 RX ORDER — ACETAMINOPHEN 500 MG
1000 TABLET ORAL ONCE
Status: COMPLETED | OUTPATIENT
Start: 2023-07-25 | End: 2023-07-25

## 2023-07-25 RX ORDER — METHOCARBAMOL 500 MG/1
750 TABLET, FILM COATED ORAL ONCE
Status: COMPLETED | OUTPATIENT
Start: 2023-07-25 | End: 2023-07-25

## 2023-07-25 RX ORDER — LIDOCAINE 50 MG/G
1 PATCH TOPICAL DAILY
Qty: 10 PATCH | Refills: 0 | Status: SHIPPED | OUTPATIENT
Start: 2023-07-25 | End: 2023-08-04

## 2023-07-25 RX ORDER — METHOCARBAMOL 750 MG/1
750 TABLET, FILM COATED ORAL 4 TIMES DAILY
Qty: 28 TABLET | Refills: 0 | Status: SHIPPED | OUTPATIENT
Start: 2023-07-25 | End: 2023-08-01

## 2023-07-25 RX ADMIN — METHOCARBAMOL 750 MG: 500 TABLET ORAL at 13:07

## 2023-07-25 RX ADMIN — ACETAMINOPHEN 1000 MG: 500 TABLET ORAL at 13:07

## 2023-07-25 ASSESSMENT — PAIN DESCRIPTION - LOCATION
LOCATION: HEAD
LOCATION: NECK
LOCATION: NECK

## 2023-07-25 ASSESSMENT — PAIN DESCRIPTION - PAIN TYPE: TYPE: ACUTE PAIN

## 2023-07-25 ASSESSMENT — PAIN SCALES - GENERAL
PAINLEVEL_OUTOF10: 8
PAINLEVEL_OUTOF10: 5
PAINLEVEL_OUTOF10: 8

## 2023-07-25 ASSESSMENT — PAIN DESCRIPTION - ORIENTATION
ORIENTATION: LEFT
ORIENTATION: LEFT

## 2023-07-25 ASSESSMENT — PAIN DESCRIPTION - DESCRIPTORS: DESCRIPTORS: ACHING

## 2023-07-25 NOTE — DISCHARGE INSTRUCTIONS
Return to the emergency department for new or worsening symptoms including, not limited to, developing worsening pain not relieved by medicines, throat closing sensation, shortness of breath, passing out, feeling that you are going to pass out, loss of sensation in your arms, change in the color or temperature of your fingers, or other symptoms/concerns. Medications as prescribed. Ensure that you can dedicate least 8 hours to sleep after you take the Robaxin as this is a skeletal muscle relaxant that causes drowsiness and you should not otherwise drink, drive, or operate machinery while take this medication. Follow-up with your primary care doctor for reevaluation in the next 1-2 days and in 1 week with the spine specialist for symptoms that worsen or fail to improve in this duration of time.

## 2023-07-25 NOTE — ED NOTES
D/C: Order noted for d/c. Pt confirmed d/c paperwork 3 paper script handed to patient  have correct name. Discharge and education instructions reviewed with patient. Teach-back successful. Pt verbalized understanding and signed d/c papers. Pt denied questions at this time. No acute distress noted. Patient instructed to follow-up as noted - return to emergency department if symptoms worsen. Patient verbalized understanding. Discharged per EDMD with discharge instructions. Pt discharged to private vehicle. Patient stable upon departure. Thanked patient for choosing [facility]) for care. Provider aware of patient pain at time of discharge.        Josiah Junior RN  07/25/23 [id number]

## 2023-07-26 ENCOUNTER — CARE COORDINATION (OUTPATIENT)
Dept: PRIMARY CARE CLINIC | Age: 68
End: 2023-07-26

## 2023-07-31 ENCOUNTER — NURSE ONLY (OUTPATIENT)
Dept: PRIMARY CARE CLINIC | Age: 68
End: 2023-07-31
Payer: MEDICARE

## 2023-07-31 DIAGNOSIS — E53.8 B12 DEFICIENCY: Primary | ICD-10-CM

## 2023-07-31 PROCEDURE — 96372 THER/PROPH/DIAG INJ SC/IM: CPT | Performed by: INTERNAL MEDICINE

## 2023-07-31 RX ORDER — CYANOCOBALAMIN 1000 UG/ML
1000 INJECTION, SOLUTION INTRAMUSCULAR; SUBCUTANEOUS ONCE
Status: COMPLETED | OUTPATIENT
Start: 2023-07-31 | End: 2023-07-31

## 2023-07-31 RX ADMIN — CYANOCOBALAMIN 1000 MCG: 1000 INJECTION, SOLUTION INTRAMUSCULAR; SUBCUTANEOUS at 10:52

## 2023-08-02 ASSESSMENT — ENCOUNTER SYMPTOMS
EYE PAIN: 0
ANAL BLEEDING: 0
VOMITING: 0
DIARRHEA: 0
SORE THROAT: 0
ABDOMINAL PAIN: 0
NAUSEA: 0
BACK PAIN: 0

## 2023-08-09 DIAGNOSIS — R30.0 DYSURIA: Primary | ICD-10-CM

## 2023-08-11 ENCOUNTER — HOSPITAL ENCOUNTER (OUTPATIENT)
Age: 68
Discharge: HOME | End: 2023-08-11
Payer: MEDICARE

## 2023-08-11 DIAGNOSIS — N18.31 STAGE 3A CHRONIC KIDNEY DISEASE (HCC): ICD-10-CM

## 2023-08-11 LAB
ANION GAP SERPL CALCULATED.3IONS-SCNC: 11 MMOL/L (ref 3–16)
BUN SERPL-MCNC: 12 MG/DL (ref 7–20)
CALCIUM SERPL-MCNC: 9.7 MG/DL (ref 8.3–10.6)
CHLORIDE SERPL-SCNC: 106 MMOL/L (ref 99–110)
CO2 SERPL-SCNC: 23 MMOL/L (ref 21–32)
CREAT SERPL-MCNC: 1.1 MG/DL (ref 0.6–1.2)
GFR SERPLBLD CREATININE-BSD FMLA CKD-EPI: 55 ML/MIN/{1.73_M2}
GLUCOSE SERPL-MCNC: 196 MG/DL (ref 70–99)
POTASSIUM SERPL-SCNC: 3.8 MMOL/L (ref 3.5–5.1)
SODIUM SERPL-SCNC: 140 MMOL/L (ref 136–145)

## 2023-08-11 PROCEDURE — 36415 COLL VENOUS BLD VENIPUNCTURE: CPT

## 2023-08-11 PROCEDURE — 80048 BASIC METABOLIC PNL TOTAL CA: CPT

## 2023-08-16 SDOH — HEALTH STABILITY: PHYSICAL HEALTH: ON AVERAGE, HOW MANY DAYS PER WEEK DO YOU ENGAGE IN MODERATE TO STRENUOUS EXERCISE (LIKE A BRISK WALK)?: 4 DAYS

## 2023-08-16 SDOH — HEALTH STABILITY: PHYSICAL HEALTH: ON AVERAGE, HOW MANY MINUTES DO YOU ENGAGE IN EXERCISE AT THIS LEVEL?: 20 MIN

## 2023-08-16 ASSESSMENT — SOCIAL DETERMINANTS OF HEALTH (SDOH)
WITHIN THE LAST YEAR, HAVE YOU BEEN KICKED, HIT, SLAPPED, OR OTHERWISE PHYSICALLY HURT BY YOUR PARTNER OR EX-PARTNER?: NO
WITHIN THE LAST YEAR, HAVE YOU BEEN AFRAID OF YOUR PARTNER OR EX-PARTNER?: NO
WITHIN THE LAST YEAR, HAVE TO BEEN RAPED OR FORCED TO HAVE ANY KIND OF SEXUAL ACTIVITY BY YOUR PARTNER OR EX-PARTNER?: NO
WITHIN THE LAST YEAR, HAVE YOU BEEN HUMILIATED OR EMOTIONALLY ABUSED IN OTHER WAYS BY YOUR PARTNER OR EX-PARTNER?: NO

## 2023-08-17 ENCOUNTER — OFFICE VISIT (OUTPATIENT)
Dept: ORTHOPEDIC SURGERY | Age: 68
End: 2023-08-17
Payer: MEDICARE

## 2023-08-17 VITALS — HEIGHT: 67 IN | BODY MASS INDEX: 36.88 KG/M2 | WEIGHT: 235 LBS

## 2023-08-17 DIAGNOSIS — M54.50 LOW BACK PAIN, UNSPECIFIED BACK PAIN LATERALITY, UNSPECIFIED CHRONICITY, UNSPECIFIED WHETHER SCIATICA PRESENT: Primary | ICD-10-CM

## 2023-08-17 DIAGNOSIS — M54.16 LUMBAR RADICULAR PAIN: ICD-10-CM

## 2023-08-17 DIAGNOSIS — M47.817 FACET ARTHRITIS OF LUMBOSACRAL REGION: ICD-10-CM

## 2023-08-17 PROCEDURE — G8399 PT W/DXA RESULTS DOCUMENT: HCPCS | Performed by: PHYSICIAN ASSISTANT

## 2023-08-17 PROCEDURE — 99203 OFFICE O/P NEW LOW 30 MIN: CPT | Performed by: PHYSICIAN ASSISTANT

## 2023-08-17 PROCEDURE — 1090F PRES/ABSN URINE INCON ASSESS: CPT | Performed by: PHYSICIAN ASSISTANT

## 2023-08-17 PROCEDURE — G8427 DOCREV CUR MEDS BY ELIG CLIN: HCPCS | Performed by: PHYSICIAN ASSISTANT

## 2023-08-17 PROCEDURE — 1123F ACP DISCUSS/DSCN MKR DOCD: CPT | Performed by: PHYSICIAN ASSISTANT

## 2023-08-17 PROCEDURE — 1036F TOBACCO NON-USER: CPT | Performed by: PHYSICIAN ASSISTANT

## 2023-08-17 PROCEDURE — 3017F COLORECTAL CA SCREEN DOC REV: CPT | Performed by: PHYSICIAN ASSISTANT

## 2023-08-17 PROCEDURE — G9899 SCRN MAM PERF RSLTS DOC: HCPCS | Performed by: PHYSICIAN ASSISTANT

## 2023-08-17 PROCEDURE — G8417 CALC BMI ABV UP PARAM F/U: HCPCS | Performed by: PHYSICIAN ASSISTANT

## 2023-08-17 RX ORDER — TIZANIDINE 4 MG/1
4 TABLET ORAL 4 TIMES DAILY PRN
Qty: 60 TABLET | Refills: 0 | Status: SHIPPED | OUTPATIENT
Start: 2023-08-17

## 2023-08-17 RX ORDER — PREDNISONE 20 MG/1
20 TABLET ORAL DAILY
Qty: 10 TABLET | Refills: 0 | Status: SHIPPED | OUTPATIENT
Start: 2023-08-17 | End: 2023-08-27

## 2023-09-05 ENCOUNTER — HOSPITAL ENCOUNTER (OUTPATIENT)
Dept: PHYSICAL THERAPY | Age: 68
Setting detail: THERAPIES SERIES
Discharge: HOME | End: 2023-09-05

## 2023-09-05 NOTE — PLAN OF CARE
[address]     Physical Therapy  Cancellation/No-show Note  Patient Name:  Jordon Segura  :  1955   Date:  2023  Cancelled visits to date: 0  No-shows to date: 1    Patient status for today's appointment patient:  []  Cancelled  []  Rescheduled appointment  [x]  No-show  (eval),     Reason given by patient:  []  Patient ill  []  Conflicting appointment  []  No transportation    []  Conflict with work  [x]  No reason given  []  Other:     Comments:      Phone call information:   []  Phone call made today to patient at _ time at number provided:      []  Patient answered, conversation as follows:    []  Patient did not answer, message left as follows:  [x]  Phone call not made today  []  Phone call not needed - pt contacted us to cancel and provided reason for cancellation.      Electronically signed by:  Andrea Xie, PT,  DPT

## 2023-09-13 ENCOUNTER — NURSE ONLY (OUTPATIENT)
Dept: PRIMARY CARE CLINIC | Age: 68
End: 2023-09-13

## 2023-09-13 DIAGNOSIS — E53.8 B12 DEFICIENCY: Primary | ICD-10-CM

## 2023-09-13 RX ORDER — CYANOCOBALAMIN 1000 UG/ML
1000 INJECTION, SOLUTION INTRAMUSCULAR; SUBCUTANEOUS ONCE
Status: COMPLETED | OUTPATIENT
Start: 2023-09-13 | End: 2023-09-13

## 2023-09-13 RX ADMIN — CYANOCOBALAMIN 1000 MCG: 1000 INJECTION, SOLUTION INTRAMUSCULAR; SUBCUTANEOUS at 11:47

## 2023-09-13 NOTE — PROGRESS NOTES
After obtaining consent, and per orders of Dr. Lisa Jones, injection of Vit b12 given in Left arm by Shanel Gannon MA. Patient instructed to remain in clinic for 20 minutes afterwards, and to report any adverse reaction to me immediately.

## 2023-09-29 DIAGNOSIS — G47.09 OTHER INSOMNIA: ICD-10-CM

## 2023-09-29 RX ORDER — ZOLPIDEM TARTRATE 5 MG/1
5 TABLET ORAL NIGHTLY PRN
Qty: 30 TABLET | Refills: 3 | Status: SHIPPED | OUTPATIENT
Start: 2023-09-29 | End: 2023-12-28

## 2023-09-29 NOTE — TELEPHONE ENCOUNTER
Medication:   Requested Prescriptions     Pending Prescriptions Disp Refills    zolpidem (AMBIEN) 5 MG tablet 30 tablet 3     Sig: Take 1 tablet by mouth nightly as needed for Sleep for up to 30 days. Last Filled:      Patient Phone Number: [phone] (home) [phone] (work)    Last appt: 7/24/2023   Next appt: 10/2/2023    Last OARRS:       1/9/2018     3:39 PM   RX Monitoring   Attestation The Prescription Monitoring Report for this patient was reviewed today.

## 2023-10-02 ENCOUNTER — OFFICE VISIT (OUTPATIENT)
Dept: PRIMARY CARE CLINIC | Age: 68
End: 2023-10-02
Payer: MEDICARE

## 2023-10-02 VITALS
BODY MASS INDEX: 36.98 KG/M2 | WEIGHT: 235.6 LBS | SYSTOLIC BLOOD PRESSURE: 110 MMHG | DIASTOLIC BLOOD PRESSURE: 72 MMHG | OXYGEN SATURATION: 98 % | HEART RATE: 70 BPM | TEMPERATURE: 97 F | HEIGHT: 67 IN

## 2023-10-02 DIAGNOSIS — Z29.11 NEED FOR RSV VACCINATION: ICD-10-CM

## 2023-10-02 DIAGNOSIS — I10 ESSENTIAL HYPERTENSION: ICD-10-CM

## 2023-10-02 DIAGNOSIS — E78.2 MIXED HYPERLIPIDEMIA: ICD-10-CM

## 2023-10-02 DIAGNOSIS — Z23 FLU VACCINE NEED: ICD-10-CM

## 2023-10-02 DIAGNOSIS — Z23 NEED FOR SHINGLES VACCINE: ICD-10-CM

## 2023-10-02 DIAGNOSIS — Z23 NEED FOR COVID-19 VACCINE: ICD-10-CM

## 2023-10-02 DIAGNOSIS — R53.83 OTHER FATIGUE: ICD-10-CM

## 2023-10-02 DIAGNOSIS — R63.8 WEIGHT DISORDER: ICD-10-CM

## 2023-10-02 DIAGNOSIS — Z00.00 MEDICARE ANNUAL WELLNESS VISIT, SUBSEQUENT: ICD-10-CM

## 2023-10-02 PROCEDURE — 90694 VACC AIIV4 NO PRSRV 0.5ML IM: CPT | Performed by: INTERNAL MEDICINE

## 2023-10-02 PROCEDURE — G8484 FLU IMMUNIZE NO ADMIN: HCPCS | Performed by: INTERNAL MEDICINE

## 2023-10-02 PROCEDURE — G0439 PPPS, SUBSEQ VISIT: HCPCS | Performed by: INTERNAL MEDICINE

## 2023-10-02 PROCEDURE — G0008 ADMIN INFLUENZA VIRUS VAC: HCPCS | Performed by: INTERNAL MEDICINE

## 2023-10-02 PROCEDURE — G8399 PT W/DXA RESULTS DOCUMENT: HCPCS | Performed by: INTERNAL MEDICINE

## 2023-10-02 PROCEDURE — G9899 SCRN MAM PERF RSLTS DOC: HCPCS | Performed by: INTERNAL MEDICINE

## 2023-10-02 PROCEDURE — 3074F SYST BP LT 130 MM HG: CPT | Performed by: INTERNAL MEDICINE

## 2023-10-02 PROCEDURE — 3017F COLORECTAL CA SCREEN DOC REV: CPT | Performed by: INTERNAL MEDICINE

## 2023-10-02 PROCEDURE — 1090F PRES/ABSN URINE INCON ASSESS: CPT | Performed by: INTERNAL MEDICINE

## 2023-10-02 PROCEDURE — 1036F TOBACCO NON-USER: CPT | Performed by: INTERNAL MEDICINE

## 2023-10-02 PROCEDURE — G8417 CALC BMI ABV UP PARAM F/U: HCPCS | Performed by: INTERNAL MEDICINE

## 2023-10-02 PROCEDURE — 1123F ACP DISCUSS/DSCN MKR DOCD: CPT | Performed by: INTERNAL MEDICINE

## 2023-10-02 PROCEDURE — 3078F DIAST BP <80 MM HG: CPT | Performed by: INTERNAL MEDICINE

## 2023-10-02 PROCEDURE — 99213 OFFICE O/P EST LOW 20 MIN: CPT | Performed by: INTERNAL MEDICINE

## 2023-10-02 PROCEDURE — G8427 DOCREV CUR MEDS BY ELIG CLIN: HCPCS | Performed by: INTERNAL MEDICINE

## 2023-10-02 RX ORDER — NIFEDIPINE 60 MG/1
60 TABLET, EXTENDED RELEASE ORAL DAILY
Qty: 90 TABLET | Refills: 3 | Status: SHIPPED | OUTPATIENT
Start: 2023-10-02

## 2023-10-02 RX ORDER — LABETALOL 300 MG/1
300 TABLET, FILM COATED ORAL 2 TIMES DAILY
Qty: 180 TABLET | Refills: 3 | Status: SHIPPED | OUTPATIENT
Start: 2023-10-02

## 2023-10-02 RX ORDER — ATORVASTATIN CALCIUM 20 MG/1
20 TABLET, FILM COATED ORAL DAILY
Qty: 90 TABLET | Refills: 3 | Status: SHIPPED | OUTPATIENT
Start: 2023-10-02

## 2023-10-02 ASSESSMENT — ENCOUNTER SYMPTOMS
DIARRHEA: 0
BLOOD IN STOOL: 0
CHOKING: 0
VOICE CHANGE: 0
CONSTIPATION: 0
FACIAL SWELLING: 0
ABDOMINAL DISTENTION: 0
CHEST TIGHTNESS: 0

## 2023-10-02 NOTE — PROGRESS NOTES
Medication Sig Taking? Authorizing Provider   zolpidem (AMBIEN) 5 MG tablet Take 1 tablet by mouth nightly as needed for Sleep for up to 90 days. Yes Cherrie Hammond MD   NIFEdipine (PROCARDIA XL) 60 MG extended release tablet Take 1 tablet by mouth daily Yes Cherrie Hammond MD   labetalol (NORMODYNE) 300 MG tablet Take 1 tablet by mouth 2 times daily Yes Cherrie Hammond MD   levothyroxine (SYNTHROID) 25 MCG tablet Take 1 tablet by mouth daily Yes Historical Provider, MD   NEXIUM 40 MG delayed release capsule Take 1 capsule by mouth in the morning and at bedtime Yes Cherrie Hammond MD   atorvastatin (LIPITOR) 20 MG tablet Take 1 tablet by mouth daily Yes Cherrie Hammond MD   fluticasone (FLONASE) 50 MCG/ACT nasal spray SHAKE LIQUID AND USE 2 SPRAYS IN EACH NOSTRIL DAILY Yes Cherrie Hammond MD   ferrous sulfate (IRON 325) 325 (65 Fe) MG tablet Take 1 tablet by mouth daily (with breakfast) Yes Cherrie Hammond MD   magnesium oxide (MAG-OX) 400 (240 Mg) MG tablet Take 1 tablet by mouth daily Yes Rhona Huynh MD   albuterol sulfate HFA (PROAIR HFA) 108 (90 Base) MCG/ACT inhaler Inhale 2 puffs into the lungs every 6 hours as needed for Wheezing or Shortness of Breath Yes Ashley Winn MD   Budeson-Glycopyrrol-Formoterol (BREZTRI AEROSPHERE) 160-9-4.8 MCG/ACT AERO Inhale 2 puffs into the lungs in the morning and at bedtime Yes Ashley Winn MD   Caltrate 600+D Plus Minerals (CALTRATE) 600-800 MG-UNIT TABS tablet Take 1 tablet by mouth in the morning and 1 tablet before bedtime.  Yes Cherrie Hammond MD   vitamin D (ERGOCALCIFEROL) 1.25 MG (05141 UT) CAPS capsule Take 1 capsule by mouth once a week Yes Historical Provider, MD   acetaminophen (TYLENOL) 500 MG tablet Take 1 tablet by mouth 4 times daily as needed for Pain  RIVERA Garcia - CNP       CareTeam (Including outside providers/suppliers regularly involved in providing care):   Patient Care Team:  Cherrie Hammond MD as PCP -

## 2023-10-04 ENCOUNTER — OFFICE VISIT (OUTPATIENT)
Dept: ENT CLINIC | Age: 68
End: 2023-10-04
Payer: MEDICARE

## 2023-10-04 VITALS
DIASTOLIC BLOOD PRESSURE: 84 MMHG | SYSTOLIC BLOOD PRESSURE: 125 MMHG | WEIGHT: 233 LBS | TEMPERATURE: 98.7 F | OXYGEN SATURATION: 98 % | BODY MASS INDEX: 36.57 KG/M2 | HEIGHT: 67 IN | HEART RATE: 68 BPM

## 2023-10-04 DIAGNOSIS — M26.629 TMJ SYNDROME: ICD-10-CM

## 2023-10-04 DIAGNOSIS — J01.90 ACUTE RHINOSINUSITIS: Primary | ICD-10-CM

## 2023-10-04 DIAGNOSIS — J30.1 NON-SEASONAL ALLERGIC RHINITIS DUE TO POLLEN: Chronic | ICD-10-CM

## 2023-10-04 PROCEDURE — G8427 DOCREV CUR MEDS BY ELIG CLIN: HCPCS | Performed by: OTOLARYNGOLOGY

## 2023-10-04 PROCEDURE — 3078F DIAST BP <80 MM HG: CPT | Performed by: OTOLARYNGOLOGY

## 2023-10-04 PROCEDURE — 99213 OFFICE O/P EST LOW 20 MIN: CPT | Performed by: OTOLARYNGOLOGY

## 2023-10-04 PROCEDURE — 1090F PRES/ABSN URINE INCON ASSESS: CPT | Performed by: OTOLARYNGOLOGY

## 2023-10-04 PROCEDURE — 3017F COLORECTAL CA SCREEN DOC REV: CPT | Performed by: OTOLARYNGOLOGY

## 2023-10-04 PROCEDURE — 1123F ACP DISCUSS/DSCN MKR DOCD: CPT | Performed by: OTOLARYNGOLOGY

## 2023-10-04 PROCEDURE — G9899 SCRN MAM PERF RSLTS DOC: HCPCS | Performed by: OTOLARYNGOLOGY

## 2023-10-04 PROCEDURE — 3074F SYST BP LT 130 MM HG: CPT | Performed by: OTOLARYNGOLOGY

## 2023-10-04 PROCEDURE — G8484 FLU IMMUNIZE NO ADMIN: HCPCS | Performed by: OTOLARYNGOLOGY

## 2023-10-04 PROCEDURE — G8399 PT W/DXA RESULTS DOCUMENT: HCPCS | Performed by: OTOLARYNGOLOGY

## 2023-10-04 PROCEDURE — 1036F TOBACCO NON-USER: CPT | Performed by: OTOLARYNGOLOGY

## 2023-10-04 PROCEDURE — G8417 CALC BMI ABV UP PARAM F/U: HCPCS | Performed by: OTOLARYNGOLOGY

## 2023-10-04 RX ORDER — LORATADINE 10 MG/1
10 TABLET ORAL DAILY
Qty: 30 TABLET | Refills: 1 | Status: SHIPPED | OUTPATIENT
Start: 2023-10-04 | End: 2023-12-03

## 2023-10-04 RX ORDER — TIZANIDINE 4 MG/1
TABLET ORAL
Qty: 60 TABLET | Refills: 0 | Status: SHIPPED | OUTPATIENT
Start: 2023-10-04

## 2023-10-04 RX ORDER — AZITHROMYCIN 250 MG/1
TABLET, FILM COATED ORAL
Qty: 2 PACKET | Refills: 0 | Status: SHIPPED | OUTPATIENT
Start: 2023-10-04

## 2023-10-04 NOTE — PATIENT INSTRUCTIONS
RHINOSINUSITIS CARE  You may use acetaminophen (eg. Tylenol) or Ibuprofen (eg. Advil) (over the counter medications) as needed for fever or pain. Take Probiotic while you are taking antibiotics, to prevent diarrhea, stomach upset, pseudomembranous colitis, and C. difficile diarrhea. This may be obtained at your pharmacy or health food store. Alternatively, you may eat one cup of yogurt with active or live cultures twice daily while taking the antibiotic. Continue for two to three days after completion of the antibiotic. Use a 12 hour decongestant spray, 0.05% oxymetazoline (e.g. Afrin, Duration, 4-Way). Spray each nostril twice three times a day for three days, then two times a day for 2 days, and then stop for two days and then repeat the cycle once. Take one Mucinex (blue box) maximum strength 1200 mg tablet every 12 hours, daily, for the next ten days. A steam inhaler mask device may be helpful. These can be purchased at your pharmacy. Use a saline nasal/sinus irrigation system, e.g. NeilMed sinus rinse, twice daily to help to clear secretions and drainage. Use distilled water; DO NOT USE TAP WATER! This is because of the possibility of amoeba or other microorganisms in tap water, which can result in a fatal disease. Alternatively, you could use a blue bulb syringe and solution of 1/4 tsp of table salt in 8 ounces (one cup or 240 ml) of distilled water. Use fluticasone 2 sprays in each nostril once daily for the next 14 days. It may take several days to several weeks for your sinusitis to clear up. It is important to take all your medications as prescribed. Please continue your antibiotics as prescribed.     Please call the office if your condition worsens or if symptoms persist after treatment is completed.        ===================================================================      ADVERSE AND SIDE EFFECTS OF MEDICATIONS:    Please be aware of the following possible adverse reactions, side

## 2023-10-04 NOTE — PROGRESS NOTES
Chief Complaint   Patient presents with    Sinusitis    Allergic Rhinitis         HISTORY OF PRESENT ILLNESS    Vinod Preciado is a 79 y.o. female. Here or recheck of acute rhinosinusitis and allergies. \"I can't hear in both ears. I need my TV on 30 and normally I use it on 24. \"  Pain in front of the left ear and spasms in the jaw muscle, for about 6 weeks. Nose gets clogged up and when I blow it it's kind of greenish drainage. Pain in nose at the nasion level between the eyes. Did not get the TV Pixie Chi too expensive. REVIEW OF SYSTEMS  Constitutional:  Denied fever. ENT/sinus:  Denied otalgia, otorrhea, nasal pain, rhinorrhea, sore throat, and sinus/facial pain. EXAMINATION    WDWN, NAD  (+)  Face:  TMJs tender. Voice:  normal.  (+)  Ears:  cerumen accumulation removed with wire loop bilaterally. TMs non-erythematous, dull, and thick, with normal pneumatic mobility and no evidence of ZAIDA. The EACs, mastoids and pinnae were normal.    (+)  Nose:  yellowish purulent secretions on the right side. Normal with no lesions. Sinuses:  NT x 4   (+)  Throat,  OC/ OP:  Post tonsillectomy, and otherwise, normal with no lesions. Neck:  NT, No masses. Trachea midline. Nodes:  No lymphadenopathy. IMPRESSION / David Fernando / Darrius Fernando:   Chase Hickman was seen today for sinusitis and allergic rhinitis . Diagnoses and all orders for this visit:    Acute rhinosinusitis  -     azithromycin (ZITHROMAX Z-VELMA) 250 MG tablet; Take by mouth: 2 tablets on day 1, then 1 tab on day 2 to 5. Start 2nd velma on day 10. Take 2 tablets on day 10, then 1 tab on day 11 to 15    TMJ syndrome    Non-seasonal allergic rhinitis due to pollen  -     loratadine (CLARITIN) 10 MG tablet; Take 1 tablet by mouth daily         RECOMMENDATIONS / PLAN:   See Patient Instructions on file for this visit, which were discussed with the patient.    Return if symptoms worsen or fail to clear up with treatment, or, for any further ENT or

## 2023-10-17 ENCOUNTER — NURSE ONLY (OUTPATIENT)
Dept: PRIMARY CARE CLINIC | Age: 68
End: 2023-10-17
Payer: MEDICARE

## 2023-10-17 DIAGNOSIS — E53.8 B12 DEFICIENCY: Primary | ICD-10-CM

## 2023-10-17 PROCEDURE — 96372 THER/PROPH/DIAG INJ SC/IM: CPT | Performed by: INTERNAL MEDICINE

## 2023-10-17 RX ORDER — CYANOCOBALAMIN 1000 UG/ML
1000 INJECTION, SOLUTION INTRAMUSCULAR; SUBCUTANEOUS ONCE
Status: COMPLETED | OUTPATIENT
Start: 2023-10-17 | End: 2023-10-17

## 2023-10-17 RX ADMIN — CYANOCOBALAMIN 1000 MCG: 1000 INJECTION, SOLUTION INTRAMUSCULAR; SUBCUTANEOUS at 11:53

## 2023-10-17 NOTE — PROGRESS NOTES
After obtaining consent, and per orders of Dr. Iza Shaw, injection of vit b12 given in Right arm by Kary Richardson MA. Patient instructed to remain in clinic for 20 minutes afterwards, and to report any adverse reaction to me immediately.

## 2023-10-24 ENCOUNTER — TELEPHONE (OUTPATIENT)
Dept: ENT CLINIC | Age: 68
End: 2023-10-24

## 2023-10-24 NOTE — TELEPHONE ENCOUNTER
Pt is calling to let Dr. Moreno Thompson know that the antibiotic he prescribed is not working. She wants to come in to see him but cannot make today work. She states she can't wait until November to be seen.

## 2023-10-25 ENCOUNTER — OFFICE VISIT (OUTPATIENT)
Dept: ENT CLINIC | Age: 68
End: 2023-10-25
Payer: MEDICARE

## 2023-10-25 VITALS
BODY MASS INDEX: 36.57 KG/M2 | HEIGHT: 67 IN | TEMPERATURE: 97.7 F | OXYGEN SATURATION: 98 % | DIASTOLIC BLOOD PRESSURE: 82 MMHG | HEART RATE: 73 BPM | WEIGHT: 233 LBS | SYSTOLIC BLOOD PRESSURE: 123 MMHG

## 2023-10-25 DIAGNOSIS — J32.1 CHRONIC FRONTAL SINUSITIS: Chronic | ICD-10-CM

## 2023-10-25 DIAGNOSIS — J32.0 CHRONIC MAXILLARY SINUSITIS: Primary | Chronic | ICD-10-CM

## 2023-10-25 DIAGNOSIS — Z86.018 HISTORY OF INVERTED PAPILLOMA: Chronic | ICD-10-CM

## 2023-10-25 PROCEDURE — G8484 FLU IMMUNIZE NO ADMIN: HCPCS | Performed by: OTOLARYNGOLOGY

## 2023-10-25 PROCEDURE — G9899 SCRN MAM PERF RSLTS DOC: HCPCS | Performed by: OTOLARYNGOLOGY

## 2023-10-25 PROCEDURE — 1090F PRES/ABSN URINE INCON ASSESS: CPT | Performed by: OTOLARYNGOLOGY

## 2023-10-25 PROCEDURE — 1036F TOBACCO NON-USER: CPT | Performed by: OTOLARYNGOLOGY

## 2023-10-25 PROCEDURE — G8399 PT W/DXA RESULTS DOCUMENT: HCPCS | Performed by: OTOLARYNGOLOGY

## 2023-10-25 PROCEDURE — 3017F COLORECTAL CA SCREEN DOC REV: CPT | Performed by: OTOLARYNGOLOGY

## 2023-10-25 PROCEDURE — 3078F DIAST BP <80 MM HG: CPT | Performed by: OTOLARYNGOLOGY

## 2023-10-25 PROCEDURE — G8417 CALC BMI ABV UP PARAM F/U: HCPCS | Performed by: OTOLARYNGOLOGY

## 2023-10-25 PROCEDURE — 99214 OFFICE O/P EST MOD 30 MIN: CPT | Performed by: OTOLARYNGOLOGY

## 2023-10-25 PROCEDURE — G8428 CUR MEDS NOT DOCUMENT: HCPCS | Performed by: OTOLARYNGOLOGY

## 2023-10-25 PROCEDURE — 3074F SYST BP LT 130 MM HG: CPT | Performed by: OTOLARYNGOLOGY

## 2023-10-25 PROCEDURE — 1123F ACP DISCUSS/DSCN MKR DOCD: CPT | Performed by: OTOLARYNGOLOGY

## 2023-10-25 RX ORDER — DOXYCYCLINE HYCLATE 100 MG
100 TABLET ORAL 2 TIMES DAILY
Qty: 42 TABLET | Refills: 0 | Status: SHIPPED | OUTPATIENT
Start: 2023-10-25 | End: 2023-11-15

## 2023-10-25 RX ORDER — METHYLPREDNISOLONE 4 MG/1
TABLET ORAL
Qty: 1 KIT | Refills: 0 | Status: SHIPPED | OUTPATIENT
Start: 2023-10-25

## 2023-10-25 NOTE — PROGRESS NOTES
Chief Complaint   Patient presents with    Sinusitis        HISTORY OF PRESENT ILLNESS    Cheryle Sers is a 79 y.o. female here for recheck and follow up of sinusitis. Patient stated that she was clear of symptoms at 3 days after previous antibiotic was finished. Then had headaches between eyes, then vision blurry. Tylenol did not help, coughing and blowing nose and nasal drainage. Then ear left ear started hurting in front of ear, then hard to breathe through nose at night and snoring real bad. Can't brathe and have to open window to get air to breathe. Started sweating. Clogged and stuffy feeling mostly on the right side. REVIEW OF SYSTEMS  Constitutional:  Denied fever and chills. ENT/sinus:  Denied otalgia, otorrhea, nasal pain, rhinorrhea, sore throat, and sinus/facial pain. EXAMINATION    WDWN, NAD  Voice:  Normal.  Ears:  TMs, EACs, mastoids and pinnae were normal.    Nose:  Normal with no lesions. Sinuses:  Tender right maxillary and frontal sinuses. Left maxillary and frontal nontender. Throat,  OC/ OP:  Normal with no lesions. Neck:  NT, No masses. Trachea midline. Nodes:  No lymphadenopathy. IMPRESSION / Chica Cano / [place] Page:   Maeve Moran was seen today for sinusitis. Diagnoses and all orders for this visit:    Chronic maxillary sinusitis  -     doxycycline hyclate (VIBRA-TABS) 100 MG tablet; Take 1 tablet by mouth 2 times daily for 21 days  -     methylPREDNISolone (MEDROL DOSEPACK) 4 MG tablet; Take by mouth, as directed by package instructions. -     CT SINUS FOR IMAGE GUIDANCE; Future    Chronic frontal sinusitis  -     doxycycline hyclate (VIBRA-TABS) 100 MG tablet; Take 1 tablet by mouth 2 times daily for 21 days  -     methylPREDNISolone (MEDROL DOSEPACK) 4 MG tablet; Take by mouth, as directed by package instructions. -     CT SINUS FOR IMAGE GUIDANCE; Future    History of inverted papilloma  -     CT SINUS FOR IMAGE GUIDANCE;  Future         RECOMMENDATIONS /

## 2023-11-06 ENCOUNTER — TELEPHONE (OUTPATIENT)
Dept: ENT CLINIC | Age: 68
End: 2023-11-06

## 2023-11-06 NOTE — TELEPHONE ENCOUNTER
Pt.stated her right side still congested with pain she is still having headaches she is finishing up the antibiotics she states for some reason she is not clearing up.

## 2023-11-14 ENCOUNTER — HOSPITAL ENCOUNTER (OUTPATIENT)
Dept: CT IMAGING | Age: 68
Discharge: HOME | End: 2023-11-14
Payer: MEDICARE

## 2023-11-14 DIAGNOSIS — Z86.018 HISTORY OF INVERTED PAPILLOMA: Chronic | ICD-10-CM

## 2023-11-14 DIAGNOSIS — J32.0 CHRONIC MAXILLARY SINUSITIS: Chronic | ICD-10-CM

## 2023-11-14 DIAGNOSIS — J32.1 CHRONIC FRONTAL SINUSITIS: Chronic | ICD-10-CM

## 2023-11-14 PROCEDURE — 70486 CT MAXILLOFACIAL W/O DYE: CPT

## 2023-11-15 ENCOUNTER — OFFICE VISIT (OUTPATIENT)
Dept: PULMONOLOGY | Age: 68
End: 2023-11-15
Payer: MEDICARE

## 2023-11-15 VITALS
SYSTOLIC BLOOD PRESSURE: 112 MMHG | BODY MASS INDEX: 36.63 KG/M2 | TEMPERATURE: 97.6 F | RESPIRATION RATE: 18 BRPM | HEART RATE: 62 BPM | OXYGEN SATURATION: 99 % | DIASTOLIC BLOOD PRESSURE: 64 MMHG | HEIGHT: 67 IN | WEIGHT: 233.4 LBS

## 2023-11-15 DIAGNOSIS — R91.1 PULMONARY NODULE SEEN ON IMAGING STUDY: Primary | ICD-10-CM

## 2023-11-15 DIAGNOSIS — J45.30 MILD PERSISTENT ASTHMA WITHOUT COMPLICATION: ICD-10-CM

## 2023-11-15 DIAGNOSIS — J30.9 ALLERGIC SINUSITIS: ICD-10-CM

## 2023-11-15 PROCEDURE — G8417 CALC BMI ABV UP PARAM F/U: HCPCS | Performed by: INTERNAL MEDICINE

## 2023-11-15 PROCEDURE — 99214 OFFICE O/P EST MOD 30 MIN: CPT | Performed by: INTERNAL MEDICINE

## 2023-11-15 PROCEDURE — 3017F COLORECTAL CA SCREEN DOC REV: CPT | Performed by: INTERNAL MEDICINE

## 2023-11-15 PROCEDURE — 1123F ACP DISCUSS/DSCN MKR DOCD: CPT | Performed by: INTERNAL MEDICINE

## 2023-11-15 PROCEDURE — G9899 SCRN MAM PERF RSLTS DOC: HCPCS | Performed by: INTERNAL MEDICINE

## 2023-11-15 PROCEDURE — G8484 FLU IMMUNIZE NO ADMIN: HCPCS | Performed by: INTERNAL MEDICINE

## 2023-11-15 PROCEDURE — 1090F PRES/ABSN URINE INCON ASSESS: CPT | Performed by: INTERNAL MEDICINE

## 2023-11-15 PROCEDURE — 3074F SYST BP LT 130 MM HG: CPT | Performed by: INTERNAL MEDICINE

## 2023-11-15 PROCEDURE — G8427 DOCREV CUR MEDS BY ELIG CLIN: HCPCS | Performed by: INTERNAL MEDICINE

## 2023-11-15 PROCEDURE — G8399 PT W/DXA RESULTS DOCUMENT: HCPCS | Performed by: INTERNAL MEDICINE

## 2023-11-15 PROCEDURE — 3078F DIAST BP <80 MM HG: CPT | Performed by: INTERNAL MEDICINE

## 2023-11-15 PROCEDURE — 1036F TOBACCO NON-USER: CPT | Performed by: INTERNAL MEDICINE

## 2023-11-15 RX ORDER — ALBUTEROL SULFATE 90 UG/1
2 AEROSOL, METERED RESPIRATORY (INHALATION) EVERY 6 HOURS PRN
Qty: 18 G | Refills: 11 | Status: SHIPPED | OUTPATIENT
Start: 2023-11-15

## 2023-11-15 RX ORDER — BUDESONIDE, GLYCOPYRROLATE, AND FORMOTEROL FUMARATE 160; 9; 4.8 UG/1; UG/1; UG/1
2 AEROSOL, METERED RESPIRATORY (INHALATION) 2 TIMES DAILY
Qty: 1 EACH | Refills: 11 | Status: SHIPPED | OUTPATIENT
Start: 2023-11-15

## 2023-11-15 ASSESSMENT — ASTHMA QUESTIONNAIRES
QUESTION_2 LAST FOUR WEEKS HOW OFTEN HAVE YOU HAD SHORTNESS OF BREATH: 3
QUESTION_4 LAST FOUR WEEKS HOW OFTEN HAVE YOU USED YOUR RESCUE INHALER OR NEBULIZER MEDICATION (SUCH AS ALBUTEROL): 3
QUESTION_1 LAST FOUR WEEKS HOW MUCH OF THE TIME DID YOUR ASTHMA KEEP YOU FROM GETTING AS MUCH DONE AT WORK, SCHOOL OR AT HOME: 3
ACT_TOTALSCORE: 15
QUESTION_5 LAST FOUR WEEKS HOW WOULD YOU RATE YOUR ASTHMA CONTROL: 3
QUESTION_3 LAST FOUR WEEKS HOW OFTEN DID YOUR ASTHMA SYMPTOMS (WHEEZING, COUGHING, SHORTNESS OF BREATH, CHEST TIGHTNESS OR PAIN) WAKE YOU UP AT NIGHT OR EARLIER THAN USUAL IN THE MORNING: 3

## 2023-11-15 NOTE — ASSESSMENT & PLAN NOTE
Patient is having increasing symptoms of cough. Unfortunately she is not using her Breztri as prescribed. She was advised to step up to using it 2 puffs twice daily. Cough may also be secondary to allergic rhinitis. Following up with ENT soon.

## 2023-11-21 ENCOUNTER — TELEPHONE (OUTPATIENT)
Dept: PRIMARY CARE CLINIC | Age: 68
End: 2023-11-21

## 2023-11-21 ENCOUNTER — NURSE ONLY (OUTPATIENT)
Dept: PRIMARY CARE CLINIC | Age: 68
End: 2023-11-21

## 2023-11-21 DIAGNOSIS — E53.8 B12 DEFICIENCY: Primary | ICD-10-CM

## 2023-11-21 RX ORDER — CYANOCOBALAMIN 1000 UG/ML
1000 INJECTION, SOLUTION INTRAMUSCULAR; SUBCUTANEOUS ONCE
Status: COMPLETED | OUTPATIENT
Start: 2023-11-21 | End: 2023-11-21

## 2023-11-21 RX ADMIN — CYANOCOBALAMIN 1000 MCG: 1000 INJECTION, SOLUTION INTRAMUSCULAR; SUBCUTANEOUS at 16:30

## 2023-11-21 NOTE — TELEPHONE ENCOUNTER
Muscle spasm in jaw. He would prescribe a muscle relaxer but the one he usually uses she is allergic to one of the ingredients in it. Would like for you to prescribe her something. She is coming for a B12 shot today at 1:30.

## 2023-11-21 NOTE — PROGRESS NOTES
After obtaining consent, and per orders of Dr. Adriana Sanford, injection of vit b12 given in Right arm by Pramod Benitez MA. Patient instructed to remain in clinic for 20 minutes afterwards, and to report any adverse reaction to me immediately.

## 2023-11-25 DIAGNOSIS — I10 ESSENTIAL HYPERTENSION: ICD-10-CM

## 2023-11-27 ENCOUNTER — OFFICE VISIT (OUTPATIENT)
Dept: ENT CLINIC | Age: 68
End: 2023-11-27
Payer: MEDICARE

## 2023-11-27 VITALS
TEMPERATURE: 97.6 F | OXYGEN SATURATION: 98 % | WEIGHT: 232 LBS | SYSTOLIC BLOOD PRESSURE: 108 MMHG | HEART RATE: 69 BPM | BODY MASS INDEX: 36.41 KG/M2 | DIASTOLIC BLOOD PRESSURE: 68 MMHG | HEIGHT: 67 IN

## 2023-11-27 DIAGNOSIS — J34.3 NASAL TURBINATE HYPERTROPHY: Primary | Chronic | ICD-10-CM

## 2023-11-27 DIAGNOSIS — J34.89 NASAL OBSTRUCTION: Chronic | ICD-10-CM

## 2023-11-27 DIAGNOSIS — Z86.018 HISTORY OF INVERTED PAPILLOMA: Chronic | ICD-10-CM

## 2023-11-27 PROCEDURE — 1123F ACP DISCUSS/DSCN MKR DOCD: CPT | Performed by: OTOLARYNGOLOGY

## 2023-11-27 PROCEDURE — G9899 SCRN MAM PERF RSLTS DOC: HCPCS | Performed by: OTOLARYNGOLOGY

## 2023-11-27 PROCEDURE — 3074F SYST BP LT 130 MM HG: CPT | Performed by: OTOLARYNGOLOGY

## 2023-11-27 PROCEDURE — 99214 OFFICE O/P EST MOD 30 MIN: CPT | Performed by: OTOLARYNGOLOGY

## 2023-11-27 PROCEDURE — G8417 CALC BMI ABV UP PARAM F/U: HCPCS | Performed by: OTOLARYNGOLOGY

## 2023-11-27 PROCEDURE — 3017F COLORECTAL CA SCREEN DOC REV: CPT | Performed by: OTOLARYNGOLOGY

## 2023-11-27 PROCEDURE — 1036F TOBACCO NON-USER: CPT | Performed by: OTOLARYNGOLOGY

## 2023-11-27 PROCEDURE — G8428 CUR MEDS NOT DOCUMENT: HCPCS | Performed by: OTOLARYNGOLOGY

## 2023-11-27 PROCEDURE — G8484 FLU IMMUNIZE NO ADMIN: HCPCS | Performed by: OTOLARYNGOLOGY

## 2023-11-27 PROCEDURE — G8399 PT W/DXA RESULTS DOCUMENT: HCPCS | Performed by: OTOLARYNGOLOGY

## 2023-11-27 PROCEDURE — 1090F PRES/ABSN URINE INCON ASSESS: CPT | Performed by: OTOLARYNGOLOGY

## 2023-11-27 PROCEDURE — 3078F DIAST BP <80 MM HG: CPT | Performed by: OTOLARYNGOLOGY

## 2023-11-27 RX ORDER — NIFEDIPINE 90 MG/1
90 TABLET, EXTENDED RELEASE ORAL DAILY
Qty: 90 TABLET | Refills: 1 | OUTPATIENT
Start: 2023-11-27

## 2023-11-27 NOTE — PROGRESS NOTES
CHIEF COMPLAINT  Chief Complaint   Patient presents with    Nose Problem       HISTORY OF PRESENT ILLNESS    Lisandro Barnett is a 79 y.o. 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 Patient stated that the Left side of nose is clear but the right side is still stuffy and sore on the right side of the mid nose. Nose is runny on the right side. Hard to breath through her nose at night. REVIEW OF SYSTEMS  Constitutional:  sweats and chills. Denied fever   ENT/sinus:  Denied otalgia, otorrhea, nasal pain, rhinorrhea, sore throat, and sinus/facial pain. EXAMINATION    WDWN, NAD  Voice:  Normal.  Ears:  TMs, EACs, mastoids and pinnae were normal.    Nose:  Right ITH. Otherwise, normal with no lesions. Sinuses:  NT x 4   Throat,  OC/OP:  Normal with no lesions. Neck:  NT, No masses. Trachea midline. Nodes:  No lymphadenopathy. COUNSELING FOR NASAL OBSTRUCTION, TURBINATE HYPERTROPHY AND POSSIBLE NASAL TURBINATE REDUCTION SURGERY, PARTIAL TURBINECTOMY AND/OR CAUTERIZATION. I discussed at length the anatomic obstruction due to turbinate hypertrophy, septal spur, and congestion. I advised that in my opinion, the nasal airway obstruction, and resultant nasal dyspnea, is, in great part, due to these anatomic abnormalities and that surgery has a high likelihood of improving the nasal airway and ameliorating the nasal obstruction and dyspnea. Any problems due to allergic rhinitis will persist, however. Cayla was advised of the recommended surgery/procedure. Cayla stated the desire to proceed with surgery. Cayla chose general anesthesia over local anesthesia with IV sedation. Cayla understands this will be done under general anesthesia. The surgical procedure was described. I discussed the surgical technique and the usual post operative course.   I discussed the possibility of persistent or recurrent septal deviation, turbinate

## 2023-12-06 ENCOUNTER — TELEPHONE (OUTPATIENT)
Dept: PRIMARY CARE CLINIC | Age: 68
End: 2023-12-06

## 2023-12-06 NOTE — TELEPHONE ENCOUNTER
Dr. Cope called concerning patient having pain in her TMJ. He says as she has several allergies, he is unable to use the muscle relaxant that he normally uses. He would like to know if there is something that she would recommend. He'd like Dr. Richardson to call him to discuss this. Please advise ASAP

## 2023-12-14 ENCOUNTER — TELEPHONE (OUTPATIENT)
Dept: PRIMARY CARE CLINIC | Age: 68
End: 2023-12-14

## 2023-12-14 NOTE — TELEPHONE ENCOUNTER
Pt' has a pre-op schedule for 12/21 for her upcoming surgery. She is experiencing congestion, dizziness and headaches. Patient also has an yeast infection from an antibiotic that she was prescribed by other provider.     Patient is requesting medication to help yeast infection and congestion.      Pharmacy    [id number] - [address] - P [phone] - F [phone]

## 2023-12-15 RX ORDER — FLUCONAZOLE 100 MG/1
200 TABLET ORAL ONCE
Qty: 2 TABLET | Refills: 0 | Status: SHIPPED | OUTPATIENT
Start: 2023-12-15 | End: 2023-12-15

## 2024-01-01 ENCOUNTER — APPOINTMENT (OUTPATIENT)
Dept: CT IMAGING | Age: 69
End: 2024-01-01
Payer: MEDICARE

## 2024-01-01 ENCOUNTER — APPOINTMENT (OUTPATIENT)
Dept: GENERAL RADIOLOGY | Age: 69
DRG: 335 | End: 2024-01-01
Attending: SURGERY
Payer: MEDICARE

## 2024-01-01 ENCOUNTER — ANESTHESIA EVENT (OUTPATIENT)
Dept: OPERATING ROOM | Age: 69
DRG: 393 | End: 2024-01-01
Payer: MEDICARE

## 2024-01-01 ENCOUNTER — TELEPHONE (OUTPATIENT)
Dept: PRIMARY CARE CLINIC | Age: 69
End: 2024-01-01

## 2024-01-01 ENCOUNTER — HOSPITAL ENCOUNTER (EMERGENCY)
Age: 69
Discharge: ANOTHER HOSPITAL | End: 2024-07-29
Attending: EMERGENCY MEDICINE
Payer: MEDICARE

## 2024-01-01 ENCOUNTER — APPOINTMENT (OUTPATIENT)
Dept: GENERAL RADIOLOGY | Age: 69
End: 2024-01-01
Payer: MEDICARE

## 2024-01-01 ENCOUNTER — HOSPITAL ENCOUNTER (INPATIENT)
Age: 69
LOS: 1 days | DRG: 335 | End: 2024-07-30
Attending: SURGERY | Admitting: SURGERY
Payer: MEDICARE

## 2024-01-01 ENCOUNTER — APPOINTMENT (OUTPATIENT)
Dept: CT IMAGING | Age: 69
DRG: 335 | End: 2024-01-01
Attending: SURGERY
Payer: MEDICARE

## 2024-01-01 ENCOUNTER — ANESTHESIA (OUTPATIENT)
Dept: OPERATING ROOM | Age: 69
DRG: 393 | End: 2024-01-01
Payer: MEDICARE

## 2024-01-01 ENCOUNTER — APPOINTMENT (OUTPATIENT)
Age: 69
DRG: 335 | End: 2024-01-01
Attending: SURGERY
Payer: MEDICARE

## 2024-01-01 VITALS
SYSTOLIC BLOOD PRESSURE: 163 MMHG | RESPIRATION RATE: 18 BRPM | TEMPERATURE: 97.3 F | WEIGHT: 223 LBS | HEART RATE: 74 BPM | HEIGHT: 67 IN | BODY MASS INDEX: 35 KG/M2 | DIASTOLIC BLOOD PRESSURE: 90 MMHG | OXYGEN SATURATION: 96 %

## 2024-01-01 VITALS
SYSTOLIC BLOOD PRESSURE: 101 MMHG | DIASTOLIC BLOOD PRESSURE: 26 MMHG | TEMPERATURE: 98.4 F | HEIGHT: 67 IN | BODY MASS INDEX: 39 KG/M2 | RESPIRATION RATE: 26 BRPM | WEIGHT: 248.46 LBS

## 2024-01-01 DIAGNOSIS — K56.609 SBO (SMALL BOWEL OBSTRUCTION) (HCC): Primary | ICD-10-CM

## 2024-01-01 DIAGNOSIS — K45.8 INTERNAL HERNIA: ICD-10-CM

## 2024-01-01 DIAGNOSIS — R10.11 RUQ PAIN: ICD-10-CM

## 2024-01-01 DIAGNOSIS — I95.9 HYPOTENSION, UNSPECIFIED HYPOTENSION TYPE: Primary | ICD-10-CM

## 2024-01-01 LAB
ABO + RH BLD: NORMAL
ALBUMIN SERPL-MCNC: 1.7 G/DL (ref 3.4–5)
ALBUMIN SERPL-MCNC: 2.7 G/DL (ref 3.4–5)
ALBUMIN SERPL-MCNC: 2.8 G/DL (ref 3.4–5)
ALBUMIN SERPL-MCNC: 3.3 G/DL (ref 3.4–5)
ALBUMIN SERPL-MCNC: 4.3 G/DL (ref 3.4–5)
ALBUMIN SERPL-MCNC: 4.4 G/DL (ref 3.4–5)
ALBUMIN/GLOB SERPL: 1.1 {RATIO} (ref 1.1–2.2)
ALBUMIN/GLOB SERPL: 1.1 {RATIO} (ref 1.1–2.2)
ALBUMIN/GLOB SERPL: 1.2 {RATIO} (ref 1.1–2.2)
ALP SERPL-CCNC: 116 U/L (ref 40–129)
ALP SERPL-CCNC: 123 U/L (ref 40–129)
ALP SERPL-CCNC: 216 U/L (ref 40–129)
ALP SERPL-CCNC: 263 U/L (ref 40–129)
ALP SERPL-CCNC: 295 U/L (ref 40–129)
ALT SERPL-CCNC: 13 U/L (ref 10–40)
ALT SERPL-CCNC: 19 U/L (ref 10–40)
ALT SERPL-CCNC: 605 U/L (ref 10–40)
ALT SERPL-CCNC: 680 U/L (ref 10–40)
ALT SERPL-CCNC: 886 U/L (ref 10–40)
ANION GAP SERPL CALCULATED.3IONS-SCNC: 12 MMOL/L (ref 3–16)
ANION GAP SERPL CALCULATED.3IONS-SCNC: 16 MMOL/L (ref 3–16)
ANION GAP SERPL CALCULATED.3IONS-SCNC: 18 MMOL/L (ref 3–16)
ANION GAP SERPL CALCULATED.3IONS-SCNC: 23 MMOL/L (ref 3–16)
ANION GAP SERPL CALCULATED.3IONS-SCNC: 25 MMOL/L (ref 3–16)
ANION GAP SERPL CALCULATED.3IONS-SCNC: 31 MMOL/L (ref 3–16)
ANISOCYTOSIS BLD QL SMEAR: ABNORMAL
APTT BLD: 110.5 SEC (ref 22.1–36.4)
APTT BLD: 72.2 SEC (ref 22.1–36.4)
APTT BLD: 77.2 SEC (ref 22.1–36.4)
AST SERPL-CCNC: 1749 U/L (ref 15–37)
AST SERPL-CCNC: 1775 U/L (ref 15–37)
AST SERPL-CCNC: 21 U/L (ref 15–37)
AST SERPL-CCNC: 2417 U/L (ref 15–37)
AST SERPL-CCNC: 32 U/L (ref 15–37)
BASE EXCESS BLDA CALC-SCNC: -13 MMOL/L (ref -3–3)
BASE EXCESS BLDA CALC-SCNC: -14 MMOL/L (ref -3–3)
BASE EXCESS BLDA CALC-SCNC: -17 MMOL/L (ref -3–3)
BASE EXCESS BLDA CALC-SCNC: -19 MMOL/L (ref -3–3)
BASE EXCESS BLDA CALC-SCNC: -6 MMOL/L (ref -3–3)
BASE EXCESS BLDA CALC-SCNC: -7 MMOL/L (ref -3–3)
BASE EXCESS BLDA CALC-SCNC: -8 MMOL/L (ref -3–3)
BASE EXCESS BLDA CALC-SCNC: -9 MMOL/L (ref -3–3)
BASOPHILS # BLD: 0 K/UL (ref 0–0.2)
BASOPHILS NFR BLD: 0 %
BASOPHILS NFR BLD: 0.2 %
BASOPHILS NFR BLD: 0.3 %
BASOPHILS NFR BLD: 0.7 %
BILIRUB DIRECT SERPL-MCNC: 0.9 MG/DL (ref 0–0.3)
BILIRUB DIRECT SERPL-MCNC: 1 MG/DL (ref 0–0.3)
BILIRUB INDIRECT SERPL-MCNC: 0.4 MG/DL (ref 0–1)
BILIRUB INDIRECT SERPL-MCNC: 0.4 MG/DL (ref 0–1)
BILIRUB SERPL-MCNC: 0.3 MG/DL (ref 0–1)
BILIRUB SERPL-MCNC: 0.3 MG/DL (ref 0–1)
BILIRUB SERPL-MCNC: 1 MG/DL (ref 0–1)
BILIRUB SERPL-MCNC: 1.3 MG/DL (ref 0–1)
BILIRUB SERPL-MCNC: 1.4 MG/DL (ref 0–1)
BLD GP AB SCN SERPL QL: NORMAL
BLOOD BANK DISPENSE STATUS: NORMAL
BLOOD BANK PRODUCT CODE: NORMAL
BODY TEMPERATURE: 36.7
BPU ID: NORMAL
BUN SERPL-MCNC: 12 MG/DL (ref 7–20)
BUN SERPL-MCNC: 13 MG/DL (ref 7–20)
BUN SERPL-MCNC: 17 MG/DL (ref 7–20)
BURR CELLS BLD QL SMEAR: ABNORMAL
BURR CELLS BLD QL SMEAR: ABNORMAL
CA-I BLD-SCNC: 0.91 MMOL/L (ref 1.12–1.32)
CA-I BLD-SCNC: 0.98 MMOL/L (ref 1.12–1.32)
CA-I BLD-SCNC: 1.05 MMOL/L (ref 1.12–1.32)
CA-I BLD-SCNC: 1.07 MMOL/L (ref 1.12–1.32)
CA-I BLD-SCNC: 1.14 MMOL/L (ref 1.12–1.32)
CA-I BLD-SCNC: 1.18 MMOL/L (ref 1.12–1.32)
CALCIUM SERPL-MCNC: 10.1 MG/DL (ref 8.3–10.6)
CALCIUM SERPL-MCNC: 10.7 MG/DL (ref 8.3–10.6)
CALCIUM SERPL-MCNC: 7 MG/DL (ref 8.3–10.6)
CALCIUM SERPL-MCNC: 8 MG/DL (ref 8.3–10.6)
CALCIUM SERPL-MCNC: 8.7 MG/DL (ref 8.3–10.6)
CALCIUM SERPL-MCNC: 9.2 MG/DL (ref 8.3–10.6)
CHLORIDE SERPL-SCNC: 102 MMOL/L (ref 99–110)
CHLORIDE SERPL-SCNC: 104 MMOL/L (ref 99–110)
CHLORIDE SERPL-SCNC: 106 MMOL/L (ref 99–110)
CHLORIDE SERPL-SCNC: 108 MMOL/L (ref 99–110)
CHOLEST SERPL-MCNC: 180 MG/DL (ref 0–199)
CO2 BLDA-SCNC: 11 MMOL/L
CO2 BLDA-SCNC: 12 MMOL/L
CO2 BLDA-SCNC: 15 MMOL/L
CO2 BLDA-SCNC: 17 MMOL/L
CO2 BLDA-SCNC: 19 MMOL/L
CO2 BLDA-SCNC: 20 MMOL/L
CO2 BLDA-SCNC: 20 MMOL/L
CO2 BLDA-SCNC: 22 MMOL/L
CO2 SERPL-SCNC: 11 MMOL/L (ref 21–32)
CO2 SERPL-SCNC: 15 MMOL/L (ref 21–32)
CO2 SERPL-SCNC: 15 MMOL/L (ref 21–32)
CO2 SERPL-SCNC: 16 MMOL/L (ref 21–32)
CO2 SERPL-SCNC: 21 MMOL/L (ref 21–32)
CO2 SERPL-SCNC: 22 MMOL/L (ref 21–32)
COHGB MFR BLDA: 0.8 % (ref 0–1.5)
CREAT SERPL-MCNC: 1.2 MG/DL (ref 0.6–1.2)
CREAT SERPL-MCNC: 1.3 MG/DL (ref 0.6–1.2)
CREAT SERPL-MCNC: 1.5 MG/DL (ref 0.6–1.2)
CREAT SERPL-MCNC: 1.7 MG/DL (ref 0.6–1.2)
CREAT SERPL-MCNC: 1.7 MG/DL (ref 0.6–1.2)
CREAT SERPL-MCNC: 2.3 MG/DL (ref 0.6–1.2)
DEPRECATED RDW RBC AUTO: 14 % (ref 12.4–15.4)
DEPRECATED RDW RBC AUTO: 14 % (ref 12.4–15.4)
DEPRECATED RDW RBC AUTO: 14.4 % (ref 12.4–15.4)
DEPRECATED RDW RBC AUTO: 14.6 % (ref 12.4–15.4)
DEPRECATED RDW RBC AUTO: 15 % (ref 12.4–15.4)
DESCRIPTION BLOOD BANK: NORMAL
ECHO BSA: 2.19 M2
EOSINOPHIL # BLD: 0 K/UL (ref 0–0.6)
EOSINOPHIL # BLD: 0.1 K/UL (ref 0–0.6)
EOSINOPHIL # BLD: 0.2 K/UL (ref 0–0.6)
EOSINOPHIL NFR BLD: 0 %
EOSINOPHIL NFR BLD: 0 %
EOSINOPHIL NFR BLD: 1 %
EOSINOPHIL NFR BLD: 1.7 %
EOSINOPHIL NFR BLD: 2.4 %
EOSINOPHIL NFR BLD: 4.6 %
ERYTHROCYTE [SEDIMENTATION RATE] IN BLOOD BY WESTERGREN METHOD: 92 MM/HR (ref 0–30)
FIBRINOGEN PPP-MCNC: >1000 MG/DL (ref 227–534)
GFR SERPLBLD CREATININE-BSD FMLA CKD-EPI: 23 ML/MIN/{1.73_M2}
GFR SERPLBLD CREATININE-BSD FMLA CKD-EPI: 33 ML/MIN/{1.73_M2}
GFR SERPLBLD CREATININE-BSD FMLA CKD-EPI: 33 ML/MIN/{1.73_M2}
GFR SERPLBLD CREATININE-BSD FMLA CKD-EPI: 38 ML/MIN/{1.73_M2}
GFR SERPLBLD CREATININE-BSD FMLA CKD-EPI: 45 ML/MIN/{1.73_M2}
GFR SERPLBLD CREATININE-BSD FMLA CKD-EPI: 50 ML/MIN/{1.73_M2}
GLUCOSE BLD-MCNC: 128 MG/DL (ref 70–99)
GLUCOSE BLD-MCNC: 130 MG/DL (ref 70–99)
GLUCOSE BLD-MCNC: 36 MG/DL (ref 70–99)
GLUCOSE BLD-MCNC: 65 MG/DL (ref 70–99)
GLUCOSE BLD-MCNC: 67 MG/DL (ref 70–99)
GLUCOSE BLD-MCNC: 92 MG/DL (ref 70–99)
GLUCOSE SERPL-MCNC: 119 MG/DL (ref 70–99)
GLUCOSE SERPL-MCNC: 127 MG/DL (ref 70–99)
GLUCOSE SERPL-MCNC: 32 MG/DL (ref 70–99)
GLUCOSE SERPL-MCNC: 51 MG/DL (ref 70–99)
GLUCOSE SERPL-MCNC: 94 MG/DL (ref 70–99)
GLUCOSE SERPL-MCNC: 95 MG/DL (ref 70–99)
HCO3 BLDA-SCNC: 10.3 MMOL/L (ref 21–29)
HCO3 BLDA-SCNC: 10.9 MMOL/L (ref 21–29)
HCO3 BLDA-SCNC: 13.6 MMOL/L (ref 21–29)
HCO3 BLDA-SCNC: 15.9 MMOL/L (ref 21–29)
HCO3 BLDA-SCNC: 18.3 MMOL/L (ref 21–29)
HCO3 BLDA-SCNC: 18.8 MMOL/L (ref 21–29)
HCO3 BLDA-SCNC: 19 MMOL/L (ref 21–29)
HCO3 BLDA-SCNC: 20.5 MMOL/L (ref 21–29)
HCT VFR BLD AUTO: 25.1 % (ref 36–48)
HCT VFR BLD AUTO: 26.8 % (ref 36–48)
HCT VFR BLD AUTO: 30.5 % (ref 36–48)
HCT VFR BLD AUTO: 33.4 % (ref 36–48)
HCT VFR BLD AUTO: 35 % (ref 36–48)
HCT VFR BLD AUTO: 36.2 % (ref 36–48)
HCT VFR BLD AUTO: 37.1 % (ref 36–48)
HDLC SERPL-MCNC: 74 MG/DL (ref 40–60)
HGB BLD-MCNC: 10.9 G/DL (ref 12–16)
HGB BLD-MCNC: 10.9 G/DL (ref 12–16)
HGB BLD-MCNC: 11.6 G/DL (ref 12–16)
HGB BLD-MCNC: 12.2 G/DL (ref 12–16)
HGB BLD-MCNC: 7.9 G/DL (ref 12–16)
HGB BLD-MCNC: 8.6 G/DL (ref 12–16)
HGB BLD-MCNC: 9.2 G/DL (ref 12–16)
HGB BLDA-MCNC: 10.5 G/DL
HYPOCHROMIA BLD QL SMEAR: ABNORMAL
INR PPP: 3.03 (ref 0.85–1.15)
INR PPP: 3.82 (ref 0.85–1.15)
INR PPP: 7.36 (ref 0.85–1.15)
INR PPP: 7.54 (ref 0.85–1.15)
LACTATE BLD-SCNC: 10.08 MMOL/L (ref 0.4–2)
LACTATE BLD-SCNC: 10.92 MMOL/L (ref 0.4–2)
LACTATE BLD-SCNC: 13.39 MMOL/L (ref 0.4–2)
LACTATE BLD-SCNC: 14.5 MMOL/L (ref 0.4–2)
LACTATE BLD-SCNC: 15.93 MMOL/L (ref 0.4–2)
LACTATE BLD-SCNC: 9.75 MMOL/L (ref 0.4–2)
LACTATE BLDV-SCNC: 1.4 MMOL/L (ref 0.4–2)
LACTATE BLDV-SCNC: 11.5 MMOL/L (ref 0.4–2)
LACTATE BLDV-SCNC: 11.6 MMOL/L (ref 0.4–2)
LACTATE BLDV-SCNC: 17.9 MMOL/L (ref 0.4–2)
LDLC SERPL CALC-MCNC: 95 MG/DL
LIPASE SERPL-CCNC: 15 U/L (ref 13–60)
LIPASE SERPL-CCNC: 600 U/L (ref 13–60)
LIPASE SERPL-CCNC: 600 U/L (ref 13–60)
LYMPHOCYTES # BLD: 0.1 K/UL (ref 1–5.1)
LYMPHOCYTES # BLD: 0.2 K/UL (ref 1–5.1)
LYMPHOCYTES # BLD: 0.4 K/UL (ref 1–5.1)
LYMPHOCYTES # BLD: 0.6 K/UL (ref 1–5.1)
LYMPHOCYTES # BLD: 0.9 K/UL (ref 1–5.1)
LYMPHOCYTES # BLD: 1.3 K/UL (ref 1–5.1)
LYMPHOCYTES NFR BLD: 10 %
LYMPHOCYTES NFR BLD: 14.2 %
LYMPHOCYTES NFR BLD: 15 %
LYMPHOCYTES NFR BLD: 15 %
LYMPHOCYTES NFR BLD: 18 %
LYMPHOCYTES NFR BLD: 32.2 %
MAGNESIUM SERPL-MCNC: 1.7 MG/DL (ref 1.8–2.4)
MAGNESIUM SERPL-MCNC: 1.9 MG/DL (ref 1.8–2.4)
MAGNESIUM SERPL-MCNC: 2.5 MG/DL (ref 1.8–2.4)
MCH RBC QN AUTO: 27.9 PG (ref 26–34)
MCH RBC QN AUTO: 28 PG (ref 26–34)
MCH RBC QN AUTO: 28.1 PG (ref 26–34)
MCH RBC QN AUTO: 28.3 PG (ref 26–34)
MCH RBC QN AUTO: 28.8 PG (ref 26–34)
MCH RBC QN AUTO: 29.2 PG (ref 26–34)
MCH RBC QN AUTO: 29.3 PG (ref 26–34)
MCHC RBC AUTO-ENTMCNC: 30.3 G/DL (ref 31–36)
MCHC RBC AUTO-ENTMCNC: 31.1 G/DL (ref 31–36)
MCHC RBC AUTO-ENTMCNC: 31.3 G/DL (ref 31–36)
MCHC RBC AUTO-ENTMCNC: 32 G/DL (ref 31–36)
MCHC RBC AUTO-ENTMCNC: 32.2 G/DL (ref 31–36)
MCHC RBC AUTO-ENTMCNC: 32.7 G/DL (ref 31–36)
MCHC RBC AUTO-ENTMCNC: 33 G/DL (ref 31–36)
MCV RBC AUTO: 87.3 FL (ref 80–100)
MCV RBC AUTO: 87.5 FL (ref 80–100)
MCV RBC AUTO: 89.3 FL (ref 80–100)
MCV RBC AUTO: 89.7 FL (ref 80–100)
MCV RBC AUTO: 91.2 FL (ref 80–100)
MCV RBC AUTO: 91.2 FL (ref 80–100)
MCV RBC AUTO: 92.1 FL (ref 80–100)
METAMYELOCYTES NFR BLD MANUAL: 1 %
METAMYELOCYTES NFR BLD MANUAL: 3 %
METHGB MFR BLDA: 1 % (ref 0–1.4)
MICROCYTES BLD QL SMEAR: ABNORMAL
MICROCYTES BLD QL SMEAR: ABNORMAL
MONOCYTES # BLD: 0 K/UL (ref 0–1.3)
MONOCYTES # BLD: 0.1 K/UL (ref 0–1.3)
MONOCYTES # BLD: 0.4 K/UL (ref 0–1.3)
MONOCYTES # BLD: 0.4 K/UL (ref 0–1.3)
MONOCYTES NFR BLD: 0 %
MONOCYTES NFR BLD: 1 %
MONOCYTES NFR BLD: 1.6 %
MONOCYTES NFR BLD: 10.4 %
MONOCYTES NFR BLD: 2 %
MONOCYTES NFR BLD: 6.3 %
MYELOCYTES NFR BLD MANUAL: 5 %
NEUTROPHILS # BLD: 1 K/UL (ref 1.7–7.7)
NEUTROPHILS # BLD: 1.1 K/UL (ref 1.7–7.7)
NEUTROPHILS # BLD: 1.8 K/UL (ref 1.7–7.7)
NEUTROPHILS # BLD: 2.1 K/UL (ref 1.7–7.7)
NEUTROPHILS # BLD: 2.8 K/UL (ref 1.7–7.7)
NEUTROPHILS # BLD: 4.6 K/UL (ref 1.7–7.7)
NEUTROPHILS NFR BLD: 52.1 %
NEUTROPHILS NFR BLD: 58 %
NEUTROPHILS NFR BLD: 76 %
NEUTROPHILS NFR BLD: 76 %
NEUTROPHILS NFR BLD: 82.3 %
NEUTROPHILS NFR BLD: 83 %
NEUTS BAND NFR BLD MANUAL: 2 % (ref 0–7)
NEUTS BAND NFR BLD MANUAL: 23 % (ref 0–7)
NT-PROBNP SERPL-MCNC: 348 PG/ML (ref 0–124)
OVALOCYTES BLD QL SMEAR: ABNORMAL
PCO2 BLDA: 28.8 MM HG (ref 35–45)
PCO2 BLDA: 31.6 MM HG (ref 35–45)
PCO2 BLDA: 32.2 MM HG (ref 35–45)
PCO2 BLDA: 36.2 MM HG (ref 35–45)
PCO2 BLDA: 36.9 MMHG (ref 35–45)
PCO2 BLDA: 39.5 MM HG (ref 35–45)
PCO2 BLDA: 48.3 MM HG (ref 35–45)
PCO2 BLDA: 49 MM HG (ref 35–45)
PERFORMED ON: ABNORMAL
PERFORMED ON: NORMAL
PH BLDA: 7.12 [PH] (ref 7.35–7.45)
PH BLDA: 7.12 [PH] (ref 7.35–7.45)
PH BLDA: 7.19 [PH] (ref 7.35–7.45)
PH BLDA: 7.19 [PH] (ref 7.35–7.45)
PH BLDA: 7.23 [PH] (ref 7.35–7.45)
PH BLDA: 7.31 [PH] (ref 7.35–7.45)
PH BLDA: 7.31 [PH] (ref 7.35–7.45)
PH BLDA: 7.32 [PH] (ref 7.35–7.45)
PH VENOUS: 7.16 (ref 7.35–7.45)
PH VENOUS: 7.31 (ref 7.35–7.45)
PHOSPHATE SERPL-MCNC: 3.1 MG/DL (ref 2.5–4.9)
PHOSPHATE SERPL-MCNC: 3.6 MG/DL (ref 2.5–4.9)
PHOSPHATE SERPL-MCNC: 6 MG/DL (ref 2.5–4.9)
PLATELET # BLD AUTO: 110 K/UL (ref 135–450)
PLATELET # BLD AUTO: 302 K/UL (ref 135–450)
PLATELET # BLD AUTO: 305 K/UL (ref 135–450)
PLATELET # BLD AUTO: 81 K/UL (ref 135–450)
PLATELET # BLD AUTO: 83 K/UL (ref 135–450)
PLATELET # BLD AUTO: 93 K/UL (ref 135–450)
PLATELET # BLD AUTO: 97 K/UL (ref 135–450)
PLATELET BLD QL SMEAR: ABNORMAL
PMV BLD AUTO: 7.4 FL (ref 5–10.5)
PMV BLD AUTO: 7.6 FL (ref 5–10.5)
PMV BLD AUTO: 8 FL (ref 5–10.5)
PMV BLD AUTO: 8.6 FL (ref 5–10.5)
PMV BLD AUTO: 9.2 FL (ref 5–10.5)
PO2 BLDA: 180 MM HG (ref 75–108)
PO2 BLDA: 49.9 MM HG (ref 75–108)
PO2 BLDA: 55.5 MMHG (ref 75–108)
PO2 BLDA: 56.7 MM HG (ref 75–108)
PO2 BLDA: 57.6 MM HG (ref 75–108)
PO2 BLDA: 62.5 MM HG (ref 75–108)
PO2 BLDA: 69 MM HG (ref 75–108)
PO2 BLDA: 97.6 MM HG (ref 75–108)
POC SAMPLE TYPE: ABNORMAL
POIKILOCYTOSIS BLD QL SMEAR: ABNORMAL
POIKILOCYTOSIS BLD QL SMEAR: ABNORMAL
POTASSIUM BLD-SCNC: 2.4 MMOL/L (ref 3.5–5.1)
POTASSIUM BLD-SCNC: 2.8 MMOL/L (ref 3.5–5.1)
POTASSIUM BLD-SCNC: 3.2 MMOL/L (ref 3.5–5.1)
POTASSIUM BLD-SCNC: 4.9 MMOL/L (ref 3.5–5.1)
POTASSIUM SERPL-SCNC: 3.1 MMOL/L (ref 3.5–5.1)
POTASSIUM SERPL-SCNC: 3.7 MMOL/L (ref 3.5–5.1)
POTASSIUM SERPL-SCNC: 3.9 MMOL/L (ref 3.5–5.1)
POTASSIUM SERPL-SCNC: 4.1 MMOL/L (ref 3.5–5.1)
POTASSIUM SERPL-SCNC: 4.9 MMOL/L (ref 3.5–5.1)
POTASSIUM SERPL-SCNC: 6.1 MMOL/L (ref 3.5–5.1)
PROMYELOCYTES NFR BLD MANUAL: 1 %
PROT SERPL-MCNC: 3.1 G/DL (ref 6.4–8.2)
PROT SERPL-MCNC: 4.6 G/DL (ref 6.4–8.2)
PROT SERPL-MCNC: 5.3 G/DL (ref 6.4–8.2)
PROT SERPL-MCNC: 7.8 G/DL (ref 6.4–8.2)
PROT SERPL-MCNC: 8.5 G/DL (ref 6.4–8.2)
PROTHROMBIN TIME: 31.2 SEC (ref 11.9–14.9)
PROTHROMBIN TIME: 37.3 SEC (ref 11.9–14.9)
PROTHROMBIN TIME: 61.5 SEC (ref 11.9–14.9)
PROTHROMBIN TIME: 62.7 SEC (ref 11.9–14.9)
RBC # BLD AUTO: 2.8 M/UL (ref 4–5.2)
RBC # BLD AUTO: 2.94 M/UL (ref 4–5.2)
RBC # BLD AUTO: 3.31 M/UL (ref 4–5.2)
RBC # BLD AUTO: 3.74 M/UL (ref 4–5.2)
RBC # BLD AUTO: 3.84 M/UL (ref 4–5.2)
RBC # BLD AUTO: 4.14 M/UL (ref 4–5.2)
RBC # BLD AUTO: 4.25 M/UL (ref 4–5.2)
SAO2 % BLDA: 100 % (ref 93–100)
SAO2 % BLDA: 79 % (ref 93–100)
SAO2 % BLDA: 82 % (ref 93–100)
SAO2 % BLDA: 82 % (ref 93–100)
SAO2 % BLDA: 87 % (ref 93–100)
SAO2 % BLDA: 88 % (ref 93–100)
SAO2 % BLDA: 90 % (ref 93–100)
SAO2 % BLDA: 96 % (ref 93–100)
SCHISTOCYTES BLD QL SMEAR: ABNORMAL
SCHISTOCYTES BLD QL SMEAR: ABNORMAL
SLIDE REVIEW: ABNORMAL
SODIUM BLD-SCNC: 143 MMOL/L (ref 136–145)
SODIUM BLD-SCNC: 150 MMOL/L (ref 136–145)
SODIUM BLD-SCNC: 151 MMOL/L (ref 136–145)
SODIUM BLD-SCNC: 152 MMOL/L (ref 136–145)
SODIUM SERPL-SCNC: 141 MMOL/L (ref 136–145)
SODIUM SERPL-SCNC: 141 MMOL/L (ref 136–145)
SODIUM SERPL-SCNC: 143 MMOL/L (ref 136–145)
SODIUM SERPL-SCNC: 144 MMOL/L (ref 136–145)
SODIUM SERPL-SCNC: 146 MMOL/L (ref 136–145)
SODIUM SERPL-SCNC: 146 MMOL/L (ref 136–145)
TRIGL SERPL-MCNC: 55 MG/DL (ref 0–150)
TROPONIN, HIGH SENSITIVITY: 10 NG/L (ref 0–14)
TROPONIN, HIGH SENSITIVITY: 289 NG/L (ref 0–14)
TROPONIN, HIGH SENSITIVITY: 351 NG/L (ref 0–14)
TROPONIN, HIGH SENSITIVITY: 36 NG/L (ref 0–14)
VARIANT LYMPHS NFR BLD MANUAL: 1 % (ref 0–6)
VLDLC SERPL CALC-MCNC: 11 MG/DL
WBC # BLD AUTO: 1.2 K/UL (ref 4–11)
WBC # BLD AUTO: 1.3 K/UL (ref 4–11)
WBC # BLD AUTO: 1.3 K/UL (ref 4–11)
WBC # BLD AUTO: 2.2 K/UL (ref 4–11)
WBC # BLD AUTO: 3.5 K/UL (ref 4–11)
WBC # BLD AUTO: 4 K/UL (ref 4–11)
WBC # BLD AUTO: 6 K/UL (ref 4–11)

## 2024-01-01 PROCEDURE — 93005 ELECTROCARDIOGRAM TRACING: CPT | Performed by: STUDENT IN AN ORGANIZED HEALTH CARE EDUCATION/TRAINING PROGRAM

## 2024-01-01 PROCEDURE — 6360000002 HC RX W HCPCS: Performed by: INTERNAL MEDICINE

## 2024-01-01 PROCEDURE — 85610 PROTHROMBIN TIME: CPT

## 2024-01-01 PROCEDURE — 6370000000 HC RX 637 (ALT 250 FOR IP): Performed by: PHYSICIAN ASSISTANT

## 2024-01-01 PROCEDURE — 85730 THROMBOPLASTIN TIME PARTIAL: CPT

## 2024-01-01 PROCEDURE — 36415 COLL VENOUS BLD VENIPUNCTURE: CPT

## 2024-01-01 PROCEDURE — 30233L1 TRANSFUSION OF NONAUTOLOGOUS FRESH PLASMA INTO PERIPHERAL VEIN, PERCUTANEOUS APPROACH: ICD-10-PCS | Performed by: SURGERY

## 2024-01-01 PROCEDURE — P9045 ALBUMIN (HUMAN), 5%, 250 ML: HCPCS

## 2024-01-01 PROCEDURE — 6360000002 HC RX W HCPCS: Performed by: ANESTHESIOLOGY

## 2024-01-01 PROCEDURE — 83605 ASSAY OF LACTIC ACID: CPT

## 2024-01-01 PROCEDURE — 71045 X-RAY EXAM CHEST 1 VIEW: CPT

## 2024-01-01 PROCEDURE — 36620 INSERTION CATHETER ARTERY: CPT

## 2024-01-01 PROCEDURE — 6360000002 HC RX W HCPCS: Performed by: STUDENT IN AN ORGANIZED HEALTH CARE EDUCATION/TRAINING PROGRAM

## 2024-01-01 PROCEDURE — 74177 CT ABD & PELVIS W/CONTRAST: CPT

## 2024-01-01 PROCEDURE — 96366 THER/PROPH/DIAG IV INF ADDON: CPT

## 2024-01-01 PROCEDURE — 2580000003 HC RX 258

## 2024-01-01 PROCEDURE — 85018 HEMOGLOBIN: CPT

## 2024-01-01 PROCEDURE — 83690 ASSAY OF LIPASE: CPT

## 2024-01-01 PROCEDURE — 76937 US GUIDE VASCULAR ACCESS: CPT

## 2024-01-01 PROCEDURE — 99291 CRITICAL CARE FIRST HOUR: CPT | Performed by: INTERNAL MEDICINE

## 2024-01-01 PROCEDURE — 0DNA0ZZ RELEASE JEJUNUM, OPEN APPROACH: ICD-10-PCS | Performed by: SURGERY

## 2024-01-01 PROCEDURE — 2500000003 HC RX 250 WO HCPCS: Performed by: ANESTHESIOLOGY

## 2024-01-01 PROCEDURE — 2580000003 HC RX 258: Performed by: STUDENT IN AN ORGANIZED HEALTH CARE EDUCATION/TRAINING PROGRAM

## 2024-01-01 PROCEDURE — 82330 ASSAY OF CALCIUM: CPT

## 2024-01-01 PROCEDURE — 2700000000 HC OXYGEN THERAPY PER DAY

## 2024-01-01 PROCEDURE — 36620 INSERTION CATHETER ARTERY: CPT | Performed by: SURGERY

## 2024-01-01 PROCEDURE — 96376 TX/PRO/DX INJ SAME DRUG ADON: CPT

## 2024-01-01 PROCEDURE — 93308 TTE F-UP OR LMTD: CPT

## 2024-01-01 PROCEDURE — 80076 HEPATIC FUNCTION PANEL: CPT

## 2024-01-01 PROCEDURE — 83735 ASSAY OF MAGNESIUM: CPT

## 2024-01-01 PROCEDURE — 92950 HEART/LUNG RESUSCITATION CPR: CPT

## 2024-01-01 PROCEDURE — 2500000003 HC RX 250 WO HCPCS: Performed by: INTERNAL MEDICINE

## 2024-01-01 PROCEDURE — 30233L1 TRANSFUSION OF NONAUTOLOGOUS FRESH PLASMA INTO PERIPHERAL VEIN, PERCUTANEOUS APPROACH: ICD-10-PCS

## 2024-01-01 PROCEDURE — 30233N1 TRANSFUSION OF NONAUTOLOGOUS RED BLOOD CELLS INTO PERIPHERAL VEIN, PERCUTANEOUS APPROACH: ICD-10-PCS | Performed by: SURGERY

## 2024-01-01 PROCEDURE — 96375 TX/PRO/DX INJ NEW DRUG ADDON: CPT

## 2024-01-01 PROCEDURE — 93005 ELECTROCARDIOGRAM TRACING: CPT

## 2024-01-01 PROCEDURE — 85025 COMPLETE CBC W/AUTO DIFF WBC: CPT

## 2024-01-01 PROCEDURE — 82947 ASSAY GLUCOSE BLOOD QUANT: CPT

## 2024-01-01 PROCEDURE — P9016 RBC LEUKOCYTES REDUCED: HCPCS

## 2024-01-01 PROCEDURE — 84484 ASSAY OF TROPONIN QUANT: CPT

## 2024-01-01 PROCEDURE — 84295 ASSAY OF SERUM SODIUM: CPT

## 2024-01-01 PROCEDURE — 96372 THER/PROPH/DIAG INJ SC/IM: CPT

## 2024-01-01 PROCEDURE — P9047 ALBUMIN (HUMAN), 25%, 50ML: HCPCS

## 2024-01-01 PROCEDURE — 44050 REDUCE BOWEL OBSTRUCTION: CPT | Performed by: SURGERY

## 2024-01-01 PROCEDURE — 6360000002 HC RX W HCPCS

## 2024-01-01 PROCEDURE — A4217 STERILE WATER/SALINE, 500 ML: HCPCS | Performed by: SURGERY

## 2024-01-01 PROCEDURE — 94002 VENT MGMT INPAT INIT DAY: CPT

## 2024-01-01 PROCEDURE — 2500000003 HC RX 250 WO HCPCS: Performed by: STUDENT IN AN ORGANIZED HEALTH CARE EDUCATION/TRAINING PROGRAM

## 2024-01-01 PROCEDURE — 31500 INSERT EMERGENCY AIRWAY: CPT

## 2024-01-01 PROCEDURE — 84132 ASSAY OF SERUM POTASSIUM: CPT

## 2024-01-01 PROCEDURE — 4A133B1 MONITORING OF ARTERIAL PRESSURE, PERIPHERAL, PERCUTANEOUS APPROACH: ICD-10-PCS

## 2024-01-01 PROCEDURE — 86850 RBC ANTIBODY SCREEN: CPT

## 2024-01-01 PROCEDURE — 0DSA0ZZ REPOSITION JEJUNUM, OPEN APPROACH: ICD-10-PCS | Performed by: SURGERY

## 2024-01-01 PROCEDURE — 96368 THER/DIAG CONCURRENT INF: CPT

## 2024-01-01 PROCEDURE — 44021 DECOMPRESS SMALL BOWEL: CPT | Performed by: SURGERY

## 2024-01-01 PROCEDURE — 97606 NEG PRS WND THER DME>50 SQCM: CPT | Performed by: SURGERY

## 2024-01-01 PROCEDURE — 85027 COMPLETE CBC AUTOMATED: CPT

## 2024-01-01 PROCEDURE — 80053 COMPREHEN METABOLIC PANEL: CPT

## 2024-01-01 PROCEDURE — P9017 PLASMA 1 DONOR FRZ W/IN 8 HR: HCPCS

## 2024-01-01 PROCEDURE — 2000000000 HC ICU R&B

## 2024-01-01 PROCEDURE — 2580000003 HC RX 258: Performed by: INTERNAL MEDICINE

## 2024-01-01 PROCEDURE — 6360000004 HC RX CONTRAST MEDICATION

## 2024-01-01 PROCEDURE — 85384 FIBRINOGEN ACTIVITY: CPT

## 2024-01-01 PROCEDURE — G0379 DIRECT REFER HOSPITAL OBSERV: HCPCS

## 2024-01-01 PROCEDURE — 3600000014 HC SURGERY LEVEL 4 ADDTL 15MIN: Performed by: SURGERY

## 2024-01-01 PROCEDURE — 2500000003 HC RX 250 WO HCPCS

## 2024-01-01 PROCEDURE — G0378 HOSPITAL OBSERVATION PER HR: HCPCS

## 2024-01-01 PROCEDURE — 96365 THER/PROPH/DIAG IV INF INIT: CPT

## 2024-01-01 PROCEDURE — 6360000002 HC RX W HCPCS: Performed by: PHYSICIAN ASSISTANT

## 2024-01-01 PROCEDURE — 74176 CT ABD & PELVIS W/O CONTRAST: CPT

## 2024-01-01 PROCEDURE — 86923 COMPATIBILITY TEST ELECTRIC: CPT

## 2024-01-01 PROCEDURE — 99223 1ST HOSP IP/OBS HIGH 75: CPT | Performed by: SURGERY

## 2024-01-01 PROCEDURE — 2580000003 HC RX 258: Performed by: EMERGENCY MEDICINE

## 2024-01-01 PROCEDURE — P9035 PLATELET PHERES LEUKOREDUCED: HCPCS

## 2024-01-01 PROCEDURE — 99285 EMERGENCY DEPT VISIT HI MDM: CPT

## 2024-01-01 PROCEDURE — 94761 N-INVAS EAR/PLS OXIMETRY MLT: CPT

## 2024-01-01 PROCEDURE — 92950 HEART/LUNG RESUSCITATION CPR: CPT | Performed by: INTERNAL MEDICINE

## 2024-01-01 PROCEDURE — 0D9A8ZZ DRAINAGE OF JEJUNUM, VIA NATURAL OR ARTIFICIAL OPENING ENDOSCOPIC: ICD-10-PCS | Performed by: SURGERY

## 2024-01-01 PROCEDURE — 80069 RENAL FUNCTION PANEL: CPT

## 2024-01-01 PROCEDURE — 86900 BLOOD TYPING SEROLOGIC ABO: CPT

## 2024-01-01 PROCEDURE — 36556 INSERT NON-TUNNEL CV CATH: CPT | Performed by: SURGERY

## 2024-01-01 PROCEDURE — 2580000003 HC RX 258: Performed by: SURGERY

## 2024-01-01 PROCEDURE — 82803 BLOOD GASES ANY COMBINATION: CPT

## 2024-01-01 PROCEDURE — 30233N1 TRANSFUSION OF NONAUTOLOGOUS RED BLOOD CELLS INTO PERIPHERAL VEIN, PERCUTANEOUS APPROACH: ICD-10-PCS

## 2024-01-01 PROCEDURE — 30233R1 TRANSFUSION OF NONAUTOLOGOUS PLATELETS INTO PERIPHERAL VEIN, PERCUTANEOUS APPROACH: ICD-10-PCS | Performed by: SURGERY

## 2024-01-01 PROCEDURE — 74018 RADEX ABDOMEN 1 VIEW: CPT

## 2024-01-01 PROCEDURE — 3700000001 HC ADD 15 MINUTES (ANESTHESIA): Performed by: SURGERY

## 2024-01-01 PROCEDURE — 86901 BLOOD TYPING SEROLOGIC RH(D): CPT

## 2024-01-01 PROCEDURE — 93005 ELECTROCARDIOGRAM TRACING: CPT | Performed by: PHYSICIAN ASSISTANT

## 2024-01-01 PROCEDURE — 99292 CRITICAL CARE ADDL 30 MIN: CPT | Performed by: INTERNAL MEDICINE

## 2024-01-01 PROCEDURE — 30233R1 TRANSFUSION OF NONAUTOLOGOUS PLATELETS INTO PERIPHERAL VEIN, PERCUTANEOUS APPROACH: ICD-10-PCS

## 2024-01-01 PROCEDURE — 94003 VENT MGMT INPAT SUBQ DAY: CPT

## 2024-01-01 PROCEDURE — 6360000002 HC RX W HCPCS: Performed by: EMERGENCY MEDICINE

## 2024-01-01 PROCEDURE — 36430 TRANSFUSION BLD/BLD COMPNT: CPT

## 2024-01-01 PROCEDURE — 06HY33Z INSERTION OF INFUSION DEVICE INTO LOWER VEIN, PERCUTANEOUS APPROACH: ICD-10-PCS

## 2024-01-01 PROCEDURE — 03HY32Z INSERTION OF MONITORING DEVICE INTO UPPER ARTERY, PERCUTANEOUS APPROACH: ICD-10-PCS

## 2024-01-01 PROCEDURE — 4A133J1 MONITORING OF ARTERIAL PULSE, PERIPHERAL, PERCUTANEOUS APPROACH: ICD-10-PCS

## 2024-01-01 PROCEDURE — 93308 TTE F-UP OR LMTD: CPT | Performed by: INTERNAL MEDICINE

## 2024-01-01 PROCEDURE — 3700000000 HC ANESTHESIA ATTENDED CARE: Performed by: SURGERY

## 2024-01-01 PROCEDURE — 3600000004 HC SURGERY LEVEL 4 BASE: Performed by: SURGERY

## 2024-01-01 PROCEDURE — 83880 ASSAY OF NATRIURETIC PEPTIDE: CPT

## 2024-01-01 PROCEDURE — 2709999900 HC NON-CHARGEABLE SUPPLY: Performed by: SURGERY

## 2024-01-01 RX ORDER — ONDANSETRON 4 MG/1
4 TABLET, ORALLY DISINTEGRATING ORAL EVERY 8 HOURS PRN
Status: DISCONTINUED | OUTPATIENT
Start: 2024-01-01 | End: 2024-01-01 | Stop reason: HOSPADM

## 2024-01-01 RX ORDER — SODIUM CHLORIDE, SODIUM GLUCONATE, SODIUM ACETATE, POTASSIUM CHLORIDE AND MAGNESIUM CHLORIDE 526; 502; 368; 37; 30 MG/100ML; MG/100ML; MG/100ML; MG/100ML; MG/100ML
125 INJECTION, SOLUTION INTRAVENOUS CONTINUOUS
Status: DISCONTINUED | OUTPATIENT
Start: 2024-01-01 | End: 2024-01-01

## 2024-01-01 RX ORDER — SODIUM CHLORIDE, SODIUM LACTATE, POTASSIUM CHLORIDE, CALCIUM CHLORIDE 600; 310; 30; 20 MG/100ML; MG/100ML; MG/100ML; MG/100ML
INJECTION, SOLUTION INTRAVENOUS CONTINUOUS PRN
Status: DISCONTINUED | OUTPATIENT
Start: 2024-01-01 | End: 2024-01-01 | Stop reason: SDUPTHER

## 2024-01-01 RX ORDER — GLUCAGON 1 MG/ML
1 KIT INJECTION PRN
Status: DISCONTINUED | OUTPATIENT
Start: 2024-01-01 | End: 2024-01-01 | Stop reason: HOSPADM

## 2024-01-01 RX ORDER — MAGNESIUM SULFATE IN WATER 40 MG/ML
2000 INJECTION, SOLUTION INTRAVENOUS ONCE
Status: COMPLETED | OUTPATIENT
Start: 2024-01-01 | End: 2024-01-01

## 2024-01-01 RX ORDER — METRONIDAZOLE 500 MG/100ML
500 INJECTION, SOLUTION INTRAVENOUS ONCE
Status: COMPLETED | OUTPATIENT
Start: 2024-01-01 | End: 2024-01-01

## 2024-01-01 RX ORDER — SODIUM CHLORIDE 0.9 % (FLUSH) 0.9 %
5-40 SYRINGE (ML) INJECTION PRN
Status: DISCONTINUED | OUTPATIENT
Start: 2024-01-01 | End: 2024-01-01 | Stop reason: HOSPADM

## 2024-01-01 RX ORDER — ALBUMIN, HUMAN INJ 5% 5 %
SOLUTION INTRAVENOUS PRN
Status: DISCONTINUED | OUTPATIENT
Start: 2024-01-01 | End: 2024-01-01 | Stop reason: SDUPTHER

## 2024-01-01 RX ORDER — METRONIDAZOLE 500 MG/100ML
500 INJECTION, SOLUTION INTRAVENOUS
Status: COMPLETED | OUTPATIENT
Start: 2024-01-01 | End: 2024-01-01

## 2024-01-01 RX ORDER — LORAZEPAM 2 MG/ML
1 INJECTION INTRAMUSCULAR EVERY 6 HOURS PRN
Status: DISCONTINUED | OUTPATIENT
Start: 2024-01-01 | End: 2024-01-01 | Stop reason: HOSPADM

## 2024-01-01 RX ORDER — SODIUM CHLORIDE 0.9 % (FLUSH) 0.9 %
5-40 SYRINGE (ML) INJECTION EVERY 12 HOURS SCHEDULED
Status: DISCONTINUED | OUTPATIENT
Start: 2024-01-01 | End: 2024-01-01 | Stop reason: HOSPADM

## 2024-01-01 RX ORDER — SODIUM CHLORIDE 9 MG/ML
INJECTION, SOLUTION INTRAVENOUS PRN
Status: DISCONTINUED | OUTPATIENT
Start: 2024-01-01 | End: 2024-01-01 | Stop reason: HOSPADM

## 2024-01-01 RX ORDER — SODIUM CHLORIDE 9 MG/ML
INJECTION, SOLUTION INTRAVENOUS CONTINUOUS PRN
Status: DISCONTINUED | OUTPATIENT
Start: 2024-01-01 | End: 2024-01-01 | Stop reason: SDUPTHER

## 2024-01-01 RX ORDER — SODIUM CHLORIDE, SODIUM LACTATE, POTASSIUM CHLORIDE, AND CALCIUM CHLORIDE .6; .31; .03; .02 G/100ML; G/100ML; G/100ML; G/100ML
1000 INJECTION, SOLUTION INTRAVENOUS ONCE
Status: DISCONTINUED | OUTPATIENT
Start: 2024-01-01 | End: 2024-01-01

## 2024-01-01 RX ORDER — MAGNESIUM HYDROXIDE 1200 MG/15ML
LIQUID ORAL CONTINUOUS PRN
Status: COMPLETED | OUTPATIENT
Start: 2024-01-01 | End: 2024-01-01

## 2024-01-01 RX ORDER — MIDAZOLAM HYDROCHLORIDE 1 MG/ML
INJECTION INTRAMUSCULAR; INTRAVENOUS PRN
Status: DISCONTINUED | OUTPATIENT
Start: 2024-01-01 | End: 2024-01-01 | Stop reason: SDUPTHER

## 2024-01-01 RX ORDER — POTASSIUM CHLORIDE 29.8 MG/ML
20 INJECTION INTRAVENOUS
Status: COMPLETED | OUTPATIENT
Start: 2024-01-01 | End: 2024-01-01

## 2024-01-01 RX ORDER — DEXTROSE MONOHYDRATE 100 MG/ML
INJECTION, SOLUTION INTRAVENOUS CONTINUOUS PRN
Status: DISCONTINUED | OUTPATIENT
Start: 2024-01-01 | End: 2024-01-01 | Stop reason: HOSPADM

## 2024-01-01 RX ORDER — SODIUM CHLORIDE, SODIUM GLUCONATE, SODIUM ACETATE, POTASSIUM CHLORIDE AND MAGNESIUM CHLORIDE 526; 502; 368; 37; 30 MG/100ML; MG/100ML; MG/100ML; MG/100ML; MG/100ML
1000 INJECTION, SOLUTION INTRAVENOUS ONCE
Status: COMPLETED | OUTPATIENT
Start: 2024-01-01 | End: 2024-01-01

## 2024-01-01 RX ORDER — SODIUM CHLORIDE, SODIUM GLUCONATE, SODIUM ACETATE, POTASSIUM CHLORIDE AND MAGNESIUM CHLORIDE 526; 502; 368; 37; 30 MG/100ML; MG/100ML; MG/100ML; MG/100ML; MG/100ML
500 INJECTION, SOLUTION INTRAVENOUS CONTINUOUS
Status: DISCONTINUED | OUTPATIENT
Start: 2024-01-01 | End: 2024-01-01

## 2024-01-01 RX ORDER — ENOXAPARIN SODIUM 100 MG/ML
40 INJECTION SUBCUTANEOUS DAILY
Status: DISCONTINUED | OUTPATIENT
Start: 2024-01-01 | End: 2024-01-01 | Stop reason: HOSPADM

## 2024-01-01 RX ORDER — HYDROMORPHONE HYDROCHLORIDE 1 MG/ML
0.5 INJECTION, SOLUTION INTRAMUSCULAR; INTRAVENOUS; SUBCUTANEOUS ONCE
Status: COMPLETED | OUTPATIENT
Start: 2024-01-01 | End: 2024-01-01

## 2024-01-01 RX ORDER — SODIUM CHLORIDE, SODIUM GLUCONATE, SODIUM ACETATE, POTASSIUM CHLORIDE AND MAGNESIUM CHLORIDE 526; 502; 368; 37; 30 MG/100ML; MG/100ML; MG/100ML; MG/100ML; MG/100ML
500 INJECTION, SOLUTION INTRAVENOUS ONCE
Status: COMPLETED | OUTPATIENT
Start: 2024-01-01 | End: 2024-01-01

## 2024-01-01 RX ORDER — HYDROMORPHONE HYDROCHLORIDE 1 MG/ML
1 INJECTION, SOLUTION INTRAMUSCULAR; INTRAVENOUS; SUBCUTANEOUS
Status: COMPLETED | OUTPATIENT
Start: 2024-01-01 | End: 2024-01-01

## 2024-01-01 RX ORDER — POTASSIUM CHLORIDE 7.45 MG/ML
10 INJECTION INTRAVENOUS
Status: DISCONTINUED | OUTPATIENT
Start: 2024-01-01 | End: 2024-01-01

## 2024-01-01 RX ORDER — POTASSIUM CHLORIDE 29.8 MG/ML
20 INJECTION INTRAVENOUS ONCE
Status: DISCONTINUED | OUTPATIENT
Start: 2024-01-01 | End: 2024-01-01 | Stop reason: SDUPTHER

## 2024-01-01 RX ORDER — THIAMINE HYDROCHLORIDE 100 MG/ML
100 INJECTION, SOLUTION INTRAMUSCULAR; INTRAVENOUS DAILY
Status: DISCONTINUED | OUTPATIENT
Start: 2024-01-01 | End: 2024-01-01 | Stop reason: HOSPADM

## 2024-01-01 RX ORDER — HYDRALAZINE HYDROCHLORIDE 20 MG/ML
10 INJECTION INTRAMUSCULAR; INTRAVENOUS EVERY 6 HOURS PRN
Status: DISCONTINUED | OUTPATIENT
Start: 2024-01-01 | End: 2024-01-01

## 2024-01-01 RX ORDER — CALCIUM GLUCONATE 20 MG/ML
2000 INJECTION, SOLUTION INTRAVENOUS ONCE
Status: COMPLETED | OUTPATIENT
Start: 2024-01-01 | End: 2024-01-01

## 2024-01-01 RX ORDER — SODIUM CHLORIDE, SODIUM GLUCONATE, SODIUM ACETATE, POTASSIUM CHLORIDE AND MAGNESIUM CHLORIDE 526; 502; 368; 37; 30 MG/100ML; MG/100ML; MG/100ML; MG/100ML; MG/100ML
150 INJECTION, SOLUTION INTRAVENOUS CONTINUOUS
Status: DISCONTINUED | OUTPATIENT
Start: 2024-01-01 | End: 2024-01-01 | Stop reason: HOSPADM

## 2024-01-01 RX ORDER — 0.9 % SODIUM CHLORIDE 0.9 %
500 INTRAVENOUS SOLUTION INTRAVENOUS ONCE
Status: COMPLETED | OUTPATIENT
Start: 2024-01-01 | End: 2024-01-01

## 2024-01-01 RX ORDER — ONDANSETRON 2 MG/ML
4 INJECTION INTRAMUSCULAR; INTRAVENOUS EVERY 6 HOURS PRN
Status: DISCONTINUED | OUTPATIENT
Start: 2024-01-01 | End: 2024-01-01 | Stop reason: HOSPADM

## 2024-01-01 RX ORDER — ONDANSETRON 2 MG/ML
4 INJECTION INTRAMUSCULAR; INTRAVENOUS
Status: DISCONTINUED | OUTPATIENT
Start: 2024-01-01 | End: 2024-01-01 | Stop reason: HOSPADM

## 2024-01-01 RX ORDER — LEVOFLOXACIN 5 MG/ML
750 INJECTION, SOLUTION INTRAVENOUS
Status: DISCONTINUED | OUTPATIENT
Start: 2024-01-01 | End: 2024-01-01

## 2024-01-01 RX ORDER — LEVOFLOXACIN 5 MG/ML
750 INJECTION, SOLUTION INTRAVENOUS
Status: DISCONTINUED | OUTPATIENT
Start: 2024-01-01 | End: 2024-01-01 | Stop reason: HOSPADM

## 2024-01-01 RX ORDER — ONDANSETRON 2 MG/ML
4 INJECTION INTRAMUSCULAR; INTRAVENOUS ONCE
Status: COMPLETED | OUTPATIENT
Start: 2024-01-01 | End: 2024-01-01

## 2024-01-01 RX ORDER — DEXTROSE MONOHYDRATE 25 G/50ML
INJECTION, SOLUTION INTRAVENOUS PRN
Status: DISCONTINUED | OUTPATIENT
Start: 2024-01-01 | End: 2024-01-01 | Stop reason: SDUPTHER

## 2024-01-01 RX ORDER — METRONIDAZOLE 500 MG/100ML
500 INJECTION, SOLUTION INTRAVENOUS EVERY 8 HOURS
Status: DISCONTINUED | OUTPATIENT
Start: 2024-01-01 | End: 2024-01-01 | Stop reason: HOSPADM

## 2024-01-01 RX ORDER — LEVOFLOXACIN 5 MG/ML
750 INJECTION, SOLUTION INTRAVENOUS ONCE
Status: DISCONTINUED | OUTPATIENT
Start: 2024-01-01 | End: 2024-01-01 | Stop reason: HOSPADM

## 2024-01-01 RX ORDER — CALCIUM CHLORIDE 100 MG/ML
INJECTION INTRAVENOUS; INTRAVENTRICULAR PRN
Status: DISCONTINUED | OUTPATIENT
Start: 2024-01-01 | End: 2024-01-01 | Stop reason: SDUPTHER

## 2024-01-01 RX ORDER — LABETALOL HYDROCHLORIDE 5 MG/ML
10 INJECTION, SOLUTION INTRAVENOUS EVERY 4 HOURS PRN
Status: DISCONTINUED | OUTPATIENT
Start: 2024-01-01 | End: 2024-01-01

## 2024-01-01 RX ORDER — ROCURONIUM BROMIDE 10 MG/ML
INJECTION, SOLUTION INTRAVENOUS PRN
Status: DISCONTINUED | OUTPATIENT
Start: 2024-01-01 | End: 2024-01-01 | Stop reason: SDUPTHER

## 2024-01-01 RX ORDER — 0.9 % SODIUM CHLORIDE 0.9 %
1000 INTRAVENOUS SOLUTION INTRAVENOUS ONCE
Status: COMPLETED | OUTPATIENT
Start: 2024-01-01 | End: 2024-01-01

## 2024-01-01 RX ORDER — ALBUMIN (HUMAN) 12.5 G/50ML
25 SOLUTION INTRAVENOUS ONCE
Status: COMPLETED | OUTPATIENT
Start: 2024-01-01 | End: 2024-01-01

## 2024-01-01 RX ADMIN — METRONIDAZOLE 500 MG: 500 INJECTION, SOLUTION INTRAVENOUS at 12:06

## 2024-01-01 RX ADMIN — CALCIUM CHLORIDE INJECTION 0.5 G: 100 INJECTION, SOLUTION INTRAVENOUS at 18:27

## 2024-01-01 RX ADMIN — SODIUM CHLORIDE: 9 INJECTION, SOLUTION INTRAVENOUS at 17:48

## 2024-01-01 RX ADMIN — POTASSIUM CHLORIDE 10 MEQ: 10 INJECTION, SOLUTION INTRAVENOUS at 21:50

## 2024-01-01 RX ADMIN — IOPAMIDOL 75 ML: 755 INJECTION, SOLUTION INTRAVENOUS at 14:38

## 2024-01-01 RX ADMIN — SODIUM CHLORIDE, SODIUM GLUCONATE, SODIUM ACETATE, POTASSIUM CHLORIDE AND MAGNESIUM CHLORIDE 1000 ML: 526; 502; 368; 37; 30 INJECTION, SOLUTION INTRAVENOUS at 12:29

## 2024-01-01 RX ADMIN — VASOPRESSIN 0.03 UNITS/MIN: 20 INJECTION INTRAVENOUS at 22:50

## 2024-01-01 RX ADMIN — SODIUM BICARBONATE 25 MEQ: 84 INJECTION, SOLUTION INTRAVENOUS at 05:35

## 2024-01-01 RX ADMIN — SODIUM BICARBONATE 50 MEQ: 84 INJECTION, SOLUTION INTRAVENOUS at 18:14

## 2024-01-01 RX ADMIN — SODIUM CHLORIDE, SODIUM GLUCONATE, SODIUM ACETATE, POTASSIUM CHLORIDE AND MAGNESIUM CHLORIDE 150 ML/HR: 526; 502; 368; 37; 30 INJECTION, SOLUTION INTRAVENOUS at 21:43

## 2024-01-01 RX ADMIN — METRONIDAZOLE 500 MG: 500 INJECTION, SOLUTION INTRAVENOUS at 05:20

## 2024-01-01 RX ADMIN — THIAMINE HYDROCHLORIDE 100 MG: 100 INJECTION, SOLUTION INTRAMUSCULAR; INTRAVENOUS at 09:00

## 2024-01-01 RX ADMIN — SODIUM CHLORIDE, SODIUM LACTATE, POTASSIUM CHLORIDE, AND CALCIUM CHLORIDE: .6; .31; .03; .02 INJECTION, SOLUTION INTRAVENOUS at 17:34

## 2024-01-01 RX ADMIN — POTASSIUM CHLORIDE 20 MEQ: 400 INJECTION, SOLUTION INTRAVENOUS at 02:58

## 2024-01-01 RX ADMIN — CALCIUM GLUCONATE 3000 MG: 98 INJECTION, SOLUTION INTRAVENOUS at 05:55

## 2024-01-01 RX ADMIN — SODIUM CHLORIDE, SODIUM GLUCONATE, SODIUM ACETATE, POTASSIUM CHLORIDE AND MAGNESIUM CHLORIDE 100 ML/HR: 526; 502; 368; 37; 30 INJECTION, SOLUTION INTRAVENOUS at 09:45

## 2024-01-01 RX ADMIN — SUGAMMADEX 400 MG: 100 INJECTION, SOLUTION INTRAVENOUS at 19:50

## 2024-01-01 RX ADMIN — ROCURONIUM BROMIDE 100 MG: 10 INJECTION, SOLUTION INTRAVENOUS at 17:42

## 2024-01-01 RX ADMIN — PHENYLEPHRINE HYDROCHLORIDE 100 MCG/MIN: 50 INJECTION INTRAVENOUS at 05:57

## 2024-01-01 RX ADMIN — ONDANSETRON 4 MG: 2 INJECTION INTRAMUSCULAR; INTRAVENOUS at 02:44

## 2024-01-01 RX ADMIN — SODIUM BICARBONATE: 84 INJECTION, SOLUTION INTRAVENOUS at 22:55

## 2024-01-01 RX ADMIN — EPINEPHRINE 20 MCG/MIN: 1 INJECTION INTRAMUSCULAR; INTRAVENOUS; SUBCUTANEOUS at 09:02

## 2024-01-01 RX ADMIN — ALBUMIN (HUMAN) 12.5 G: 12.5 SOLUTION INTRAVENOUS at 18:40

## 2024-01-01 RX ADMIN — PHENYLEPHRINE HYDROCHLORIDE 20 MCG/MIN: 50 INJECTION INTRAVENOUS at 00:34

## 2024-01-01 RX ADMIN — ALBUMIN (HUMAN) 25 G: 0.25 INJECTION, SOLUTION INTRAVENOUS at 05:42

## 2024-01-01 RX ADMIN — CALCIUM GLUCONATE 2000 MG: 20 INJECTION, SOLUTION INTRAVENOUS at 22:54

## 2024-01-01 RX ADMIN — SODIUM CHLORIDE 500 ML: 9 INJECTION, SOLUTION INTRAVENOUS at 13:38

## 2024-01-01 RX ADMIN — DEXTROSE MONOHYDRATE 250 ML: 100 INJECTION, SOLUTION INTRAVENOUS at 05:27

## 2024-01-01 RX ADMIN — MAGNESIUM SULFATE HEPTAHYDRATE 2000 MG: 40 INJECTION, SOLUTION INTRAVENOUS at 23:50

## 2024-01-01 RX ADMIN — HYDROMORPHONE HYDROCHLORIDE 0.5 MG: 1 INJECTION, SOLUTION INTRAMUSCULAR; INTRAVENOUS; SUBCUTANEOUS at 07:57

## 2024-01-01 RX ADMIN — ROCURONIUM BROMIDE 50 MG: 10 INJECTION, SOLUTION INTRAVENOUS at 18:29

## 2024-01-01 RX ADMIN — SODIUM CHLORIDE, PRESERVATIVE FREE 10 ML: 5 INJECTION INTRAVENOUS at 09:00

## 2024-01-01 RX ADMIN — SODIUM CHLORIDE, SODIUM GLUCONATE, SODIUM ACETATE, POTASSIUM CHLORIDE AND MAGNESIUM CHLORIDE 1000 ML: 526; 502; 368; 37; 30 INJECTION, SOLUTION INTRAVENOUS at 02:46

## 2024-01-01 RX ADMIN — SODIUM CHLORIDE: 9 INJECTION, SOLUTION INTRAVENOUS at 17:34

## 2024-01-01 RX ADMIN — VASOPRESSIN 0.03 UNITS/MIN: 20 INJECTION INTRAVENOUS at 13:59

## 2024-01-01 RX ADMIN — SODIUM BICARBONATE: 84 INJECTION, SOLUTION INTRAVENOUS at 05:57

## 2024-01-01 RX ADMIN — HYDROMORPHONE HYDROCHLORIDE 1 MG: 1 INJECTION, SOLUTION INTRAMUSCULAR; INTRAVENOUS; SUBCUTANEOUS at 06:11

## 2024-01-01 RX ADMIN — LABETALOL HYDROCHLORIDE 10 MG: 5 INJECTION, SOLUTION INTRAVENOUS at 11:51

## 2024-01-01 RX ADMIN — EPINEPHRINE 20 MCG/MIN: 1 INJECTION INTRAMUSCULAR; INTRAVENOUS; SUBCUTANEOUS at 05:00

## 2024-01-01 RX ADMIN — METRONIDAZOLE 500 MG: 500 INJECTION, SOLUTION INTRAVENOUS at 04:52

## 2024-01-01 RX ADMIN — SODIUM CHLORIDE 1000 ML: 9 INJECTION, SOLUTION INTRAVENOUS at 04:41

## 2024-01-01 RX ADMIN — LIDOCAINE HYDROCHLORIDE: 20 SOLUTION ORAL at 03:20

## 2024-01-01 RX ADMIN — EPINEPHRINE 10 MCG/MIN: 1 INJECTION INTRAMUSCULAR; INTRAVENOUS; SUBCUTANEOUS at 13:25

## 2024-01-01 RX ADMIN — EPINEPHRINE 20 MCG/MIN: 1 INJECTION INTRAMUSCULAR; INTRAVENOUS; SUBCUTANEOUS at 20:38

## 2024-01-01 RX ADMIN — SODIUM CHLORIDE, SODIUM GLUCONATE, SODIUM ACETATE, POTASSIUM CHLORIDE AND MAGNESIUM CHLORIDE 500 ML: 526; 502; 368; 37; 30 INJECTION, SOLUTION INTRAVENOUS at 23:41

## 2024-01-01 RX ADMIN — MIDAZOLAM HYDROCHLORIDE 2 MG: 2 INJECTION, SOLUTION INTRAMUSCULAR; INTRAVENOUS at 19:34

## 2024-01-01 RX ADMIN — SODIUM CHLORIDE 70 MCG/MIN: 9 INJECTION, SOLUTION INTRAVENOUS at 20:20

## 2024-01-01 RX ADMIN — POTASSIUM CHLORIDE 20 MEQ: 400 INJECTION, SOLUTION INTRAVENOUS at 23:38

## 2024-01-01 RX ADMIN — SODIUM BICARBONATE: 84 INJECTION, SOLUTION INTRAVENOUS at 14:02

## 2024-01-01 RX ADMIN — METRONIDAZOLE 500 MG: 500 INJECTION, SOLUTION INTRAVENOUS at 21:52

## 2024-01-01 RX ADMIN — HYDROMORPHONE HYDROCHLORIDE 1 MG: 1 INJECTION, SOLUTION INTRAMUSCULAR; INTRAVENOUS; SUBCUTANEOUS at 04:37

## 2024-01-01 RX ADMIN — DEXTROSE 50 % IN WATER (D50W) INTRAVENOUS SYRINGE 25 G: at 18:14

## 2024-01-01 RX ADMIN — SODIUM CHLORIDE, SODIUM GLUCONATE, SODIUM ACETATE, POTASSIUM CHLORIDE AND MAGNESIUM CHLORIDE 1000 ML: 526; 502; 368; 37; 30 INJECTION, SOLUTION INTRAVENOUS at 14:04

## 2024-01-01 RX ADMIN — SODIUM CHLORIDE, SODIUM GLUCONATE, SODIUM ACETATE, POTASSIUM CHLORIDE AND MAGNESIUM CHLORIDE: 526; 502; 368; 37; 30 INJECTION, SOLUTION INTRAVENOUS at 19:07

## 2024-01-01 RX ADMIN — ENOXAPARIN SODIUM 40 MG: 100 INJECTION SUBCUTANEOUS at 09:40

## 2024-01-01 RX ADMIN — HYDROMORPHONE HYDROCHLORIDE 0.5 MG: 1 INJECTION, SOLUTION INTRAMUSCULAR; INTRAVENOUS; SUBCUTANEOUS at 02:43

## 2024-01-01 RX ADMIN — THIAMINE HYDROCHLORIDE 100 MG: 100 INJECTION, SOLUTION INTRAMUSCULAR; INTRAVENOUS at 00:42

## 2024-01-01 RX ADMIN — PHENYLEPHRINE HYDROCHLORIDE 30 MCG/MIN: 50 INJECTION INTRAVENOUS at 16:25

## 2024-01-01 RX ADMIN — SODIUM CHLORIDE 100 MCG/MIN: 9 INJECTION, SOLUTION INTRAVENOUS at 04:40

## 2024-01-01 RX ADMIN — POTASSIUM CHLORIDE 20 MEQ: 400 INJECTION, SOLUTION INTRAVENOUS at 01:45

## 2024-01-01 RX ADMIN — SODIUM CHLORIDE 15 MCG/MIN: 9 INJECTION, SOLUTION INTRAVENOUS at 13:32

## 2024-01-01 RX ADMIN — ONDANSETRON 4 MG: 2 INJECTION INTRAMUSCULAR; INTRAVENOUS at 04:36

## 2024-01-01 RX ADMIN — POTASSIUM CHLORIDE 20 MEQ: 400 INJECTION, SOLUTION INTRAVENOUS at 00:39

## 2024-01-01 RX ADMIN — SODIUM CHLORIDE 70 MCG/MIN: 9 INJECTION, SOLUTION INTRAVENOUS at 00:34

## 2024-01-01 ASSESSMENT — ENCOUNTER SYMPTOMS
BACK PAIN: 0
COLOR CHANGE: 0
RECTAL PAIN: 0
BLOOD IN STOOL: 0
COUGH: 0
CONSTIPATION: 0
DIARRHEA: 0
NAUSEA: 1
CHEST TIGHTNESS: 0
VOMITING: 1
SHORTNESS OF BREATH: 0
ABDOMINAL PAIN: 1
RESPIRATORY NEGATIVE: 1
ABDOMINAL DISTENTION: 0
ANAL BLEEDING: 0

## 2024-01-01 ASSESSMENT — PAIN SCALES - GENERAL
PAINLEVEL_OUTOF10: 10
PAINLEVEL_OUTOF10: 10
PAINLEVEL_OUTOF10: 0
PAINLEVEL_OUTOF10: 8
PAINLEVEL_OUTOF10: 0
PAINLEVEL_OUTOF10: 0
PAINLEVEL_OUTOF10: 10
PAINLEVEL_OUTOF10: 0

## 2024-01-01 ASSESSMENT — PAIN DESCRIPTION - LOCATION
LOCATION: ABDOMEN

## 2024-01-01 ASSESSMENT — PAIN DESCRIPTION - ORIENTATION
ORIENTATION: INNER
ORIENTATION: RIGHT

## 2024-01-01 ASSESSMENT — PULMONARY FUNCTION TESTS
PIF_VALUE: 29
PIF_VALUE: 36
PIF_VALUE: 16
PIF_VALUE: 28
PIF_VALUE: 28
PIF_VALUE: 31
PIF_VALUE: 11

## 2024-01-01 ASSESSMENT — PAIN - FUNCTIONAL ASSESSMENT
PAIN_FUNCTIONAL_ASSESSMENT: 0-10
PAIN_FUNCTIONAL_ASSESSMENT: ACTIVITIES ARE NOT PREVENTED

## 2024-01-01 ASSESSMENT — PAIN DESCRIPTION - DESCRIPTORS
DESCRIPTORS: SHARP
DESCRIPTORS: ACHING

## 2024-01-03 RX ORDER — ZOLPIDEM TARTRATE 5 MG/1
5 TABLET ORAL NIGHTLY PRN
COMMUNITY

## 2024-01-03 NOTE — PROGRESS NOTES
Name_______________________________________Printed:____________________  Date and time of surgery____1/5/24  0730____________________Arrival Time:____0600  [facility] [place]   1. The instructions given regarding when and if a patient needs to stop oral intake prior to surgery varies.Follow the specific instructions you were given                  XXXXX___Nothing to eat or to drink after Midnight the night before.                   ____Carbo loading or instructions will be given to select patients-if you have been given those instructions -please do the following                           The evening before your surgery after dinner before midnight drink 40 ounces of gatorade.If you are diabetic use sugar free.  The morning of surgery drink 40 ounces of water.This needs to be finished 3 hours prior to your surgery start time.    2. Take the following pills with a small sip of water on the morning of surgery___take breztri, labetalol, nifedipine, flonase,levothyroxine, nexium am of procedure________________________________________________                  Do not take blood pressure medications ending in pril or sartan the jacqui prior to surgery or the morning of surgery. Dr Lewis's patient are not to take any medications the AM of surgery.         3. Aspirin, Ibuprofen, Advil, Naproxen, Vitamin E and other Anti-inflammatory products and supplements should be stopped for 5 -7days before surgery or as directed by your physician.   4. Check with your Doctor regarding stopping Plavix, Coumadin,Eliquis, Lovenox,Effient,Pradaxa,Xarelto, Fragmin or other blood thinners and follow their instructions.   5. Do not smoke, and do not drink any alcoholic beverages 24 hours prior to surgery.  This includes NA Beer.Refrain from the usage of any recreational drugs.   6. You may brush your teeth and gargle the morning of surgery.  DO NOT SWALLOW WATER   7. You MUST make arrangements for a responsible adult to stay on site while you are

## 2024-01-05 ENCOUNTER — ANESTHESIA (OUTPATIENT)
Dept: OPERATING ROOM | Age: 69
End: 2024-01-05
Payer: MEDICARE

## 2024-01-05 ENCOUNTER — ANESTHESIA EVENT (OUTPATIENT)
Dept: OPERATING ROOM | Age: 69
End: 2024-01-05
Payer: MEDICARE

## 2024-01-05 ENCOUNTER — HOSPITAL ENCOUNTER (OUTPATIENT)
Age: 69
Setting detail: OUTPATIENT SURGERY
Discharge: HOME | End: 2024-01-05
Attending: OTOLARYNGOLOGY | Admitting: OTOLARYNGOLOGY
Payer: MEDICARE

## 2024-01-05 VITALS
TEMPERATURE: 97.6 F | RESPIRATION RATE: 18 BRPM | HEIGHT: 67 IN | OXYGEN SATURATION: 94 % | WEIGHT: 229 LBS | BODY MASS INDEX: 35.94 KG/M2 | HEART RATE: 71 BPM | DIASTOLIC BLOOD PRESSURE: 54 MMHG | SYSTOLIC BLOOD PRESSURE: 99 MMHG

## 2024-01-05 DIAGNOSIS — J34.3 HYPERTROPHY OF INFERIOR NASAL TURBINATE: ICD-10-CM

## 2024-01-05 DIAGNOSIS — Z86.018 HISTORY OF INVERTED PAPILLOMA: ICD-10-CM

## 2024-01-05 DIAGNOSIS — J34.89 NASAL OBSTRUCTION: ICD-10-CM

## 2024-01-05 DIAGNOSIS — G89.18 POST-OP PAIN: Primary | ICD-10-CM

## 2024-01-05 PROCEDURE — 31237 NSL/SINS NDSC SURG BX POLYPC: CPT | Performed by: OTOLARYNGOLOGY

## 2024-01-05 PROCEDURE — A4217 STERILE WATER/SALINE, 500 ML: HCPCS | Performed by: OTOLARYNGOLOGY

## 2024-01-05 PROCEDURE — 6370000000 HC RX 637 (ALT 250 FOR IP): Performed by: STUDENT IN AN ORGANIZED HEALTH CARE EDUCATION/TRAINING PROGRAM

## 2024-01-05 PROCEDURE — 3700000000 HC ANESTHESIA ATTENDED CARE: Performed by: OTOLARYNGOLOGY

## 2024-01-05 PROCEDURE — 3600000004 HC SURGERY LEVEL 4 BASE: Performed by: OTOLARYNGOLOGY

## 2024-01-05 PROCEDURE — 2500000003 HC RX 250 WO HCPCS: Performed by: NURSE ANESTHETIST, CERTIFIED REGISTERED

## 2024-01-05 PROCEDURE — 7100000010 HC PHASE II RECOVERY - FIRST 15 MIN: Performed by: OTOLARYNGOLOGY

## 2024-01-05 PROCEDURE — 3600000014 HC SURGERY LEVEL 4 ADDTL 15MIN: Performed by: OTOLARYNGOLOGY

## 2024-01-05 PROCEDURE — 61782 SCAN PROC CRANIAL EXTRA: CPT | Performed by: OTOLARYNGOLOGY

## 2024-01-05 PROCEDURE — 2709999900 HC NON-CHARGEABLE SUPPLY: Performed by: OTOLARYNGOLOGY

## 2024-01-05 PROCEDURE — 6360000002 HC RX W HCPCS: Performed by: STUDENT IN AN ORGANIZED HEALTH CARE EDUCATION/TRAINING PROGRAM

## 2024-01-05 PROCEDURE — 88304 TISSUE EXAM BY PATHOLOGIST: CPT

## 2024-01-05 PROCEDURE — 6370000000 HC RX 637 (ALT 250 FOR IP): Performed by: OTOLARYNGOLOGY

## 2024-01-05 PROCEDURE — 6360000002 HC RX W HCPCS: Performed by: OTOLARYNGOLOGY

## 2024-01-05 PROCEDURE — 2720000010 HC SURG SUPPLY STERILE: Performed by: OTOLARYNGOLOGY

## 2024-01-05 PROCEDURE — 2580000003 HC RX 258: Performed by: OTOLARYNGOLOGY

## 2024-01-05 PROCEDURE — 7100000000 HC PACU RECOVERY - FIRST 15 MIN: Performed by: OTOLARYNGOLOGY

## 2024-01-05 PROCEDURE — 7100000011 HC PHASE II RECOVERY - ADDTL 15 MIN: Performed by: OTOLARYNGOLOGY

## 2024-01-05 PROCEDURE — 30130 EXCISE INFERIOR TURBINATE: CPT | Performed by: OTOLARYNGOLOGY

## 2024-01-05 PROCEDURE — 3700000001 HC ADD 15 MINUTES (ANESTHESIA): Performed by: OTOLARYNGOLOGY

## 2024-01-05 PROCEDURE — 2500000003 HC RX 250 WO HCPCS: Performed by: OTOLARYNGOLOGY

## 2024-01-05 PROCEDURE — 6360000002 HC RX W HCPCS: Performed by: NURSE ANESTHETIST, CERTIFIED REGISTERED

## 2024-01-05 PROCEDURE — 7100000001 HC PACU RECOVERY - ADDTL 15 MIN: Performed by: OTOLARYNGOLOGY

## 2024-01-05 RX ORDER — MAGNESIUM HYDROXIDE 1200 MG/15ML
LIQUID ORAL CONTINUOUS PRN
Status: COMPLETED | OUTPATIENT
Start: 2024-01-05 | End: 2024-01-05

## 2024-01-05 RX ORDER — APREPITANT 40 MG/1
40 CAPSULE ORAL ONCE
Status: COMPLETED | OUTPATIENT
Start: 2024-01-05 | End: 2024-01-05

## 2024-01-05 RX ORDER — SODIUM CHLORIDE 0.9 % (FLUSH) 0.9 %
5-40 SYRINGE (ML) INJECTION EVERY 12 HOURS SCHEDULED
Status: DISCONTINUED | OUTPATIENT
Start: 2024-01-05 | End: 2024-01-05 | Stop reason: HOSPADM

## 2024-01-05 RX ORDER — BACITRACIN ZINC AND POLYMYXIN B SULFATE 500; 1000 [USP'U]/G; [USP'U]/G
OINTMENT TOPICAL
Status: COMPLETED | OUTPATIENT
Start: 2024-01-05 | End: 2024-01-05

## 2024-01-05 RX ORDER — SODIUM CHLORIDE, SODIUM LACTATE, POTASSIUM CHLORIDE, CALCIUM CHLORIDE 600; 310; 30; 20 MG/100ML; MG/100ML; MG/100ML; MG/100ML
INJECTION, SOLUTION INTRAVENOUS CONTINUOUS
Status: DISCONTINUED | OUTPATIENT
Start: 2024-01-05 | End: 2024-01-05 | Stop reason: HOSPADM

## 2024-01-05 RX ORDER — LIDOCAINE HYDROCHLORIDE 20 MG/ML
INJECTION, SOLUTION INFILTRATION; PERINEURAL PRN
Status: DISCONTINUED | OUTPATIENT
Start: 2024-01-05 | End: 2024-01-05 | Stop reason: SDUPTHER

## 2024-01-05 RX ORDER — MIDAZOLAM HYDROCHLORIDE 1 MG/ML
INJECTION INTRAMUSCULAR; INTRAVENOUS PRN
Status: DISCONTINUED | OUTPATIENT
Start: 2024-01-05 | End: 2024-01-05 | Stop reason: SDUPTHER

## 2024-01-05 RX ORDER — MAGNESIUM SULFATE HEPTAHYDRATE 500 MG/ML
INJECTION, SOLUTION INTRAMUSCULAR; INTRAVENOUS PRN
Status: DISCONTINUED | OUTPATIENT
Start: 2024-01-05 | End: 2024-01-05 | Stop reason: SDUPTHER

## 2024-01-05 RX ORDER — SODIUM CHLORIDE 9 MG/ML
INJECTION, SOLUTION INTRAVENOUS CONTINUOUS
Status: DISCONTINUED | OUTPATIENT
Start: 2024-01-05 | End: 2024-01-05 | Stop reason: HOSPADM

## 2024-01-05 RX ORDER — HYDROCODONE BITARTRATE AND ACETAMINOPHEN 7.5; 325 MG/1; MG/1
1 TABLET ORAL EVERY 6 HOURS PRN
Qty: 24 TABLET | Refills: 0 | Status: SHIPPED | OUTPATIENT
Start: 2024-01-05 | End: 2024-01-11

## 2024-01-05 RX ORDER — OXYMETAZOLINE HYDROCHLORIDE 0.05 G/100ML
2 SPRAY NASAL
Status: COMPLETED | OUTPATIENT
Start: 2024-01-05 | End: 2024-01-05

## 2024-01-05 RX ORDER — CEFUROXIME AXETIL 250 MG/1
250 TABLET ORAL 2 TIMES DAILY
Qty: 14 TABLET | Refills: 0 | Status: SHIPPED | OUTPATIENT
Start: 2024-01-05 | End: 2024-01-12

## 2024-01-05 RX ORDER — SODIUM CHLORIDE 9 MG/ML
INJECTION, SOLUTION INTRAVENOUS PRN
Status: DISCONTINUED | OUTPATIENT
Start: 2024-01-05 | End: 2024-01-05 | Stop reason: HOSPADM

## 2024-01-05 RX ORDER — ONDANSETRON 2 MG/ML
INJECTION INTRAMUSCULAR; INTRAVENOUS PRN
Status: DISCONTINUED | OUTPATIENT
Start: 2024-01-05 | End: 2024-01-05 | Stop reason: SDUPTHER

## 2024-01-05 RX ORDER — HYDROMORPHONE HYDROCHLORIDE 2 MG/ML
0.5 INJECTION, SOLUTION INTRAMUSCULAR; INTRAVENOUS; SUBCUTANEOUS EVERY 5 MIN PRN
Status: DISCONTINUED | OUTPATIENT
Start: 2024-01-05 | End: 2024-01-05 | Stop reason: HOSPADM

## 2024-01-05 RX ORDER — EPINEPHRINE 1 MG/ML
INJECTION, SOLUTION INTRAMUSCULAR; SUBCUTANEOUS
Status: COMPLETED | OUTPATIENT
Start: 2024-01-05 | End: 2024-01-05

## 2024-01-05 RX ORDER — SODIUM CHLORIDE 0.9 % (FLUSH) 0.9 %
5-40 SYRINGE (ML) INJECTION PRN
Status: DISCONTINUED | OUTPATIENT
Start: 2024-01-05 | End: 2024-01-05 | Stop reason: HOSPADM

## 2024-01-05 RX ORDER — FENTANYL CITRATE 50 UG/ML
50 INJECTION, SOLUTION INTRAMUSCULAR; INTRAVENOUS EVERY 5 MIN PRN
Status: DISCONTINUED | OUTPATIENT
Start: 2024-01-05 | End: 2024-01-05 | Stop reason: HOSPADM

## 2024-01-05 RX ORDER — PROMETHAZINE HYDROCHLORIDE 25 MG/1
25 TABLET ORAL EVERY 6 HOURS PRN
Qty: 40 TABLET | Refills: 0 | Status: SHIPPED | OUTPATIENT
Start: 2024-01-05

## 2024-01-05 RX ORDER — DEXAMETHASONE SODIUM PHOSPHATE 4 MG/ML
INJECTION, SOLUTION INTRA-ARTICULAR; INTRALESIONAL; INTRAMUSCULAR; INTRAVENOUS; SOFT TISSUE PRN
Status: DISCONTINUED | OUTPATIENT
Start: 2024-01-05 | End: 2024-01-05 | Stop reason: SDUPTHER

## 2024-01-05 RX ORDER — ROCURONIUM BROMIDE 10 MG/ML
INJECTION, SOLUTION INTRAVENOUS PRN
Status: DISCONTINUED | OUTPATIENT
Start: 2024-01-05 | End: 2024-01-05 | Stop reason: SDUPTHER

## 2024-01-05 RX ORDER — ACETAMINOPHEN 325 MG/1
650 TABLET ORAL ONCE
Status: COMPLETED | OUTPATIENT
Start: 2024-01-05 | End: 2024-01-05

## 2024-01-05 RX ORDER — OXYMETAZOLINE HYDROCHLORIDE 0.05 G/100ML
SPRAY NASAL
Status: COMPLETED | OUTPATIENT
Start: 2024-01-05 | End: 2024-01-05

## 2024-01-05 RX ORDER — SUCCINYLCHOLINE/SOD CL,ISO/PF 200MG/10ML
SYRINGE (ML) INTRAVENOUS PRN
Status: DISCONTINUED | OUTPATIENT
Start: 2024-01-05 | End: 2024-01-05 | Stop reason: SDUPTHER

## 2024-01-05 RX ORDER — CLINDAMYCIN PHOSPHATE 150 MG/ML
900 INJECTION, SOLUTION INTRAVENOUS ONCE
Status: DISCONTINUED | OUTPATIENT
Start: 2024-01-05 | End: 2024-01-05

## 2024-01-05 RX ORDER — FENTANYL CITRATE 50 UG/ML
INJECTION, SOLUTION INTRAMUSCULAR; INTRAVENOUS PRN
Status: DISCONTINUED | OUTPATIENT
Start: 2024-01-05 | End: 2024-01-05 | Stop reason: SDUPTHER

## 2024-01-05 RX ORDER — PROPOFOL 10 MG/ML
INJECTION, EMULSION INTRAVENOUS PRN
Status: DISCONTINUED | OUTPATIENT
Start: 2024-01-05 | End: 2024-01-05 | Stop reason: SDUPTHER

## 2024-01-05 RX ORDER — OXYCODONE HYDROCHLORIDE 5 MG/1
5 TABLET ORAL
Status: COMPLETED | OUTPATIENT
Start: 2024-01-05 | End: 2024-01-05

## 2024-01-05 RX ORDER — ONDANSETRON 2 MG/ML
4 INJECTION INTRAMUSCULAR; INTRAVENOUS
Status: DISCONTINUED | OUTPATIENT
Start: 2024-01-05 | End: 2024-01-05 | Stop reason: HOSPADM

## 2024-01-05 RX ORDER — DIPHENHYDRAMINE HCL 25 MG
50 TABLET ORAL ONCE
Status: COMPLETED | OUTPATIENT
Start: 2024-01-05 | End: 2024-01-05

## 2024-01-05 RX ADMIN — ONDANSETRON 4 MG: 2 INJECTION INTRAMUSCULAR; INTRAVENOUS at 09:21

## 2024-01-05 RX ADMIN — MAGNESIUM SULFATE HEPTAHYDRATE 1 G: 500 INJECTION, SOLUTION INTRAMUSCULAR; INTRAVENOUS at 07:55

## 2024-01-05 RX ADMIN — SODIUM CHLORIDE: 9 INJECTION, SOLUTION INTRAVENOUS at 07:04

## 2024-01-05 RX ADMIN — DIPHENHYDRAMINE HYDROCHLORIDE 50 MG: 25 TABLET ORAL at 07:00

## 2024-01-05 RX ADMIN — APREPITANT 40 MG: 40 CAPSULE ORAL at 07:02

## 2024-01-05 RX ADMIN — OXYCODONE HYDROCHLORIDE 5 MG: 5 TABLET ORAL at 10:05

## 2024-01-05 RX ADMIN — Medication 120 MG: at 07:49

## 2024-01-05 RX ADMIN — Medication 2 SPRAY: at 07:01

## 2024-01-05 RX ADMIN — PROPOFOL 150 MG: 10 INJECTION, EMULSION INTRAVENOUS at 07:49

## 2024-01-05 RX ADMIN — FENTANYL CITRATE 50 MCG: 50 INJECTION, SOLUTION INTRAMUSCULAR; INTRAVENOUS at 07:49

## 2024-01-05 RX ADMIN — FENTANYL CITRATE 50 MCG: 50 INJECTION, SOLUTION INTRAMUSCULAR; INTRAVENOUS at 08:35

## 2024-01-05 RX ADMIN — SODIUM CHLORIDE: 9 INJECTION, SOLUTION INTRAVENOUS at 09:19

## 2024-01-05 RX ADMIN — ROCURONIUM BROMIDE 45 MG: 10 INJECTION, SOLUTION INTRAVENOUS at 08:05

## 2024-01-05 RX ADMIN — ACETAMINOPHEN 650 MG: 325 TABLET ORAL at 07:01

## 2024-01-05 RX ADMIN — MIDAZOLAM 2 MG: 1 INJECTION INTRAMUSCULAR; INTRAVENOUS at 07:45

## 2024-01-05 RX ADMIN — SUGAMMADEX 200 MG: 100 INJECTION, SOLUTION INTRAVENOUS at 09:21

## 2024-01-05 RX ADMIN — DEXAMETHASONE SODIUM PHOSPHATE 10 MG: 4 INJECTION, SOLUTION INTRAMUSCULAR; INTRAVENOUS at 08:15

## 2024-01-05 RX ADMIN — CEFAZOLIN 2000 MG: 2 INJECTION, POWDER, FOR SOLUTION INTRAMUSCULAR; INTRAVENOUS at 07:42

## 2024-01-05 RX ADMIN — LIDOCAINE HYDROCHLORIDE 60 MG: 20 INJECTION, SOLUTION INFILTRATION; PERINEURAL at 07:49

## 2024-01-05 RX ADMIN — ROCURONIUM BROMIDE 5 MG: 10 INJECTION, SOLUTION INTRAVENOUS at 07:49

## 2024-01-05 ASSESSMENT — ENCOUNTER SYMPTOMS: SHORTNESS OF BREATH: 1

## 2024-01-05 ASSESSMENT — PAIN SCALES - GENERAL
PAINLEVEL_OUTOF10: 4
PAINLEVEL_OUTOF10: 0
PAINLEVEL_OUTOF10: 7

## 2024-01-05 ASSESSMENT — PAIN DESCRIPTION - DESCRIPTORS
DESCRIPTORS: SORE;DISCOMFORT
DESCRIPTORS: DISCOMFORT;SORE;PRESSURE

## 2024-01-05 ASSESSMENT — PAIN DESCRIPTION - ORIENTATION
ORIENTATION: RIGHT
ORIENTATION: RIGHT

## 2024-01-05 ASSESSMENT — PAIN DESCRIPTION - PAIN TYPE
TYPE: SURGICAL PAIN
TYPE: SURGICAL PAIN

## 2024-01-05 ASSESSMENT — PAIN - FUNCTIONAL ASSESSMENT: PAIN_FUNCTIONAL_ASSESSMENT: 0-10

## 2024-01-05 ASSESSMENT — PAIN DESCRIPTION - LOCATION
LOCATION: NOSE
LOCATION: NOSE

## 2024-01-05 NOTE — H&P
Date of Surgery Update:  Cayla Farfan was seen, history and physical examination reviewed, and patient examined by me today. There have been no significant clinical changes since the completion of the previous history and physical.    The risk, benefits, and alternatives of the proposed procedure have been explained to the patient (or appropriate guardian) and understanding verbalized. All questions answered. Patient wishes to proceed.    Electronically signed by: Marky Terrazas MD,1/5/2024,7:33 AM

## 2024-01-05 NOTE — OP NOTE
Operative Note      Patient: Cayla Farfan  YOB: 1955  MRN: [id number]    Date of Procedure: 1/5/2024    Pre-Op Diagnosis Codes:     * Hypertrophy of inferior nasal turbinate [J34.3]     * Nasal obstruction [J34.89]     * History of inverted papilloma [Z86.018]    Post-Op Diagnosis: Same       Procedure(s):  PARTIAL EXCISION OF RIGHT INFERIOR TURBINATE,   MICRODEBRIDEMENT, EXCISION OF TISSUE FROM INFERIOR NASAL TURBINATE  Use of Response Biomedical CT image guidance.      Surgeon(s):  Marky Terrazas MD    Assistant:   * No surgical staff found *    Anesthesia: General    Estimated Blood Loss (mL): less than 50 (fifty)    Complications: None    Specimens:   ID Type Source Tests Collected by Time Destination   A : A. TISSUE FROM RIGHT INFERIOR RIGHT TURBINATE Tissue Tissue SURGICAL PATHOLOGY Marky Terrazas MD 1/5/2024 0826    B : B. MICRODEBRIDEMENT SHAVINGS OF RIGHT INFERIOR NASAL TURBINATE Tissue Tissue SURGICAL PATHOLOGY Marky Terrazas MD 1/5/2024 0850    C : C. PORTION OF RIGHT INFERIOR TURBINATE Tissue Tissue SURGICAL PATHOLOGY Marky Terrazas MD 1/5/2024 0903        Implants:  * No implants in log *      Drains: * No LDAs found *    Findings:  There was florid papillomatous tissue on the right inferior turbinate and anterior area of the right lateral nasal wall, which appeared to be limited to the inferior turbinate.  There were well healed post surgical changes of the middle meatus, ethmoid cavity with no apparent involvement with the papillomatous tissue.  The middle meatal antrostomy was patent.  The middle turbinate also appeared to be normal with no involvement with papilloma.  The remainder of the nasal cavity was clear of the papillomatous tissue.  The inferior third to half of the inferior nasal turbinate was excised.  All gross papillomatous tissue was excised with the microdebrider.   The nasal septum appeared to be midline.  The right nasal cavity was widely patent with no obstruction at the

## 2024-01-05 NOTE — ANESTHESIA POSTPROCEDURE EVALUATION
Department of Anesthesiology  Postprocedure Note    Patient: Cayla Farfan  MRN: [id number]  YOB: 1955  Date of evaluation: 1/5/2024    Procedure Summary       Date: 01/05/24 Room / Location: [address]    Anesthesia Start: 0745 Anesthesia Stop: 0938    Procedure: PARTIAL EXCISION OF RIGHT INFERIOR TURBINATE, MICRODEBRIDEMENT, EXCISION OF TISSUE FROM INFERIOR NASAL TURBINATE (Right: Nose) Diagnosis:       Hypertrophy of inferior nasal turbinate      Nasal obstruction      History of inverted papilloma      (Hypertrophy of inferior nasal turbinate [J34.3])      (Nasal obstruction [J34.89])      (History of inverted papilloma [Z86.018])    Surgeons: Marky Terrazas MD Responsible Provider: Belgica Cordova MD    Anesthesia Type: general ASA Status: 2            Anesthesia Type: No value filed.    Wilfrid Phase I: Wilfrid Score: 10    Wilfrid Phase II:      Anesthesia Post Evaluation    Patient location during evaluation: bedside  Patient participation: complete - patient participated  Level of consciousness: awake and alert  Pain score: 2  Airway patency: patent  Nausea & Vomiting: no vomiting  Cardiovascular status: hemodynamically stable  Respiratory status: nonlabored ventilation  Hydration status: stable  Multimodal analgesia pain management approach  Pain management: adequate    No notable events documented.

## 2024-01-05 NOTE — PROGRESS NOTES
Pt awake and alert.  Pt on RA, VSS.   in the waiting room.  Pt with c/o  pain, denies nausea, tolerating PO.  Pt meets criteria to be discharged from phase 1.

## 2024-01-05 NOTE — DISCHARGE INSTRUCTIONS
[facility] PHYSICIANS  [facility]    Marky Terrazas M.D.   [address], [address]   [place] [id number] [phone]       POST OPERATIVE INSTRUCTIONS   PARTIAL EXISION OF RIGHT INFERIOR NASAL TURBINATE AND EXCISION OF PAPILLOMATOUS NASAL TISSUE        ACTIVITY   Rest in bed, face up, on the day of surgery.  Then you may resume normal activities on the first post op day.  Do not drive a motorized vehicle, operate heavy or complicated machinery, or engage in any activities requiring normal judgement and concentration and decision making while you are taking narcotic pain medication.  Refrain from any activity that may result in a blow or injury to the nose for six weeks.       DIET   You may resume normal diet immediately after returning home. It may help to limit yourself to a bland diet and avoid spicy food, for the first 24 hours after surgery.       DRAINING AND BLEEDING are normal after nasal surgery. This may continue to some degree for the first 7 TO 10 days after surgery.  Change your mustache dressing under the nose as it becomes soiled or soaked. If excessive bleeding occurs, lie with the head elevated on two pillows.  Put crushed ice in a plastic sandwich bag wrapped in cloth, such as a cotton arnaldo-shirt, and place on the forehead.  Do not put any direct pressure to the nose.       SWELLING of the upper lip and face are common after surgery. This will subside after a few days.       NASAL SALINE   Use a saline nasal mist spray in both nostrils every three to four hours while awake, for 10 days, to keep the inside of your nose moist .       NOSE BLOWING   You may blow your nose gently, just enough to clear mucus for the first ten days after surgery.  You may also sniff or irrigate your nose to clear mucus.       Take only those medications prescribed by Doctor Terrazas, and your usual prescribed medication if approved by Doctor Terrazas.       QUESTIONS?   If you have any questions or have any

## 2024-01-05 NOTE — PROGRESS NOTES
Discharge instructions reviewed with patient/friend Zeeshan. All home medications have been reviewed, questions answered and patient verbalized understanding.  Discharge instructions signed.  Pt dc'd per wheelchair.  Patient discharged home with ice pack, nasal mist spray and other belongings. Friend taking stable pt home.

## 2024-01-05 NOTE — ANESTHESIA PRE PROCEDURE
patient.  Patient verbalized an understanding and agrees to proceed.   )  Induction: intravenous.    MIPS: Postoperative opioids intended and Prophylactic antiemetics administered.  Anesthetic plan and risks discussed with patient.      Plan discussed with CRNA.                    Belgica Cordova MD   1/5/2024

## 2024-01-05 NOTE — PROGRESS NOTES
Pt arrived from OR to PACU bay 6.  Report received from OR staff.  Pt arouses to voice.  Moustache dressing in place, no visible drainage.  Pt on 6 L simple mask, NSR, VSS.  Will continue to monitor.

## 2024-01-05 NOTE — BRIEF OP NOTE
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 TISSUE FROM RIGHT INFERIOR RIGHT TURBINATE Tissue Tissue SURGICAL PATHOLOGY Marky Terrazas MD 1/5/2024 0826    B : B. MICRODEBRIDEMENT SHAVINGS OF RIGHT INFERIOR NASAL TURBINATE Tissue Tissue SURGICAL PATHOLOGY Marky Terrazas MD 1/5/2024 0850    C : C. PORTION OF RIGHT INFERIOR TURBINATE Tissue Tissue SURGICAL PATHOLOGY Marky Terrazas MD 1/5/2024 0903        Implants:  * No implants in log *      Drains: * No LDAs found *    Findings:  There was florid papillomatous tissue on the right inferior turbinate and anterior area of the right lateral nasal wall, which appeared to be limited to the inferior turbinate.  There were well healed post surgical changes of the middle meatus, ethmoid cavity with no apparent involvement with the papillomatous tissue.  The middle meatal antrostomy was patent.  The middle turbinate also appeared to be normal with no involvement with papilloma.  The remainder of the nasal cavity was clear of the papillomatous tissue.  The inferior third to half of the inferior nasal turbinate was excised.  All gross papillomatous tissue was excised with the microdebrider.          Electronically signed by Marky Terrazas MD on 1/5/2024 at 9:24 AM

## 2024-01-08 ENCOUNTER — OFFICE VISIT (OUTPATIENT)
Dept: ENT CLINIC | Age: 69
End: 2024-01-08

## 2024-01-08 VITALS
HEIGHT: 67 IN | OXYGEN SATURATION: 98 % | WEIGHT: 233 LBS | HEART RATE: 59 BPM | BODY MASS INDEX: 36.57 KG/M2 | DIASTOLIC BLOOD PRESSURE: 79 MMHG | SYSTOLIC BLOOD PRESSURE: 114 MMHG | TEMPERATURE: 97.1 F

## 2024-01-08 DIAGNOSIS — Z86.018 HISTORY OF INVERTED PAPILLOMA: ICD-10-CM

## 2024-01-08 DIAGNOSIS — Z09 POSTOP CHECK: Primary | ICD-10-CM

## 2024-01-08 PROCEDURE — 99024 POSTOP FOLLOW-UP VISIT: CPT | Performed by: OTOLARYNGOLOGY

## 2024-01-08 NOTE — PROGRESS NOTES
POST-OP NOTE      CHIEF COMPLAINT:  Post op check.      HISTORY:     POD #3, post excision of inverted papilloma right nasal cavity on 01/05/2023.  Otherwise, doing well, with no current complaints.      EXAMINATION:        Vitals:    01/08/24 0903   BP: 114/79   Pulse: 59   Temp: 97.1 °F (36.2 °C)   SpO2: 98%      WDWN, awake and alert, with no evidence of distress.  Nose:  Merocel packing removed with no difficulty and no bleeding.  Airway patent with normal air flow through bilateral nasal cavity.  Mustache dressing replaced.      PATHOLOGY:  Pending.        IMPRESSION / DIAGNOSES / ORDERS   Diagnoses and all orders for this visit:    Postop check    History of inverted papilloma       Doing well postop with no evidence of perioperative complication.      RECOMMENDATIONS / PLAN   [place] was advised to follow the post-op instructions in the after visit summary given after surgery.  [place] was advised to continue post-op medications as prescribed.        Follow-up  Return in about 10 days (around 1/18/2024) for post op check.

## 2024-01-16 DIAGNOSIS — R10.10 UPPER ABDOMINAL PAIN: ICD-10-CM

## 2024-01-17 NOTE — TELEPHONE ENCOUNTER
Medication:   Requested Prescriptions     Pending Prescriptions Disp Refills    NEXIUM 40 MG delayed release capsule [Pharmacy Med Name: NEXIUM 40MG CAPSULES] 90 capsule      Sig: TAKE 1 CAPSULE BY MOUTH EVERY MORNING BEFORE BREAKFAST        Last Filled:      Patient Phone Number: [phone] (home) [phone] (work)    Last appt: 12/21/2023   Next appt: 1/22/2024    Last OARRS:       1/9/2018     3:39 PM   RX Monitoring   Attestation The Prescription Monitoring Report for this patient was reviewed today.

## 2024-01-18 ENCOUNTER — OFFICE VISIT (OUTPATIENT)
Dept: ENT CLINIC | Age: 69
End: 2024-01-18

## 2024-01-18 VITALS
OXYGEN SATURATION: 100 % | TEMPERATURE: 97.5 F | HEART RATE: 76 BPM | BODY MASS INDEX: 36.48 KG/M2 | SYSTOLIC BLOOD PRESSURE: 116 MMHG | DIASTOLIC BLOOD PRESSURE: 75 MMHG | HEIGHT: 67 IN

## 2024-01-18 DIAGNOSIS — Z09 POSTOP CHECK: Primary | ICD-10-CM

## 2024-01-18 PROCEDURE — 99024 POSTOP FOLLOW-UP VISIT: CPT | Performed by: OTOLARYNGOLOGY

## 2024-01-18 NOTE — PROGRESS NOTES
POST-OP NOTE      CHIEF COMPLAINT:  Post op check.      HISTORY:     POD #13, post excision of inverted papilloma right nasal cavity on 01/05/2023.  Doing well, with no current complaints.      EXAMINATION:        Vitals:    01/18/24 1009   BP: 116/75   Pulse: 76   Temp: 97.5 °F (36.4 °C)   SpO2: 100%      WDWN, awake and alert, with no evidence of distress.  Nose:  operative area appeared to be healing well.       PATHOLOGY:  FINAL DIAGNOSIS:     A.  Tissue, right inferior turbinate:   -Fragment of sinonasal polyp, inverted type.   -Negative for malignancy.     B.  Right inferior turbinate, microdebridement shavings:   -Fragment of sinonasal polyp, inverted type.   -Negative for malignancy.     C.  Portion right inferior turbinate:   -Fragments of unremarkable bone and sinonasal mucosa.         IMPRESSION / DIAGNOSES / ORDERS   Diagnoses and all orders for this visit:    Postop check    Doing well postop with no evidence of perioperative complication.      RECOMMENDATIONS / PLAN   [place] was advised to follow the post-op instructions in the after visit summary given after surgery.  [place] was advised to continue post-op medications as prescribed.    Return in about 6 months (around 7/18/2024) for recheck/follow-up, and sooner if condition worsens.

## 2024-01-30 ENCOUNTER — OFFICE VISIT (OUTPATIENT)
Dept: PRIMARY CARE CLINIC | Age: 69
End: 2024-01-30
Payer: MEDICARE

## 2024-01-30 VITALS
SYSTOLIC BLOOD PRESSURE: 125 MMHG | TEMPERATURE: 97.2 F | HEIGHT: 67 IN | BODY MASS INDEX: 36.51 KG/M2 | WEIGHT: 232.6 LBS | DIASTOLIC BLOOD PRESSURE: 77 MMHG | OXYGEN SATURATION: 100 % | HEART RATE: 70 BPM

## 2024-01-30 DIAGNOSIS — E53.8 B12 DEFICIENCY: ICD-10-CM

## 2024-01-30 DIAGNOSIS — R11.0 NAUSEA: ICD-10-CM

## 2024-01-30 DIAGNOSIS — R10.10 UPPER ABDOMINAL PAIN: Primary | ICD-10-CM

## 2024-01-30 DIAGNOSIS — G47.09 OTHER INSOMNIA: ICD-10-CM

## 2024-01-30 PROCEDURE — 1123F ACP DISCUSS/DSCN MKR DOCD: CPT | Performed by: INTERNAL MEDICINE

## 2024-01-30 PROCEDURE — 3017F COLORECTAL CA SCREEN DOC REV: CPT | Performed by: INTERNAL MEDICINE

## 2024-01-30 PROCEDURE — G8484 FLU IMMUNIZE NO ADMIN: HCPCS | Performed by: INTERNAL MEDICINE

## 2024-01-30 PROCEDURE — 96372 THER/PROPH/DIAG INJ SC/IM: CPT | Performed by: INTERNAL MEDICINE

## 2024-01-30 PROCEDURE — G8399 PT W/DXA RESULTS DOCUMENT: HCPCS | Performed by: INTERNAL MEDICINE

## 2024-01-30 PROCEDURE — 99214 OFFICE O/P EST MOD 30 MIN: CPT | Performed by: INTERNAL MEDICINE

## 2024-01-30 PROCEDURE — G9899 SCRN MAM PERF RSLTS DOC: HCPCS | Performed by: INTERNAL MEDICINE

## 2024-01-30 PROCEDURE — 1036F TOBACCO NON-USER: CPT | Performed by: INTERNAL MEDICINE

## 2024-01-30 PROCEDURE — 1090F PRES/ABSN URINE INCON ASSESS: CPT | Performed by: INTERNAL MEDICINE

## 2024-01-30 PROCEDURE — G8417 CALC BMI ABV UP PARAM F/U: HCPCS | Performed by: INTERNAL MEDICINE

## 2024-01-30 PROCEDURE — G8427 DOCREV CUR MEDS BY ELIG CLIN: HCPCS | Performed by: INTERNAL MEDICINE

## 2024-01-30 PROCEDURE — 3078F DIAST BP <80 MM HG: CPT | Performed by: INTERNAL MEDICINE

## 2024-01-30 PROCEDURE — 3074F SYST BP LT 130 MM HG: CPT | Performed by: INTERNAL MEDICINE

## 2024-01-30 RX ORDER — TIZANIDINE 2 MG/1
2 TABLET ORAL 3 TIMES DAILY PRN
Qty: 30 TABLET | Refills: 1 | Status: SHIPPED | OUTPATIENT
Start: 2024-01-30

## 2024-01-30 RX ORDER — CYANOCOBALAMIN 1000 UG/ML
1000 INJECTION, SOLUTION INTRAMUSCULAR; SUBCUTANEOUS ONCE
Status: COMPLETED | OUTPATIENT
Start: 2024-01-30 | End: 2024-01-30

## 2024-01-30 RX ORDER — PROMETHAZINE HYDROCHLORIDE 25 MG/1
25 TABLET ORAL EVERY 8 HOURS PRN
Qty: 50 TABLET | Refills: 1 | Status: SHIPPED | OUTPATIENT
Start: 2024-01-30

## 2024-01-30 RX ORDER — ZOLPIDEM TARTRATE 10 MG/1
10 TABLET ORAL NIGHTLY PRN
Qty: 30 TABLET | Refills: 3 | Status: SHIPPED | OUTPATIENT
Start: 2024-01-30 | End: 2024-04-29

## 2024-01-30 RX ADMIN — CYANOCOBALAMIN 1000 MCG: 1000 INJECTION, SOLUTION INTRAMUSCULAR; SUBCUTANEOUS at 10:40

## 2024-01-30 ASSESSMENT — ENCOUNTER SYMPTOMS
CHEST TIGHTNESS: 0
ABDOMINAL DISTENTION: 0
BLOOD IN STOOL: 0
CONSTIPATION: 0
VOICE CHANGE: 0
DIARRHEA: 0
FACIAL SWELLING: 0
CHOKING: 0

## 2024-01-30 NOTE — PROGRESS NOTES
-     labetalol (NORMODYNE) 300 MG tablet; Take 1 tablet by mouth 2 times daily  -     NIFEdipine (PROCARDIA XL) 60 MG extended release tablet; Take 1 tablet by mouth daily      Mixed hyperlipidemia   LDL still at 158 .   Lipids checked at   9/23  ;Admits not taking meds some days !  -     atorvastatin (LIPITOR) 20 MG tablet; Take 1 tablet by mouth daily              Upper abdominal pain  has upper GI series with [facility] / Dr Barbosa results??  Cont Nexium bid   -                Acquired hypothyroidism  C/O  Endocrine /    . On synthroid 0.25 ???  -     -      I    Plan:

## 2024-02-21 DIAGNOSIS — R10.10 UPPER ABDOMINAL PAIN: ICD-10-CM

## 2024-02-21 RX ORDER — TIZANIDINE 2 MG/1
TABLET ORAL
Qty: 30 TABLET | Refills: 1 | Status: SHIPPED | OUTPATIENT
Start: 2024-02-21

## 2024-02-21 NOTE — TELEPHONE ENCOUNTER
Medication:   Requested Prescriptions     Pending Prescriptions Disp Refills    tiZANidine (ZANAFLEX) 2 MG tablet [Pharmacy Med Name: TIZANIDINE 2MG TABLETS] 30 tablet 1     Sig: TAKE 1 TABLET BY MOUTH THREE TIMES DAILY AS NEEDED FOR SPASMS        Last Filled:      Patient Phone Number: [phone] (home) [phone] (work)    Last appt: 1/30/2024   Next appt: Visit date not found    Last OARRS:       1/9/2018     3:39 PM   RX Monitoring   Attestation The Prescription Monitoring Report for this patient was reviewed today.

## 2024-02-26 ENCOUNTER — TELEPHONE (OUTPATIENT)
Dept: PRIMARY CARE CLINIC | Age: 69
End: 2024-02-26

## 2024-02-26 RX ORDER — PREDNISONE 20 MG/1
40 TABLET ORAL DAILY
Qty: 12 TABLET | Refills: 0 | Status: SHIPPED | OUTPATIENT
Start: 2024-02-26 | End: 2024-03-03

## 2024-02-28 DIAGNOSIS — I10 ESSENTIAL HYPERTENSION: ICD-10-CM

## 2024-02-28 NOTE — TELEPHONE ENCOUNTER
Medication:   Requested Prescriptions     Pending Prescriptions Disp Refills    NIFEdipine (PROCARDIA XL) 90 MG extended release tablet [Pharmacy Med Name: NIFEDIPINE 90MG ER (XL/OSM) TABLET] 90 tablet 1     Sig: TAKE 1 TABLET BY MOUTH IN THE MORNING        Last Filled:      Patient Phone Number: [phone] (home) [phone] (work)    Last appt: 1/30/2024   Next appt: Visit date not found    Last OARRS:       1/9/2018     3:39 PM   RX Monitoring   Attestation The Prescription Monitoring Report for this patient was reviewed today.

## 2024-02-29 RX ORDER — NIFEDIPINE 90 MG/1
TABLET, EXTENDED RELEASE ORAL
Qty: 90 TABLET | Refills: 1 | Status: SHIPPED | OUTPATIENT
Start: 2024-02-29

## 2024-03-11 ENCOUNTER — TELEPHONE (OUTPATIENT)
Dept: ENT CLINIC | Age: 69
End: 2024-03-11

## 2024-03-11 ENCOUNTER — HOSPITAL ENCOUNTER (OUTPATIENT)
Age: 69
Discharge: HOME | End: 2024-03-11
Payer: MEDICARE

## 2024-03-11 DIAGNOSIS — N18.31 STAGE 3A CHRONIC KIDNEY DISEASE (HCC): ICD-10-CM

## 2024-03-11 LAB
ALBUMIN SERPL-MCNC: 4 G/DL (ref 3.4–5)
ANION GAP SERPL CALCULATED.3IONS-SCNC: 12 MMOL/L (ref 3–16)
BUN SERPL-MCNC: 15 MG/DL (ref 7–20)
CALCIUM SERPL-MCNC: 9.8 MG/DL (ref 8.3–10.6)
CHLORIDE SERPL-SCNC: 106 MMOL/L (ref 99–110)
CO2 SERPL-SCNC: 22 MMOL/L (ref 21–32)
CREAT SERPL-MCNC: 1.2 MG/DL (ref 0.6–1.2)
CREAT UR-MCNC: 214.5 MG/DL (ref 28–259)
GFR SERPLBLD CREATININE-BSD FMLA CKD-EPI: 50 ML/MIN/{1.73_M2}
GLUCOSE SERPL-MCNC: 73 MG/DL (ref 70–99)
MICROALBUMIN UR DL<=1MG/L-MCNC: <1.2 MG/DL
MICROALBUMIN/CREAT UR: NORMAL MG/G (ref 0–30)
PHOSPHATE SERPL-MCNC: 3.2 MG/DL (ref 2.5–4.9)
POTASSIUM SERPL-SCNC: 3.9 MMOL/L (ref 3.5–5.1)
SODIUM SERPL-SCNC: 140 MMOL/L (ref 136–145)

## 2024-03-11 PROCEDURE — 80069 RENAL FUNCTION PANEL: CPT

## 2024-03-11 PROCEDURE — 82570 ASSAY OF URINE CREATININE: CPT

## 2024-03-11 PROCEDURE — 36415 COLL VENOUS BLD VENIPUNCTURE: CPT

## 2024-03-11 PROCEDURE — 82043 UR ALBUMIN QUANTITATIVE: CPT

## 2024-03-11 NOTE — TELEPHONE ENCOUNTER
Anesthesia Pre Eval Note    Anesthesia ROS/Med Hx    Overall Review:  EKG was reviewed     Anesthetic Complication History:    History of delayed awakening/emergence     Neuro/Psych Review:       Positive for TIA  Positive for headaches  Positive for psychiatric history    Cardiovascular Review:     Positive for hyperlipidemia    GI/HEPATIC/RENAL Review:     Positive for GERD - well controlled    End/Other Review:    Positive for arthritis  Positive for chronic pain  Positive for cancer    Overall Review of Systems Comments:  Anxiety  H/o colon ca  H/o ovarian ca  Pain intolerance  Additional Results:  EKG:  No results found for this or any previous visit (from the past 4464 hour(s)).    ALLERGIES:   -- Cephalexin -- DIARRHEA and DIZZINESS    --  Confusion?   -- Keflex -- GI UPSET, DIARRHEA and Other (See                            Comments)    --  Confusion?   -- Remdesivir -- Other (See Comments)    --  Messed with kidneys   -- Sulfamethoxazole-Trimethoprim -- RASH   Last Labs        Component                Value               Date/Time                  WBC                      4.7                 02/12/2024 1408            RBC                      4.60                02/12/2024 1408            HGB                      13.5                02/12/2024 1408            HCT                      40.9                02/12/2024 1408            MCV                      88.8                02/12/2024 1408            MCH                      29.4                02/12/2024 1408            MCHC                     33.1                02/12/2024 1408            RDW-CV                   14.2                02/12/2024 1408            Sodium                   142                 02/12/2024 1408            Potassium                4.2                 02/12/2024 1408            Chloride                 107                 02/12/2024 1408            Carbon Dioxide           26                  02/12/2024 1408            Glucose       Left patient a message to schedule flu shot and BP check             87                  2024 1408            BUN                      20                  2024 1408            Creatinine               0.84                2024 1408            GFR,ESTIMATE             >60                 2024 1408            CALCIUM                  8.9                 2024 1408            PLT                      247                 2024 1408        Past Medical History:  No date: Anxiety  No date: Arthritis  No date: Cerebral infarction (CMD)      Comment:  TIA  No date: Chronic pain  No date: Common migraine  No date: High cholesterol  No date: Malignant neoplasm (CMD)      Comment:  ovarian and colon  No date: Mitral valve prolapse  No date: Motion sickness  No date: PONV (postoperative nausea and vomiting)  No date: TIA (transient ischemic attack)      Comment:  from PCP H&P  Past Surgical History:  No date: Anus surgery      Comment:  as child  No date: Appendectomy  2018: Arthroscopy knee medial or lat; Left      Comment:  Dr Bell  : Breast surgery      Comment:  R lumpectomy  No date:  section, classic      Comment:  x 2  : Colon surgery      Comment:  colon resection  No date: Colonoscopy  : Hysterectomy  No date: Removal gallbladder      Comment:  at 51 years old      Relevant Problems   No relevant active problems       Physical Exam     Airway   Mallampati: II  TM Distance: >3 FB    Cardiovascular    Cardio Rate: Normal    Head Assessment  Head assessment: Normocephalic and Atraumatic    General Assessment  General Assessment: No acute distress    Dental Exam  Dental exam normal    Pulmonary Exam  Pulmonary exam normal    Abdominal Exam  Abdominal exam normal      Anesthesia Plan:    ASA Status: 3  Anesthesia Type: General    Induction: Intravenous and Inhalational  Preferred Airway Type: LMA  Maintenance: Inhalational and Combined  Premedication: None      Post-op Pain Management: Per Surgeon and Single Shot  Block      Checklist  Reviewed: Lab Results, EKG, Nursing Notes, Care Everywhere, Problem list, Medications, Beta Blocker Status, Anesthesia Record, NPO Status, Patient Summary, Allergies, Consultations and Past Med History  Consent/Risks Discussed Statement:  The proposed anesthetic plan, including its risks and benefits, have been discussed with the Patient along with the risks and benefits of alternatives. Questions were encouraged and answered and the patient and/or representative understands and agrees to proceed.    I have discussed elements of the patient's history or examination, as noted above and/or as follows, that place the patient at higher risk of complications; age, BMI> 30 (obesity) and vascular disease.    I discussed with the patient (and/or patient's legal representative) the risks and benefits of the proposed anesthesia plan, General, which may include services performed by other anesthesia providers.    Alternative anesthesia plans, if available, were reviewed with the patient (and/or patient's legal representative). Discussion has been held with the patient (and/or patient's legal representative) regarding risks of anesthesia, which include allergic reaction, aspiration, anxiety, dental injury, nausea, vomiting, sore throat, nerve injury and oral injury and emergent situations that may require change in anesthesia plan.    The patient (and/or patient's legal representative) has indicated understanding, his/her questions have been answered, and he/she wishes to proceed with the planned anesthetic.    Blood Products: Not Anticipated

## 2024-03-11 NOTE — TELEPHONE ENCOUNTER
Pt called in stating she has been having severe headaches since she had her surgery in January, she wants to know if  can call her in something or if she should come in and see him.    1.  Name of PCP:   2.  PCP Contacted on Admission: [ ] Y    [x] N - admitted at night    3.  PCP contacted at Discharge: [ ] Y    [ ] N    [ ] N/A  4.  Post-Discharge Appointment Date and Location:  5.  Summary of Handoff given to PCP:

## 2024-03-26 DIAGNOSIS — R10.10 UPPER ABDOMINAL PAIN: ICD-10-CM

## 2024-03-26 RX ORDER — TIZANIDINE 2 MG/1
TABLET ORAL
Qty: 30 TABLET | Refills: 1 | Status: SHIPPED | OUTPATIENT
Start: 2024-03-26

## 2024-03-26 NOTE — TELEPHONE ENCOUNTER
Medication:   Requested Prescriptions     Pending Prescriptions Disp Refills    tiZANidine (ZANAFLEX) 2 MG tablet [Pharmacy Med Name: TIZANIDINE 2MG TABLETS] 30 tablet 1     Sig: TAKE 1 TABLET BY MOUTH THREE TIMES DAILY AS NEEDED FOR SPASMS        Patient Phone Number: [phone] (home) [phone] (work)    Last appt: 1/30/2024   Next appt: 4/1/2024

## 2024-04-01 ENCOUNTER — OFFICE VISIT (OUTPATIENT)
Dept: PRIMARY CARE CLINIC | Age: 69
End: 2024-04-01
Payer: MEDICARE

## 2024-04-01 VITALS
OXYGEN SATURATION: 100 % | TEMPERATURE: 97.2 F | BODY MASS INDEX: 35.75 KG/M2 | HEART RATE: 73 BPM | WEIGHT: 227.8 LBS | HEIGHT: 67 IN | DIASTOLIC BLOOD PRESSURE: 75 MMHG | SYSTOLIC BLOOD PRESSURE: 109 MMHG

## 2024-04-01 DIAGNOSIS — I10 ESSENTIAL HYPERTENSION: Primary | ICD-10-CM

## 2024-04-01 DIAGNOSIS — M54.50 CHRONIC MIDLINE LOW BACK PAIN WITHOUT SCIATICA: ICD-10-CM

## 2024-04-01 DIAGNOSIS — E66.01 SEVERE OBESITY (BMI 35.0-39.9) WITH COMORBIDITY (HCC): ICD-10-CM

## 2024-04-01 DIAGNOSIS — L81.5 LEUKODERMA: ICD-10-CM

## 2024-04-01 DIAGNOSIS — G89.29 CHRONIC MIDLINE LOW BACK PAIN WITHOUT SCIATICA: ICD-10-CM

## 2024-04-01 DIAGNOSIS — Z12.31 ENCOUNTER FOR SCREENING MAMMOGRAM FOR HIGH-RISK PATIENT: ICD-10-CM

## 2024-04-01 DIAGNOSIS — E53.8 B12 DEFICIENCY: ICD-10-CM

## 2024-04-01 PROCEDURE — G8417 CALC BMI ABV UP PARAM F/U: HCPCS | Performed by: INTERNAL MEDICINE

## 2024-04-01 PROCEDURE — 99214 OFFICE O/P EST MOD 30 MIN: CPT | Performed by: INTERNAL MEDICINE

## 2024-04-01 PROCEDURE — 3078F DIAST BP <80 MM HG: CPT | Performed by: INTERNAL MEDICINE

## 2024-04-01 PROCEDURE — G8427 DOCREV CUR MEDS BY ELIG CLIN: HCPCS | Performed by: INTERNAL MEDICINE

## 2024-04-01 PROCEDURE — 3074F SYST BP LT 130 MM HG: CPT | Performed by: INTERNAL MEDICINE

## 2024-04-01 PROCEDURE — G8399 PT W/DXA RESULTS DOCUMENT: HCPCS | Performed by: INTERNAL MEDICINE

## 2024-04-01 PROCEDURE — 1123F ACP DISCUSS/DSCN MKR DOCD: CPT | Performed by: INTERNAL MEDICINE

## 2024-04-01 PROCEDURE — 3017F COLORECTAL CA SCREEN DOC REV: CPT | Performed by: INTERNAL MEDICINE

## 2024-04-01 PROCEDURE — 96372 THER/PROPH/DIAG INJ SC/IM: CPT | Performed by: INTERNAL MEDICINE

## 2024-04-01 PROCEDURE — G9899 SCRN MAM PERF RSLTS DOC: HCPCS | Performed by: INTERNAL MEDICINE

## 2024-04-01 PROCEDURE — 1036F TOBACCO NON-USER: CPT | Performed by: INTERNAL MEDICINE

## 2024-04-01 PROCEDURE — 1090F PRES/ABSN URINE INCON ASSESS: CPT | Performed by: INTERNAL MEDICINE

## 2024-04-01 RX ORDER — METHOCARBAMOL 750 MG/1
750 TABLET, FILM COATED ORAL 3 TIMES DAILY PRN
Qty: 30 TABLET | Refills: 0 | Status: SHIPPED | OUTPATIENT
Start: 2024-04-01 | End: 2024-04-11

## 2024-04-01 RX ORDER — LIDOCAINE 50 MG/G
1-2 PATCH TOPICAL DAILY
Qty: 60 PATCH | Refills: 0 | Status: SHIPPED | OUTPATIENT
Start: 2024-04-01

## 2024-04-01 RX ORDER — CYANOCOBALAMIN 1000 UG/ML
1000 INJECTION, SOLUTION INTRAMUSCULAR; SUBCUTANEOUS ONCE
Status: COMPLETED | OUTPATIENT
Start: 2024-04-01 | End: 2024-04-01

## 2024-04-01 RX ORDER — ACETAMINOPHEN 500 MG
500 TABLET ORAL 4 TIMES DAILY PRN
Qty: 28 TABLET | Refills: 0 | Status: SHIPPED | OUTPATIENT
Start: 2024-04-01 | End: 2024-04-08

## 2024-04-01 RX ORDER — NIFEDIPINE 60 MG/1
60 TABLET, EXTENDED RELEASE ORAL DAILY
Qty: 90 TABLET | Refills: 3 | Status: SHIPPED | OUTPATIENT
Start: 2024-04-01

## 2024-04-01 RX ORDER — METHOCARBAMOL 750 MG/1
750 TABLET, FILM COATED ORAL 4 TIMES DAILY
Qty: 40 TABLET | Refills: 0 | Status: SHIPPED | OUTPATIENT
Start: 2024-04-01 | End: 2024-04-01 | Stop reason: SDUPTHER

## 2024-04-01 RX ORDER — LABETALOL 300 MG/1
300 TABLET, FILM COATED ORAL 2 TIMES DAILY
Qty: 180 TABLET | Refills: 3 | Status: SHIPPED | OUTPATIENT
Start: 2024-04-01

## 2024-04-01 RX ADMIN — CYANOCOBALAMIN 1000 MCG: 1000 INJECTION, SOLUTION INTRAMUSCULAR; SUBCUTANEOUS at 12:23

## 2024-04-01 SDOH — ECONOMIC STABILITY: INCOME INSECURITY: HOW HARD IS IT FOR YOU TO PAY FOR THE VERY BASICS LIKE FOOD, HOUSING, MEDICAL CARE, AND HEATING?: NOT HARD AT ALL

## 2024-04-01 SDOH — ECONOMIC STABILITY: FOOD INSECURITY: WITHIN THE PAST 12 MONTHS, YOU WORRIED THAT YOUR FOOD WOULD RUN OUT BEFORE YOU GOT MONEY TO BUY MORE.: NEVER TRUE

## 2024-04-01 SDOH — ECONOMIC STABILITY: FOOD INSECURITY: WITHIN THE PAST 12 MONTHS, THE FOOD YOU BOUGHT JUST DIDN'T LAST AND YOU DIDN'T HAVE MONEY TO GET MORE.: NEVER TRUE

## 2024-04-01 ASSESSMENT — PATIENT HEALTH QUESTIONNAIRE - PHQ9
SUM OF ALL RESPONSES TO PHQ QUESTIONS 1-9: 0
SUM OF ALL RESPONSES TO PHQ9 QUESTIONS 1 & 2: 0
2. FEELING DOWN, DEPRESSED OR HOPELESS: NOT AT ALL
SUM OF ALL RESPONSES TO PHQ QUESTIONS 1-9: 0
SUM OF ALL RESPONSES TO PHQ QUESTIONS 1-9: 0
1. LITTLE INTEREST OR PLEASURE IN DOING THINGS: NOT AT ALL
SUM OF ALL RESPONSES TO PHQ QUESTIONS 1-9: 0

## 2024-04-01 ASSESSMENT — ENCOUNTER SYMPTOMS
BACK PAIN: 1
COLOR CHANGE: 1
VOICE CHANGE: 0
BLOOD IN STOOL: 0
FACIAL SWELLING: 0
CONSTIPATION: 0
CHEST TIGHTNESS: 0
ABDOMINAL DISTENTION: 0

## 2024-04-01 NOTE — PROGRESS NOTES
Subjective:      Patient ID: Cayla Farfan is a 68 y.o. female.    4/1/2024 Patient presents with:  Lower Back Pain  Breast Problem: Discoloration on right breast for 1 week                    12/21/2023 Patient presents with:  Pre-op Exam: PARTIAL EXCISION OF RIGHT INFERIOR TURBINATE, MICRODEBRIDEMENT, POSSIBLE EXCISION OF INVERTING PAPILLOMA OF INFERIOR TURBINATE, MIDDLE TURBINATE, ETHMOID SINUSES OF NASAL CAVITY- Surgery 1/5/2024- [facility]- Dr Marky Terrazas          2022   1. Normal SPECT stress and rest myocardial perfusion scan with no evidence of exercise   stress-induced reversible myocardial ischemia.     2. Normal appearing left ventricular wall motion analysis and ejection fraction at rest.     3 .Scan significance indicates low cardiac risk            10/2/2023 Patient presents with:  Medicare AWV  Hypertension                    7/24/2023 Patient presents with:  Hypertension  Lower Back Pain: Chronic pain- would like medication   Edema                  4/14/2023 Patient presents with:  Hypertension  Other: Discuss vit b12 injections  Abdominal Pain: Pain under rib cage, pain is worse when eating  Patient presents with:  Hypertension    C/o Dr Gonzales  GI / [facility]   67-year-old female with episodic periumbilical abdominal pain nausea and vomiting of food from the most recent meal. This is distinctly different than her symptoms that brought her to our attention a few months ago. This occurs 3-4 times a week. We will obtain an upper GI series to rule out \"candy cane syndrome.\" In addition, we will obtain a stool sample for H. pylori antigen       2/28/2023 Patient presents with:  Otalgia: Right ear pain for 1 week. No sre throat . No fever   Hypertension  taking all meds . Get light headed sometimes   Other feel cold !!              1/12/2023 Patient presents with:  Hypertension: 1 week f/u              1/4/2023 Patient presents with:  Discuss Labs: Discuss labs from

## 2024-04-22 ENCOUNTER — TELEPHONE (OUTPATIENT)
Dept: PRIMARY CARE CLINIC | Age: 69
End: 2024-04-22

## 2024-04-22 NOTE — TELEPHONE ENCOUNTER
----- Message from Mauro Connolly sent at 4/22/2024 12:54 PM EDT -----  Regarding: [facility] Referral Request  [facility] Referral Request    Reason for referral request: Specialty Provider    Specialist/Lab/Test patient is requesting (if known):    Specialist Phone Number (if applicable):    Additional Information Dermatologist at [facility]  --------------------------------------------------------------------------------------------------------------------------    Relationship to Patient: Self     Call Back Information: OK to leave message on voicemail  Preferred Call Back Number: Phone [phone]

## 2024-04-26 ENCOUNTER — NURSE ONLY (OUTPATIENT)
Dept: PRIMARY CARE CLINIC | Age: 69
End: 2024-04-26

## 2024-04-26 DIAGNOSIS — E53.8 B12 DEFICIENCY: Primary | ICD-10-CM

## 2024-04-26 RX ORDER — CYANOCOBALAMIN 1000 UG/ML
1000 INJECTION, SOLUTION INTRAMUSCULAR; SUBCUTANEOUS ONCE
Status: COMPLETED | OUTPATIENT
Start: 2024-04-26 | End: 2024-04-26

## 2024-04-26 RX ADMIN — CYANOCOBALAMIN 1000 MCG: 1000 INJECTION, SOLUTION INTRAMUSCULAR; SUBCUTANEOUS at 12:21

## 2024-04-26 NOTE — PROGRESS NOTES
After obtaining consent, and per orders of Dr. Joey Richardson, injection of Vit B12 given in Left arm by Haydee Kohler MA. Patient instructed to remain in clinic for 20 minutes afterwards, and to report any adverse reaction to me immediately.

## 2024-05-10 ENCOUNTER — TELEPHONE (OUTPATIENT)
Dept: PRIMARY CARE CLINIC | Age: 69
End: 2024-05-10

## 2024-05-10 NOTE — TELEPHONE ENCOUNTER
Pt states the tiZANidine (ZANAFLEX) 2 MG tablet is not working and her back is getting worse, she is having leg pains- pt would like to know if something else can be called in to pharmacy.           [id number] - [place], [place] - [address] [place] [address] - P [phone] - F [phone]   [address] [address]       Pt # [phone]

## 2024-05-13 ENCOUNTER — OFFICE VISIT (OUTPATIENT)
Dept: PRIMARY CARE CLINIC | Age: 69
End: 2024-05-13
Payer: MEDICARE

## 2024-05-13 VITALS
HEIGHT: 67 IN | OXYGEN SATURATION: 100 % | TEMPERATURE: 97.2 F | BODY MASS INDEX: 36.26 KG/M2 | DIASTOLIC BLOOD PRESSURE: 77 MMHG | SYSTOLIC BLOOD PRESSURE: 116 MMHG | WEIGHT: 231 LBS | HEART RATE: 70 BPM

## 2024-05-13 DIAGNOSIS — L29.9 PRURITIC DISORDER: Primary | ICD-10-CM

## 2024-05-13 PROCEDURE — 3074F SYST BP LT 130 MM HG: CPT | Performed by: INTERNAL MEDICINE

## 2024-05-13 PROCEDURE — 1123F ACP DISCUSS/DSCN MKR DOCD: CPT | Performed by: INTERNAL MEDICINE

## 2024-05-13 PROCEDURE — G8417 CALC BMI ABV UP PARAM F/U: HCPCS | Performed by: INTERNAL MEDICINE

## 2024-05-13 PROCEDURE — 3078F DIAST BP <80 MM HG: CPT | Performed by: INTERNAL MEDICINE

## 2024-05-13 PROCEDURE — 1090F PRES/ABSN URINE INCON ASSESS: CPT | Performed by: INTERNAL MEDICINE

## 2024-05-13 PROCEDURE — G8399 PT W/DXA RESULTS DOCUMENT: HCPCS | Performed by: INTERNAL MEDICINE

## 2024-05-13 PROCEDURE — 99214 OFFICE O/P EST MOD 30 MIN: CPT | Performed by: INTERNAL MEDICINE

## 2024-05-13 PROCEDURE — 1036F TOBACCO NON-USER: CPT | Performed by: INTERNAL MEDICINE

## 2024-05-13 PROCEDURE — G9899 SCRN MAM PERF RSLTS DOC: HCPCS | Performed by: INTERNAL MEDICINE

## 2024-05-13 PROCEDURE — 3017F COLORECTAL CA SCREEN DOC REV: CPT | Performed by: INTERNAL MEDICINE

## 2024-05-13 PROCEDURE — G8427 DOCREV CUR MEDS BY ELIG CLIN: HCPCS | Performed by: INTERNAL MEDICINE

## 2024-05-13 RX ORDER — FEXOFENADINE HCL 180 MG/1
180 TABLET ORAL DAILY
Qty: 30 TABLET | Refills: 1 | Status: SHIPPED | OUTPATIENT
Start: 2024-05-13

## 2024-05-13 RX ORDER — CLOBETASOL PROPIONATE 0.5 MG/G
OINTMENT TOPICAL
Qty: 100 G | Refills: 1 | Status: SHIPPED | OUTPATIENT
Start: 2024-05-13

## 2024-05-13 ASSESSMENT — ENCOUNTER SYMPTOMS
CONSTIPATION: 0
COLOR CHANGE: 1
DIARRHEA: 0
BLOOD IN STOOL: 0
ABDOMINAL DISTENTION: 0
VOICE CHANGE: 0
BACK PAIN: 1
FACIAL SWELLING: 0

## 2024-05-13 NOTE — PROGRESS NOTES
Subjective:      Patient ID: Cayla Farfan is a 68 y.o. female.    5/13/2024  Patient presents with:  Pruritis: Spots all over body- very itchy              4/1/2024 Patient presents with:  Lower Back Pain  Breast Problem: Discoloration on right breast for 1 week                    12/21/2023 Patient presents with:  Pre-op Exam: PARTIAL EXCISION OF RIGHT INFERIOR TURBINATE, MICRODEBRIDEMENT, POSSIBLE EXCISION OF INVERTING PAPILLOMA OF INFERIOR TURBINATE, MIDDLE TURBINATE, ETHMOID SINUSES OF NASAL CAVITY- Surgery 1/5/2024- [facility]- Dr Marky Terrazas          2022   1. Normal SPECT stress and rest myocardial perfusion scan with no evidence of exercise   stress-induced reversible myocardial ischemia.     2. Normal appearing left ventricular wall motion analysis and ejection fraction at rest.     3 .Scan significance indicates low cardiac risk            10/2/2023 Patient presents with:  Medicare AWV  Hypertension                    7/24/2023 Patient presents with:  Hypertension  Lower Back Pain: Chronic pain- would like medication   Edema                  4/14/2023 Patient presents with:  Hypertension  Other: Discuss vit b12 injections  Abdominal Pain: Pain under rib cage, pain is worse when eating  Patient presents with:  Hypertension    C/o Dr Gonzales  GI / [facility]   67-year-old female with episodic periumbilical abdominal pain nausea and vomiting of food from the most recent meal. This is distinctly different than her symptoms that brought her to our attention a few months ago. This occurs 3-4 times a week. We will obtain an upper GI series to rule out \"candy cane syndrome.\" In addition, we will obtain a stool sample for H. pylori antigen       2/28/2023 Patient presents with:  Otalgia: Right ear pain for 1 week. No sre throat . No fever   Hypertension  taking all meds . Get light headed sometimes   Other feel cold !!              1/12/2023 Patient presents with:  Hypertension: 1 week

## 2024-05-14 DIAGNOSIS — G47.09 OTHER INSOMNIA: ICD-10-CM

## 2024-05-14 DIAGNOSIS — R10.10 UPPER ABDOMINAL PAIN: ICD-10-CM

## 2024-05-14 RX ORDER — TIZANIDINE 2 MG/1
TABLET ORAL
Qty: 30 TABLET | Refills: 1 | Status: SHIPPED | OUTPATIENT
Start: 2024-05-14

## 2024-05-14 RX ORDER — ZOLPIDEM TARTRATE 10 MG/1
10 TABLET ORAL NIGHTLY PRN
Qty: 30 TABLET | Refills: 3 | Status: SHIPPED | OUTPATIENT
Start: 2024-05-14 | End: 2024-08-12

## 2024-05-14 NOTE — TELEPHONE ENCOUNTER
Medication:   Requested Prescriptions     Pending Prescriptions Disp Refills    tiZANidine (ZANAFLEX) 2 MG tablet 30 tablet 1     Sig: TAKE 1 TABLET BY MOUTH THREE TIMES DAILY AS NEEDED FOR SPASMS    zolpidem (AMBIEN) 10 MG tablet 30 tablet 3     Sig: Take 1 tablet by mouth nightly as needed for Sleep for up to 90 days.        Last Filled:      Patient Phone Number: [phone] (home) [phone] (work)    Last appt: 5/13/2024   Next appt: 9/13/2024    Last OARRS:       1/9/2018     3:39 PM   RX Monitoring   Attestation The Prescription Monitoring Report for this patient was reviewed today.

## 2024-05-28 ENCOUNTER — OFFICE VISIT (OUTPATIENT)
Dept: PULMONOLOGY | Age: 69
End: 2024-05-28
Payer: MEDICARE

## 2024-05-28 VITALS
BODY MASS INDEX: 35.6 KG/M2 | OXYGEN SATURATION: 100 % | TEMPERATURE: 97 F | SYSTOLIC BLOOD PRESSURE: 124 MMHG | HEIGHT: 67 IN | RESPIRATION RATE: 18 BRPM | HEART RATE: 68 BPM | WEIGHT: 226.8 LBS | DIASTOLIC BLOOD PRESSURE: 60 MMHG

## 2024-05-28 DIAGNOSIS — J45.30 MILD PERSISTENT ASTHMA WITHOUT COMPLICATION: ICD-10-CM

## 2024-05-28 DIAGNOSIS — J30.9 ALLERGIC SINUSITIS: Primary | ICD-10-CM

## 2024-05-28 PROCEDURE — 3017F COLORECTAL CA SCREEN DOC REV: CPT | Performed by: INTERNAL MEDICINE

## 2024-05-28 PROCEDURE — 1090F PRES/ABSN URINE INCON ASSESS: CPT | Performed by: INTERNAL MEDICINE

## 2024-05-28 PROCEDURE — 1036F TOBACCO NON-USER: CPT | Performed by: INTERNAL MEDICINE

## 2024-05-28 PROCEDURE — 1123F ACP DISCUSS/DSCN MKR DOCD: CPT | Performed by: INTERNAL MEDICINE

## 2024-05-28 PROCEDURE — 3078F DIAST BP <80 MM HG: CPT | Performed by: INTERNAL MEDICINE

## 2024-05-28 PROCEDURE — G8427 DOCREV CUR MEDS BY ELIG CLIN: HCPCS | Performed by: INTERNAL MEDICINE

## 2024-05-28 PROCEDURE — G9899 SCRN MAM PERF RSLTS DOC: HCPCS | Performed by: INTERNAL MEDICINE

## 2024-05-28 PROCEDURE — G8399 PT W/DXA RESULTS DOCUMENT: HCPCS | Performed by: INTERNAL MEDICINE

## 2024-05-28 PROCEDURE — G8417 CALC BMI ABV UP PARAM F/U: HCPCS | Performed by: INTERNAL MEDICINE

## 2024-05-28 PROCEDURE — 3074F SYST BP LT 130 MM HG: CPT | Performed by: INTERNAL MEDICINE

## 2024-05-28 PROCEDURE — 99214 OFFICE O/P EST MOD 30 MIN: CPT | Performed by: INTERNAL MEDICINE

## 2024-05-28 NOTE — ASSESSMENT & PLAN NOTE
Controlled with Breztri using as needed up to twice daily.  Mild cough.  Discussed.  No further workup

## 2024-05-28 NOTE — PROGRESS NOTES
REASON FOR CONSULTATION/CC:    Chief Complaint   Patient presents with    Follow-up        Consult at request of   Joey Richardson MD for  dyspnea     PCP: Joey Richardson MD    HISTORY OF PRESENT ILLNESS: Cayla Farfan is a 68 y.o. year old female with a history of   who presents        Pulmonary history  Originally seen in August 2022 secondary to dyspnea with multifactorial concern with leg cramps.  Completed pulmonary function test and methacholine challenge.  Pulmonary function test without obstruction or bronchodilator response with a normal diffusion capacity and lung capacity.  Methacholine challenge demonstrated PC of 0.3 decreasing to 60% of normal at dose of 1.  2022 PFT FEV1 95%     Treatment history  Flovent 220 -August 2022, educated on proper use.  Had suboptimal technique  Samples of Breztri and Trelegy 200 -October 2022.   Trelegy not helpful.      CV   2022. Echo diastolic dysfunction grade PASP 25.    2022.    Normal SPECT stress and rest myocardial perfusion scan with no evidence of exercise    stress-induced reversible myocardial ischemia                 Current History          Asthma    Breztri.  Using on as needed bases.   Has dry  cough.     allergic rhinitis   Seeing ENT. status post surgery.  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              Objective:   PHYSICAL EXAM:  Blood pressure 124/60, pulse 68, temperature 97 °F (36.1 °C), temperature source Infrared, resp. rate 18, height 1.702 m (5' 7.01\"), weight 102.9 kg (226 lb 12.8 oz), last menstrual period 08/23/2023, SpO2 100 %, not currently breastfeeding.'  Gen: No distress.    Eyes: PERRL. No sclera icterus. No conjunctival injection.   ENT: No discharge. Pharynx clear. External appearance of ears and nose normal.  Neck: Trachea midline. No obvious mass.    Resp: No accessory muscle use. No crackles. No wheezes. No rhonchi.    CV: Regular rate.

## 2024-05-29 ENCOUNTER — OFFICE VISIT (OUTPATIENT)
Dept: PRIMARY CARE CLINIC | Age: 69
End: 2024-05-29
Payer: MEDICARE

## 2024-05-29 VITALS
BODY MASS INDEX: 36.07 KG/M2 | SYSTOLIC BLOOD PRESSURE: 110 MMHG | TEMPERATURE: 97.8 F | WEIGHT: 229.8 LBS | DIASTOLIC BLOOD PRESSURE: 68 MMHG | HEART RATE: 88 BPM | HEIGHT: 67 IN | OXYGEN SATURATION: 98 %

## 2024-05-29 DIAGNOSIS — M25.562 CHRONIC PAIN OF LEFT KNEE: ICD-10-CM

## 2024-05-29 DIAGNOSIS — H61.23 BILATERAL HEARING LOSS DUE TO CERUMEN IMPACTION: ICD-10-CM

## 2024-05-29 DIAGNOSIS — J01.01 ACUTE RECURRENT MAXILLARY SINUSITIS: ICD-10-CM

## 2024-05-29 DIAGNOSIS — L29.9 PRURITIC DISORDER: ICD-10-CM

## 2024-05-29 DIAGNOSIS — G89.29 CHRONIC PAIN OF LEFT KNEE: ICD-10-CM

## 2024-05-29 DIAGNOSIS — J30.9 ALLERGIC SINUSITIS: ICD-10-CM

## 2024-05-29 DIAGNOSIS — M79.605 LEFT LEG PAIN: Primary | ICD-10-CM

## 2024-05-29 DIAGNOSIS — R51.9 SEVERE FRONTAL HEADACHES: ICD-10-CM

## 2024-05-29 DIAGNOSIS — E53.8 B12 DEFICIENCY: ICD-10-CM

## 2024-05-29 PROCEDURE — 99214 OFFICE O/P EST MOD 30 MIN: CPT | Performed by: INTERNAL MEDICINE

## 2024-05-29 PROCEDURE — 1036F TOBACCO NON-USER: CPT | Performed by: INTERNAL MEDICINE

## 2024-05-29 PROCEDURE — 3078F DIAST BP <80 MM HG: CPT | Performed by: INTERNAL MEDICINE

## 2024-05-29 PROCEDURE — 1123F ACP DISCUSS/DSCN MKR DOCD: CPT | Performed by: INTERNAL MEDICINE

## 2024-05-29 PROCEDURE — 3017F COLORECTAL CA SCREEN DOC REV: CPT | Performed by: INTERNAL MEDICINE

## 2024-05-29 PROCEDURE — G9899 SCRN MAM PERF RSLTS DOC: HCPCS | Performed by: INTERNAL MEDICINE

## 2024-05-29 PROCEDURE — G8417 CALC BMI ABV UP PARAM F/U: HCPCS | Performed by: INTERNAL MEDICINE

## 2024-05-29 PROCEDURE — 3074F SYST BP LT 130 MM HG: CPT | Performed by: INTERNAL MEDICINE

## 2024-05-29 PROCEDURE — 1090F PRES/ABSN URINE INCON ASSESS: CPT | Performed by: INTERNAL MEDICINE

## 2024-05-29 PROCEDURE — G8399 PT W/DXA RESULTS DOCUMENT: HCPCS | Performed by: INTERNAL MEDICINE

## 2024-05-29 PROCEDURE — G8427 DOCREV CUR MEDS BY ELIG CLIN: HCPCS | Performed by: INTERNAL MEDICINE

## 2024-05-29 PROCEDURE — 96372 THER/PROPH/DIAG INJ SC/IM: CPT | Performed by: INTERNAL MEDICINE

## 2024-05-29 RX ORDER — FEXOFENADINE HCL 180 MG/1
180 TABLET ORAL DAILY
Qty: 30 TABLET | Refills: 5 | Status: SHIPPED | OUTPATIENT
Start: 2024-05-29

## 2024-05-29 RX ORDER — CYANOCOBALAMIN 1000 UG/ML
1000 INJECTION, SOLUTION INTRAMUSCULAR; SUBCUTANEOUS ONCE
Status: COMPLETED | OUTPATIENT
Start: 2024-05-29 | End: 2024-05-29

## 2024-05-29 RX ORDER — UREA 200 MG/G
CREAM TOPICAL NIGHTLY
COMMUNITY
Start: 2024-04-17

## 2024-05-29 RX ORDER — FLUCONAZOLE 150 MG/1
150 TABLET ORAL ONCE
Qty: 1 TABLET | Refills: 0 | Status: SHIPPED | OUTPATIENT
Start: 2024-05-29 | End: 2024-05-29

## 2024-05-29 RX ORDER — DOXYCYCLINE HYCLATE 100 MG
100 TABLET ORAL 2 TIMES DAILY
Qty: 20 TABLET | Refills: 0 | Status: SHIPPED | OUTPATIENT
Start: 2024-05-29 | End: 2024-06-08

## 2024-05-29 RX ORDER — CYCLOSPORINE 0.5 MG/ML
1 EMULSION OPHTHALMIC 2 TIMES DAILY
COMMUNITY
Start: 2023-11-02

## 2024-05-29 RX ORDER — PILOCARPINE HYDROCHLORIDE 7.5 MG/1
7.5 TABLET, FILM COATED ORAL 3 TIMES DAILY
COMMUNITY

## 2024-05-29 RX ORDER — ATORVASTATIN CALCIUM 40 MG/1
40 TABLET, FILM COATED ORAL DAILY
COMMUNITY
Start: 2024-05-20

## 2024-05-29 RX ORDER — FLUTICASONE PROPIONATE 50 MCG
2 SPRAY, SUSPENSION (ML) NASAL DAILY
Qty: 48 G | Refills: 3 | Status: SHIPPED | OUTPATIENT
Start: 2024-05-29

## 2024-05-29 RX ADMIN — CYANOCOBALAMIN 1000 MCG: 1000 INJECTION, SOLUTION INTRAMUSCULAR; SUBCUTANEOUS at 14:55

## 2024-05-29 ASSESSMENT — ENCOUNTER SYMPTOMS
SORE THROAT: 0
SWOLLEN GLANDS: 0
CHEST TIGHTNESS: 0
BLOOD IN STOOL: 0
CONSTIPATION: 0
SINUS PRESSURE: 1
COUGH: 1
DIARRHEA: 0
BACK PAIN: 1
ABDOMINAL DISTENTION: 0
FACIAL SWELLING: 0
SINUS COMPLAINT: 1
SHORTNESS OF BREATH: 0
HOARSE VOICE: 1
VOICE CHANGE: 0
COLOR CHANGE: 1
CHOKING: 0

## 2024-05-29 ASSESSMENT — PATIENT HEALTH QUESTIONNAIRE - PHQ9
SUM OF ALL RESPONSES TO PHQ QUESTIONS 1-9: 0
SUM OF ALL RESPONSES TO PHQ QUESTIONS 1-9: 0
SUM OF ALL RESPONSES TO PHQ9 QUESTIONS 1 & 2: 0
1. LITTLE INTEREST OR PLEASURE IN DOING THINGS: NOT AT ALL
SUM OF ALL RESPONSES TO PHQ QUESTIONS 1-9: 0
SUM OF ALL RESPONSES TO PHQ QUESTIONS 1-9: 0
2. FEELING DOWN, DEPRESSED OR HOPELESS: NOT AT ALL

## 2024-05-29 NOTE — PROGRESS NOTES
Cayla Farfan (:  1955) is a 68 y.o. female,Established patient, here for evaluation of the following chief complaint(s):  Headache (Around eyes since Friday./) and Nasal Congestion (/)      Assessment & Plan   ASSESSMENT/PLAN:  1. Left leg pain and swelling for past few days without trauma and positive Holmna's on exam, will get stat doppler.   -     Vascular duplex lower extremity venous left; Future  2. Pruritic disorder: controlled with prn allegra, will refill  -     fexofenadine (ALLEGRA) 180 MG tablet; Take 1 tablet by mouth daily, Disp-30 tablet, R-5Normal  3. Allergic sinusitis instructed to take allergra and flonase regularly for the next month since frequent flare ups over the past month.    -     fluticasone (FLONASE) 50 MCG/ACT nasal spray; 2 sprays by Each Nostril route daily Shake liquid, Disp-48 g, R-3ZERO refills remain on this prescription. Your patient is requesting advance approval of refills for this medication to PREVENT ANY MISSED DOSESNormal  4. Chronic pain of left knee, status post TKR over 10 years ago and knee not supporting weight.  Concerned for failed knee prosthesis. Now chronically swollen.  Refer to ortho where she will have evaluation and xrays at appointment.  -     Anju Matos MD, Orthopedic Surgery (Trauma; Knee; Shoulder), [facility]  5. Bilateral hearing loss due to cerumen impaction, refer to ENT for ear cleaning.  THis has been a recurring problem for patient.  -     Marky Us MD, Otolaryngology, [facility]  6. Acute recurrent maxillary sinusitis with painful right ethmoid and maxillary sinus pain and marked swelling of left nasal mucosa.  Take flonase and allegra for the next month and give doxycycline 100 mg bid for ten days.  -     doxycycline hyclate (VIBRA-TABS) 100 MG tablet; Take 1 tablet by mouth 2 times daily for 10 days, Disp-20 tablet, R-0Normal  7. Severe frontal headaches for 3 months.  Appears to be more than sinus.

## 2024-05-30 ENCOUNTER — HOSPITAL ENCOUNTER (OUTPATIENT)
Dept: VASCULAR LAB | Age: 69
Discharge: HOME | End: 2024-06-01
Payer: MEDICARE

## 2024-05-30 DIAGNOSIS — M79.605 LEFT LEG PAIN: ICD-10-CM

## 2024-05-30 PROCEDURE — 93971 EXTREMITY STUDY: CPT

## 2024-05-31 ENCOUNTER — OFFICE VISIT (OUTPATIENT)
Dept: ENT CLINIC | Age: 69
End: 2024-05-31

## 2024-05-31 VITALS
HEART RATE: 72 BPM | OXYGEN SATURATION: 98 % | DIASTOLIC BLOOD PRESSURE: 68 MMHG | BODY MASS INDEX: 35.79 KG/M2 | WEIGHT: 228 LBS | HEIGHT: 67 IN | SYSTOLIC BLOOD PRESSURE: 103 MMHG | TEMPERATURE: 97.1 F

## 2024-05-31 DIAGNOSIS — H60.8X3 CHRONIC ECZEMATOID OTITIS EXTERNA OF BOTH EARS: ICD-10-CM

## 2024-05-31 DIAGNOSIS — H90.0 CONDUCTIVE HEARING LOSS OF BOTH EARS: ICD-10-CM

## 2024-05-31 DIAGNOSIS — H61.23 IMPACTED CERUMEN OF BOTH EARS: Primary | ICD-10-CM

## 2024-05-31 NOTE — PATIENT INSTRUCTIONS
Schedule an audiogram (hearing test), for recheck evaluation of your hearing loss.    Schedule an appointment for ear recheck and possible cleaning in the future if your hearing is decreased, or you have a sensation of ear wax build up or are told you have ear wax build up by your primary physician or other health care provider.    You may use an over the counter ear wax removal kit (such as Murine, Bausch and Lomb, NeilMed, or Debrox wax removal system) for ear wax removal, as needed.  It may help to use Debrox (OTC) for 4 days prior to future visits for ear cleaning.  This may soften your ear wax and facilitate removal of the wax.        NO Q-TIPS OR OTHER INSTRUMENTS/OBJECTS IN THE EARS   You should never clean your ears with a Q-tip, cotton tipped applicator, Eri pin, paper clip, safety pin, pen cap, or any other instrument.  This will tend to push wax in deeper and pack the ear canal with wax.  There is a high risk and danger of this practice, especially rupture of ear drum, dislocation or other damage to ossicles, and permanent, irreversible, and irreparable hearing loss.  It may cause inflammation and irritation of the ear canal and cause itching or pain.  I recommend only use of one the several ear wax removal kits available \"over the counter\" if you feel a need to try to remove ear wax.  For example, Murine, Bausch and Lomb, NeilMed, or Debrox ear wax removal kits may be used for ear wax removal, as needed.  No other methods should be self used for cleaning your ears.

## 2024-06-07 ENCOUNTER — HOSPITAL ENCOUNTER (OUTPATIENT)
Age: 69
Discharge: HOME | End: 2024-06-07
Payer: MEDICARE

## 2024-06-07 ENCOUNTER — OFFICE VISIT (OUTPATIENT)
Dept: ORTHOPEDIC SURGERY | Age: 69
End: 2024-06-07

## 2024-06-07 VITALS — BODY MASS INDEX: 35.79 KG/M2 | WEIGHT: 228 LBS | HEIGHT: 67 IN

## 2024-06-07 DIAGNOSIS — T84.84XA PAIN IN KNEE REGION AFTER TOTAL KNEE REPLACEMENT, INITIAL ENCOUNTER (HCC): ICD-10-CM

## 2024-06-07 DIAGNOSIS — M25.561 RIGHT KNEE PAIN, UNSPECIFIED CHRONICITY: Primary | ICD-10-CM

## 2024-06-07 DIAGNOSIS — M25.561 RIGHT KNEE PAIN, UNSPECIFIED CHRONICITY: ICD-10-CM

## 2024-06-07 DIAGNOSIS — Z96.659 PAIN IN KNEE REGION AFTER TOTAL KNEE REPLACEMENT, INITIAL ENCOUNTER (HCC): ICD-10-CM

## 2024-06-07 DIAGNOSIS — M25.562 LEFT KNEE PAIN, UNSPECIFIED CHRONICITY: ICD-10-CM

## 2024-06-07 LAB
CRP SERPL-MCNC: <3 MG/L (ref 0–5.1)
ERYTHROCYTE [SEDIMENTATION RATE] IN BLOOD BY WESTERGREN METHOD: 61 MM/HR (ref 0–30)

## 2024-06-07 PROCEDURE — 86140 C-REACTIVE PROTEIN: CPT

## 2024-06-07 PROCEDURE — 36415 COLL VENOUS BLD VENIPUNCTURE: CPT

## 2024-06-07 PROCEDURE — 85652 RBC SED RATE AUTOMATED: CPT

## 2024-06-10 ENCOUNTER — TELEPHONE (OUTPATIENT)
Dept: ORTHOPEDIC SURGERY | Age: 69
End: 2024-06-10

## 2024-06-10 NOTE — TELEPHONE ENCOUNTER
----- Message from Marvin Bazan MD sent at 6/10/2024  8:55 AM EDT -----  Can u let pt know her labs look reassuring and she should continue PT   08-Aug-2018 05:30

## 2024-06-11 ENCOUNTER — PROCEDURE VISIT (OUTPATIENT)
Dept: AUDIOLOGY | Age: 69
End: 2024-06-11
Payer: MEDICARE

## 2024-06-11 DIAGNOSIS — H90.3 SENSORINEURAL HEARING LOSS (SNHL) OF BOTH EARS: Primary | ICD-10-CM

## 2024-06-11 DIAGNOSIS — R42 DIZZINESS AND GIDDINESS: ICD-10-CM

## 2024-06-11 PROCEDURE — 92557 COMPREHENSIVE HEARING TEST: CPT | Performed by: AUDIOLOGIST

## 2024-06-11 PROCEDURE — 92567 TYMPANOMETRY: CPT | Performed by: AUDIOLOGIST

## 2024-06-11 NOTE — PROGRESS NOTES
function.      Reliability: Good  Transducer: Inserts    See scanned audiogram dated 6/11/2024 for results.      PATIENT EDUCATION:       The following items were discussed with the patient:   - Good Communication Strategies  - Hearing Loss and Hearing Aids    Educational information was shared in the After Visit Summary.                                                RECOMMENDATIONS:                                                                                                                                                                                                                                                                      The following items are recommended based on patient report and results from today's appointment:  - Continue medical follow-up with Marky Terrazas MD.  - Retest hearing as medically indicated and/or sooner if a change in hearing is noted.  - If desired, schedule a Hearing Aid Evaluation (HAE) appointment to discuss hearing aid options.  She was provided with a list of known Medicaid hearing aid providers.  - Utilize \"Good Communication Strategies\" as discussed to assist in speech understanding with communication partners.         Asya Sprague  Audiologist       Chart CC'd to: Marky Terrazas MD      Degree of   Hearing Sensitivity dB Range   Within Normal Limits (WNL) 0 - 20   Mild 20 - 40   Moderate 40 - 55   Moderately-Severe 55 - 70   Severe 70 - 90   Profound 90 +

## 2024-06-13 ENCOUNTER — TELEPHONE (OUTPATIENT)
Dept: PRIMARY CARE CLINIC | Age: 69
End: 2024-06-13

## 2024-06-13 DIAGNOSIS — F41.9 ANXIETY: Primary | ICD-10-CM

## 2024-06-13 RX ORDER — DIAZEPAM 2 MG/1
2 TABLET ORAL EVERY 8 HOURS PRN
Qty: 10 TABLET | Refills: 0 | Status: SHIPPED | OUTPATIENT
Start: 2024-06-13 | End: 2024-06-23

## 2024-06-13 NOTE — TELEPHONE ENCOUNTER
Patient says she is canceling her brain MRI. She says she needs a sedative before she has it and would like Dr. Richardson to call something in for it. She'll reschedule afterward. Please advise ASAP

## 2024-06-19 ENCOUNTER — HOSPITAL ENCOUNTER (OUTPATIENT)
Dept: PHYSICAL THERAPY | Age: 69
Setting detail: THERAPIES SERIES
Discharge: HOME | End: 2024-06-19
Attending: ORTHOPAEDIC SURGERY

## 2024-06-26 ENCOUNTER — HOSPITAL ENCOUNTER (OUTPATIENT)
Dept: MRI IMAGING | Age: 69
Discharge: HOME | End: 2024-06-26
Attending: INTERNAL MEDICINE
Payer: MEDICARE

## 2024-06-26 DIAGNOSIS — R51.9 SEVERE FRONTAL HEADACHES: ICD-10-CM

## 2024-06-26 PROCEDURE — 70551 MRI BRAIN STEM W/O DYE: CPT

## 2024-07-18 ENCOUNTER — OFFICE VISIT (OUTPATIENT)
Dept: ENT CLINIC | Age: 69
End: 2024-07-18
Payer: MEDICARE

## 2024-07-18 VITALS
HEIGHT: 67 IN | BODY MASS INDEX: 34.69 KG/M2 | SYSTOLIC BLOOD PRESSURE: 124 MMHG | HEART RATE: 62 BPM | TEMPERATURE: 97.9 F | DIASTOLIC BLOOD PRESSURE: 71 MMHG | OXYGEN SATURATION: 100 % | WEIGHT: 221 LBS

## 2024-07-18 DIAGNOSIS — Z86.018 HISTORY OF INVERTED PAPILLOMA: Chronic | ICD-10-CM

## 2024-07-18 DIAGNOSIS — J33.0 POLYP OF RIGHT NASAL CAVITY: Primary | Chronic | ICD-10-CM

## 2024-07-18 PROCEDURE — 31231 NASAL ENDOSCOPY DX: CPT | Performed by: OTOLARYNGOLOGY

## 2024-07-18 NOTE — PROGRESS NOTES
Chief Complaint   Patient presents with    Inverting papilloma of right nasal cavity.    Nose Problem         PROCEDURE - Diagnostic Nasal Endoscopy, bilateral  [CPT 11593]    INFORMED CONSENT    Risks, benefits, and alternatives of diagnostic nasal endoscopy were explained to the patient.  Specific mention was made of the risk of nosebleed, drainage, throat numbness with difficulty swallowing, and pain from the procedure.  The patient gave oral consent to proceed.        INDICATIONS/HISTORY  Patient underwent partial excision of the right inferior turbinate with micro debridement and excision of tissue from the inferior nasal turbinate for recurrent inverting papilloma on 1/5/2024.  She came today for repeat endoscopic examination of the nose to rule out persistent disease.    FINDINGS    There were post surgical changes on the right side; all well healed, with no evidence of active sinus disease.   Small polyp versus papilloma in the right nasal cavity between the middle turbinate and septum.  Otherwise, no polyps or masses bilaterally.  The right middle meatus and ethmoid cavity, were clear.  The right maxillary antrostomy was patent. The nasal septum was midline, with no significant deviation.  The left middle and inferior turbinates appeared to be normal.  There was no purulent exudate, polyp, mass, or lesion appreciated in the left nasal cavity or middle meatus.  The olfactory clefts appeared to be clear.  The sphenoethmoid recesses and sphenoid sinus ostia appeared to be clear and patent.  The remainder of the right and left nasal cavities appeared to be normal.           DESCRIPTION OF PROCEDURE    The nasal cavities were decongested and anesthetized with a mixture of equal parts of 0.05% oxymetazoline solution and 4% lidocaine solution by nasal sprayer.  This was repeated after 5 minutes.  After an appropriate elapse of time, the 4.4 mm 30 degree rigid nasal telescope was inserted into the left nasal

## 2024-07-23 ENCOUNTER — OFFICE VISIT (OUTPATIENT)
Dept: PRIMARY CARE CLINIC | Age: 69
End: 2024-07-23
Payer: MEDICARE

## 2024-07-23 VITALS
BODY MASS INDEX: 35.06 KG/M2 | OXYGEN SATURATION: 100 % | SYSTOLIC BLOOD PRESSURE: 120 MMHG | HEART RATE: 71 BPM | TEMPERATURE: 97.9 F | DIASTOLIC BLOOD PRESSURE: 70 MMHG | HEIGHT: 67 IN | WEIGHT: 223.4 LBS

## 2024-07-23 DIAGNOSIS — R10.10 UPPER ABDOMINAL PAIN: ICD-10-CM

## 2024-07-23 DIAGNOSIS — E53.8 B12 DEFICIENCY: ICD-10-CM

## 2024-07-23 DIAGNOSIS — R10.11 RUQ PAIN: Primary | ICD-10-CM

## 2024-07-23 PROCEDURE — 1123F ACP DISCUSS/DSCN MKR DOCD: CPT | Performed by: INTERNAL MEDICINE

## 2024-07-23 PROCEDURE — 1090F PRES/ABSN URINE INCON ASSESS: CPT | Performed by: INTERNAL MEDICINE

## 2024-07-23 PROCEDURE — G8417 CALC BMI ABV UP PARAM F/U: HCPCS | Performed by: INTERNAL MEDICINE

## 2024-07-23 PROCEDURE — 3017F COLORECTAL CA SCREEN DOC REV: CPT | Performed by: INTERNAL MEDICINE

## 2024-07-23 PROCEDURE — 1036F TOBACCO NON-USER: CPT | Performed by: INTERNAL MEDICINE

## 2024-07-23 PROCEDURE — 96372 THER/PROPH/DIAG INJ SC/IM: CPT | Performed by: INTERNAL MEDICINE

## 2024-07-23 PROCEDURE — G8399 PT W/DXA RESULTS DOCUMENT: HCPCS | Performed by: INTERNAL MEDICINE

## 2024-07-23 PROCEDURE — 3074F SYST BP LT 130 MM HG: CPT | Performed by: INTERNAL MEDICINE

## 2024-07-23 PROCEDURE — G9899 SCRN MAM PERF RSLTS DOC: HCPCS | Performed by: INTERNAL MEDICINE

## 2024-07-23 PROCEDURE — 99214 OFFICE O/P EST MOD 30 MIN: CPT | Performed by: INTERNAL MEDICINE

## 2024-07-23 PROCEDURE — 3078F DIAST BP <80 MM HG: CPT | Performed by: INTERNAL MEDICINE

## 2024-07-23 PROCEDURE — G8427 DOCREV CUR MEDS BY ELIG CLIN: HCPCS | Performed by: INTERNAL MEDICINE

## 2024-07-23 RX ORDER — CYANOCOBALAMIN 1000 UG/ML
1000 INJECTION, SOLUTION INTRAMUSCULAR; SUBCUTANEOUS ONCE
Status: COMPLETED | OUTPATIENT
Start: 2024-07-23 | End: 2024-07-23

## 2024-07-23 RX ADMIN — CYANOCOBALAMIN 1000 MCG: 1000 INJECTION, SOLUTION INTRAMUSCULAR; SUBCUTANEOUS at 10:30

## 2024-07-23 ASSESSMENT — ENCOUNTER SYMPTOMS
COLOR CHANGE: 1
CHOKING: 0
DIARRHEA: 0
ABDOMINAL DISTENTION: 0
CHEST TIGHTNESS: 0
VOICE CHANGE: 0
CONSTIPATION: 0
BLOOD IN STOOL: 0
ABDOMINAL PAIN: 1
FACIAL SWELLING: 0

## 2024-07-23 NOTE — PROGRESS NOTES
[place] ? Cannot take Nsaids!    Post gastric bypass 2005      GERD    Repeat Colonscopy 1/21  ; Polyps; O Marilee     cholecystectomy   Endocrine: Negative for cold intolerance and heat intolerance.        Followed by Dr. Trujillo  for thyroid   Genitourinary:  Vaginal discharge: itching.        Mammogram  3/23    Pap 0n 9/127/18    Musculoskeletal:  Positive for arthralgias.        DEXA  8/22 osteopenia       Sees Ortho neck / [facility]   1/21       S/P Surgery  DDD 8/26/20      DJD .    Skin:  Positive for color change (over rt breast).   Psychiatric/Behavioral:  Negative for self-injury, sleep disturbance and suicidal ideas.         Anxiety: followed by counselor       Objective:   Physical Exam  Constitutional:       General: She is not in acute distress.     Appearance: She is obese.   Neck:      Comments:     Cardiovascular:      Rate and Rhythm: Regular rhythm.      Heart sounds: Normal heart sounds.   Pulmonary:      Breath sounds: Normal breath sounds.   Abdominal:      General: There is distension.      Palpations: There is no mass.      Tenderness: There is abdominal tenderness (RUQ). There is no guarding or rebound.      Hernia: No hernia is present.   Musculoskeletal:         General: Normal range of motion.      Right lower leg: No edema.      Left lower leg: No edema.   Skin:     General: Skin is warm.      Comments:     Loss of pigmentation  in rt breast and left thigh   Neurological:      General: No focal deficit present.      Mental Status: She is oriented to person, place, and time.   Psychiatric:         Mood and Affect: Mood normal.         Behavior: Behavior normal.         Assessment:    [place] was seen today for upper abdominal pain.    Diagnoses and all orders for this visit:    RUQ pain  -     CBC with Auto Differential; Future  -     Comprehensive Metabolic Panel; Future  -     Sedimentation Rate; Future  -     US ABDOMEN LIMITED; Future  -     Lipid Panel; Future  -     Urinalysis; Future  -     NEXIUM

## 2024-07-26 ENCOUNTER — HOSPITAL ENCOUNTER (OUTPATIENT)
Dept: ULTRASOUND IMAGING | Age: 69
Discharge: HOME | End: 2024-07-26
Payer: MEDICARE

## 2024-07-26 DIAGNOSIS — R10.11 RUQ PAIN: ICD-10-CM

## 2024-07-26 PROCEDURE — 76705 ECHO EXAM OF ABDOMEN: CPT

## 2024-07-29 PROBLEM — R65.21 SEPTIC SHOCK (HCC): Status: ACTIVE | Noted: 2024-01-01

## 2024-07-29 PROBLEM — J96.01 ACUTE RESPIRATORY FAILURE WITH HYPOXIA (HCC): Status: ACTIVE | Noted: 2024-01-01

## 2024-07-29 PROBLEM — I95.9 HYPOTENSION: Status: ACTIVE | Noted: 2024-01-01

## 2024-07-29 PROBLEM — K56.609 SMALL BOWEL OBSTRUCTION (HCC): Status: ACTIVE | Noted: 2024-01-01

## 2024-07-29 PROBLEM — I46.9 CARDIAC ARREST (HCC): Status: ACTIVE | Noted: 2024-01-01

## 2024-07-29 PROBLEM — A41.9 SEPTIC SHOCK (HCC): Status: ACTIVE | Noted: 2024-01-01

## 2024-07-29 PROBLEM — K55.9 ISCHEMIC BOWEL DISEASE (HCC): Status: ACTIVE | Noted: 2024-01-01

## 2024-07-29 PROBLEM — E87.20 LACTIC ACIDOSIS: Status: ACTIVE | Noted: 2024-01-01

## 2024-07-29 NOTE — CONSULTS
101.3 kg (223 lb 6.4 oz)   07/18/24 100.2 kg (221 lb)         General Appearance:  Intubated, sedated.   Head:  Normocephalic, without obvious abnormality, atraumatic   Neck: Supple, symmetrical, trachea midline, no adenopathy, thyroid: not enlarged, symmetric, no tenderness/mass/nodules, no carotid bruit.        Lungs:   Respirations unlabored, clear to auscultation bilaterally, without any wheezes, rubs or ronchi.    Chest Wall:  No tenderness or deformity   Heart:  Regular rhythm, rate is tachy, S1, S2 normal, there is no murmur, there is no rub or gallop, cannot assess jvd, no bilateral lower extremity edema   Abdomen:   Soft, non-tender, bowel sounds active all four quadrants,  no masses, no organomegaly       Extremities: Extremities normal, atraumatic, no cyanosis.    Pulses: 2+ and symmetric   Skin: Skin color, texture, turgor normal, no rashes or lesions   Pysch: Normal mood and affect   Neurologic: Normal gross motor and sensory exam.  Cranial nerves intact        Labs:     Recent Labs     07/29/24  0231 07/29/24  1205    141   K 3.9 4.1   BUN 12 17   CREATININE 1.3* 1.5*    108   CO2 22 15*   GLUCOSE 119* 94   CALCIUM 10.7* 9.2   MG  --  1.70*     Recent Labs     07/29/24  0231 07/29/24  1205 07/29/24  1325   WBC 6.0 1.2* 1.3*   HGB 12.2 10.9* 9.2*   HCT 37.1 35.0* 30.5*    110* 83*   MCV 87.3 91.2 92.1     No results for input(s): \"CHOLTOT\", \"TRIG\", \"HDL\", \"CHOLHDL\", \"LDL\" in the last 72 hours.    Invalid input(s): \"LIPIDCOMM\", \"VLDCHOL\"  Recent Labs     07/29/24  1205   INR 3.82*     No results for input(s): \"CKTOTAL\", \"CKMB\", \"CKMBINDEX\", \"TROPONINI\" in the last 72 hours.  No results for input(s): \"BNP\" in the last 72 hours.  No results for input(s): \"TSH\" in the last 72 hours.  No results for input(s): \"CHOL\", \"HDL\", \"TRIG\" in the last 72 hours.    Invalid input(s): \"LDLCALC\"]    Lab Results   Component Value Date    CKTOTAL 104 01/20/2023    TROPONINI <0.01 05/25/2021  would like to thank you for providing me the opportunity to participate in the care of your patient. If you have any questions, please do not hesitate to contact me.     Gilberto Matos MD, [facility], [place] [address]  Ph: [phone]  Fax: [phone]

## 2024-07-29 NOTE — PERIOP NOTE
PRE-OP NOTE  Department of Bariatric Surgery      Chief Complaint or Reason for Surgery: SBO with internal hernia     Procedure: Diagnostic laparoscopy, possible bowel resection, possible open  Expected time: 7/29/2024, 2:00PM    Plan  1. Diet: NPO now  2. IVF: Plasmalyte 100 mL/hr  3. Antibiotics: Levaquin 750 mg, Flagyl 500 mg  4. Labs to be drawn: CBC and Renal panel resulted 7/29 @ 02:31, Type and Screen and INR ordered today.   5. Anesthesia: to see patient  6. Consent: Obtained. In chart  7. Pulmonary: CXR on 7/29/24 reviewed  8. Cardiac: EKG on 7/29/24 reviewed  9. Pregnancy test: N/A    Deya Pryor DO  PGY1, General Surgery  07/29/24  8:39 AM  [phone]

## 2024-07-29 NOTE — ANESTHESIA POSTPROCEDURE EVALUATION
Department of Anesthesiology  Postprocedure Note    Patient: Cayla Farfan  MRN: [id number]  YOB: 1955  Date of evaluation: 7/29/2024    Procedure Summary       Date: 07/29/24 Room / Location: [place] [address] [facility]    Anesthesia Start: 1734 Anesthesia Stop: 1950    Procedure: EXPLORATORY LAPAROTOMY, LYSIS OF ADHESION, REDUCTION OF SMALL BOWEL VOLVULUS, CLOSURE OF ENTEROTOMY, REPLACEMENT OF ABTHERA (Abdomen) Diagnosis:       SBO (small bowel obstruction) (HCC)      (SBO (small bowel obstruction) (HCC) [K56.609])    Surgeons: Ankit Coto DO Responsible Provider: Heavenly Strickland DO    Anesthesia Type: general ASA Status: 5 - Emergent            Anesthesia Type: No value filed.    Wilfrid Phase I:      Wilfrid Phase II:      Anesthesia Post Evaluation    Patient location during evaluation: ICU  Patient participation: waiting for patient participation  Level of consciousness: sedated and ventilated  Nausea & Vomiting: no nausea and no vomiting  Cardiovascular status: vasoactive/inotropes  Respiratory status: ventilator  Hydration status: stable  Comments: Pt administered 1 unit PRBCs, 1 unit FFP. Vasopressors remain at preop rates. Pt stable intraoperatively but remains critically ill.   Pain management: adequate    No notable events documented.

## 2024-07-29 NOTE — PROGRESS NOTES
07/29/24 1525   Encounter Summary   Encounter Overview/Reason Crisis   Service Provided For Family   Referral/Consult From Other    Support System Significant other;Children;Family members;Friends/neighbors   Last Encounter  07/29/24  ([facility])   Complexity of Encounter High   Begin Time 1320   End Time  1400   Total Time Calculated 40 min   Crisis   Type Code Blue   Assessment/Intervention/Outcome   Assessment Anxious;Stress overload;Tearful;Shock;Sad;Questioning meaning and purpose;Powerlessness   Intervention Active listening;Discussed belief system/[employer] practices/jaquelin;Discussed relationship with God;Explored/Affirmed feelings, thoughts, concerns;Nurtured Hope;Sustaining Presence/Ministry of presence;Explored Coping Skills/Resources   Outcome Engaged in conversation;Expressed feelings, needs, and concerns;Connection/Belonging;Comfort;Grieving;Receptive     Student  Ira Dang

## 2024-07-29 NOTE — PROGRESS NOTES
Pt from [facility] with abd pain/N/S, tenderness in RUQ with possible SBO. Sent to [facility], adm to 5S. Pt received pain meds and metoprolol, became bradycardic, and was found to be unresponsive. Initiated compressions, ROSC achieved after 2 rounds per report, Intubated, L Fem CVC placed, sent to ICU around 1240. At 1245 /88 (95). Pt Sinus tach in 110's. Surgery residents at bedside to place R Radial A-Line, kit set up for Hemosphere. BP cycled but failed to read. Upon zeroing Arterial line Initial BP 33/27 with a visible waveform on arterial line and palpable femoral pulse. Per MD, pt received as 200 Mcg Anthony @ 13:15, 200 mcg Anthony @ 13:16, 200 mcg Anthony 1318, Initiated compressions upon loss of pulse at 13:19, 1 mg epi given. 1320 1 bicarb given. Rosc Achieved after 1 round of compressions. Pharmacist mixed epi gtt which was started at 10mcg/min at 1325. At 1324 one more amp of bicarb given. 1326 Epi up to 20mcg.min, at 1327 Levophed gtt started at rate of 15mcg/min. 1328 titrated to 2mcg/min, 1 more amp of bicarb given. 1329 new bag of LR hung at 999 rate. 1330 CaCl given, levo gtt to 25mcg/min. 1333 levo gtt to 35. New labs sent, abg obtained. Pt went down to CT and obtained CT abd with contrast, this RN and charge nurse brought pt back to ICU. Pt was prepped and sent to OR with CRNA and OR nurses, but came back shortly after for hypotension. Pt was started on neosynepherine, received 1u platelets, 2u plasma, Multiple fluid bolus' of Plasmalyte per Surgical resident. Peep increased to 15, vitals improved significantly and pt sent down to OR with Dr. Strickland for exploratory laparotomy.

## 2024-07-29 NOTE — BRIEF OP NOTE
Brief Postoperative Note      Patient: Cayla Farfan  YOB: 1955  MRN: [id number]    Date of Procedure: 7/29/2024    Pre-Op Diagnosis Codes:     * SBO (small bowel obstruction) ([facility]) [K56.609]    Post-Op Diagnosis: Same       Procedure(s):  EXPLORATORY LAPAROTOMY, LYSIS OF ADHESION, REDUCTION OF SMALL BOWEL VOLVULUS, CLOSURE OF ENTEROTOMY, REPLACEMENT OF ABTHERA    Surgeon(s):  Ankit Coto DO    Assistant:  Resident: Valentin Betancourt DO    Anesthesia: General    Estimated Blood Loss (mL): less than 50     Complications: None    Specimens:   * No specimens in log *    Implants:  * No implants in log *      Drains:   Negative Pressure Wound Therapy Abdomen Medial;Upper (Active)       NG/OG/NJ/NE Tube Left nostril (Active)   Surrounding Skin Clean, dry & intact 07/29/24 1006   Securement device Other (comment) 07/29/24 1006   Free Water/Flush (mL) 20 mL 07/29/24 1006   Output (mL) 0 ml 07/29/24 1006       Findings:  Infection Present At Time Of Surgery (PATOS) (choose all levels that have infection present):  No infection present  Other Findings: small bowel volvulus, lysis of adhesions, controlled enterotomy created for bowel decompression.    Electronically signed by Valentin Betancourt DO on 7/29/2024 at 7:21 PM

## 2024-07-29 NOTE — PROCEDURES
PROCEDURE NOTE  Date: 7/29/2024   Name: Cayla Farfan  YOB: 1955    Insert Arterial Line    Date/Time: 7/29/2024 3:14 PM    Performed by: Evelyn Murray DO  Authorized by: Ankit Coto DO  Consent: Verbal consent obtained.  Risks and benefits: risks, benefits and alternatives were discussed  Consent given by: power of   Required items: required blood products, implants, devices, and special equipment available  Patient identity confirmed: hospital-assigned identification number and arm band  Time out: Immediately prior to procedure a \"time out\" was called to verify the correct patient, procedure, equipment, support staff and site/side marked as required.  Preparation: Patient was prepped and draped in the usual sterile fashion.  Indications: multiple ABGs and hemodynamic monitoring  Location: right radial  Anesthesia: local infiltration    Anesthesia:  Local Anesthetic: lidocaine 1% without epinephrine  Anesthetic total: 5 mL    Sedation:  Patient sedated: no    Needle gauge: 20  Seldinger technique: Seldinger technique used  Number of attempts: 1  Post-procedure: line sutured and dressing applied  Patient tolerance: patient tolerated the procedure well with no immediate complications  Comments: EBL: 5 cc        Evelyn Murray DO, MSMEd  PGY-3, General Surgery  07/29/24  3:14 PM  [facility]   Detail Level: Detailed Add 01018 Cpt? (Important Note: In 2017 The Use Of 18751 Is Being Tracked By Cms To Determine Future Global Period Reimbursement For Global Periods): yes Wound Evaluated By: Anupama Goldstein PA-C

## 2024-07-29 NOTE — PROGRESS NOTES
Attempted transport to OR with Dr. Strickland and 2 OR RNs. While in the elevator BP dropped to 50s/20s. 1mg epi given at 1554  in elevator. Decision made to bring patient back up to ICU room. Once in ICU room, a pulse was palpable. Additional 1mg epi given at 1557 and 1602 for low BP. 1amp bicarb and 1g CaCl also given at 1602 for low BP and hemodynamic instability. Infusions of Epi, Levo, and Vaso continued running throughout time. ICU team and Dr. Alonzo immediately at bedside to discuss plan of care. Decision made to wait 1 hour and reassess patient for hemodynamic stability. ICU team and Dr. Alonzo remained at bedside for handoff of care.

## 2024-07-29 NOTE — SIGNIFICANT EVENT
Rapid Response turned Code Blue     Rapid response called for patient bradycardic to 30s after having received 20mg labetolol. Shortly after had lost pulse.     12:18 no pulse, compressions initiated   12:20 pulse check, no pulse, PEA, compressions continued, 1mg epi given  12:22 pulse check, no pulse, PEA, compressions continued, 1 amp bicarb given  12:24 pulse back - ROSC     Surgery at bedside placed L fem CVC, and a line  Stat labs revealed elevated LA to 11.5.   Pt was intubated and transferred to ICU for further management.       --------------------------------  ADDENDUM    On zeroing a line around 13:15, BP 33/27 with a visible waveform on art line with palpable pulse.     13:15 200 hannah push   13:16 200 hannah push   13:18 200 hannah push     13:19 lost pulse, PEA arrest, compressions initiated, 1mg epi   13:20 1 amp bicarb   13:21 ROSC achieved     13:24 1 amp bicarb   13:25 epi gtt started   13:27 levo gtt started   13:28 1 amp bicarb  13:29 fluids wide open  13:30 calcium chloride 1g      Repeat POCT labs revealed lactic acid of 14. ABG with pH of 7.324, pCO2 39.5, pO2 180. She was then started on bicarb drip 3 amps in 1L.     Surgery team at bedside. Pt later taken to CT imaging.       Nathan Christiansen DO  Internal Med, PGY-2  07/29/24    Responded to Code Blue  Chest compression were ongoing  Labs were just drawn prior and reviewed  On tele patient was sinus tachycardia and was given labetalol prior to PEA arrest  ROSC achieved with bicarb and epi push suspected profound acidemia  Primary surgical team was there and placement left femoral CVC  Wise of labs were ordered  Intubated and transfer to ICU for further care    Pt has a high probability of imminent or life-threatening deterioration requiring close monitoring, and highly complex decision-making and/or interventions of high intensity to assess, manipulate, and support his critical organ systems to prevent the his inevitable decline which would occur if

## 2024-07-29 NOTE — PROGRESS NOTES
4 Eyes Skin Assessment     NAME:  Cayla Farfan  YOB: 1955  MEDICAL RECORD NUMBER:  [id number]    The patient is being assessed for  Admission    I agree that at least one RN has performed a thorough Head to Toe Skin Assessment on the patient. ALL assessment sites listed below have been assessed.      Areas assessed by both nurses:    Head, Face, Ears, Shoulders, Back, Chest, Arms, Elbows, Hands, Sacrum. Buttock, Coccyx, Ischium, and Legs. Feet and Heels        Does the Patient have a Wound? No noted wound(s)       Kavon Prevention initiated by RN: No  Wound Care Orders initiated by RN: No    Pressure Injury (Stage 3,4, Unstageable, DTI, NWPT, and Complex wounds) if present, place Wound referral order by RN under : No    New Ostomies, if present place, Ostomy referral order under : No     Nurse 1 eSignature: Electronically signed by Franky Hunter RN on 7/29/24 at 8:28 AM EDT    **SHARE this note so that the co-signing nurse can place an eSignature**    Nurse 2 eSignature: {Esignature:[id number]}

## 2024-07-29 NOTE — PROGRESS NOTES
Levaquin 750 mg q24h ordered for patient.  This medication is renally eliminated.  Will change to 750 mg q48h per renal dose adjustment policy.     Estimated Creatinine Clearance: 44 mL/min (A) (based on SCr of 1.5 mg/dL (H)).     Pharmacy will continue to monitor renal function and adjust dose as necessary.    Please call with any questions.  Patti Banegas, PharmD, BCPS  Main pharmacy: [id number]  7/29/2024 1:58 PM

## 2024-07-29 NOTE — H&P
Bariatric Surgery   Resident History and Physical     Reason for Admission: SBO    History of Present Illness:   Cayla Farfan is a 69 y.o. 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 Patient reports that she has had chronic intermittent abdominal pain but since 2 weeks ago on Saturday (7/20), she had a sudden onset of worsening epigastric pain with nausea and vomiting. She went to her PCP later that week for evaluations and received an US of her RUQ that showed unremarkable findings and post cholecystectomy. Unfortunately, patient states that her pain became extremely worse and decided to visit the ED today. Denies of fever, chills, chest pain, or SOB. Last BM: several days ago. Last passing of gas: 2 days ago. Last meal: 2 days ago. At [facility], patient was HDS with no leukocytosis noted. CT abd/pelvis revealed SBO related to an internal hernia in the right mid abdomen. Patient received levaquin/flagyl and NGT. Patient has now been transferred to [facility] for admission under Bariatric surgery.     Past Medical History:        Diagnosis Date    Anxiety state     Asthma     Back ache     chronic    Environmental allergies     GERD (gastroesophageal reflux disease)     Hypertension     MVP (mitral valve prolapse)     Neck pain, chronic     Reflux     Sleep apnea     no CPAP       Past Surgical History:        Procedure Laterality Date    BACK SURGERY      BLADDER SUSPENSION      CHOLECYSTECTOMY      FOOT SURGERY  11/09/2012    endoscopic plantar fasciotomy right foot    GASTRIC BYPASS SURGERY      HERNIA REPAIR      HIATAL HERNIA REPAIR      HYSTERECTOMY (CERVIX STATUS UNKNOWN)      JOINT REPLACEMENT Bilateral     KNEE    KNEE ARTHROPLASTY  02/28/2013    L    KNEE ARTHROSCOPY Right 03/07/2014    LAMINECTOMY  07/01/2011    bilateral L5-S1 laminotomy, nerve root exploration, foraminotomy    NASAL SINUS SURGERY Right 02/05/2018    Excision of rt nasal mass    KS  fexofenadine (ALLEGRA) 180 MG tablet Take 1 tablet by mouth daily 30 tablet 5    fluticasone (FLONASE) 50 MCG/ACT nasal spray 2 sprays by Each Nostril route daily Shake liquid 48 g 3    tiZANidine (ZANAFLEX) 2 MG tablet TAKE 1 TABLET BY MOUTH THREE TIMES DAILY AS NEEDED FOR SPASMS 30 tablet 1    zolpidem (AMBIEN) 10 MG tablet Take 1 tablet by mouth nightly as needed for Sleep for up to 90 days. 30 tablet 3    clobetasol (TEMOVATE) 0.05 % ointment Apply topically 2 times daily. 100 g 1    lidocaine (LIDODERM) 5 % Place 1-2 patches onto the skin daily 12 hours on, 12 hours off. 60 patch 0    acetaminophen (TYLENOL) 500 MG tablet Take 1 tablet by mouth 4 times daily as needed for Pain 28 tablet 0    labetalol (NORMODYNE) 300 MG tablet Take 1 tablet by mouth 2 times daily 180 tablet 3    NIFEdipine (PROCARDIA XL) 60 MG extended release tablet Take 1 tablet by mouth daily 90 tablet 3    Galcanezumab-gnlm 100 MG/ML SOSY Inject into the skin every 30 days      Budeson-Glycopyrrol-Formoterol (BREZTRI AEROSPHERE) 160-9-4.8 MCG/ACT AERO Inhale 2 puffs into the lungs in the morning and at bedtime 1 each 11    albuterol sulfate HFA (PROAIR HFA) 108 (90 Base) MCG/ACT inhaler Inhale 2 puffs into the lungs every 6 hours as needed for Wheezing or Shortness of Breath 18 g 11    levothyroxine (SYNTHROID) 25 MCG tablet Take 1 tablet by mouth daily      ferrous sulfate (IRON 325) 325 (65 Fe) MG tablet Take 1 tablet by mouth daily (with breakfast) 90 tablet 1    magnesium oxide (MAG-OX) 400 (240 Mg) MG tablet Take 1 tablet by mouth daily 30 tablet 5    vitamin D (ERGOCALCIFEROL) 1.25 MG (12118 UT) CAPS capsule Take 1 capsule by mouth once a week         Current Meds  sodium chloride flush 0.9 % injection 5-40 mL, 2 times per day  sodium chloride flush 0.9 % injection 5-40 mL, PRN  0.9 % sodium chloride infusion, PRN  ondansetron (ZOFRAN-ODT) disintegrating tablet 4 mg, Q8H PRN   Or  ondansetron (ZOFRAN) injection 4 mg, Q6H

## 2024-07-29 NOTE — ED PROVIDER NOTES
I personally saw the patient and made/approved the management plan and take responsibility for the patient management.    For further details of Cayla Farfan's emergency department encounter, please see the advanced practice provider's documentation.    I, Ralph Urbina MD, am the primary physician provider of record.    CHIEF COMPLAINT  Chief Complaint   Patient presents with    Abdominal Pain     Patient arrives through triage with complaints of abdominal pain. Patient states that she did an ultrasound on Friday and had blood work done earlier in the week but has not received results. Patient states that the pain got really bad this evening. Sharp RUQ pain. Vomiting for several days.        Briefly, Cayla Farfan is a 69 y.o. female  who presents to the ED complaining of a few days of nausea vomiting and bloating with generally upper abdominal pain mostly to the epigastrium and right abdomen.  Has not had a fever.  She is still having some bowel movements though.  She has a remote history of Robinson-en-Y gastric bypass 15+ years ago and remote cholecystectomy as well.  Her PCP ordered a right upper quadrant ultrasound a few days ago for right lower abdominal pain despite her cholecystectomy history and this was nonacute but she is worsened since then so she comes in for evaluation.    FOCUSED PHYSICAL EXAMINATION  BP (!) 168/84   Pulse 83   Temp 97.3 °F (36.3 °C) (Temporal)   Resp 18   Ht 1.702 m (5' 7\")   Wt 101.2 kg (223 lb)   LMP 08/23/2023   SpO2 96%   BMI 34.93 kg/m²    Focused physical examination notable for mild acute distress, well-appearing, well-nourished, normal speech and mentation without obvious facial droop, no obvious rash.  No obvious cranial nerve deficits on my initial exam.  Abdominal bloating present with hypoactive but not tympanic bowel sounds.  She has moderate epigastric and right upper quadrant tenderness with mild tenderness throughout the rest of the abdomen.  No rebound

## 2024-07-29 NOTE — ANESTHESIA PRE PROCEDURE
Continuous Valentin Betancourt,  mL/hr at 07/29/24 1734 Restarted at 07/29/24 1735   • HYDROmorphone (DILAUDID) injection 0.25 mg  0.25 mg IntraVENous Q3H PRN Kerry Murrayine Bigornia, DO        Or   • HYDROmorphone (DILAUDID) injection 0.5 mg  0.5 mg IntraVENous Q3H PRN Evelyn Murrayornia, DO   0.5 mg at 07/29/24 0757   • norepinephrine (LEVOPHED) 16 mg in sodium chloride 0.9 % 250 mL infusion  1-100 mcg/min IntraVENous Continuous Von Moreno, DO 65.625 mL/hr at 07/29/24 1734 70 mcg/min at 07/29/24 1734   • perflutren lipid microspheres (DEFINITY) injection 1.5 mL  1.5 mL IntraVENous ONCE PRN Gilberto Matos MD       • EPINEPHrine 5 mg in sodium chloride 0.9 % 250 mL infusion  1-20 mcg/min IntraVENous Continuous Michael Rodriguez MD 60 mL/hr at 07/29/24 1734 20 mcg/min at 07/29/24 1734   • metroNIDAZOLE (FLAGYL) 500 mg in 0.9% NaCl 100 mL IVPB premix  500 mg IntraVENous Q8H Kerry Murrayine Bigornia, DO       • levoFLOXacin (LEVAQUIN) 750 MG/150ML infusion 750 mg  750 mg IntraVENous Q48H Evelyn Murray Bigornia, DO       • sodium bicarbonate 150 mEq in dextrose 5 % 1,000 mL infusion   IntraVENous Continuous Michael Rodriguez  mL/hr at 07/29/24 1402 NoRateChange at 07/29/24 1734   • VASOpressin 20 Units in sodium chloride 0.9 % 100 mL infusion  0.03 Units/min IntraVENous Continuous MadisonalidNathan, DO 9 mL/hr at 07/29/24 1734 0.03 Units/min at 07/29/24 1734   • 0.9 % sodium chloride infusion   IntraVENous PRN Kerry Murrayine Bigornia, DO       • phenylephrine (GALA-SYNEPHRINE) 50 mg in sodium chloride 0.9 % 250 mL infusion   mcg/min IntraVENous Continuous Zion Winslow, DO 15 mL/hr at 07/29/24 1837 50 mcg/min at 07/29/24 1837   • 0.9 % sodium chloride infusion   IntraVENous PRN Valentin Betancourt, DO       • 0.9 % sodium chloride infusion   IntraVENous PRN Heavenly Strickland DO       • 0.9 % sodium chloride infusion   IntraVENous PRN Heavenly Strickland DO       • sod chloride IRR soln 0.9

## 2024-07-29 NOTE — PROGRESS NOTES
Notified by [facility] PT HR in the low 50s. This RN went into patients room. Pt was agonal breathing. Rapid Response called, pt unresponsive. Code Blue called.

## 2024-07-29 NOTE — ED PROVIDER NOTES
[facility] EMERGENCY DEPARTMENT  EMERGENCY DEPARTMENT ENCOUNTER        Pt Name: Cayla Farfan  MRN: [id number]  Birthdate 1955  Date of evaluation: 7/29/2024  Provider: DONNA Reardon  PCP: Joey Richardson MD  Note Started: 2:15 AM EDT 7/29/24       I have seen and evaluated this patient with my supervising physician Ralph Urbina MD.      CHIEF COMPLAINT       Chief Complaint   Patient presents with    Abdominal Pain     Patient arrives through triage with complaints of abdominal pain. Patient states that she did an ultrasound on Friday and had blood work done earlier in the week but has not received results. Patient states that the pain got really bad this evening. Sharp RUQ pain. Vomiting for several days.        HISTORY OF PRESENT ILLNESS: 1 or more Elements     History From: Patient  Limitations to history : None    Cayla Farfan is a 69 y.o. female with past medical history of GERD, hypertension, anxiety, status post gastric bypass, migraines, obstructive sleep apnea who presents ED with complaint of abdominal pain.  She reports the past several days she has had pain to the upper abdomen that she states is sharp in nature 8/10.  She states anytime she eats or drinks she has immediate nausea and vomiting.  She reports PCP ordered ultrasound earlier this week and she is unsure of the results.  She denies chest pain or shortness of breath.  Denies fever or chills.  Denies any rashes or lesions.  Denies urinary symptoms or changes in bowel movements.  Became concerned and came to the ED for further evaluation and treatment.  Reports past surgical history of gastric bypass, cholecystectomy, hiatal hernia repair, hysterectomy, partial splenectomy, upper GI scope and bladder suspension surgery.    Nursing Notes were all reviewed and agreed with or any disagreements were addressed in the HPI.    REVIEW OF SYSTEMS :      Review of Systems   Constitutional:  Positive for

## 2024-07-29 NOTE — PROCEDURES
PROCEDURE NOTE  Date: 7/29/2024   Name: Cayla Farfan  YOB: 1955    CENTRAL LINE    Date/Time: 7/29/2024 1:00 PM    Performed by: Evelyn Murray DO  Authorized by: Ankit Coto DO  Consent: The procedure was performed in an emergent situation.  Patient identity confirmed: arm band and hospital-assigned identification number  Indications: vascular access  Preparation: skin prepped with ChloraPrep  Location details: left femoral  Site selection rationale: code line  Patient position: flat  Catheter type: triple lumen  Catheter size: 7 Fr  Number of attempts: 1  Successful placement: yes  Post-procedure: line sutured and dressing applied  Assessment: blood return through all ports and free fluid flow  Patient tolerance: patient tolerated the procedure well with no immediate complications  Comments: EBL: 5 cc          Evelyn Murray DO, MSMEd  PGY-3, General Surgery  07/29/24  3:12 PM  [employer]

## 2024-07-29 NOTE — PROGRESS NOTES
Patient admitted to room 5317 from ED. Patient is A&O x 4. VSS. Patient oriented to the room all safety measures in place. Patient given IS and SCDs at this time. Admission orders released and patient 4 eyes completed. Admission documentation completed. No other needs are noted at this time.    [x] Bed alarm on and cord plugged into wall  [x] Bed in lowest position  [x] Call light and bedside table within reach  [x] Patient educated on all safety measures  []Oxygen connected to wall (if applicable)     Nurse 1 Esignature: Electronically signed by Franky Hunter RN on 7/29/24 at 8:28 AM EDT  Nurse 2 Esignature: {Esignature:[id number]}

## 2024-07-29 NOTE — CODE DOCUMENTATION
Patient had a PEA arrest on 5S and was transferred to ICU.  Presentation was for abdominal pain, and found to have an internal hernia on CT scan.  She had been doing well and the plan was for her to go to the ED when she abruptly decompensated.  She was on the vent, but not requiring continuous pressors when I met her.  However, while getting central and arterial lines placed her bps started dropping and she was given several pushes of neosynephrine.  I was called to bedside because of concerns for another impending arrest.  When I arrived her blood pressure on the arterial line said 50 over 20s with a MAP in the 40s.  There were Doppler and for pulse nor having difficulty sustaining the signal.  A pulse was not palpable I called a code.  She received chest compressions, an amp of bicarbonate, and an amp of epinephrine and then we were able to reestablish a pulse and blood pressure.  However the blood pressure continued to run low.  We gave a total of 3 A of bicarb and started her on an epinephrine followed by Levophed drip.  Blood gas at the time showed an adequately compensated pH at 7.32 with a pCO2 in the 30s and a lactate of 14.  Prior to the ABG was also concerned that she could be hyperkalemic related to her acidemia so she was given an amp of calcium chloride.    Additional interventions were needed but will reserve that for my consult note.   same as patient

## 2024-07-29 NOTE — PROGRESS NOTES
Patient manually bagged for 6 minutes per ACLS guidelines on a 100% FIO2 during chest compressions.  Capnography readings ranged from 32 mmHg to 41 mmHg during manual bagging and chest compressions.  The code started at 1324 PM and ended at 1330   PM.

## 2024-07-29 NOTE — CONSULTS
ICU CONSULT       Hospital Day: 0  ICU Day: 0                                                        Code:Full Code  Admit Date: 7/29/2024  PCP: Joey Richardson MD                                  CC: Abdominal Pain s/p Code Blue x2    HISTORY OF PRESENT ILLNESS:   Mrs. Farfan is a 69 y.o. F w/pmhx of GERD, HTN, HLD, gracie-en-y gastric bypass (+15 yrs ago), and cholecystectomy who presented to ED [place] for chronic abdominal pain that acutely worsened with nausea and vomiting.    Pt transferred to [facility] for admission by Bariatric surgery for CT abd/pelvis revealed SBO related to internal hernia in the right mid abdomen.  Surgery started patient on levaquin/flagyl, placed NGT, with plans for OR today.  Last BM several days ago, last passing of gas 2 days ago, last meal 2 days ago.    ICU team called to patient's room at 1210 for rapid called for markedly decreased RR.  Once ICU team arrived at bedside pt had lost her pulse and CPR was initiated.  ROSC achieved after 6 minutes, however, pt remained unresponsive requiring mechanical ventilation and brought to ICU.  She was started on norepinephrine, epinephrine, and levophed before losing a pulse once more at 1315 ROSC achieved after three rounds of CPR.  Patient is now requiring four pressors with phenylephrine added.  BP ranging 70/50's to 100's/60's with added pressors.  Surgery on board and would like to take patient down to OR when she is more stable as unlikely cardiac etiology of PEA per cardiologist at bedside.  Bedside echo performed, LVEF 80% with small left ventricle and NRWMA.  Suspected septic shock, with severe acidemia, consistent with stress-induced cardiomyopathy.    Admitted to ICU for continued pressor support s/p code blue x2.    PAST HISTORY:     Past Medical History:   Diagnosis Date    Anxiety state     Asthma     Back ache     chronic    Environmental allergies     GERD (gastroesophageal reflux disease)     Hypertension     MVP (mitral  surgery for planned OR today 7/29. At 1210 rapid response called for decreased RR patient lost pulse and CPR was initiated.  Surgery team present placed central line and arterial line.  ROSC achieved after six minutes.  Patient started on epinephrine and norepinephrine and transferred to ICU where she remained unresponsive off sedation.  She lost pulse again at 1310 with ROSC after four rounds of CPR.  Pt remained hypotensive despite addition of levophed.  Phenylephrine added which brought BP to 90's/50's with MAP 40-50s.   - Bariatric surgery consulted:  patient taken to surgery at 1745.  Will follow-up    post-op recommendations.   - Lactic Acid 14.50 ->10.92 will continue to trend likely 2/2 poor perfusion.   - Continue NE, Epi, Levo, and Anthony drips, titrate to MAP goal >60.   - Continue Levaquin and Flagyl   - Continue Plasmalyte-A at 125 mL/hr   - Continue sodium bicarb 150 meq in D5 at 150mL/hr            Code Status:Full Code  FEN: Diet NPO   PPX:  Lovenox  DISPO: ICU    This patient has been staffed and discussed with Dr. Michael Rodriguez MD  -----------------------------  Zion Winslow DO, PGY-2  Internal Medicine Resident  7/29/2024  1:16 PM      Patient seen, examined and discussed with the resident and I agree with the assessment and plan.  Briefly, this is a 69 y.o. female with PEA arrest, small bowel obstruction secondary to internal hernia.    Vent Mode: AC/PRVC Resp Rate (Set): 26 bpm/Vt (Set, mL): 450 mL/ /FiO2 : 100 %  PEEP/CPAP (cmH2O): 5   Peak Inspiratory Pressure (cmH2O): 28 cmH2O  ETCO2 (mmHg): 27 mmHg   Recent Labs     07/29/24  1623 07/29/24  1810   PHART 7.312* 7.125*   TZR1YNS 36.2 48.3*   PO2ART 49.9* 57.6*       Patient has a history of Robinson-en-Y about a decade and a half ago and presented with worsening abdominal pain overnight.  CT abdomen at 2:30 in the morning showed an internal hernia with small bowel obstruction.  Plan was for her to go to the OR today after decompression.  However,  just after noon today she had a change in her vital signs, becoming hypertensive and tachycardic.  She was given a one-time dose of labetalol on the floor and then abruptly lost her blood pressure and became asystolic.  Code was called and she got 2 rounds of CPR before ROSC.  She was transferred to the ICU intubated.  She further decompensated in the ICU with dropping blood pressures and arrested a second time.  She needed an additional round of CPR and dose of epinephrine to get ROSC but she was not able to maintain her blood pressure without vasopressors so she was started on an epinephrine drip.  This was an adequate and Levophed drip was also started.  My presumption was that she was acidemic likely from ischemic bowel, this was discussed at length with the surgery team as we could think of no other explanation for why she would so abruptly decompensate, even though this explanation was hard to fathom given how quickly she decompensated.  An ABG soon after achieving ROSC the second time showed a pH of 7.31 with a pCO2 of 36, and a lactate of 14.  Over the course of the next several hours she required increasing doses of pressor and quickly was maxed out on Levophed at 100 mcg/min, and epinephrine at 30 mcg/min so vasopressin was added.  This is why we are giving her a bicarbonate drip with 3 A of bicarb in a liter of water at 150 mL an hour and boluses of Plasma-Lyte 1 L at a time.  She was taken down to the CT scanner and it did not show obvious evidence of ischemic bowel but it did show ischemia in the liver.  This was discussed with surgery and it was again felt that it was likely related to her bowel obstruction.  The plan along was for her to go to the OR to have repair of her hernia and now removal of possible ischemic bowel, however patient was in the uncomfortable position of being possibly too sick to survive surgery but definitely too sick to survive without surgery.  The first attempt to take her to

## 2024-07-29 NOTE — PROGRESS NOTES
Student Chaplains Arnel and mira Farmer responded to Code Blue for PT. PT had large amount of family present in the waiting room that were cared for by the  team. Chaplains will continue to be avilable for support.  Student Jamin Mckeon       07/29/24 1320   Encounter Summary   Encounter Overview/Reason Crisis   Service Provided For Family   Referral/Consult From Other    Support System Significant other;Children;Family members;Friends/neighbors   Last Encounter  07/29/24  (MB)   Complexity of Encounter High   Begin Time 1320   End Time  1400   Total Time Calculated 40 min   Crisis   Type Code Blue   Assessment/Intervention/Outcome   Assessment Anxious;Compromised coping;Despair;Impaired resilience;Impaired social interaction;Interrupted family processes;Moral distress;Powerlessness;Sad;Shock;Tearful   Intervention Active listening;Confronted/Challenged;Empowerment;Explored/Affirmed feelings, thoughts, concerns;Nurtured Hope;Sustaining Presence/Ministry of presence   Outcome Engaged in conversation;Expressed feelings, needs, and concerns;Receptive

## 2024-07-29 NOTE — PROGRESS NOTES
responded to codes and offered support to pt's family while medical staff cared for pt. Listening to them as they shared stories of their experiences with pt. Family tearful and concerned about pt's health. Large family present waiting to get more information on pt's health.  offered compassionate listening presence, emotional support and prayer at waiting area with family present. Other  following up as needed.     07/29/24 1604   Encounter Summary   Encounter Overview/Reason Crisis   Service Provided For Family   Referral/Consult From Multi-disciplinary team   Support System Significant other;Children;Family members   Last Encounter  07/29/24  (ME, Responded to rapid, code blue, offered support to family, prayed at waiting area.)   Crisis   Type Code Blue;Rapid Response   Assessment/Intervention/Outcome   Assessment Anxious;Tearful;Shock;Sad;Questioning meaning and purpose;Fearful;Concerns with suffering   Intervention Active listening;Confronted/Challenged;Discussed belief system/Zoroastrianism practices/jaquelin;Discussed illness injury and it’s impact;Discussed meaning/purpose;Empowerment;Explored/Affirmed feelings, thoughts, concerns;Explored Coping Skills/Resources;Guided Imagery, Healing touch, etc.;Life review/Legacy;Nurtured Hope;Prayer (assurance of)/[place];Sustaining Presence/Ministry of presence   Outcome Encouraged;Engaged in conversation;Expressed Gratitude;Comfort;Less anxious, Less agitated;Receptive;Restored Hope

## 2024-07-30 NOTE — DEATH NOTES
Death Pronouncement Note  Patient's Name: Cayla Farfan   Patient's YOB: 1955  MRN Number: [id number]    Admitting Provider: Ankit Coto DO  Attending Provider: Ankit Coto DO    Patient was examined and the following were absent: Pulses, Blood Pressure, and Respiratory effort    I declared the patient dead on 7/30/2024 at 10:03 AM    Preliminary Cause of Death: Multi-organ failure with heart failure (HCC)     Electronically signed by Zion Winslow DO on 7/30/24 at 10:06 AM EDT

## 2024-07-30 NOTE — PLAN OF CARE
Point of Care Note:  Bariatric Surgery  Cyala Farfan    5:19 AM  7/30/2024    Discussed with significant other who wanted this resident to speak to daughter regarding prognosis of mother. Discussed with daughter that the patient's prognosis is very poor and likelihood of returning to her normal function was very low. Daughter understands and had no questions at this time.      Evelyn Murray DO, Laya  PGY-3, General Surgery  07/30/24  5:23 AM  Richy

## 2024-07-30 NOTE — PROGRESS NOTES
responded to request from [facility] to comfort and support family of PT. PT's family have been here since the early morning and are in great distress regarding PT's prognosis. Family is rather large and was present yesterday, ~20 total.  provided spiritual care and will continue to check on family as the day progresses.  Student , Jamin Lagunas       07/30/24 0600   Encounter Summary   Encounter Overview/Reason Crisis   Service Provided For Family   Referral/Consult From Nurse   Support System Significant other;Children;Family members;Friends/neighbors   Last Encounter  07/30/24  (MB)   Complexity of Encounter High   Begin Time 0600   End Time  0635   Total Time Calculated 35 min   Assessment/Intervention/Outcome   Assessment Anxious;Compromised coping;Concerns with suffering;Decisional conflict;Despair;Impaired social interaction;Interrupted family processes;Moral distress;Powerlessness;Sad;Shock;Stress overload;Tearful   Intervention Active listening;Discussed illness injury and it’s impact;Discussed relationship with God;Empowerment;Explored/Affirmed feelings, thoughts, concerns;Grief Care;Nurtured Hope;Prayer (assurance of)/[place];Sustaining Presence/Ministry of presence   Outcome Deescalated;Engaged in conversation;Expressed feelings, needs, and concerns;Grieving   Plan and Referrals   Plan/Referrals Continue to visit, (comment)  (MB)

## 2024-07-30 NOTE — CARE COORDINATION
Case Management Assessment  Initial Evaluation    Date/Time of Evaluation: 7/30/2024 9:12 AM  Assessment Completed by: STEFAN FULLER    If patient is discharged prior to next notation, then this note serves as note for discharge by case management.    Patient Name: Cayla Farfan                   YOB: 1955  Diagnosis: SBO (small bowel obstruction) (HCC) [K56.609]  Small bowel obstruction (HCC) [K56.609]                   Date / Time: 7/29/2024  7:31 AM    Patient Admission Status: Inpatient   Readmission Risk (Low < 19, Mod (19-27), High > 27): Readmission Risk Score: 19.1    Current PCP: Joey Richardson MD  PCP verified by CM? No    Chart Reviewed: Yes      History Provided by: Medical Record  Patient Orientation: Unable to Assess    Patient Cognition: Other (see comment) (MARKO)    Hospitalization in the last 30 days (Readmission):  No    If yes, Readmission Assessment in CM Navigator will be completed.    Advance Directives:      Code Status: DNR-CC   Patient's Primary Decision Maker is: Named in Scanned ACP Document    Primary Decision Maker: partha cruz - Child - [phone]    Secondary Decision Maker: Zeeshan Young - Friend - [phone]    Discharge Planning:    Patient lives with:   Type of Home:    Primary Care Giver: Other (Comment)  Patient Support Systems include: Family Members   Current Financial resources: Medicare  Current community resources: None  Current services prior to admission:              Current DME:              Type of Home Care services:       ADLS  Prior functional level: Independent in ADLs/IADLs  Current functional level: Assistance with the following:, Dressing, Toileting, Bathing, Feeding, Cooking, Housework, Shopping, Mobility    PT AM-PAC:   /24  OT AM-PAC:   /24    Family can provide assistance at DC: No  Would you like Case Management to discuss the discharge plan with any other family members/significant others, and if so, who? No  Plans to Return to

## 2024-07-30 NOTE — PROGRESS NOTES
ICU Post Operative SURGERY PROGRESS NOTE    CC: SBO    Procedure: exploratory laparotomy, lysis of adhesion, reduction of small bowel volvulus, closure of enterotomy, replacement of abthera     SUBJECTIVE:   Interval Hx: Patient currently intubated and sedated, on pressors, and not making much urine.      Date 07/29/24 0701 - 07/30/24 0700 07/30/24 0701 - 07/31/24 0700   Shift 0701-1900 1901-0700 24 Hour Total 0701-1900 1901-0700 24 Hour Total   INTAKE   I.V. 1198 255 4249      Blood 225 6926.4 7151.4        Volume (Transfuse Plasma)  33.3 33.3        Volume (Transfuse platelets) 225  225        Volume (Transfuse Plasma)  3180.2 3180.2        Volume (Transfuse Plasma)  3296.7 3296.7        Volume (Transfuse Plasma)  133.2 133.2        Volume (Transfuse RBC)  283.1 283.1      NG/GT 20  20      IV Piggyback 250  250      Shift Total 2345 7576.4 9921.4      OUTPUT   Urine  200 200      Emesis/NG output 0  0      Blood  50 50        EBL (mL)  50 50      Shift Total 0 250 250      NET 2345 7326.4 9671.4            ROS: A 14 point review of systems was conducted, significant findings as noted above. All other systems negative.  OBJECTIVE:   Vitals:   Vitals:    07/30/24 0015 07/30/24 0030 07/30/24 0103 07/30/24 0106   BP: (!) 135/100 (!) 137/91     Pulse: (!) 112 (!) 113 (!) 114 (!) 116   Resp: 26 26 26 26   Temp:       TempSrc:       SpO2: 94% 94% 93% 95%   Weight:       Height:           I/O:   Intake/Output Summary (Last 24 hours) at 7/30/2024 0157  Last data filed at 7/29/2024 2111  Gross per 24 hour   Intake 9921.4 ml   Output 250 ml   Net 9671.4 ml     I/O last 3 completed shifts:  In: 2345 [I.V.:1850; Blood:225; NG/GT:20; IV Piggyback:250]  Out: 0     Diet: Diet NPO      Physical Examination:   General appearance: Intubated  Eyes: No scleral icterus  Neck: Trachea midline, neck supple  Chest/Lungs: Normal effort with no accessory muscle use on mechanical ventilation  Cardiovascular: RRR, well-perfused  Abdomen:  occlusion      4. Mildly increased small bowel obstruction with transition point related to   what appears to be internal hernia      5. Trace pleural effusions with dependent atelectasis      Electronically signed by Jamin Gilman      XR CHEST PORTABLE   Final Result   1.  Endotracheal tube projects 3 cm above the [place].   2.  Low lung volumes with perihilar and bibasilar airspace opacity may represent   atelectasis, pneumonia, or edema.      Electronically signed by Marilu Barrios      XR ABDOMEN (KUB) (SINGLE AP VIEW)   Final Result      Nasoenteric catheter tip is in the mid stomach. There is a suture line overlying   the region of the gastric fundus. Cholecystectomy clips are noted. Normal bowel   gas pattern.      Atelectasis or scarring in the left lung base.      Electronically signed by Evens Smiht          ASSESSMENT AND PLAN:   Adolph is a 69 y.o. female with cc SBO s/p exploratory laparotomy, Roberto, reduction of small bowel volvulus, closure of enterotomy, replacement of abthera (7/29) Day of Surgery      Neuro:   Pain/sedation/psych  No sedation or analgesia at this time     Cardiovascular:   Hx of HTN, MVP, HLD  - Home meds: atorvastatin, labetalol, nifeidipine - hold meds until able to take po intake and clinical improvement of hypovolemia  - Recent echo 7/29 hyperdynamic LV systolic function with EF > 80%    Hypovolemic shock  - Currently on Epinephrine 5 mg, Levophed 16 mg, Anthony 50 mg, vasopressin 0.03  - Continue IVF     Lactic acidosis  - Continue to trend Q6H  - Thiamine 100 mg x1 given  - 1.5 L Plasmalyte bolus given overnight    PEA arrest  - S/p 2 code blue (2 rounds, 1 round until ROSC)  - Cardiology following    Pulmonary:   Acute hypoxic failure   - Remained intubated for surgery  - Goal to extubate per ICU team  - Continue to wean ventilation  - Will obtain AM CXR to assess fluid overload     GI:   Hx Robinson-en-Y; s/p exploratory laparotomy, Roberto, reduction of small bowel volvulus, closure  of enterotomy, replacement of Abthera (7/29)  - F/u wound vac output (1.4 L/24 hrs)  - Diet NPO  - Continue NGT LCWS, monitor output (  - Monitor for abdominal compartment syndrome  - Plan for OR for definitive abdominal closure pending clinical course    Shock liver, transaminitis  - Continue IVF resuscitation  - Monitor daily LFTs      FEN:   - Plasmalyte 150 cc/hr  - Sodium bicarb 150 cc/hr  - Follow electrolyte replacement protocol  - Continue NPO     /Renal:   Acute kidney injury  - Goal UOP 0.3-0.5 cc/kg/hr  - Serum Cr 1.7 (baseline 1.0)  - Continue strict I/O with Oliver     Hem/ID:   Acute anemia  - S/p 4 U FFP, 1 U platelets, 1 U pRBC  - Hgb 10.9 from 7.9  - Continue daily CBC    Leukopenia, thrombocytopenia  - WBC 2.2 from 1.3  - Continue daily CBC  - Monitor for fevers and bleeding    S/p exploratory laparotomy, Roberto, reduction of small bowel volvulus, closure of enterotomy, replacement of Abthera (7/29)  - Continue IV abx (Levofloxacin, Metronidazole)    Prophylaxis:   SCDs, Lovenox     Access:  Central Access: 7/29/24 femoral  Peripheral Access: 7/29/24 (x2)  Arterial Line Access: 7/29/24 (x1)                                Oliver Date placed: 7/29/24  NGT Date placed: 7/29/24  ETT Date placed: 7/29/24     Mobility:  Intubated and sedated, unable to assess     Dispo:   ICU    Code Status: Full Code  -----------------------------  Barbara Piña MD  07/30/24  1:57 AM

## 2024-07-30 NOTE — PROGRESS NOTES
Pt's family all at bedside and okay to stop all vasopressors. Emotional support given. Will continue to monitor.

## 2024-07-30 NOTE — PROGRESS NOTES
[facility] called to inform pf family wishing to withdrawal care. [facility] supports family decision and okay to extubate pt. Call back with cardiac time of death.

## 2024-07-30 NOTE — PROGRESS NOTES
POC Surgery note:  Patient decompensated this AM with worsening acidosis, worsening renal failure with hyperkalemia, and increasing difficulty with ventilation. The patient would need CRRT at this point for control on acidosis and electrolyte derangements.    Discussion was has with decision makers, daughter and son, who after extensive explanation in regards to starting CRRT and continued care stated that the patient would not want to continue. The decision was made to proceed with comfort care at this time. Orders were placed and nursing staff made aware of code status changes. Attending Dr. Nnamdi Betancourt DO PGY-5  General Surgery  07/30/24  7:40 AM

## 2024-07-30 NOTE — PROGRESS NOTES
ICU DAILY PROGRESS NOTE  SURGERY    SUBJECTIVE:   INTERVAL HISTORY:   Unable to obtain from patient secondary to intubation and non responsive    REVIEW OF SYSTEMS:     Unable to obtain from patient secondary to intubation and non responsive.    OBJECTIVE:   VITALS: BP (!) 85/73   Pulse (!) 103   Temp 98.4 °F (36.9 °C) (Bladder)   Resp 26   Ht 1.702 m (5' 7\")   Wt 112.7 kg (248 lb 7.3 oz)   LMP 08/23/2023   SpO2 90%   BMI 38.91 kg/m²     PHYSICAL EXAMINATION:   General appearance: Intubated  Eyes: No scleral icterus  Neck: Trachea midline, neck supple  Chest/Lungs: Normal effort with no accessory muscle use on mechanical ventilation  Cardiovascular: RRR, well-perfused  Abdomen: Soft, appropriately-tender, incisions c/d/i and well approximated with Dermabond. Abthera in place  Skin: Warm and dry, no rashes  Extremities: Some edema to upper and lower extremities  Genitourinary: Oliver in place with scant, clear yellow urine  Neuro: Sedated and unable to assess    INTAKE/OUTPUT:     Intake/Output Summary (Last 24 hours) at 7/30/2024 0830  Last data filed at 7/30/2024 0800  Gross per 24 hour   Intake 88739.59 ml   Output 2685 ml   Net 42889.59 ml     I/O last 3 completed shifts:  In: 39838.6 [I.V.:4997.3; Blood:7151.4; NG/GT:20; IV Piggyback:2500.9]  Out: 2685 [Urine:285; Drains:2350; Blood:50]    LABORATORY RESULTS:  CBC:   Recent Labs     07/29/24  1807 07/29/24 2131 07/30/24  0408   WBC 1.3* 2.2* 3.5*   HGB 7.9* 10.9* 8.6*   HCT 25.1* 33.4* 26.8*   PLT 97* 93* 81*       BMP:   Recent Labs     07/29/24  1205 07/29/24  1325 07/29/24  2131 07/30/24  0408    146* 143 146*   K 4.1 3.7 3.1* 6.1*    108 102 104   CO2 15* 15* 16* 11*   BUN 17 17 17 19   CREATININE 1.5* 1.7* 1.7* 2.3*   GLUCOSE 94 51* 127* 32*   PHOS 3.1  --  3.6 6.0*     LFT's:   Recent Labs     07/29/24  1325 07/29/24  2131 07/30/24  0408   AST 1,775* 1,749* 2,417*   * 605* 886*   BILITOT 1.0 1.4* 1.3*   ALKPHOS 295* 263* 216*

## 2024-07-30 NOTE — PROGRESS NOTES
[facility] surgical resident had a discussion with pt's daughter and son at bedside in regards to pt's condition and plan of care. Both son and daughter are in agreement to comfort care. Emotional support provided. Will continue to monitor.

## 2024-07-30 NOTE — PLAN OF CARE
Problem: Discharge Planning  Goal: Discharge to home or other facility with appropriate resources  7/29/2024 2027 by Emmett Morel RN  Outcome: Progressing  7/29/2024 2026 by Emmett Morel RN  Outcome: Progressing     Problem: Safety - Adult  Goal: Free from fall injury  7/29/2024 2027 by Emmett Morel RN  Outcome: Progressing  7/29/2024 2026 by Emmett Morel RN  Outcome: Progressing  Flowsheets (Taken 7/29/2024 1300)  Free From Fall Injury: Instruct family/caregiver on patient safety     Problem: Pain  Goal: Verbalizes/displays adequate comfort level or baseline comfort level  7/29/2024 2027 by Emmett Morel RN  Outcome: Progressing  7/29/2024 2026 by Emmett Morel RN  Outcome: Progressing     Problem: Neurosensory - Adult  Goal: Achieves stable or improved neurological status  Outcome: Progressing     Problem: Respiratory - Adult  Goal: Achieves optimal ventilation and oxygenation  Outcome: Progressing     Problem: Cardiovascular - Adult  Goal: Maintains optimal cardiac output and hemodynamic stability  Outcome: Progressing     Problem: Skin/Tissue Integrity - Adult  Goal: Skin integrity remains intact  Outcome: Progressing  Goal: Incisions, wounds, or drain sites healing without S/S of infection  Outcome: Progressing  Goal: Oral mucous membranes remain intact  Outcome: Progressing     Problem: Musculoskeletal - Adult  Goal: Return mobility to safest level of function  Outcome: Progressing     Problem: Gastrointestinal - Adult  Goal: Minimal or absence of nausea and vomiting  Outcome: Progressing  Goal: Maintains adequate nutritional intake  Outcome: Progressing  Goal: Establish and maintain optimal ostomy function  Outcome: Progressing     Problem: Genitourinary - Adult  Goal: Urinary catheter remains patent  Outcome: Progressing     Problem: Infection - Adult  Goal: Absence of infection at discharge  Outcome: Progressing  Goal: Absence of infection during hospitalization  Outcome: Progressing

## 2024-07-30 NOTE — PROGRESS NOTES
[facility] called to inform that family has decided to withdrawal care today. [facility] stated to call back with time of family decision. Will continue to monitor.

## 2024-07-30 NOTE — PROGRESS NOTES
provided support and care for family of PT. PT's daughter made the decision to withdraw life sustaining measures after family had been able to say their goodbyes.  was present at time of death and received  home request from PT's daughter. Family has left the room with the PT and  followed up with RN/care team for additional support.  Student Jamin Mckeon       24 0800   Encounter Summary   Encounter Overview/Reason Grief, Loss, and Adjustments   Service Provided For Family   Referral/Consult From Nurse   Support System Significant other;Children;Family members;Friends/neighbors   Last Encounter  24  (MB)   Complexity of Encounter High   Begin Time 0800   End Time  1130   Total Time Calculated 210 min   Assessment/Intervention/Outcome   Assessment Angry;Anxious;Complicated grieving;Compromised coping;Concerns with suffering;Decisional conflict;Despair;Fearful;Impaired resilience;Impaired social interaction;Interrupted family processes;Moral distress;Powerlessness;Questioning jaquelin;Sad;Shock;Stress overload;Tearful   Intervention Active listening;Confronted/Challenged;Discussed belief system/Pentecostal practices/jaquelin;Discussed death, afterlife;Discussed illness injury and it’s impact;Discussed meaning/purpose;Empowerment;End of Life Care;Explored/Affirmed feelings, thoughts, concerns;Explored Coping Skills/Resources;Grief Care;Nurtured Hope;Prayer (assurance of)/[place];Sustaining Presence/Ministry of presence   Outcome Deescalated;Engaged in conversation;Expressed feelings, needs, and concerns;Expressed feelings of Teresa, Peace and/or Love;Expressed Gratitude;Grieving;Receptive

## 2024-07-30 NOTE — PROGRESS NOTES
Pt  this am around 10 am with pt's family at bedside. Emotional support provided. Will continue to monitor.

## 2024-07-30 NOTE — DISCHARGE SUMMARY
INTERNAL MEDICINE DEPARTMENT - [facility]   DISCHARGE SUMMARY - DEATH NOTE   TIME OF DEATH 10:03a , 7/30/2024    Patient ID: Cayla Farfan                                             Patient's PCP: Joey Richardson MD                                          Admitting Physician: Ankit Coto DO  Admit Date: 7/29/2024   Discharge Physician: Zion Winslow DO   Discharge Date: 7/30/2024     PROBLEMS DURING HOSPITALIZATION:  Patient Active Problem List   Diagnosis    Bariatric surgery status    Epidural lipomatosis    Adhesive capsulitis    Rotator cuff tendinitis    Plantar fasciitis, right    Mitral valve disease    GERD (gastroesophageal reflux disease)    HTN (hypertension)    Anxiety    Mild persistent asthma without complication    Osteoarthritis of right knee    Chest pain    Hesitancy of micturition    HTN (hypertension), malignant    Abdominal pain    Subclinical hyperthyroidism    Thyroid nodule    Allergic sinusitis    Pharyngitis    Bilateral impacted cerumen    Nose septum deviation    Anemia    Mau's syndrome    Obstructive apnea    Pancreatitis    Recurrent sinusitis    Headache, cervicogenic    Nasal mass    Inverted papilloma of nasal cavity    Intractable cluster headache syndrome    Migraine with aura and with status migrainosus, not intractable    Morbidly obese (HCC)    Pneumobilia    Closed fracture of nasal bones    Vitamin D deficiency    Facet arthritis of lumbosacral region    Pain in both lower extremities    WILSON (dyspnea on exertion)    Obesity (BMI 30-39.9)    Pulmonary nodule seen on imaging study    Low back pain    Lumbar radicular pain    Hypertrophy of inferior nasal turbinate    Nasal obstruction    Small bowel obstruction (HCC)    Hypotension    Septic shock (HCC)    Ischemic bowel disease (HCC)    Acute respiratory failure with hypoxia (HCC)    Lactic acidosis    Cardiac arrest (HCC)       DISCHARGE DIAGNOSES:  1- Multi-organ failure with heart failure    Hospital Course:       Mrs. Farfan was admitted on 7/29/24 by bariatric surgery for urgent exploratory laparotomy for concern of SBO with likely internal hernia.  Presented as a transfer from [facility].  Per surgery notes patient was very high risk for surgery, discussed risks of having operation vs. Comfort care given patients condition.  POA agreed to risks and benefits and chose to proceed with surgery as well as pursue full life saving efforts if needed.      Just after noon on 7/29 rapid response called to room for patient becoming hypertensive and tachycardic.  She was given prn labetalol 10 mg after which she abruptly lost blood pressure and became asystolic.  Dali Moya called with two rounds of CPR and one dose of epinephrine to achieve ROSC.  She continued to be unresponsive and started on epinephrine gtt as she was not sustaining adequate blood pressures.  Levophed added as epinephrine alone was inadequately supporting her blood pressure.  Discussed with surgery team who agreed that acute decompensation was likely due to ischemic bowl as no other clear explanation.  She arrested once more at 1310 requiring an additional round of CPR and epinephrine.  Over the next several hours pressor requirements continued to increase.  Lactate acid was 14 at this time, given 3 a bicarb with bicarb gtt added.  Started IVF initially with boluses of plasmalyte.  Surgery team discussed again with family risks of procedures as she would not likely survive the procedure, however, needed to have procedure emergently.  Patient was optimized to the extent of ICU team capacity on 4 pressors with bicarb gtt and mechanical ventilation.  Taken to OR late afternoon 1700 7/29.  Patient tolerated procedure without complications.  AM on 7/30 surgery team had repeat discussions with family and code status was changed to DNR-CC.  Mrs. Farfan passed away at 10:03a on 7/30 from multi-system organ failure.     S: Patient is unresponsive    Physical

## 2024-07-30 NOTE — PLAN OF CARE
Progressing  7/29/2024 2027 by Emmett Morel RN  Outcome: Progressing     Problem: Genitourinary - Adult  Goal: Urinary catheter remains patent  7/30/2024 0332 by Dayton Tillman RN  Outcome: Progressing  7/29/2024 2027 by Emmett Morel RN  Outcome: Progressing     Problem: Infection - Adult  Goal: Absence of infection at discharge  7/30/2024 0332 by Dayton Tillman RN  Outcome: Progressing  7/29/2024 2027 by Emmett Morel RN  Outcome: Progressing  Goal: Absence of infection during hospitalization  7/30/2024 0332 by Dayton Tillman RN  Outcome: Progressing  7/29/2024 2027 by Emmett Morel RN  Outcome: Progressing     Problem: Metabolic/Fluid and Electrolytes - Adult  Goal: Electrolytes maintained within normal limits  7/30/2024 0332 by Dayton Tillman RN  Outcome: Progressing  7/29/2024 2027 by Emmett Morel RN  Outcome: Progressing  Goal: Hemodynamic stability and optimal renal function maintained  7/30/2024 0332 by Dayton Tillman RN  Outcome: Progressing  7/29/2024 2027 by Emmett Morel RN  Outcome: Progressing     Problem: Hematologic - Adult  Goal: Maintains hematologic stability  7/30/2024 0332 by Dayton Tillman RN  Outcome: Progressing  7/29/2024 2027 by Emmett Morel RN  Outcome: Progressing     Problem: Safety - Medical Restraint  Goal: Remains free of injury from restraints (Restraint for Interference with Medical Device)  Description: INTERVENTIONS:  1. Determine that other, less restrictive measures have been tried or would not be effective before applying the restraint  2. Evaluate the patient's condition at the time of restraint application  3. Inform patient/family regarding the reason for restraint  4. Q2H: Monitor safety, psychosocial status, comfort, nutrition and hydration  Outcome: Completed  Note: Patient doesn't require use of BSWR       Problem: Safety - Medical Restraint  Goal: Remains free of injury from restraints (Restraint for Interference with Medical Device)  Description:  INTERVENTIONS:  1. Determine that other, less restrictive measures have been tried or would not be effective before applying the restraint  2. Evaluate the patient's condition at the time of restraint application  3. Inform patient/family regarding the reason for restraint  4. Q2H: Monitor safety, psychosocial status, comfort, nutrition and hydration  Outcome: Completed  Note: Patient doesn't require use of BSWR

## 2024-07-31 LAB
EKG ATRIAL RATE: 126 BPM
EKG ATRIAL RATE: 77 BPM
EKG ATRIAL RATE: 98 BPM
EKG DIAGNOSIS: NORMAL
EKG P AXIS: 23 DEGREES
EKG P AXIS: 36 DEGREES
EKG P AXIS: 42 DEGREES
EKG P-R INTERVAL: 132 MS
EKG P-R INTERVAL: 138 MS
EKG P-R INTERVAL: 152 MS
EKG Q-T INTERVAL: 312 MS
EKG Q-T INTERVAL: 354 MS
EKG Q-T INTERVAL: 394 MS
EKG QRS DURATION: 100 MS
EKG QRS DURATION: 82 MS
EKG QRS DURATION: 90 MS
EKG QTC CALCULATION (BAZETT): 445 MS
EKG QTC CALCULATION (BAZETT): 451 MS
EKG QTC CALCULATION (BAZETT): 451 MS
EKG R AXIS: -16 DEGREES
EKG R AXIS: 4 DEGREES
EKG R AXIS: 6 DEGREES
EKG T AXIS: 245 DEGREES
EKG T AXIS: 30 DEGREES
EKG T AXIS: 40 DEGREES
EKG VENTRICULAR RATE: 126 BPM
EKG VENTRICULAR RATE: 77 BPM
EKG VENTRICULAR RATE: 98 BPM
HEMOGLOBIN: 7.5 G/DL (ref 12–16)
HEMOGLOBIN: 8.2 G/DL (ref 12–16)

## 2024-08-01 NOTE — PROGRESS NOTES
Physician Progress Note      PATIENT:               OFELIA CASTELLANOS  CSN #:                  [id number]  :                       1955  ADMIT DATE:       2024 7:31 AM  DISCH DATE:        2024 10:03 AM  RESPONDING  PROVIDER #:        Ankit Coto DO          QUERY TEXT:    Patient admitted with \"SBO/internal hernia contributing to ischemic bowel or   perforation\" (H/P ).  Documentation reflects Septic shock in note in discharge summary .  If   possible, please document in the progress notes and discharge summary if   Septic shock was:    The medical record reflects the following:  Risk Factors: small bowel obstruction  Clinical Indicators:  WBC=1.2, Platelets=110, Lactic acid=14.50,   OS=649-937, RR=27-33, per MD notes \"She is ill-appearing\"; Brief op note    \"small bowel volvulus, lysis of adhesions, controlled enterotomy created for   bowel decompression.\"  Treatment: CBC, CMP, Abdominal CT, Pan culture, General surgery/Pulmonology   consult, s/p Ex. Lap , Palliative care consult, Meds-25 gram of IV Albumin   x1, 3.5L of Plasmalyte IV, 1L of LR IV bolus, IV Levaquin, IV Flagyl  Options provided:  -- Sepsis with septic shock due to small bowel obstruction, POA confirmed   after study  -- Other - I will add my own diagnosis  -- Disagree - Not applicable / Not valid  -- Disagree - Clinically unable to determine / Unknown  -- Refer to Clinical Documentation Reviewer    PROVIDER RESPONSE TEXT:    Provider is clinically unable to determine a response to this query.  Ischemia of unknown origin. No ischemic bowel or perforation    Query created by: Zarina Mcneill on 2024 6:26 PM      Electronically signed by:  Ankit Coto DO 2024 7:40 AM

## 2024-08-04 PROBLEM — K55.9 ISCHEMIC BOWEL DISEASE (HCC): Status: RESOLVED | Noted: 2024-01-01 | Resolved: 2024-08-04

## 2024-08-04 PROBLEM — K56.2 VOLVULUS OF INTESTINE (HCC): Status: ACTIVE | Noted: 2024-08-04

## 2024-08-05 NOTE — OP NOTE
[place] [id number]                            OPERATIVE REPORT      PATIENT NAME: OFELIA CASTELLANOS               : 1955  MED REC NO: [id number]                      ROOM: [facility]  ACCOUNT NO: [id number]                       ADMIT DATE: 2024  PROVIDER: Ankit Coto DO      DATE OF PROCEDURE:  2024    SURGEON:  Ankit Coto DO    PREOPERATIVE DIAGNOSIS:  Small bowel obstruction.    POSTOPERATIVE DIAGNOSES:    1. Small bowel obstruction.  2. Intestinal volvulus without Ischemia    PROCEDURES PERFORMED:    1. Exploratory laparotomy with lysis of adhesions.  2. Reduction of small bowel volvulus.  3. Controlled enterotomy with decompression of small bowel contents and closure.  4. Placement of ABThera temporary abdominal closure device.    ANESTHESIA:  General endotracheal.    COMPLICATIONS:  None.    CONDITION:  Critical.    ESTIMATED BLOOD LOSS:  25 mL.    INDICATION FOR PROCEDURE:  The patient is a 69-year-old female who I was called about from an outside hospital with a small-bowel obstruction consistent on CT scan.  After personal discussion with ER physician, Dr. Urbina, who noted the patient had normal labs, normal and stable vitals, mild abdominal tenderness to palpation without any peritoneal signs and a CT scan consistent with a small bowel obstruction without any obvious signs of intestinal compromise; the patient was placed on antibiotics.  NG tube placed for decompression and was transferred to [facility] for surgical management.  Upon arrival, the patient remained stable with stable vitals that did not show any signs of tachycardia or hypotension.  The patient also had a consistent abdominal exam which was communicated to me by chief resident, and the patient was scheduled for an exploration and possible lysis of adhesions after understanding all risks, benefits, alternatives of the procedure.  Shortly

## 2024-09-03 ENCOUNTER — TELEPHONE (OUTPATIENT)
Dept: PRIMARY CARE CLINIC | Age: 69
End: 2024-09-03

## 2024-09-03 NOTE — TELEPHONE ENCOUNTER
Triston called in to ask for copy of patient's medications that she was allergic too   Triston can pick it up when it is ready   Please call triston [phone] ok to leave vm

## 2024-09-03 NOTE — TELEPHONE ENCOUNTER
[facility] telling them we are unable to give this information out for they are not on patients HIPAA for us to give this to them

## (undated) DEVICE — STERILE POLYISOPRENE POWDER-FREE SURGICAL GLOVES: Brand: PROTEXIS

## (undated) DEVICE — GLOVE ORANGE PI 7   MSG9070

## (undated) DEVICE — PACKING 440411 10PK DOYLE NASAL: Brand: MEROCEL®

## (undated) DEVICE — PACK PROCEDURE SURG EXTREMITY MFFOP CUST

## (undated) DEVICE — SUTURE PERMAHAND SZ 3-0 L30IN NONABSORBABLE BLK SH L26MM K832H

## (undated) DEVICE — BLANKET WRM W29.9XL79.1IN UP BODY FORC AIR MISTRAL-AIR

## (undated) DEVICE — KIT,ANTI FOG,W/SPONGE & FLUID,SOFT PACK: Brand: MEDLINE

## (undated) DEVICE — SYRINGE,CONTROL,LL,FINGER,GRIP: Brand: MEDLINE INDUSTRIES, INC.

## (undated) DEVICE — DRAIN SURG L18IN DIA025IN 100% SIL RADPQ FOR CLS WND DRNAGE

## (undated) DEVICE — HYPODERMIC SAFETY NEEDLE: Brand: MAGELLAN

## (undated) DEVICE — TOWEL,STOP FLAG GOLD N-W: Brand: MEDLINE

## (undated) DEVICE — MEDIA CONTRAST RX ISOVUE-300 61% 30ML VIALS

## (undated) DEVICE — INSUFFLATION NEEDLE TO ESTABLISH PNEUMOPERITONEUM.: Brand: INSUFFLATION NEEDLE

## (undated) DEVICE — DRESSING NEG PRESSURE 665X802 MM ABD OPN STRL ABTHERA ADV LF

## (undated) DEVICE — PATIENT TRACKER 9734887XOM NON-INVASIVE

## (undated) DEVICE — 3M™ STERI-DRAPE™ INSTRUMENT POUCH 1018: Brand: STERI-DRAPE™

## (undated) DEVICE — Device

## (undated) DEVICE — SUTURE VICRYL CTD ANTBCTRL 54 IN TI PLU VLT

## (undated) DEVICE — DRAPE,UTILTY,TAPE,15X26, 4EA/PK: Brand: MEDLINE

## (undated) DEVICE — APPLICATOR  COTTON-TIPPED 6 IN WOOD STRL

## (undated) DEVICE — MOUTHPIECE ENDOSCP L CTRL OPN AND SIDE PORTS DISP

## (undated) DEVICE — SET VLV 3 PC AWS DISPOSABLE GRDIAN SCOPEVALET

## (undated) DEVICE — PACKING NSL W4XL4CM SNUS STNT DISP MEROGEL

## (undated) DEVICE — SUTURE PDS + SZ 1 L96IN ABSRB VLT L65MM TP-1 1/2 CIR PDP880G

## (undated) DEVICE — BLADE 1882940HR 5PK M4 INF TURB 2.9MM: Brand: STRAIGHTSHOT

## (undated) DEVICE — NEEDLE, QUINCKE, 25GX2.5": Brand: MEDLINE

## (undated) DEVICE — CLEANER,CAUTERY TIP,2X2",STERILE: Brand: MEDLINE

## (undated) DEVICE — SPONGE GZ W4XL8IN COT WVN 12 PLY

## (undated) DEVICE — CODMAN® SURGICAL PATTIES 1/2" X 3" (1.27CM X 7.62CM): Brand: CODMAN®

## (undated) DEVICE — SURGICAL SUCTION CONNECTING TUBE WITH MALE CONNECTOR AND SUCTION CLAMP, 2 FT. LONG (.6 M), 5 MM I.D.: Brand: CONMED

## (undated) DEVICE — GLOVE SURG SZ 75 L12IN THK75MIL DK GRN LTX FREE

## (undated) DEVICE — STAPLER SKIN H3.9MM WIRE DIA0.58MM CRWN 6.9MM 35 STPL ROT

## (undated) DEVICE — DRAPE,LAP,CHOLE,W/TROUGHS,STERILE: Brand: MEDLINE

## (undated) DEVICE — BLADE 1884080EM TRICUT 4MMX13CM M4 ROHS: Brand: FUSION®

## (undated) DEVICE — 6 ML SYRINGE LUER-LOCK TIP: Brand: MONOJECT

## (undated) DEVICE — Z DISCONTINUED USE 2516375 APPLICATOR MEDICATED 3 CC CLR STRL CHLORAPREP

## (undated) DEVICE — SOLUTION IV IRRIG WATER 500ML POUR BRL ST 2F7113

## (undated) DEVICE — SOLUTION IRRIG 500ML 0.9% SOD CHLO USP POUR PLAS BTL

## (undated) DEVICE — LIQUIBAND RAPID ADHESIVE 36/CS 0.8ML: Brand: MEDLINE

## (undated) DEVICE — PREMIUM WET SKIN PREP TRAY: Brand: MEDLINE INDUSTRIES, INC.

## (undated) DEVICE — STERILE LATEX POWDER-FREE SURGICAL GLOVESWITH NITRILE COATING: Brand: PROTEXIS

## (undated) DEVICE — SUTURE ABSORBABLE MONOFILAMENT 0 CTX 60 IN VIO PDS + PDP990G

## (undated) DEVICE — PORT VLV 2 W NDL FREE SMRTSITE

## (undated) DEVICE — GENERAL LAPAROSCOPIC: Brand: MEDLINE INDUSTRIES, INC.

## (undated) DEVICE — FORCEPS BX L240CM WRK CHN 2.8MM STD CAP W/ NDL MIC MESH

## (undated) DEVICE — YANKAUER,OPEN TIP,W/O VENT,STERILE: Brand: MEDLINE INDUSTRIES, INC.

## (undated) DEVICE — NEEDLE,22GX1.5",REG,BEVEL: Brand: MEDLINE

## (undated) DEVICE — GOWN AURORA NONREINF LG: Brand: MEDLINE INDUSTRIES, INC.

## (undated) DEVICE — SPONGE,LAP,18"X18",DLX,XR,ST,5/PK,40/PK: Brand: MEDLINE

## (undated) DEVICE — MASTISOL ADHESIVE LIQ 2/3ML

## (undated) DEVICE — 40583 XL ADVANCED TRENDELENBURG POSITIONING KIT: Brand: 40583 XL ADVANCED TRENDELENBURG POSITIONING KIT

## (undated) DEVICE — SUTURE VICRYL SZ 4-0 L18IN ABSRB UD L19MM PS-2 3/8 CIR PRIM J496H

## (undated) DEVICE — BW-412T DISP COMBO CLEANING BRUSH: Brand: SINGLE USE COMBINATION CLEANING BRUSH

## (undated) DEVICE — 1LYRTR 16FR10ML100%SIL UMS SNP: Brand: MEDLINE INDUSTRIES, INC.

## (undated) DEVICE — INTENDED FOR TISSUE SEPARATION, AND OTHER PROCEDURES THAT REQUIRE A SHARP SURGICAL BLADE TO PUNCTURE OR CUT.: Brand: BARD-PARKER ® STAINLESS STEEL BLADES

## (undated) DEVICE — TRAP FLUID

## (undated) DEVICE — PROCEDURE KIT ENDOSCP CUST

## (undated) DEVICE — BLANKET WRM W40.2XL55.9IN IORT LO BODY + MISTRAL AIR

## (undated) DEVICE — TOWEL,OR,DSP,ST,BLUE,STD,4/PK,20PK/CS: Brand: MEDLINE

## (undated) DEVICE — SOLUTION INJ LR VISIV 1000ML BG

## (undated) DEVICE — Device: Brand: JELCO

## (undated) DEVICE — SUTURE VICRYL + SZ 3-0 L18IN ABSRB UD SH 1/2 CIR TAPERCUT NDL VCP864D

## (undated) DEVICE — APPLICATOR MEDICATED 26 CC SOLUTION HI LT ORNG CHLORAPREP

## (undated) DEVICE — WIPE INSTR W73XL73CM VISITEC

## (undated) DEVICE — INSTRUMENT TRACKER 9733533XOM ENT 1PK

## (undated) DEVICE — NEPTUNE E-SEP SMOKE EVACUATION PENCIL, COATED, 70MM BLADE, PUSH BUTTON SWITCH: Brand: NEPTUNE E-SEP

## (undated) DEVICE — SUTURE VICRYL SZ 0 L18IN ABSRB UD POLYGLACTIN 910 BRAID TIE J912G

## (undated) DEVICE — NEEDLE SPNL 22GA L5IN BLK HUB S STL W/ QNCKE PNT W/OUT

## (undated) DEVICE — DRAPE EENT SPLIT

## (undated) DEVICE — GENERAL: Brand: MEDLINE INDUSTRIES, INC.

## (undated) DEVICE — 4-PORT MANIFOLD: Brand: NEPTUNE 2

## (undated) DEVICE — SUTURE VICRYL + SZ 0 L18IN ABSRB UD L36MM CT-1 1/2 CIR VCP840D

## (undated) DEVICE — PENCIL ELECTROSURG ROCK W HOLSTER 50 BX

## (undated) DEVICE — TUBING, SUCTION, 1/4" X 10', STRAIGHT: Brand: MEDLINE

## (undated) DEVICE — TROCAR: Brand: KII FIOS FIRST ENTRY

## (undated) DEVICE — MEDICINE CUP, GRADUATED, STER: Brand: MEDLINE

## (undated) DEVICE — SYRINGE, LUER LOCK, 3ML: Brand: MEDLINE